# Patient Record
Sex: MALE | Race: WHITE | NOT HISPANIC OR LATINO | Employment: OTHER | URBAN - METROPOLITAN AREA
[De-identification: names, ages, dates, MRNs, and addresses within clinical notes are randomized per-mention and may not be internally consistent; named-entity substitution may affect disease eponyms.]

---

## 2017-01-11 ENCOUNTER — ANTICOAGULATION THERAPY VISIT (OUTPATIENT)
Dept: ANTICOAGULATION | Facility: CLINIC | Age: 82
End: 2017-01-11
Payer: MEDICARE

## 2017-01-11 DIAGNOSIS — I50.9 CONGESTIVE HEART FAILURE, UNSPECIFIED CONGESTIVE HEART FAILURE CHRONICITY, UNSPECIFIED CONGESTIVE HEART FAILURE TYPE: ICD-10-CM

## 2017-01-11 DIAGNOSIS — Z79.01 LONG-TERM (CURRENT) USE OF ANTICOAGULANTS: Primary | ICD-10-CM

## 2017-01-11 LAB — INR POINT OF CARE: 2.3 (ref 0.86–1.14)

## 2017-01-11 PROCEDURE — 99207 ZZC NO CHARGE NURSE ONLY: CPT

## 2017-01-11 PROCEDURE — 85610 PROTHROMBIN TIME: CPT | Mod: QW

## 2017-01-11 PROCEDURE — 36416 COLLJ CAPILLARY BLOOD SPEC: CPT

## 2017-01-11 NOTE — PROGRESS NOTES
ANTICOAGULATION FOLLOW-UP CLINIC VISIT    Patient Name:  Miguel Patten  Date:  1/11/2017  Contact Type:  Face to Face    SUBJECTIVE:     Patient Findings     Positives No Problem Findings           OBJECTIVE    INR PROTIME   Date Value Ref Range Status   01/11/2017 2.3* 0.86 - 1.14 Final       ASSESSMENT / PLAN  INR assessment THER    Recheck INR In: 8 WEEKS    INR Location Clinic      Anticoagulation Summary as of 1/11/2017     INR goal 2.0-3.0   Selected INR 2.3 (1/11/2017)   Maintenance plan 2.5 mg (2.5 mg x 1) on Mon, Wed, Fri; 3.75 mg (2.5 mg x 1.5) all other days   Full instructions 2.5 mg on Mon, Wed, Fri; 3.75 mg all other days   Weekly total 22.5 mg   No change documented Ashly Melo RN   Plan last modified Edward Lo RN (6/1/2016)   Next INR check 3/7/2017   Target end date     Indications   CHF (congestive heart failure) (H) [I50.9]  Long-term (current) use of anticoagulants [Z79.01] [Z79.01]         Anticoagulation Episode Summary     INR check location     Preferred lab     Send INR reminders to [facility]    Comments 2.5 mg tablets      Anticoagulation Care Providers     Provider Role Specialty Phone number    Rober العراقي MD [facility] [phone]            See the Encounter Report to view Anticoagulation Flowsheet and Dosing Calendar (Go to Encounters tab in chart review, and find the Anticoagulation Therapy Visit)    Dosage adjustment made based on physician directed care plan.    Ashly Melo, KARI

## 2017-01-24 DIAGNOSIS — I48.91 ATRIAL FIBRILLATION (H): Primary | ICD-10-CM

## 2017-01-24 NOTE — TELEPHONE ENCOUNTER
Hansa    Last Written Prescription Date: 04/11/2016  Last Fill Qty: 90, # refills: 3  Last Office Visit with G, P or [facility] prescribing provider: 09/27/2016       Date and Result of Last PT/INR:   INR      2.3   1/11/2017  INR      2.2   11/16/2016  INR     1.71   7/22/2016  INR     2.25   3/23/2015  INR      1.8   7/11/2014  INR      1.7   1/31/2014       Prema Pizano CMA

## 2017-01-25 RX ORDER — WARFARIN SODIUM 2.5 MG/1
TABLET ORAL
Qty: 90 TABLET | Refills: 3 | Status: ON HOLD | OUTPATIENT
Start: 2017-01-25 | End: 2017-04-21

## 2017-01-25 NOTE — TELEPHONE ENCOUNTER
Prescription approved per [facility] Refill Protocol.    Kerwin Faustin RN, BSN

## 2017-03-08 ENCOUNTER — ANTICOAGULATION THERAPY VISIT (OUTPATIENT)
Dept: ANTICOAGULATION | Facility: CLINIC | Age: 82
End: 2017-03-08
Payer: MEDICARE

## 2017-03-08 DIAGNOSIS — Z79.01 LONG-TERM (CURRENT) USE OF ANTICOAGULANTS: ICD-10-CM

## 2017-03-08 DIAGNOSIS — Z85.46 PERSONAL HISTORY OF MALIGNANT NEOPLASM OF PROSTATE: Primary | ICD-10-CM

## 2017-03-08 DIAGNOSIS — I50.9 CONGESTIVE HEART FAILURE, UNSPECIFIED CONGESTIVE HEART FAILURE CHRONICITY, UNSPECIFIED CONGESTIVE HEART FAILURE TYPE: ICD-10-CM

## 2017-03-08 LAB
ALBUMIN UR-MCNC: 100 MG/DL
APPEARANCE UR: CLEAR
BACTERIA #/AREA URNS HPF: ABNORMAL /HPF
BILIRUB UR QL STRIP: ABNORMAL
COLOR UR AUTO: ABNORMAL
GLUCOSE UR STRIP-MCNC: NEGATIVE MG/DL
HGB UR QL STRIP: ABNORMAL
INR POINT OF CARE: 3.6 (ref 0.86–1.14)
KETONES UR STRIP-MCNC: ABNORMAL MG/DL
LEUKOCYTE ESTERASE UR QL STRIP: NEGATIVE
MUCOUS THREADS #/AREA URNS LPF: PRESENT /LPF
NITRATE UR QL: NEGATIVE
NON-SQ EPI CELLS #/AREA URNS LPF: ABNORMAL /LPF
PH UR STRIP: 6 PH (ref 5–7)
PSA SERPL-MCNC: 1.37 UG/L (ref 0–4)
RBC #/AREA URNS AUTO: ABNORMAL /HPF (ref 0–2)
SP GR UR STRIP: 1.02 (ref 1–1.03)
URN SPEC COLLECT METH UR: ABNORMAL
UROBILINOGEN UR STRIP-ACNC: 1 EU/DL (ref 0.2–1)
WBC #/AREA URNS AUTO: ABNORMAL /HPF (ref 0–2)

## 2017-03-08 PROCEDURE — 84153 ASSAY OF PSA TOTAL: CPT | Performed by: UROLOGY

## 2017-03-08 PROCEDURE — 99207 ZZC NO CHARGE NURSE ONLY: CPT

## 2017-03-08 PROCEDURE — 81001 URINALYSIS AUTO W/SCOPE: CPT | Performed by: UROLOGY

## 2017-03-08 PROCEDURE — 85610 PROTHROMBIN TIME: CPT | Mod: QW

## 2017-03-08 PROCEDURE — 36416 COLLJ CAPILLARY BLOOD SPEC: CPT

## 2017-03-08 NOTE — PROGRESS NOTES
ANTICOAGULATION FOLLOW-UP CLINIC VISIT    Patient Name:  Miguel Patten  Date:  3/8/2017  Contact Type:  Face to Face    SUBJECTIVE:     Patient Findings     Positives Change in medications (on prednisone for the next 3 days)           OBJECTIVE    INR Protime   Date Value Ref Range Status   03/08/2017 3.6 (A) 0.86 - 1.14 Final       ASSESSMENT / PLAN  INR assessment SUPRA    Recheck INR In: 7 WEEKS    INR Location Clinic      Anticoagulation Summary as of 3/8/2017     INR goal 2.0-3.0   Today's INR 3.6!   Maintenance plan 2.5 mg (2.5 mg x 1) on Mon, Wed, Fri; 3.75 mg (2.5 mg x 1.5) all other days   Full instructions 3/9: Hold; Otherwise 2.5 mg on Mon, Wed, Fri; 3.75 mg all other days   Weekly total 22.5 mg   Plan last modified Edward Lo RN (6/1/2016)   Next INR check 4/26/2017   Target end date     Indications   CHF (congestive heart failure) (H) [I50.9]  Long-term (current) use of anticoagulants [Z79.01] [Z79.01]         Anticoagulation Episode Summary     INR check location     Preferred lab     Send INR reminders to [facility]    Comments 2.5 mg tablets      Anticoagulation Care Providers     Provider Role Specialty Phone number    Rober العراقي MD [facility] [phone]            See the Encounter Report to view Anticoagulation Flowsheet and Dosing Calendar (Go to Encounters tab in chart review, and find the Anticoagulation Therapy Visit)    Dosage adjustment made based on physician directed care plan.    Hold tomorrow (already took today's dose) and then resume     Ashly Melo RN

## 2017-03-08 NOTE — MR AVS SNAPSHOT
Miguel ANAND Patten   3/8/2017 9:00 AM   Anticoagulation Therapy Visit    Description:  84 year old male   Provider:  TARIQ ANTI COAG   Department:  Ph Anticoag           INR as of 3/8/2017     Today's INR 3.6!      Anticoagulation Summary as of 3/8/2017     INR goal 2.0-3.0   Today's INR 3.6!   Full instructions 3/9: Hold; Otherwise 2.5 mg on Mon, Wed, Fri; 3.75 mg all other days   Next INR check 4/26/2017    Indications   CHF (congestive heart failure) (H) [I50.9]  Long-term (current) use of anticoagulants [Z79.01] [Z79.01]         Your next Anticoagulation Clinic appointment(s)     Apr 26, 2017  9:00 AM CDT   Anticoagulation [facility] with TARIQ ANTI JOY   [facility]    [address] [place] [phone]   [phone]              Contact Numbers     Clinic Number:         March 2017 Details    Sun Mon Tue Wed Thu Fri Sat        1               2               3               4                 5               6               7               8      2.5 mg   See details      9      Hold         10      2.5 mg         11      3.75 mg           12      3.75 mg         13      2.5 mg         14      3.75 mg         15      2.5 mg         16      3.75 mg         17      2.5 mg         18      3.75 mg           19      3.75 mg         20      2.5 mg         21      3.75 mg         22      2.5 mg         23      3.75 mg         24      2.5 mg         25      3.75 mg           26      3.75 mg         27      2.5 mg         28      3.75 mg         29      2.5 mg         30      3.75 mg         31      2.5 mg           Date Details   03/08 This INR check               How to take your warfarin dose     To take:  2.5 mg Take 1 of the 2.5 mg tablets.    To take:  3.75 mg Take 1.5 of the 2.5 mg tablets.    Hold Do not take your warfarin dose. See the Details table to the right for additional instructions.                April 2017 Details    Sun Mon Tue Wed Thu Fri Sat            1      3.75 mg           2      3.75 mg         3      2.5 mg         4      3.75 mg         5      2.5 mg         6      3.75 mg         7      2.5 mg         8      3.75 mg           9      3.75 mg         10      2.5 mg         11      3.75 mg         12      2.5 mg         13      3.75 mg         14      2.5 mg         15      3.75 mg           16      3.75 mg         17      2.5 mg         18      3.75 mg         19      2.5 mg         20      3.75 mg         21      2.5 mg         22      3.75 mg           23      3.75 mg         24      2.5 mg         25      3.75 mg         26            27               28               29                 30                      Date Details   No additional details    Date of next INR:  4/26/2017         How to take your warfarin dose     To take:  2.5 mg Take 1 of the 2.5 mg tablets.    To take:  3.75 mg Take 1.5 of the 2.5 mg tablets.

## 2017-03-15 ENCOUNTER — ALLIED HEALTH/NURSE VISIT (OUTPATIENT)
Dept: CARDIOLOGY | Facility: CLINIC | Age: 82
End: 2017-03-15
Payer: MEDICARE

## 2017-03-15 DIAGNOSIS — Z95.0 CARDIAC PACEMAKER IN SITU: Primary | ICD-10-CM

## 2017-03-15 PROCEDURE — 93296 REM INTERROG EVL PM/IDS: CPT | Performed by: INTERNAL MEDICINE

## 2017-03-15 PROCEDURE — 93294 REM INTERROG EVL PM/LDLS PM: CPT | Performed by: INTERNAL MEDICINE

## 2017-03-15 NOTE — PROGRESS NOTES
[employer] CRT-P Remote PPM Device Check  AP: 0 % BIVP: 99 %  Mode: VVIR        Presenting Rhythm: BIVP  Heart Rate: Adequate rates per histogram  Sensing: Stable    Pacing Threshold: Stable    Impedance: Stable  Battery Status: 9 years  Atrial Arrhythmia: Chronic AFIB, taking Warfarin  Ventricular Arrhythmia: 7 ventricular high rates. 2 EGMs available show regular VS events for probable NSVT lasting 5-13 beats, rates 145-180bpm. EF 40-45% (2015). Reviewed findings with KARI Reis.      Care Plan: F/u annual threshold and OV in 3 months. Gave patient results over the phone. Dedra,CVT

## 2017-03-15 NOTE — MR AVS SNAPSHOT
After Visit Summary   3/15/2017    Miguel Patten    MRN: [id number]           Patient Information     Date Of Birth          12/16/1932        Visit Information        Provider Department      3/15/2017 3:45 PM BENNETT TECH1 [facility] [place]        Today's Diagnoses     Cardiac pacemaker in situ    -  1       Follow-ups after your visit        Your next 10 appointments already scheduled     Mar 15, 2017  3:45 PM CDT   Remote PPM Check with BENNETT TECH1   [facility] [place] ([facility])    [address] [place] [id number] [phone]           This appointment is for a remote check of your pacemaker.  This is not an appointment at the office.            Mar 30, 2017  9:30 AM CDT   SHORT with Rober العراقي MD   [facility]    [address] [facility] [id number] [phone]            Apr 26, 2017  9:00 AM CDT   Anticoagulation Visit with PH ANTI COAG   [facility] ([facility])    [address] [place] [id number] [phone]            May 31, 2017  9:00 AM CDT   Return Visit with CARDIO DEVICE NURSE   [facility] ([facility])    [address] [place] [phone]            May 31, 2017  9:30 AM CDT   Return Visit with Cristina Caputo MD   [facility] ([address] [phone]              Who to contact     If you have questions or need follow up information about today's clinic visit or your schedule please contact [facility] [place] directly at [phone].  Normal or non-critical lab and imaging results will be communicated to you by MyChart, letter or phone within 4 business days after the clinic has received the results. If you do not hear from us  "within 7 days, please contact the clinic through [employer] or phone. If you have a critical or abnormal lab result, we will notify you by phone as soon as possible.  Submit refill requests through [employer] or call your pharmacy and they will forward the refill request to us. Please allow 3 business days for your refill to be completed.          Additional Information About Your Visit        [employer] Information     [employer] lets you send messages to your doctor, view your test results, renew your prescriptions, schedule appointments and more. To sign up, go to [email or web address] . Click on \"Log in\" on the left side of the screen, which will take you to the Welcome page. Then click on \"Sign up Now\" on the right side of the page.     You will be asked to enter the access code listed below, as well as some personal information. Please follow the directions to create your username and password.     Your access code is: [email or web address]  Expires: 2017 10:57 AM     Your access code will  in 90 days. If you need help or a new code, please call your [place] clinic or [phone].        Care EveryWhere ID     This is your Care EveryWhere ID. This could be used by other organizations to access your [place] medical records  [id number]         Blood Pressure from Last 3 Encounters:   10/20/16 139/81   10/06/16 141/85   16 128/60    Weight from Last 3 Encounters:   10/20/16 85.5 kg (188 lb 6.4 oz)   16 85.5 kg (188 lb 6.4 oz)   16 84.4 kg (186 lb)              We Performed the Following     INTERROGATION DEVICE EVAL REMOTE, PACER/ICD (83558)     PM DEVICE INTERROGATE REMOTE (35481)        Primary Care Provider    None       No address on file        Thank you!     Thank you for choosing [facility] [place]  for your care. Our goal is always to provide you with excellent care. Hearing back from our patients is one way we can continue to improve our services. Please " take a few minutes to complete the written survey that you may receive in the mail after your visit with us. Thank you!             Your Updated Medication List - Protect others around you: Learn how to safely use, store and throw away your medicines at www.disposemymeds.org.          This list is accurate as of: 3/15/17 10:57 AM.  Always use your most recent med list.                   Brand Name Dispense Instructions for use    albuterol 108 (90 BASE) MCG/ACT Inhaler    PROAIR HFA/PROVENTIL HFA/VENTOLIN HFA    1 Inhaler    Inhale 2 puffs into the lungs every 6 hours as needed for shortness of breath / dyspnea or wheezing       fluticasone 44 MCG/ACT Inhaler    FLOVENT HFA    2 Inhaler    INHALE ONE PUFF INTO THE LUNGS TWO TIMES A DAY       ipratropium - albuterol 0.5 mg/2.5 mg/3 mL 0.5-2.5 (3) MG/3ML neb solution    DUONEB    60 vial    Take 3 mLs by nebulization every 6 hours as needed for shortness of breath / dyspnea.       JANTOVEN 2.5 MG tablet   Generic drug:  warfarin     90 tablet    TAKE ONE TABLET BY MOUTH DAILY  ON MONDAY, WEDNESDAY, AND FRIDAY AND TAKE ONE AND ONE-HALF TABLETS  DAILY BY MOUTH THE REST OF THE WEEK       losartan 50 MG tablet    COZAAR    180 tablet    Take 1 tablet (50 mg) by mouth 2 times daily       LUPRON DEPOT 22.5 MG kit   Generic drug:  leuprolide     1 each    Inject 22.5 MG, IM q 4 months per Dr. Zeyad Guevara, pt's Urologist in [place].       predniSONE 20 MG tablet    DELTASONE    10 tablet    TAKE ONE TABLET BY MOUTH EVERY DAY AS NEEDED       simvastatin 40 MG tablet    ZOCOR    90 tablet    TAKE ONE TABLET BY MOUTH AT BEDTIME

## 2017-04-06 ENCOUNTER — TELEPHONE (OUTPATIENT)
Dept: FAMILY MEDICINE | Facility: CLINIC | Age: 82
End: 2017-04-06

## 2017-04-06 ENCOUNTER — ALLIED HEALTH/NURSE VISIT (OUTPATIENT)
Dept: FAMILY MEDICINE | Facility: CLINIC | Age: 82
End: 2017-04-06
Payer: MEDICARE

## 2017-04-06 DIAGNOSIS — C61 MALIGNANT NEOPLASM OF PROSTATE (H): ICD-10-CM

## 2017-04-06 PROCEDURE — 96372 THER/PROPH/DIAG INJ SC/IM: CPT

## 2017-04-06 NOTE — MR AVS SNAPSHOT
After Visit Summary   4/6/2017    Miguel Patten    MRN: [id number]           Patient Information     Date Of Birth          12/16/1932        Visit Information        Provider Department      4/6/2017 4:30 PM CHERISE ARGUELLES TEAM [facility]        Today's Diagnoses     MALIGN NEOPL PROSTATE           Follow-ups after your visit        Your next 10 appointments already scheduled     Apr 26, 2017  9:00 AM CDT   [facility]agulation Visit with PH ANTI COAG   [facility]    [address] [place] [phone]   [phone]            May 31, 2017  9:00 AM CDT   Return Visit with CARDIO DEVICE NURSE   [facility]    [address] [place] [phone]            May 31, 2017  9:30 AM CDT   Return Visit with Cristina Caputo MD   [facility]    [address] [place] [address]   [phone]            Aug 10, 2017 10:00 AM CDT   Nurse Only with CHERISE ARGUELLES TEAM JOHANNY [facility] ([facility])    [address] [place] [phone]              Who to contact     If you have questions or need follow up information about today's clinic visit or your schedule please contact [facility] directly at [phone].  Normal or non-critical lab and imaging results will be communicated to you by MyChart, letter or phone within 4 business days after the clinic has received the results. If you do not hear from us within 7 days, please contact the clinic through MyChart or phone. If you have a critical or abnormal lab result, we will notify you by phone as soon as possible.  Submit refill requests through [employer] or call your pharmacy and they will forward the refill request to us. Please allow 3 business days for your refill to be completed.          Additional  "Information About Your Visit        MyChart Information     [employer] lets you send messages to your doctor, view your test results, renew your prescriptions, schedule appointments and more. To sign up, go to [email or web address] . Click on \"Log in\" on the left side of the screen, which will take you to the Welcome page. Then click on \"Sign up Now\" on the right side of the page.     You will be asked to enter the access code listed below, as well as some personal information. Please follow the directions to create your username and password.     Your access code is: [email or web address]  Expires: 2017 10:57 AM     Your access code will  in 90 days. If you need help or a new code, please call your [place] clinic or [phone].        Care EveryWhere ID     This is your Care EveryWhere ID. This could be used by other organizations to access your [place] medical records  [id number]         Blood Pressure from Last 3 Encounters:   10/20/16 139/81   10/06/16 141/85   16 128/60    Weight from Last 3 Encounters:   10/20/16 188 lb 6.4 oz (85.5 kg)   16 188 lb 6.4 oz (85.5 kg)   16 186 lb (84.4 kg)              We Performed the Following     INJECTION INTRAMUSCULAR OR SUB-Q     LEUPROLIDE ACETATE 7.5MG          Today's Medication Changes          These changes are accurate as of: 17  4:38 PM.  If you have any questions, ask your nurse or doctor.               These medicines have changed or have updated prescriptions.        Dose/Directions    LUPRON DEPOT 22.5 MG kit   This may have changed:  additional instructions   Used for:  Malignant neoplasm of prostate (H)   Generic drug:  leuprolide        Inject 22.5 MG, IM q 4 months per Dr. Zeyad Guevara, pt's Urologist in [place]. 2017   Quantity:  1 each   Refills:  3                Primary Care Provider    None       No address on file        Thank you!     Thank you for choosing [facility]  for your care. Our goal is " always to provide you with excellent care. Hearing back from our patients is one way we can continue to improve our services. Please take a few minutes to complete the written survey that you may receive in the mail after your visit with us. Thank you!             Your Updated Medication List - Protect others around you: Learn how to safely use, store and throw away your medicines at www.disposemymeds.org.          This list is accurate as of: 4/6/17  4:38 PM.  Always use your most recent med list.                   Brand Name Dispense Instructions for use    albuterol 108 (90 BASE) MCG/ACT Inhaler    PROAIR HFA/PROVENTIL HFA/VENTOLIN HFA    1 Inhaler    Inhale 2 puffs into the lungs every 6 hours as needed for shortness of breath / dyspnea or wheezing       fluticasone 44 MCG/ACT Inhaler    FLOVENT HFA    2 Inhaler    INHALE ONE PUFF INTO THE LUNGS TWO TIMES A DAY       ipratropium - albuterol 0.5 mg/2.5 mg/3 mL 0.5-2.5 (3) MG/3ML neb solution    DUONEB    60 vial    Take 3 mLs by nebulization every 6 hours as needed for shortness of breath / dyspnea.       JANTOVEN 2.5 MG tablet   Generic drug:  warfarin     90 tablet    TAKE ONE TABLET BY MOUTH DAILY  ON MONDAY, WEDNESDAY, AND FRIDAY AND TAKE ONE AND ONE-HALF TABLETS  DAILY BY MOUTH THE REST OF THE WEEK       losartan 50 MG tablet    COZAAR    180 tablet    Take 1 tablet (50 mg) by mouth 2 times daily       LUPRON DEPOT 22.5 MG kit   Generic drug:  leuprolide     1 each    Inject 22.5 MG, IM q 4 months per Dr. Zeyad Guevara, pt's Urologist in [place]. 4/6/2017       predniSONE 20 MG tablet    DELTASONE    10 tablet    TAKE ONE TABLET BY MOUTH EVERY DAY AS NEEDED       simvastatin 40 MG tablet    ZOCOR    90 tablet    TAKE ONE TABLET BY MOUTH AT BEDTIME

## 2017-04-06 NOTE — NURSING NOTE
Prior to injection verified patient identity using patient's name and date of birth.   Patient instructed to remain in clinic for 20 minutes afterwards, and to report any adverse reaction to me immediately.  Fabiana Bird MA

## 2017-04-10 ENCOUNTER — APPOINTMENT (OUTPATIENT)
Dept: GENERAL RADIOLOGY | Facility: CLINIC | Age: 82
End: 2017-04-10
Attending: FAMILY MEDICINE
Payer: MEDICARE

## 2017-04-10 ENCOUNTER — HOSPITAL ENCOUNTER (EMERGENCY)
Facility: CLINIC | Age: 82
Discharge: HOME | End: 2017-04-10
Attending: FAMILY MEDICINE | Admitting: FAMILY MEDICINE
Payer: MEDICARE

## 2017-04-10 VITALS
TEMPERATURE: 98.6 F | RESPIRATION RATE: 16 BRPM | HEART RATE: 72 BPM | SYSTOLIC BLOOD PRESSURE: 138 MMHG | DIASTOLIC BLOOD PRESSURE: 80 MMHG | OXYGEN SATURATION: 98 %

## 2017-04-10 DIAGNOSIS — C61 MALIGNANT NEOPLASM OF PROSTATE (H): ICD-10-CM

## 2017-04-10 DIAGNOSIS — M54.50 ACUTE RIGHT-SIDED LOW BACK PAIN WITHOUT SCIATICA: ICD-10-CM

## 2017-04-10 PROCEDURE — 99283 EMERGENCY DEPT VISIT LOW MDM: CPT | Mod: Z6 | Performed by: FAMILY MEDICINE

## 2017-04-10 PROCEDURE — 99283 EMERGENCY DEPT VISIT LOW MDM: CPT | Performed by: FAMILY MEDICINE

## 2017-04-10 PROCEDURE — 72170 X-RAY EXAM OF PELVIS: CPT | Mod: TC

## 2017-04-10 ASSESSMENT — ENCOUNTER SYMPTOMS
BACK PAIN: 1
FEVER: 0
APPETITE CHANGE: 0
DIZZINESS: 0
CHILLS: 0
WEAKNESS: 0
SHORTNESS OF BREATH: 0
LIGHT-HEADEDNESS: 0
ACTIVITY CHANGE: 1

## 2017-04-10 NOTE — ED AVS SNAPSHOT
[facility] Emergency Department    [address] [place] [address]    Phone:  [phone]    Fax:  [phone]                                       Miguel Patten   MRN: [id number]    Department:  [facility] Emergency Department   Date of Visit:  4/10/2017           After Visit Summary Signature Page     I have received my discharge instructions, and my questions have been answered. I have discussed any challenges I see with this plan with the nurse or doctor.    ..........................................................................................................................................  Patient/Patient Representative Signature      ..........................................................................................................................................  Patient Representative Print Name and Relationship to Patient    ..................................................               ................................................  Date                                            Time    ..........................................................................................................................................  Reviewed by Signature/Title    ...................................................              ..............................................  Date                                                            Time

## 2017-04-10 NOTE — ED PROVIDER NOTES
History     Chief Complaint   Patient presents with     Hip Pain     RIght hip pain started two days ago. Pt has had hip replacement 10 years ago.      The history is provided by the patient.     Miguel Patten is a 84 year old male who presents to the ED with bilateral hip pain. Patient woke up 2 days ago with bilateral hip pain but more prominent to right hip. Patient has bilateral hip replacements 10+ years ago. He denies an injury, fall or trauma. The pain is worsened with movement. When he gets up from a sitting position he develops pain to right lower side of back as well. Patient mentions he dances 2-3x a month but has never had problems with his hips before. He gets Lupron injections to buttocks due to a history of prostate cancer.     Patient Active Problem List   Diagnosis     Malignant neoplasm of prostate (H)     Health Care Home     Advanced directives, counseling/discussion     Gout     History of total hip arthroplasty - bilateral     Osteoarthritis of hip - right     Atrial flutter (H)     Aneurysm of ascending aorta (H)     CHF (congestive heart failure) (H)     Hypertension goal BP (blood pressure) < 140/90     Aortic regurgitation     S/P AVR (aortic valve replacement)     S/P ascending aortic replacement     Atrial fibrillation (H)     Long-term (current) use of anticoagulants [Z79.01]     Mild intermittent asthma without complication     Past Medical History:   Diagnosis Date     Arthritis      Ascending aortic aneurysm (H)     repaired with Hemashield graft 7/2014     Asthma      Atrial fibrillation (H)     chronic     Complete AV block (H)     sp CRTP implant     Congestive heart failure (H)      Coronary artery disease     mild-moderate stenosis by angiography     H/O aortic valve replacement     bioprosthetic, 7/2014     Hypertension      Malignant neoplasm of prostate (H)     Prostate cancer       Past Surgical History:   Procedure Laterality Date     ARTHROPLASTY HIP  1/21/2013     Procedure: ARTHROPLASTY HIP;  right total hip arthroplasty;  Surgeon: Rober Alves MD;  Location: [facility] OR     C NONSPECIFIC PROCEDURE      Hernia repair     C NONSPECIFIC PROCEDURE      Prostatectomy/cancer     C TOTAL HIP ARTHROPLASTY  1/21/13    Right     GENITOURINARY SURGERY  2001    Prostatectomy       ORTHOPEDIC SURGERY      Left SONALI     PHACOEMULSIFICATION WITH STANDARD INTRAOCULAR LENS IMPLANT Right 10/6/2016    Procedure: PHACOEMULSIFICATION WITH STANDARD INTRAOCULAR LENS IMPLANT;  Surgeon: Elder Rueda MD;  Location: [facility] OR     PHACOEMULSIFICATION WITH STANDARD INTRAOCULAR LENS IMPLANT Left 10/20/2016    Procedure: PHACOEMULSIFICATION WITH STANDARD INTRAOCULAR LENS IMPLANT;  Surgeon: Elder Rueda MD;  Location: [facility] OR     REPAIR ANEURYSM ASCENDING AORTA  7/17/2014    2. Aortic aneurysm repair with 32 mm Hemashield graft.      REPLACE VALVE AORTIC  7/17/2014    1. Aortic valve replacement with 23 mm [employer] Trifecta tissue valve.      s/p aneurysm repair by Dr. Friedman       s/p avr         Family History   Problem Relation Age of Onset     Asthma Daughter      Asthma Father      Asthma Paternal Grandmother        Social History   Substance Use Topics     Smoking status: Never Smoker     Smokeless tobacco: Never Used     Alcohol use 0.0 oz/week     0 Standard drinks or equivalent per week      Comment: rarely, once a week or less        Immunization History   Administered Date(s) Administered     Influenza (High Dose) 3 valent vaccine 11/05/2010, 10/14/2011, 10/29/2012, 10/04/2013, 10/10/2014, 11/10/2015, 11/16/2016     Influenza (IIV3) 10/30/2003, 10/20/2004, 11/03/2005, 11/07/2006, 11/08/2007, 11/25/2008, 10/02/2009     Pneumococcal 23 valent 01/08/2010     TD (ADULT, 7+) 01/08/2010          Allergies   Allergen Reactions     No Known Drug Allergies        Current Outpatient Prescriptions   Medication Sig Dispense Refill     leuprolide (LUPRON DEPOT) 22.5 MG kit Inject 22.5 MG, IM q  4 months per Dr. Zeyad Guevara, pt's Urologist in [place]. 4/6/2017 1 each 3     predniSONE (DELTASONE) 20 MG tablet TAKE ONE TABLET BY MOUTH EVERY DAY AS NEEDED 10 tablet 3     JANTOVEN 2.5 MG tablet TAKE ONE TABLET BY MOUTH DAILY  ON MONDAY, WEDNESDAY, AND FRIDAY AND TAKE ONE AND ONE-HALF TABLETS  DAILY BY MOUTH THE REST OF THE WEEK 90 tablet 3     albuterol (PROAIR HFA, PROVENTIL HFA, VENTOLIN HFA) 108 (90 BASE) MCG/ACT inhaler Inhale 2 puffs into the lungs every 6 hours as needed for shortness of breath / dyspnea or wheezing 1 Inhaler 6     losartan (COZAAR) 50 MG tablet Take 1 tablet (50 mg) by mouth 2 times daily 180 tablet 3     simvastatin (ZOCOR) 40 MG tablet TAKE ONE TABLET BY MOUTH AT BEDTIME 90 tablet 3     fluticasone (FLOVENT HFA) 44 MCG/ACT inhaler INHALE ONE PUFF INTO THE LUNGS TWO TIMES A DAY 2 Inhaler 2     ipratropium - albuterol 0.5 mg/2.5 mg/3 mL (DUONEB) 0.5-2.5 (3) MG/3ML nebulization Take 3 mLs by nebulization every 6 hours as needed for shortness of breath / dyspnea. 60 vial 1       I have reviewed the Medications, Allergies, Past Medical and Surgical History, and Social History in the Epic system.    Review of Systems   Constitutional: Positive for activity change. Negative for appetite change, chills and fever.   Respiratory: Negative for shortness of breath.    Musculoskeletal: Positive for back pain (right lower side).        Positive for bilateral hip pain but more in right hip. He has had previous right and left SONALI.   Neurological: Negative for dizziness, weakness and light-headedness.   All other systems reviewed and are negative.      Physical Exam      Physical Exam   Constitutional: He is oriented to person, place, and time. He appears well-developed and well-nourished. No distress.   HENT:   Head: Normocephalic and atraumatic.   Eyes: Conjunctivae and EOM are normal.   Pulmonary/Chest: No respiratory distress.   Musculoskeletal: He exhibits no edema or deformity.   He has  no pain over the hip on the right side. He has right sided low back pain with no radiation. The most tender area is around the right SI joint.    Neurological: He is alert and oriented to person, place, and time.   Skin: Skin is warm and dry. No rash noted. No erythema.       ED Course     ED Course     Procedures    Results for orders placed or performed during the hospital encounter of 04/10/17 (from the past 24 hour(s))   Pelvis XR, 1-2 views    Narrative    XR PELVIS 1/2 VW 4/10/2017 3:05 PM    COMPARISON: 3/4/2013 and CT dated 7/22/2016.    HISTORY: Pain over the right iliac/sacral area, history of prostate  cancer.      Impression    IMPRESSION: Bilateral hip arthroplasties are again seen, hardware in  unchanged position. No displaced fractures are seen. No definite lytic  or sclerotic lesions are seen. However, stool and bowel gas somewhat  obscure the pelvis.    IANTRAM COLON           Assessments & Plan (with Medical Decision Making)  Miguel is an 84-year-old male here with what he calls right hip pain.  He's had this pain now for about 6 or 7 days.  He had a right total hip about 10 years ago.  Since about Tuesday of last week he has had pain getting out of a chair.  When he actually points to the area, however, it's over his right SI joint.  Exam shows no skin rash and no tingling or abnormal sensation of the skin.  There is an area of palpable tenderness just above the right SI joint, superior to the joint.  X-ray shows no lytic bone lesions, and we did this because he has a history of prostate cancer.  The patient was very relieved to hear this.  I recommend physical therapy and is very happy with that idea.  He's been to physical therapy in the past and it has worked well for him.  He was discharged from the ED.       I have reviewed the nursing notes.    I have reviewed the findings, diagnosis, plan and need for follow up with the patient.    New Prescriptions    No medications on file       Final  diagnoses:   Acute right-sided low back pain without sciatica       This document serves as a record of services personally performed by Rober Mack MD. It was created on their behalf by Judie Moody, a trained medical scribe. The creation of this record is based on the provider's personal observations and the statements of the patient. This document has been checked and approved by the attending provider.   Note: Chart documentation done in part with Dragon Voice Recognition software. Although reviewed after completion, some word and grammatical errors may remain.        4/10/2017   [facility] EMERGENCY DEPARTMENT     Rober Mack MD  04/10/17 1522

## 2017-04-10 NOTE — ED NOTES
"Pt presents with right hip pain. Started two days ago. Pt has had total hip replacement 10 years ago. Pt has no pain while sitting but while moving pt states \"hurts like hell.\" Pt using heat.   "

## 2017-04-10 NOTE — DISCHARGE INSTRUCTIONS
Back Care Tips    Caring for your back  These are things you can do to prevent a recurrence of acute back pain and to reduce symptoms from chronic back pain:    Maintain a healthy weight. If you are overweight, losing weight will help most types of back pain.    Exercise is an important part of recovery from most types of back pain. The back is supported by the muscles behind and in front of the spine. This means both the back muscles and the abdominal muscles must be strengthened to provide better support for your spine.     Swimming and brisk walking are good overall exercises to improve your fitness level.    Practice safe lifting methods (below).    Practice good posture when sitting, standing and walking. Avoid prolonged sitting. This puts more stress on the lower back than standing or walking.    Wear quality shoes with sufficient arch support. Foot and ankle alignment can affect back symptoms. Women should avoid high heels.    Therapeutic massage can help  relax the back muscles without stretching them.    During the first 24 to 72 hours after an acute injury or flare-up of chronic back pain, apply an ice pack to the painful area for 20 minutes and then remove it for 20 minutes over a period of 60 to 90 minutes or several times a day. As a safety precaution, do not use a heating pad at bedtime. Sleeping on a heating pad can lead to skin burns or tissue damage.    Ice and heat therapies can be alternated.  Medications  Talk to your doctor before using medications, especially if you have other medical problems or are taking other medicines.    You may use acetaminophen or ibuprofen to control pain, unless other pain medicine was prescribed. If you have chronic conditions like diabetes, liver or kidney disease, stomach ulcers or gastrointestinal bleeding, or are taking blood thinners, talk with your doctor before taking any meidcations.    Be careful if you are given prescription pain medicines, narcotics, or  medication for muscle spasm. They can cause drowsiness, affect your coordination, reflexes and judgment. Do not drive or operate heavy machinery.  Lumbar stretch  Here is a simple stretching exercise that will help relax muscle spasm and keep your back more limber. If exercise makes your back pain worse, don t do it.    Lie on your back with your knees bent and both feet on the ground.    Slowly raise your left knee to your chest as you flatten your lower back against the floor. Hold for 5 seconds.    Relax and repeat the exercise with your right knee.    Do 10 of these exercises for each leg.  Safe lifting method    Don t bend over at the waist to lift an object off the floor.  Instead, bend your knees and hips in a squat.     Keep your back and head upright    Hold the object close to your body, directly in front of you.    Straighten your legs to lift the object.     Lower the object to the floor in the reverse fashion.    If you must slide something across the floor, push it.  Posture tips  Sitting  Sit in chairs with straight backs or low-back support. rKeep your k nees lower than your hip, with your feet flat on the floor.  When driving, sit up straight. Adjust the seat forward so you are not leaning toward the steering wheel.  A small pillow or rolled towel behind your lower back may help if you are driving long distances.   Standing  When standing for long periods, shift most of your weight to one leg at a time. Alternate legs every few minutes.   Sleeping  The best way to sleep is on your side with your knees bent. Put a low pillow under your head to support your neck in a neutral spine position. Avoid thick pillows that bend your neck to one side. Put a pillow between your legs to further relax your lower back. If you sleep on your back, put pillows under your knees to support your legs in a slightly flexed position. Use a firm mattress. If your mattress sags, replace it, or use a 1/2-inch plywood board  under the mattress to add support.  Follow-up care  Follow up with your health care provider or as directed by our staff.  If X-rays, a CT scan or an MRI scan were taken, they will be reviewed by a radiologist. You will be notified of any new findings that may affect your care.  Call 911  Seek emergency medical care if any of the following occur:    Trouble breathing    Confusion    Very drowsy or trouble breathing    Fainting or loss of consciousness    Rapid or very slow heart rate    Loss of  bwel or bladder control  When to seek medical care  Call your health care provider if any of the following occur:    Pain becomes worse or spreads to your arms or legs    Weakness or numbness in one or both arms or legs    Numbness in the groin area    Thank you for choosing our Emergency Department for your care.     Sincerely,    Dr Oscar Mack M.D.

## 2017-04-10 NOTE — ED AVS SNAPSHOT
[facility]    [address] [place] [address]    Phone:  [phone]    Fax:  [phone]                                       Miguel Patten   MRN: [id number]    Department:  [facility]   Date of Visit:  4/10/2017           Patient Information     Date Of Birth          12/16/1932        Your diagnoses for this visit were:     Acute right-sided low back pain without sciatica        You were seen by Rober Mack MD.      Follow-up Information     Follow up with [facility].    Specialty:  EMERGENCY MEDICINE    Why:  If symptoms worsen    Contact information:    [address] [facility]   [place] [id number] [phone]    Additional information:    From [address]: Exit at [employer] on [place] side of [place]. Turn right on [address]. Turn left at stoplight on [address]. [facility] will be in view two blocks ahead        Discharge Instructions         Back Care Tips    Caring for your back  These are things you can do to prevent a recurrence of acute back pain and to reduce symptoms from chronic back pain:    Maintain a healthy weight. If you are overweight, losing weight will help most types of back pain.    Exercise is an important part of recovery from most types of back pain. The back is supported by the muscles behind and in front of the spine. This means both the back muscles and the abdominal muscles must be strengthened to provide better support for your spine.     Swimming and brisk walking are good overall exercises to improve your fitness level.    Practice safe lifting methods (below).    Practice good posture when sitting, standing and walking. Avoid prolonged sitting. This puts more stress on the lower back than standing or walking.    Wear quality shoes with sufficient arch support. Foot and ankle alignment can affect back symptoms. Women should avoid high  heels.    Therapeutic massage can help  relax the back muscles without stretching them.    During the first 24 to 72 hours after an acute injury or flare-up of chronic back pain, apply an ice pack to the painful area for 20 minutes and then remove it for 20 minutes over a period of 60 to 90 minutes or several times a day. As a safety precaution, do not use a heating pad at bedtime. Sleeping on a heating pad can lead to skin burns or tissue damage.    Ice and heat therapies can be alternated.  Medications  Talk to your doctor before using medications, especially if you have other medical problems or are taking other medicines.    You may use acetaminophen or ibuprofen to control pain, unless other pain medicine was prescribed. If you have chronic conditions like diabetes, liver or kidney disease, stomach ulcers or gastrointestinal bleeding, or are taking blood thinners, talk with your doctor before taking any meidcations.    Be careful if you are given prescription pain medicines, narcotics, or medication for muscle spasm. They can cause drowsiness, affect your coordination, reflexes and judgment. Do not drive or operate heavy machinery.  Lumbar stretch  Here is a simple stretching exercise that will help relax muscle spasm and keep your back more limber. If exercise makes your back pain worse, don t do it.    Lie on your back with your knees bent and both feet on the ground.    Slowly raise your left knee to your chest as you flatten your lower back against the floor. Hold for 5 seconds.    Relax and repeat the exercise with your right knee.    Do 10 of these exercises for each leg.  Safe lifting method    Don t bend over at the waist to lift an object off the floor.  Instead, bend your knees and hips in a squat.     Keep your back and head upright    Hold the object close to your body, directly in front of you.    Straighten your legs to lift the object.     Lower the object to the floor in the reverse  fashion.    If you must slide something across the floor, push it.  Posture tips  Sitting  Sit in chairs with straight backs or low-back support. rKeep your k nees lower than your hip, with your feet flat on the floor.  When driving, sit up straight. Adjust the seat forward so you are not leaning toward the steering wheel.  A small pillow or rolled towel behind your lower back may help if you are driving long distances.   Standing  When standing for long periods, shift most of your weight to one leg at a time. Alternate legs every few minutes.   Sleeping  The best way to sleep is on your side with your knees bent. Put a low pillow under your head to support your neck in a neutral spine position. Avoid thick pillows that bend your neck to one side. Put a pillow between your legs to further relax your lower back. If you sleep on your back, put pillows under your knees to support your legs in a slightly flexed position. Use a firm mattress. If your mattress sags, replace it, or use a 1/2-inch plywood board under the mattress to add support.  Follow-up care  Follow up with your health care provider or as directed by our staff.  If X-rays, a CT scan or an MRI scan were taken, they will be reviewed by a radiologist. You will be notified of any new findings that may affect your care.  Call 911  Seek emergency medical care if any of the following occur:    Trouble breathing    Confusion    Very drowsy or trouble breathing    Fainting or loss of consciousness    Rapid or very slow heart rate    Loss of  bwel or bladder control  When to seek medical care  Call your health care provider if any of the following occur:    Pain becomes worse or spreads to your arms or legs    Weakness or numbness in one or both arms or legs    Numbness in the groin area    Thank you for choosing our Emergency Department for your care.     Sincerely,    Dr Oscar Mack M.D.          Future Appointments        Provider Department Dept Phone  Center    4/26/2017 9:00 AM [place] [facility] [phone] [facility]    5/31/2017 9:00 AM CARDIO DEVICE NURSE [facility] [phone] [facility]    5/31/2017 9:30 AM Cristina Caputo MD [facility] [phone] [facility]    8/10/2017 10:00 AM NL FLOAT TEAM D [facility] [phone] [facility]      24 Hour Appointment Hotline       To make an appointment at any [facility], call [phone] ([phone]). If you don't have a family doctor or clinic, we will help you find one. [facility] are conveniently located to serve the needs of you and your family.          ED Discharge Orders     PHYSICAL THERAPY REFERRAL       *This therapy referral will be filtered to a centralized scheduling office at [facility] and the patient will receive a call to schedule an appointment at a [facility] location most convenient for them. *     [facility] provides Physical Therapy evaluation and treatment and many specialty services across the [facility] system.  If requesting a specialty program, please choose from the list below.    If you have not heard from the scheduling office within 2 business days, please call [phone] for all locations, with the exception of [place], please call [phone].  Treatment: Evaluation & Treatment  Special Instructions/Modalities:   Special Programs: None    Please be aware that coverage of these services is subject to the terms and limitations of your health insurance plan.  Call member services at your health plan with any benefit or coverage questions.      **Note to Provider:  If you are referring outside of [facility] for the therapy appointment, please list the name of the location in the  special instructions  above, print the referral and give to the patient to schedule the appointment.                     Review of your medicines      Our records  show that you are taking the medicines listed below. If these are incorrect, please call your family doctor or clinic.        Dose / Directions Last dose taken    albuterol 108 (90 BASE) MCG/ACT Inhaler   Commonly known as:  PROAIR HFA/PROVENTIL HFA/VENTOLIN HFA   Dose:  2 puff   Quantity:  1 Inhaler        Inhale 2 puffs into the lungs every 6 hours as needed for shortness of breath / dyspnea or wheezing   Refills:  6        fluticasone 44 MCG/ACT Inhaler   Commonly known as:  FLOVENT HFA   Quantity:  2 Inhaler        INHALE ONE PUFF INTO THE LUNGS TWO TIMES A DAY   Refills:  2        ipratropium - albuterol 0.5 mg/2.5 mg/3 mL 0.5-2.5 (3) MG/3ML neb solution   Commonly known as:  DUONEB   Dose:  1 ampule   Quantity:  60 vial        Take 3 mLs by nebulization every 6 hours as needed for shortness of breath / dyspnea.   Refills:  1        JANTOVEN 2.5 MG tablet   Quantity:  90 tablet   Generic drug:  warfarin        TAKE ONE TABLET BY MOUTH DAILY  ON MONDAY, WEDNESDAY, AND FRIDAY AND TAKE ONE AND ONE-HALF TABLETS  DAILY BY MOUTH THE REST OF THE WEEK   Refills:  3        losartan 50 MG tablet   Commonly known as:  COZAAR   Dose:  50 mg   Quantity:  180 tablet        Take 1 tablet (50 mg) by mouth 2 times daily   Refills:  3        LUPRON DEPOT 22.5 MG kit   Quantity:  1 each   Generic drug:  leuprolide        Inject 22.5 MG, IM q 4 months per Dr. Zeyad Guevara, pt's Urologist in [place]. 4/6/2017   Refills:  3        predniSONE 20 MG tablet   Commonly known as:  DELTASONE   Quantity:  10 tablet        TAKE ONE TABLET BY MOUTH EVERY DAY AS NEEDED   Refills:  3        simvastatin 40 MG tablet   Commonly known as:  ZOCOR   Quantity:  90 tablet        TAKE ONE TABLET BY MOUTH AT BEDTIME   Refills:  3                Procedures and tests performed during your visit     Pelvis XR, 1-2 views      Orders Needing Specimen Collection     None      Pending Results     No orders found from 4/8/2017 to 4/11/2017.           "  Pending Culture Results     No orders found from 2017 to 2017.            Thank you for choosing [facility]       Thank you for choosing [facility] for your care. Our goal is always to provide you with excellent care. Hearing back from our patients is one way we can continue to improve our services. Please take a few minutes to complete the written survey that you may receive in the mail after you visit with us. Thank you!        [employer] Information     [employer] lets you send messages to your doctor, view your test results, renew your prescriptions, schedule appointments and more. To sign up, go to [email or web address] . Click on \"Log in\" on the left side of the screen, which will take you to the Welcome page. Then click on \"Sign up Now\" on the right side of the page.     You will be asked to enter the access code listed below, as well as some personal information. Please follow the directions to create your username and password.     Your access code is: [email or web address]  Expires: 2017 10:57 AM     Your access code will  in 90 days. If you need help or a new code, please call your [facility] clinic or [phone].        Care EveryWhere ID     This is your Care EveryWhere ID. This could be used by other organizations to access your [facility] medical records  [id number]        After Visit Summary       This is your record. Keep this with you and show to your community pharmacist(s) and doctor(s) at your next visit.                  "

## 2017-04-19 ENCOUNTER — OFFICE VISIT (OUTPATIENT)
Dept: FAMILY MEDICINE | Facility: OTHER | Age: 82
End: 2017-04-19
Payer: MEDICARE

## 2017-04-19 ENCOUNTER — HOSPITAL ENCOUNTER (OUTPATIENT)
Dept: CT IMAGING | Facility: CLINIC | Age: 82
Discharge: HOME | DRG: 372 | End: 2017-04-19
Attending: INTERNAL MEDICINE | Admitting: INTERNAL MEDICINE
Payer: MEDICARE

## 2017-04-19 ENCOUNTER — HOSPITAL ENCOUNTER (INPATIENT)
Facility: CLINIC | Age: 82
LOS: 2 days | Discharge: HOME | DRG: 372 | End: 2017-04-21
Attending: EMERGENCY MEDICINE | Admitting: FAMILY MEDICINE
Payer: MEDICARE

## 2017-04-19 VITALS
HEIGHT: 72 IN | BODY MASS INDEX: 23.84 KG/M2 | DIASTOLIC BLOOD PRESSURE: 62 MMHG | HEART RATE: 76 BPM | WEIGHT: 176 LBS | SYSTOLIC BLOOD PRESSURE: 108 MMHG | OXYGEN SATURATION: 97 % | TEMPERATURE: 98.2 F

## 2017-04-19 DIAGNOSIS — Z79.01 LONG-TERM (CURRENT) USE OF ANTICOAGULANTS: ICD-10-CM

## 2017-04-19 DIAGNOSIS — I48.20 CHRONIC ATRIAL FIBRILLATION (H): ICD-10-CM

## 2017-04-19 DIAGNOSIS — I10 HYPERTENSION GOAL BP (BLOOD PRESSURE) < 140/90: ICD-10-CM

## 2017-04-19 DIAGNOSIS — I50.9 CONGESTIVE HEART FAILURE, UNSPECIFIED CONGESTIVE HEART FAILURE CHRONICITY, UNSPECIFIED CONGESTIVE HEART FAILURE TYPE: ICD-10-CM

## 2017-04-19 DIAGNOSIS — I48.20 CHRONIC ATRIAL FIBRILLATION (H): Primary | ICD-10-CM

## 2017-04-19 DIAGNOSIS — K35.32 RUPTURED APPENDICITIS: ICD-10-CM

## 2017-04-19 DIAGNOSIS — R10.31 RLQ ABDOMINAL PAIN: Primary | ICD-10-CM

## 2017-04-19 DIAGNOSIS — R10.31 RLQ ABDOMINAL PAIN: ICD-10-CM

## 2017-04-19 DIAGNOSIS — E78.5 HYPERLIPIDEMIA LDL GOAL <100: ICD-10-CM

## 2017-04-19 DIAGNOSIS — I48.91 ATRIAL FIBRILLATION (H): ICD-10-CM

## 2017-04-19 PROBLEM — K37 APPENDICITIS: Status: ACTIVE | Noted: 2017-04-19

## 2017-04-19 LAB
ALBUMIN SERPL-MCNC: 3.5 G/DL (ref 3.4–5)
ALP SERPL-CCNC: 56 U/L (ref 40–150)
ALT SERPL W P-5'-P-CCNC: 15 U/L (ref 0–70)
ANION GAP SERPL CALCULATED.3IONS-SCNC: 8 MMOL/L (ref 3–14)
ANION GAP SERPL CALCULATED.3IONS-SCNC: 9 MMOL/L (ref 3–14)
APTT PPP: 66 SEC (ref 22–37)
AST SERPL W P-5'-P-CCNC: 14 U/L (ref 0–45)
BASOPHILS # BLD AUTO: 0.1 10E9/L (ref 0–0.2)
BASOPHILS # BLD AUTO: 0.1 10E9/L (ref 0–0.2)
BASOPHILS NFR BLD AUTO: 0.5 %
BASOPHILS NFR BLD AUTO: 0.8 %
BILIRUB SERPL-MCNC: 1.4 MG/DL (ref 0.2–1.3)
BUN SERPL-MCNC: 15 MG/DL (ref 7–30)
BUN SERPL-MCNC: 16 MG/DL (ref 7–30)
CALCIUM SERPL-MCNC: 8.5 MG/DL (ref 8.5–10.1)
CALCIUM SERPL-MCNC: 8.9 MG/DL (ref 8.5–10.1)
CHLORIDE SERPL-SCNC: 102 MMOL/L (ref 94–109)
CHLORIDE SERPL-SCNC: 106 MMOL/L (ref 94–109)
CHOLEST SERPL-MCNC: 127 MG/DL
CO2 SERPL-SCNC: 25 MMOL/L (ref 20–32)
CO2 SERPL-SCNC: 28 MMOL/L (ref 20–32)
CREAT SERPL-MCNC: 0.75 MG/DL (ref 0.66–1.25)
CREAT SERPL-MCNC: 0.88 MG/DL (ref 0.66–1.25)
CRP SERPL-MCNC: 102 MG/L (ref 0–8)
DIFFERENTIAL METHOD BLD: ABNORMAL
DIFFERENTIAL METHOD BLD: NORMAL
EOSINOPHIL # BLD AUTO: 0.1 10E9/L (ref 0–0.7)
EOSINOPHIL # BLD AUTO: 0.1 10E9/L (ref 0–0.7)
EOSINOPHIL NFR BLD AUTO: 0.9 %
EOSINOPHIL NFR BLD AUTO: 1.1 %
ERYTHROCYTE [DISTWIDTH] IN BLOOD BY AUTOMATED COUNT: 15 % (ref 10–15)
ERYTHROCYTE [DISTWIDTH] IN BLOOD BY AUTOMATED COUNT: 15.2 % (ref 10–15)
ERYTHROCYTE [SEDIMENTATION RATE] IN BLOOD BY WESTERGREN METHOD: 23 MM/H (ref 0–20)
GFR SERPL CREATININE-BSD FRML MDRD: 82 ML/MIN/1.7M2
GFR SERPL CREATININE-BSD FRML MDRD: ABNORMAL ML/MIN/1.7M2
GLUCOSE SERPL-MCNC: 80 MG/DL (ref 70–99)
GLUCOSE SERPL-MCNC: 95 MG/DL (ref 70–99)
HCT VFR BLD AUTO: 40.3 % (ref 40–53)
HCT VFR BLD AUTO: 41.8 % (ref 40–53)
HDLC SERPL-MCNC: 70 MG/DL
HGB BLD-MCNC: 13.4 G/DL (ref 13.3–17.7)
HGB BLD-MCNC: 13.8 G/DL (ref 13.3–17.7)
IMM GRANULOCYTES # BLD: 0 10E9/L (ref 0–0.4)
IMM GRANULOCYTES NFR BLD: 0.2 %
INR PPP: 2.99 (ref 0.86–1.14)
LDLC SERPL CALC-MCNC: 45 MG/DL
LYMPHOCYTES # BLD AUTO: 1.5 10E9/L (ref 0.8–5.3)
LYMPHOCYTES # BLD AUTO: 1.5 10E9/L (ref 0.8–5.3)
LYMPHOCYTES NFR BLD AUTO: 14.3 %
LYMPHOCYTES NFR BLD AUTO: 15.2 %
MCH RBC QN AUTO: 29.8 PG (ref 26.5–33)
MCH RBC QN AUTO: 30.3 PG (ref 26.5–33)
MCHC RBC AUTO-ENTMCNC: 33 G/DL (ref 31.5–36.5)
MCHC RBC AUTO-ENTMCNC: 33.3 G/DL (ref 31.5–36.5)
MCV RBC AUTO: 90 FL (ref 78–100)
MCV RBC AUTO: 92 FL (ref 78–100)
MONOCYTES # BLD AUTO: 1 10E9/L (ref 0–1.3)
MONOCYTES # BLD AUTO: 1.2 10E9/L (ref 0–1.3)
MONOCYTES NFR BLD AUTO: 11.4 %
MONOCYTES NFR BLD AUTO: 9.9 %
NEUTROPHILS # BLD AUTO: 7.2 10E9/L (ref 1.6–8.3)
NEUTROPHILS # BLD AUTO: 7.4 10E9/L (ref 1.6–8.3)
NEUTROPHILS NFR BLD AUTO: 72.4 %
NEUTROPHILS NFR BLD AUTO: 73.3 %
NONHDLC SERPL-MCNC: 57 MG/DL
PLATELET # BLD AUTO: 193 10E9/L (ref 150–450)
PLATELET # BLD AUTO: 203 10E9/L (ref 150–450)
POTASSIUM SERPL-SCNC: 4.3 MMOL/L (ref 3.4–5.3)
POTASSIUM SERPL-SCNC: 4.5 MMOL/L (ref 3.4–5.3)
PROT SERPL-MCNC: 7 G/DL (ref 6.8–8.8)
RBC # BLD AUTO: 4.5 10E12/L (ref 4.4–5.9)
RBC # BLD AUTO: 4.55 10E12/L (ref 4.4–5.9)
SODIUM SERPL-SCNC: 139 MMOL/L (ref 133–144)
SODIUM SERPL-SCNC: 139 MMOL/L (ref 133–144)
TRIGL SERPL-MCNC: 60 MG/DL
WBC # BLD AUTO: 10.2 10E9/L (ref 4–11)
WBC # BLD AUTO: 9.8 10E9/L (ref 4–11)

## 2017-04-19 PROCEDURE — 85730 THROMBOPLASTIN TIME PARTIAL: CPT | Performed by: EMERGENCY MEDICINE

## 2017-04-19 PROCEDURE — 36415 COLL VENOUS BLD VENIPUNCTURE: CPT | Performed by: INTERNAL MEDICINE

## 2017-04-19 PROCEDURE — 99207 ZZC CDG-UP CODE -MED NECESSITY: CPT | Performed by: PHYSICIAN ASSISTANT

## 2017-04-19 PROCEDURE — 80048 BASIC METABOLIC PNL TOTAL CA: CPT | Performed by: INTERNAL MEDICINE

## 2017-04-19 PROCEDURE — 86140 C-REACTIVE PROTEIN: CPT | Performed by: EMERGENCY MEDICINE

## 2017-04-19 PROCEDURE — 99285 EMERGENCY DEPT VISIT HI MDM: CPT | Mod: 25

## 2017-04-19 PROCEDURE — 25800025 ZZH RX 258: Performed by: PHYSICIAN ASSISTANT

## 2017-04-19 PROCEDURE — 85025 COMPLETE CBC W/AUTO DIFF WBC: CPT | Performed by: EMERGENCY MEDICINE

## 2017-04-19 PROCEDURE — 85610 PROTHROMBIN TIME: CPT | Performed by: EMERGENCY MEDICINE

## 2017-04-19 PROCEDURE — 93010 ELECTROCARDIOGRAM REPORT: CPT | Mod: Z6 | Performed by: EMERGENCY MEDICINE

## 2017-04-19 PROCEDURE — 25000128 H RX IP 250 OP 636: Performed by: PHYSICIAN ASSISTANT

## 2017-04-19 PROCEDURE — 80061 LIPID PANEL: CPT | Performed by: INTERNAL MEDICINE

## 2017-04-19 PROCEDURE — 25000132 ZZH RX MED GY IP 250 OP 250 PS 637: Mod: GY | Performed by: PHYSICIAN ASSISTANT

## 2017-04-19 PROCEDURE — 85652 RBC SED RATE AUTOMATED: CPT | Performed by: EMERGENCY MEDICINE

## 2017-04-19 PROCEDURE — A9270 NON-COVERED ITEM OR SERVICE: HCPCS | Mod: GY | Performed by: PHYSICIAN ASSISTANT

## 2017-04-19 PROCEDURE — 99223 1ST HOSP IP/OBS HIGH 75: CPT | Mod: AI | Performed by: PHYSICIAN ASSISTANT

## 2017-04-19 PROCEDURE — 25000125 ZZHC RX 250: Performed by: PHYSICIAN ASSISTANT

## 2017-04-19 PROCEDURE — 25000128 H RX IP 250 OP 636: Performed by: EMERGENCY MEDICINE

## 2017-04-19 PROCEDURE — 99285 EMERGENCY DEPT VISIT HI MDM: CPT | Mod: 25 | Performed by: EMERGENCY MEDICINE

## 2017-04-19 PROCEDURE — 93005 ELECTROCARDIOGRAM TRACING: CPT

## 2017-04-19 PROCEDURE — 85025 COMPLETE CBC W/AUTO DIFF WBC: CPT | Performed by: INTERNAL MEDICINE

## 2017-04-19 PROCEDURE — 99214 OFFICE O/P EST MOD 30 MIN: CPT | Performed by: INTERNAL MEDICINE

## 2017-04-19 PROCEDURE — 96375 TX/PRO/DX INJ NEW DRUG ADDON: CPT

## 2017-04-19 PROCEDURE — 80053 COMPREHEN METABOLIC PANEL: CPT | Performed by: EMERGENCY MEDICINE

## 2017-04-19 PROCEDURE — 12000000 ZZH R&B MED SURG/OB

## 2017-04-19 PROCEDURE — 87081 CULTURE SCREEN ONLY: CPT | Performed by: FAMILY MEDICINE

## 2017-04-19 PROCEDURE — 96365 THER/PROPH/DIAG IV INF INIT: CPT

## 2017-04-19 RX ORDER — NALOXONE HYDROCHLORIDE 0.4 MG/ML
.1-.4 INJECTION, SOLUTION INTRAMUSCULAR; INTRAVENOUS; SUBCUTANEOUS
Status: DISCONTINUED | OUTPATIENT
Start: 2017-04-19 | End: 2017-04-21 | Stop reason: HOSPADM

## 2017-04-19 RX ORDER — DEXTROSE MONOHYDRATE, SODIUM CHLORIDE, AND POTASSIUM CHLORIDE 50; 1.49; 4.5 G/1000ML; G/1000ML; G/1000ML
INJECTION, SOLUTION INTRAVENOUS CONTINUOUS
Status: DISCONTINUED | OUTPATIENT
Start: 2017-04-19 | End: 2017-04-21

## 2017-04-19 RX ORDER — LEVOFLOXACIN 5 MG/ML
500 INJECTION, SOLUTION INTRAVENOUS EVERY 24 HOURS
Status: DISCONTINUED | OUTPATIENT
Start: 2017-04-19 | End: 2017-04-20

## 2017-04-19 RX ORDER — HYDROMORPHONE HYDROCHLORIDE 1 MG/ML
.3-.5 INJECTION, SOLUTION INTRAMUSCULAR; INTRAVENOUS; SUBCUTANEOUS
Status: DISCONTINUED | OUTPATIENT
Start: 2017-04-19 | End: 2017-04-21 | Stop reason: HOSPADM

## 2017-04-19 RX ORDER — HYDROMORPHONE HYDROCHLORIDE 1 MG/ML
0.5 INJECTION, SOLUTION INTRAMUSCULAR; INTRAVENOUS; SUBCUTANEOUS
Status: DISCONTINUED | OUTPATIENT
Start: 2017-04-19 | End: 2017-04-19

## 2017-04-19 RX ORDER — SODIUM CHLORIDE 9 MG/ML
INJECTION, SOLUTION INTRAVENOUS CONTINUOUS
Status: DISCONTINUED | OUTPATIENT
Start: 2017-04-19 | End: 2017-04-19

## 2017-04-19 RX ORDER — ONDANSETRON 2 MG/ML
4 INJECTION INTRAMUSCULAR; INTRAVENOUS EVERY 6 HOURS PRN
Status: DISCONTINUED | OUTPATIENT
Start: 2017-04-19 | End: 2017-04-21 | Stop reason: HOSPADM

## 2017-04-19 RX ORDER — LIDOCAINE 40 MG/G
CREAM TOPICAL
Status: DISCONTINUED | OUTPATIENT
Start: 2017-04-19 | End: 2017-04-19

## 2017-04-19 RX ORDER — ALBUTEROL SULFATE 90 UG/1
2 AEROSOL, METERED RESPIRATORY (INHALATION) EVERY 6 HOURS PRN
Status: DISCONTINUED | OUTPATIENT
Start: 2017-04-19 | End: 2017-04-21 | Stop reason: HOSPADM

## 2017-04-19 RX ORDER — ACETAMINOPHEN 650 MG/1
650 SUPPOSITORY RECTAL EVERY 4 HOURS PRN
Status: DISCONTINUED | OUTPATIENT
Start: 2017-04-19 | End: 2017-04-21 | Stop reason: HOSPADM

## 2017-04-19 RX ADMIN — LEVOFLOXACIN 500 MG: 5 INJECTION, SOLUTION INTRAVENOUS at 17:29

## 2017-04-19 RX ADMIN — POTASSIUM CHLORIDE, DEXTROSE MONOHYDRATE AND SODIUM CHLORIDE: 150; 5; 450 INJECTION, SOLUTION INTRAVENOUS at 16:53

## 2017-04-19 RX ADMIN — HYDROMORPHONE HYDROCHLORIDE 0.5 MG: 1 INJECTION, SOLUTION INTRAMUSCULAR; INTRAVENOUS; SUBCUTANEOUS at 22:40

## 2017-04-19 RX ADMIN — TAZOBACTAM SODIUM AND PIPERACILLIN SODIUM 3.38 G: 375; 3 INJECTION, SOLUTION INTRAVENOUS at 14:00

## 2017-04-19 RX ADMIN — FLUTICASONE PROPIONATE 1 PUFF: 50 POWDER, METERED RESPIRATORY (INHALATION) at 19:54

## 2017-04-19 RX ADMIN — HYDROMORPHONE HYDROCHLORIDE 0.5 MG: 1 INJECTION, SOLUTION INTRAMUSCULAR; INTRAVENOUS; SUBCUTANEOUS at 14:12

## 2017-04-19 RX ADMIN — METRONIDAZOLE 500 MG: 500 INJECTION, SOLUTION INTRAVENOUS at 18:55

## 2017-04-19 RX ADMIN — SODIUM CHLORIDE: 9 INJECTION, SOLUTION INTRAVENOUS at 14:00

## 2017-04-19 ASSESSMENT — PATIENT HEALTH QUESTIONNAIRE - PHQ9: 5. POOR APPETITE OR OVEREATING: NOT AT ALL

## 2017-04-19 ASSESSMENT — ACTIVITIES OF DAILY LIVING (ADL)
TOILETING: 0-->INDEPENDENT
RETIRED_COMMUNICATION: 0-->UNDERSTANDS/COMMUNICATES WITHOUT DIFFICULTY
AMBULATION: 1-->ASSISTIVE EQUIPMENT
SWALLOWING: 0-->SWALLOWS FOODS/LIQUIDS WITHOUT DIFFICULTY
COGNITION: 0 - NO COGNITION ISSUES REPORTED
BATHING: 0-->INDEPENDENT
TRANSFERRING: 0-->INDEPENDENT
DRESS: 0-->INDEPENDENT
RETIRED_EATING: 0-->INDEPENDENT
FALL_HISTORY_WITHIN_LAST_SIX_MONTHS: NO

## 2017-04-19 ASSESSMENT — ANXIETY QUESTIONNAIRES
7. FEELING AFRAID AS IF SOMETHING AWFUL MIGHT HAPPEN: NOT AT ALL
6. BECOMING EASILY ANNOYED OR IRRITABLE: NOT AT ALL
2. NOT BEING ABLE TO STOP OR CONTROL WORRYING: NOT AT ALL
1. FEELING NERVOUS, ANXIOUS, OR ON EDGE: NOT AT ALL
3. WORRYING TOO MUCH ABOUT DIFFERENT THINGS: NOT AT ALL
GAD7 TOTAL SCORE: 0
IF YOU CHECKED OFF ANY PROBLEMS ON THIS QUESTIONNAIRE, HOW DIFFICULT HAVE THESE PROBLEMS MADE IT FOR YOU TO DO YOUR WORK, TAKE CARE OF THINGS AT HOME, OR GET ALONG WITH OTHER PEOPLE: NOT DIFFICULT AT ALL
5. BEING SO RESTLESS THAT IT IS HARD TO SIT STILL: NOT AT ALL

## 2017-04-19 ASSESSMENT — ENCOUNTER SYMPTOMS: ABDOMINAL PAIN: 1

## 2017-04-19 ASSESSMENT — PAIN SCALES - GENERAL: PAINLEVEL: NO PAIN (0)

## 2017-04-19 NOTE — IP AVS SNAPSHOT
MRN:[id number]                      After Visit Summary   4/19/2017    Miguel Patten    MRN: [id number]           Thank you!     Thank you for choosing [facility] for your care. Our goal is always to provide you with excellent care. Hearing back from our patients is one way we can continue to improve our services. Please take a few minutes to complete the written survey that you may receive in the mail after you visit with us. Thank you!        Patient Information     Date Of Birth          12/16/1932        Designated Caregiver       Most Recent Value    Caregiver    Will someone help with your care after discharge? yes    Name of designated caregiver Jmi Vang    Phone number of caregiver [phone]    Caregiver address [address] [place] [address]      About your hospital stay     You were admitted on:  April 19, 2017 You last received care in the:  [address] [facility]    You were discharged on:  April 21, 2017        Reason for your hospital stay       Ruptured appendicitis which was causing right lower abdominal pain.  Thankfully you have responded very well to the antibiotics given to you during your hospital stay and are able to discharge home with ongoing by mouth antibiotics and close clinical follow up.  Please take the antibiotics as prescribed and make sure to follow up with Coumadin clinic, Dr. Gonzales, and Dr. Ann as scheduled.  Enjoy going home!                  Who to Call     For medical emergencies, please call 911.  For non-urgent questions about your medical care, please call your primary care provider or clinic, None          Attending Provider     Provider Specialty    Brandin Hale MD Emergency Medicine    Shirley Quintanilla MD Family Practice       Primary Care Provider    None       No address on file        After Care Instructions     Activity       Your activity upon discharge: activity as tolerated             Diet       Follow this diet upon discharge: Advance to a regular diet as tolerated                  Follow-up Appointments     Follow-up and recommended labs and tests        Follow up with Coumadin Clinic on Monday April 21 as scheduled  Follow up with Dr. Gonzales within 7-10 days for hospital follow- up.    Follow up with Dr. Ann in 6 weeks as scheduled.                  Your next 10 appointments already scheduled     Apr 24, 2017  3:00 PM CDT   Anticoagulation Visit with PH ANTI COAG   [facility]    [address] [place] [id number]   [phone]            May 02, 2017  2:00 PM CDT   Office Visit with Vladimir Gonzales DO   [facility] ([facility]    [address] [facility] [id number] [phone]           Bring a current list of meds and any records pertaining to this visit.  For Physicals, please bring immunization records and any forms needing to be filled out.  Please arrive 10 minutes early to complete paperwork.            May 31, 2017  9:00 AM CDT   Return Visit with CARDIO DEVICE NURSE   [facility]    [address] [place] [id number]   [phone]            May 31, 2017  9:30 AM CDT   Return Visit with Cristina Caputo MD   [facility] ([address] [address]   [phone]            Jun 02, 2017 12:30 PM CDT   CT ABDOMEN PELVIS W CONTRAST with PHCT1   [facility] CT Scan ([facility])    [address] [place] [id number]   [phone]           Please bring any scans or X-rays taken at other hospitals, if similar tests were done. Also bring a list of your medicines, including vitamins, minerals and over-the-counter drugs. It is safest to leave personal items at home.  Be sure to tell your doctor:   If you have any allergies.   If there s any chance  you are pregnant.   If you are breastfeeding.   If you have any special needs.  You may have contrast for this exam. To prepare:   Do not eat or drink for 2 hours before your exam. If you need to take medicine, you may take it with small sips of water. (We may ask you to take liquid medicine as well.)   The day before your exam, drink extra fluids at least six 8-ounce glasses (unless your doctor tells you to restrict your fluids).  Patients over 70 or patients with diabetes or kidney problems:   If you haven t had a blood test (creatinine test) within the last 30 days, go to your clinic or Diagnostic Imaging Department for this test.  If you have diabetes:   If your kidney function is normal, continue taking your metformin (Avandamet, Glucophage, Glucovance, Metaglip) on the day of your exam.   If your kidney function is abnormal, wait 48 hours before restarting this medicine.  You will have oral contrast for this exam:   You will drink the contrast at home. Get this from your clinic or Diagnostic Imaging Department. Please follow the directions given.  Please wear loose clothing, such as a sweat suit or jogging clothes. Avoid snaps, zippers and other metal. We may ask you to undress and put on a hospital gown.  If you have any questions, please call the Imaging Department where you will have your exam.            Jun 02, 2017  2:00 PM CDT   Return Visit with Petar Ann MD   [address] [address]   [phone]            Aug 10, 2017 10:00 AM CDT   Nurse Only with CHERISE ARGUELLES TEAM D [address] [address]   [phone]              Future tests that were ordered for you     CT Abdomen Pelvis w Contrast       Administration of IV contrast (contrast agent, dose, and amount) will be tailored to this examination per the appropriate written protocol  "listed in the Protocol E-Book, or by the supervising imaging provider.                  Warfarin Instruction     You have started taking a medicine called warfarin. This is a blood-thinning medicine (anticoagulant). It helps prevent and treat blood clots.      Before leaving the hospital, make sure you know how much to take and how long to take it.      You will need regular blood tests to make sure your blood is clotting safely. It is very important to see your doctor for regular blood tests.    Talk to your doctor before taking any new medicine (this includes over-the-counter drugs and herbal products). Many medicines can interact with warfarin. This may cause more bleeding or too much clotting.     Eating a lot of vitamin K--found in green, leafy vegetables--can change the way warfarin works in your body. Do NOT avoid these foods. Instead, try to eat the same amount each day.     Bleeding is the most common side-effect of warfarin. You may notice bleeding gums, a bloody nose, bruises and bleeding longer when you cut yourself. See a doctor at once if:   o You cough up blood  o You find blood in your stool (poop)  o You have a deep cut, or a cut that bleeds longer than 10 minutes   o You have a bad cut, hard fall, accident or hit your head (go to urgent care or the emergency room).    For women who can get pregnant: This medicine can harm an unborn baby. Be very careful not to get pregnant while taking this medicine. If you think you might be pregnant, call your doctor right away.    For more information, read \"Guide to Warfarin Therapy,  the booklet you received in the hospital.        Pending Results     No orders found from 4/17/2017 to 4/20/2017.            Statement of Approval     Ordered          04/21/17 1541  I have reviewed and agree with all the recommendations and orders detailed in this document.  EFFECTIVE NOW     Approved and electronically signed by:  Shirley Quintanilla MD           " "  Admission Information     Date & Time Provider Department Dept. Phone    4/19/2017 Shirley Quintanilla MD [address] [facility] [phone]      Your Vitals Were     Blood Pressure Pulse Temperature Respirations Height Weight    131/66 62 95.8  F (35.4  C) (Oral) 18 1.854 m (6' 1\") 81.9 kg (180 lb 8 oz)    Pulse Oximetry BMI (Body Mass Index)                96% 23.81 kg/m2          Care EveryWhere ID     This is your Care EveryWhere ID. This could be used by other organizations to access your [facility] medical records  [id number]           Review of your medicines      START taking        Dose / Directions    acetaminophen 325 MG tablet   Commonly known as:  TYLENOL        Dose:  650 mg   Take 2 tablets (650 mg) by mouth every 4 hours as needed for mild pain or fever   Quantity:  100 tablet   Refills:  0       levofloxacin 500 MG tablet   Commonly known as:  LEVAQUIN   Indication:  Infection Within the Abdomen   Used for:  Ruptured appendicitis        Dose:  500 mg   Take 1 tablet (500 mg) by mouth daily for 12 days   Quantity:  12 tablet   Refills:  0       metroNIDAZOLE 50 mg/mL Susp   Commonly known as:  FLAGYL   Indication:  Infection Within the Abdomen   Used for:  Ruptured appendicitis        Dose:  500 mg   Take 10 mLs (500 mg) by mouth 3 times daily for 12 days   Quantity:  360 mL   Refills:  0       senna-docusate 8.6-50 MG per tablet   Commonly known as:  SENOKOT-S;PERICOLACE   Used for:  Ruptured appendicitis        Dose:  1 tablet   Take 1 tablet by mouth At Bedtime   Quantity:  30 tablet   Refills:  0         CONTINUE these medicines which may have CHANGED, or have new prescriptions. If we are uncertain of the size of tablets/capsules you have at home, strength may be listed as something that might have changed.        Dose / Directions    JANTOVEN 2.5 MG tablet   This may have changed:  See the new instructions.   Used for:  Chronic atrial fibrillation (H), Long-term " (current) use of anticoagulants   Generic drug:  warfarin        HOLD THIS MEDICATION on 4/21, 4/22, and 4/23.  Please follow up with coumadin clinic on Monday 4/24 as scheduled and then take as scheduled by coumadin clinic.   Quantity:  90 tablet   Refills:  3         CONTINUE these medicines which have NOT CHANGED        Dose / Directions    albuterol 108 (90 BASE) MCG/ACT Inhaler   Commonly known as:  PROAIR HFA/PROVENTIL HFA/VENTOLIN HFA   Used for:  Mild intermittent asthma, uncomplicated        Dose:  2 puff   Inhale 2 puffs into the lungs every 6 hours as needed for shortness of breath / dyspnea or wheezing   Quantity:  1 Inhaler   Refills:  6       bicalutamide 50 MG tablet   Commonly known as:  CASODEX        Dose:  50 mg   Take 1 tablet (50 mg) by mouth daily   Refills:  0       fluticasone 44 MCG/ACT Inhaler   Commonly known as:  FLOVENT HFA   Used for:  Mild intermittent asthma without complication        INHALE ONE PUFF INTO THE LUNGS TWO TIMES A DAY   Quantity:  2 Inhaler   Refills:  2       ipratropium - albuterol 0.5 mg/2.5 mg/3 mL 0.5-2.5 (3) MG/3ML neb solution   Commonly known as:  DUONEB   Used for:  Moderate persistent asthma        Dose:  1 ampule   Take 3 mLs by nebulization every 6 hours as needed for shortness of breath / dyspnea.   Quantity:  60 vial   Refills:  1       losartan 50 MG tablet   Commonly known as:  COZAAR   Used for:  S/P AVR (aortic valve replacement)        Dose:  50 mg   Take 1 tablet (50 mg) by mouth 2 times daily   Quantity:  180 tablet   Refills:  3       LUPRON DEPOT (3-MONTH) 22.5 MG kit   Used for:  Malignant neoplasm of prostate (H)   Generic drug:  leuprolide        Inject 22.5 MG, IM q 4 months per Dr. Zeyad Guevara, pt's Urologist in [place]. 4/6/2017   Quantity:  1 each   Refills:  3       simvastatin 40 MG tablet   Commonly known as:  ZOCOR   Used for:  S/P AVR (aortic valve replacement)        TAKE ONE TABLET BY MOUTH AT BEDTIME   Quantity:  90 tablet    Refills:  3         STOP taking     predniSONE 20 MG tablet   Commonly known as:  DELTASONE                Where to get your medicines      These medications were sent to [facility] [place], [place] - [address]  [address], [facility] [address]     Phone:  [phone]     levofloxacin 500 MG tablet    metroNIDAZOLE 50 mg/mL Susp    senna-docusate 8.6-50 MG per tablet                Protect others around you: Learn how to safely use, store and throw away your medicines at www.disposemymeds.org.             Medication List: This is a list of all your medications and when to take them. Check marks below indicate your daily home schedule. Keep this list as a reference.      Medications           Morning Afternoon Evening Bedtime As Needed    acetaminophen 325 MG tablet   Commonly known as:  TYLENOL   Take 2 tablets (650 mg) by mouth every 4 hours as needed for mild pain or fever   Last time this was given:  650 mg on 4/20/2017  6:59 PM                                   albuterol 108 (90 BASE) MCG/ACT Inhaler   Commonly known as:  PROAIR HFA/PROVENTIL HFA/VENTOLIN HFA   Inhale 2 puffs into the lungs every 6 hours as needed for shortness of breath / dyspnea or wheezing                                   bicalutamide 50 MG tablet   Commonly known as:  CASODEX   Take 1 tablet (50 mg) by mouth daily                                   fluticasone 44 MCG/ACT Inhaler   Commonly known as:  FLOVENT HFA   INHALE ONE PUFF INTO THE LUNGS TWO TIMES A DAY                                      ipratropium - albuterol 0.5 mg/2.5 mg/3 mL 0.5-2.5 (3) MG/3ML neb solution   Commonly known as:  DUONEB   Take 3 mLs by nebulization every 6 hours as needed for shortness of breath / dyspnea.                                   JANTOVEN 2.5 MG tablet   HOLD THIS MEDICATION on 4/21, 4/22, and 4/23.  Please follow up with coumadin clinic on Monday 4/24 as scheduled and then take as scheduled by coumadin clinic.   Generic  drug:  warfarin                                levofloxacin 500 MG tablet   Commonly known as:  LEVAQUIN   Take 1 tablet (500 mg) by mouth daily for 12 days   Last time this was given:  500 mg on 4/21/2017  4:06 PM                                   losartan 50 MG tablet   Commonly known as:  COZAAR   Take 1 tablet (50 mg) by mouth 2 times daily   Last time this was given:  50 mg on 4/21/2017  8:45 AM                                      LUPRON DEPOT (3-MONTH) 22.5 MG kit   Inject 22.5 MG, IM q 4 months per Dr. Zeyad Guevara, pt's Urologist in [place]. 4/6/2017   Generic drug:  leuprolide                                metroNIDAZOLE 50 mg/mL Susp   Commonly known as:  FLAGYL   Take 10 mLs (500 mg) by mouth 3 times daily for 12 days   Last time this was given:  500 mg on 4/21/2017  3:00 PM                                         senna-docusate 8.6-50 MG per tablet   Commonly known as:  SENOKOT-S;PERICOLACE   Take 1 tablet by mouth At Bedtime   Last time this was given:  1 tablet on 4/20/2017  8:31 PM                                   simvastatin 40 MG tablet   Commonly known as:  ZOCOR   TAKE ONE TABLET BY MOUTH AT BEDTIME                                             More Information        What Is Appendicitis?    Your side may hurt so much that you called your doctor. Or maybe you went straight to the hospital emergency room. If the symptoms came on quickly, you may have appendicitis. This is an infection of the appendix. Surgery can remove the infection and relieve your symptoms. Read on to learn more.     A normal appendix    Your Appendix  The appendix is a hollow structure about the size of your little finger. It opens off the colon (large intestine). The purpose of the appendix is unclear. But if it becomes blocked, it may become infected.     An inflamed appendix    Pain and Other Symptoms  Symptoms tend to appear quickly, often over a day or two. Symptoms can include:    Pain that starts in the center of  your belly and moves to your lower right side    Increased pain and pressure on your side when you walk    Vomiting, nausea, or decreased appetite    Fever or fatigue    Either diarrhea or constipation  How Surgery Helps  Medication can t cure appendicitis. Surgery is needed to remove an infected appendix (an appendectomy). This is a very common procedure. Removing the appendix should not affect your long-term health. It s best to remove the appendix before it bursts. If an infected or burst appendix is not removed, it can cause severe health problems.     3807-7233 [employer]. [address] [place] [address], [place] [address]. All rights reserved. This information is not intended as a substitute for professional medical care. Always follow your healthcare professional's instructions.

## 2017-04-19 NOTE — ED NOTES
"Sent to ED from CT by Dr. Gonzales for \"rupture\". Patient dx with ruptured appendix per CT. Last p.o. Was cereal at 0730 this morning. Patient did not receive an oral prep for CT. Katherin Shepherd RN  "

## 2017-04-19 NOTE — ED NOTES
"Diagnosis: Ruptured Appendicitis   The pt seen in the clinic this AM with this complaint and was then ordered to have a CT scan of abdomen; \"The patient is a pleasant 84-year-old gentleman who presents today with right lower quadrant abdominal pain. He was recently in the emergency department at that that time was complaining of sacroiliac discomfort. The pain is easily reproducible with palpation and is right over Fields's point\"   Medical history: Aneurysm of ascending aorta, Bilateral hip arthroplasty, Atrial valve replacement, gout, CHF, Atrial fib/flutter, has a pacemaker, Mild intermittent asthma, aortic regurgitation, long term anticoagulant usage.   EKG/Tele: Electronic ventricular pacemaker Pacemaker ECG  Vitals: Stable  Abnormal Labs: Results for JAMI HUDSON (MRN [id number]) as of 4/19/2017 14:27   Ref. Range 4/19/2017 13:40   Sodium Latest Ref Range: 133 - 144 mmol/L 139   Potassium Latest Ref Range: 3.4 - 5.3 mmol/L 4.5   Chloride Latest Ref Range: 94 - 109 mmol/L 106   Carbon Dioxide Latest Ref Range: 20 - 32 mmol/L 25   Urea Nitrogen Latest Ref Range: 7 - 30 mg/dL 16   Creatinine Latest Ref Range: 0.66 - 1.25 mg/dL 0.75   GFR Estimate Latest Ref Range: >60 mL/min/1.7m2 >90...   GFR Estimate If Black Latest Ref Range: >60 mL/min/1.7m2 >90...   Calcium Latest Ref Range: 8.5 - 10.1 mg/dL 8.5   Anion Gap Latest Ref Range: 3 - 14 mmol/L 8   Albumin Latest Ref Range: 3.4 - 5.0 g/dL 3.5   Protein Total Latest Ref Range: 6.8 - 8.8 g/dL 7.0   Bilirubin Total Latest Ref Range: 0.2 - 1.3 mg/dL 1.4 (H)   Alkaline Phosphatase Latest Ref Range: 40 - 150 U/L 56   ALT Latest Ref Range: 0 - 70 U/L 15   AST Latest Ref Range: 0 - 45 U/L 14   CRP Inflammation Latest Ref Range: 0.0 - 8.0 mg/L 102.0 (H)   Glucose Latest Ref Range: 70 - 99 mg/dL 95   WBC Latest Ref Range: 4.0 - 11.0 10e9/L 9.8   Hemoglobin Latest Ref Range: 13.3 - 17.7 g/dL 13.4   Hematocrit Latest Ref Range: 40.0 - 53.0 % 40.3   Platelet Count Latest " Ref Range: 150 - 450 10e9/L 193   RBC Count Latest Ref Range: 4.4 - 5.9 10e12/L 4.50   MCV Latest Ref Range: 78 - 100 fl 90   MCH Latest Ref Range: 26.5 - 33.0 pg 29.8   MCHC Latest Ref Range: 31.5 - 36.5 g/dL 33.3   RDW Latest Ref Range: 10.0 - 15.0 % 15.2 (H)   Diff Method Unknown Pending     Imaging: CT of abdomen and Pelvis IMPRESSION:  1. Findings are most consistent with acute appendicitis with  perforation, but without periappendiceal abscess.  2. Multiple hepatic cysts. Some appear slightly increased in size.  3. Atherosclerosis of the aorta and of the coronary arteries.  4. Severe pectus excavatum deformity of the chest with indentation at  the anterior aspect of the heart.  Meds Given: Zosyn and Dilaudid 0.5.IV  ED course: IV, Labs, EKG  Pain: Controlled to rt lower quad  Skin Integrity:Intact  Belongings list done.  Special needs: Has a female S.O. That he lives with. Pt is [place], he has bilateral hearing aides. He is a fall risk with IV in place. He uses a cane.   Orders completed  Report given to: Sujatha Heard RN

## 2017-04-19 NOTE — PHARMACY-ANTICOAGULATION SERVICE
Clinical Pharmacy - Warfarin Dosing Consult     Pharmacy has been consulted to manage this patient s warfarin therapy.  Indication: Atrial Fibrillation  Therapy Goal: INR 2-3  Warfarin Prior to Admission: Yes  Warfarin PTA Regimen: 2.5 mg on Monday, Wednesday and Friday, 3.75 mg all other days of the week.  Recent documented change in oral intake/nutrition: Unknown    INR   Date Value Ref Range Status   04/19/2017 2.99 (H) 0.86 - 1.14 Final     INR Protime   Date Value Ref Range Status   03/08/2017 3.6 (A) 0.86 - 1.14 Final       Recommend holding warfarin dose today per MD order.  Pharmacy will monitor Miguel Patten daily and order warfarin doses to achieve specified goal.      Please contact pharmacy as soon as possible if the warfarin needs to be held for a procedure or if the warfarin goals change.

## 2017-04-19 NOTE — NURSING NOTE
"Chief Complaint   Patient presents with     Abdominal Pain     right lower quadrant pain       Initial /62 (BP Location: Right arm, Patient Position: Chair, Cuff Size: Adult Regular)  Pulse 76  Temp 98.2  F (36.8  C) (Temporal)  Ht 5' 11.5\" (1.816 m)  Wt 176 lb (79.8 kg)  SpO2 97%  BMI 24.2 kg/m2 Estimated body mass index is 24.2 kg/(m^2) as calculated from the following:    Height as of this encounter: 5' 11.5\" (1.816 m).    Weight as of this encounter: 176 lb (79.8 kg).  Medication Reconciliation: complete   Dominique WALLER      "

## 2017-04-19 NOTE — IP AVS SNAPSHOT
[address] [facility]    [address] [facility] [address] [place] [address]    Phone:  [phone]                                       After Visit Summary   4/19/2017    Miguel Patten    MRN: [id number]           After Visit Summary Signature Page     I have received my discharge instructions, and my questions have been answered. I have discussed any challenges I see with this plan with the nurse or doctor.    ..........................................................................................................................................  Patient/Patient Representative Signature      ..........................................................................................................................................  Patient Representative Print Name and Relationship to Patient    ..................................................               ................................................  Date                                            Time    ..........................................................................................................................................  Reviewed by Signature/Title    ...................................................              ..............................................  Date                                                            Time

## 2017-04-19 NOTE — ED PROVIDER NOTES
History     Chief Complaint   Patient presents with     Abdominal Pain     The history is provided by the patient and medical records.     Miguel Patten is a 84 year old male who presents from the CT scanner for evaluation and treatment of a ruptured appendicitis. Patient was seen in the clinic earlier today by Dr. Ashkan Gonzales who evelin a CBC and ordered the CT of the abdomen and pelvis. Patient has a leukocyte count of 10.2. A CT of the abdomen and pelvis was performed showing small gas bubbles outside of the appendix consistent with the ruptured acute appendicitis. Patient was sent to the ER to facilitate surgical intervention.    Patient states he only experiences pain when touching his abdomen. He denies fever. Patient last ate breakfast at 0730. Patient has a pacemaker and is on blood thinner. He is unsure when his last INR was.    I have reviewed the Medications, Allergies, Past Medical and Surgical History, and Social History in the Epic system.    Patient Active Problem List   Diagnosis     Malignant neoplasm of prostate (H)     Health Care Home     Advanced directives, counseling/discussion     Gout     History of total hip arthroplasty - bilateral     Osteoarthritis of hip - right     Atrial flutter (H)     Aneurysm of ascending aorta (H)     CHF (congestive heart failure) (H)     Hypertension goal BP (blood pressure) < 140/90     Aortic regurgitation     S/P AVR (aortic valve replacement)     S/P ascending aortic replacement     Atrial fibrillation (H)     Long-term (current) use of anticoagulants [Z79.01]     Mild intermittent asthma without complication     Past Medical History:   Diagnosis Date     Arthritis      Ascending aortic aneurysm (H)     repaired with Hemashield graft 7/2014     Asthma      Atrial fibrillation (H)     chronic     Complete AV block (H)     sp CRTP implant     Congestive heart failure (H)      Coronary artery disease     mild-moderate stenosis by angiography     H/O aortic  valve replacement     bioprosthetic, 7/2014     Hypertension      Malignant neoplasm of prostate (H)     Prostate cancer       Past Surgical History:   Procedure Laterality Date     ARTHROPLASTY HIP  1/21/2013    Procedure: ARTHROPLASTY HIP;  right total hip arthroplasty;  Surgeon: Rober Alves MD;  Location: [facility] OR     C NONSPECIFIC PROCEDURE      Hernia repair     C NONSPECIFIC PROCEDURE      Prostatectomy/cancer     C TOTAL HIP ARTHROPLASTY  1/21/13    Right     GENITOURINARY SURGERY  2001    Prostatectomy       ORTHOPEDIC SURGERY      Left SONALI     PHACOEMULSIFICATION WITH STANDARD INTRAOCULAR LENS IMPLANT Right 10/6/2016    Procedure: PHACOEMULSIFICATION WITH STANDARD INTRAOCULAR LENS IMPLANT;  Surgeon: Elder Rueda MD;  Location: [facility]     PHACOEMULSIFICATION WITH STANDARD INTRAOCULAR LENS IMPLANT Left 10/20/2016    Procedure: PHACOEMULSIFICATION WITH STANDARD INTRAOCULAR LENS IMPLANT;  Surgeon: Elder Rueda MD;  Location: [facility]     REPAIR ANEURYSM ASCENDING AORTA  7/17/2014    2. Aortic aneurysm repair with 32 mm Hemashield graft.      REPLACE VALVE AORTIC  7/17/2014    1. Aortic valve replacement with 23 mm [employer] Trifecta tissue valve.      s/p aneurysm repair by Dr. Friedman       s/p avr         Family History   Problem Relation Age of Onset     Asthma Daughter      Asthma Father      Asthma Paternal Grandmother        Social History   Substance Use Topics     Smoking status: Never Smoker     Smokeless tobacco: Never Used     Alcohol use 0.0 oz/week     0 Standard drinks or equivalent per week      Comment: rarely, once a week or less        Immunization History   Administered Date(s) Administered     Influenza (High Dose) 3 valent vaccine 11/05/2010, 10/14/2011, 10/29/2012, 10/04/2013, 10/10/2014, 11/10/2015, 11/16/2016     Influenza (IIV3) 10/30/2003, 10/20/2004, 11/03/2005, 11/07/2006, 11/08/2007, 11/25/2008, 10/02/2009     Pneumococcal 23 valent 01/08/2010     TD (ADULT, 7+)  01/08/2010          Allergies   Allergen Reactions     No Known Drug Allergies        Current Outpatient Prescriptions   Medication Sig Dispense Refill     leuprolide (LUPRON DEPOT) 22.5 MG kit Inject 22.5 MG, IM q 4 months per Dr. Zeyad Guevara, pt's Urologist in [place]. 4/6/2017 1 each 3     JANTOVEN 2.5 MG tablet TAKE ONE TABLET BY MOUTH DAILY  ON MONDAY, WEDNESDAY, AND FRIDAY AND TAKE ONE AND ONE-HALF TABLETS  DAILY BY MOUTH THE REST OF THE WEEK 90 tablet 3     albuterol (PROAIR HFA, PROVENTIL HFA, VENTOLIN HFA) 108 (90 BASE) MCG/ACT inhaler Inhale 2 puffs into the lungs every 6 hours as needed for shortness of breath / dyspnea or wheezing 1 Inhaler 6     losartan (COZAAR) 50 MG tablet Take 1 tablet (50 mg) by mouth 2 times daily 180 tablet 3     simvastatin (ZOCOR) 40 MG tablet TAKE ONE TABLET BY MOUTH AT BEDTIME 90 tablet 3     fluticasone (FLOVENT HFA) 44 MCG/ACT inhaler INHALE ONE PUFF INTO THE LUNGS TWO TIMES A DAY 2 Inhaler 2     predniSONE (DELTASONE) 20 MG tablet TAKE ONE TABLET BY MOUTH EVERY DAY AS NEEDED 10 tablet 3     ipratropium - albuterol 0.5 mg/2.5 mg/3 mL (DUONEB) 0.5-2.5 (3) MG/3ML nebulization Take 3 mLs by nebulization every 6 hours as needed for shortness of breath / dyspnea. 60 vial 1     Review of Systems   Gastrointestinal: Positive for abdominal pain.   All other systems reviewed and are negative.      Physical Exam      Physical Exam   Constitutional: He is oriented to person, place, and time. He appears well-developed and well-nourished. No distress.   HENT:   Head: Normocephalic and atraumatic.   Eyes: Conjunctivae and EOM are normal.   Neck: Normal range of motion. Neck supple.   Cardiovascular: Normal rate, regular rhythm and normal heart sounds.    Pulmonary/Chest: Effort normal.   Crackles bilateral bases.   Abdominal: Soft. Bowel sounds are decreased. There is tenderness. There is rebound and guarding (RLQ).   Musculoskeletal: Normal range of motion.   Neurological: He is  alert and oriented to person, place, and time.   Skin: Skin is warm and dry.   Psychiatric: He has a normal mood and affect. His behavior is normal.   Nursing note and vitals reviewed.      ED Course     ED Course     Procedures         [id number]  this EKG was interpreted by Brandin Hale M.D.  Electronically paced rhythm with a rate of 60 bpm.  It appears the underlying rhythm is atrial fibrillation.  No other analysis is unobtainable.    Results for orders placed or performed during the hospital encounter of 17 (from the past 24 hour(s))   CBC with platelets differential   Result Value Ref Range    WBC 9.8 4.0 - 11.0 10e9/L    RBC Count 4.50 4.4 - 5.9 10e12/L    Hemoglobin 13.4 13.3 - 17.7 g/dL    Hematocrit 40.3 40.0 - 53.0 %    MCV 90 78 - 100 fl    MCH 29.8 26.5 - 33.0 pg    MCHC 33.3 31.5 - 36.5 g/dL    RDW 15.2 (H) 10.0 - 15.0 %    Platelet Count 193 150 - 450 10e9/L    Diff Method Pending    INR   Result Value Ref Range    INR 2.99 (H) 0.86 - 1.14   Partial thromboplastin time   Result Value Ref Range    PTT 66 (H) 22 - 37 sec   Comprehensive metabolic panel   Result Value Ref Range    Sodium 139 133 - 144 mmol/L    Potassium 4.5 3.4 - 5.3 mmol/L    Chloride 106 94 - 109 mmol/L    Carbon Dioxide 25 20 - 32 mmol/L    Anion Gap 8 3 - 14 mmol/L    Glucose 95 70 - 99 mg/dL    Urea Nitrogen 16 7 - 30 mg/dL    Creatinine 0.75 0.66 - 1.25 mg/dL    GFR Estimate >90  Non  GFR Calc   >60 mL/min/1.7m2    GFR Estimate If Black >90   GFR Calc   >60 mL/min/1.7m2    Calcium 8.5 8.5 - 10.1 mg/dL    Bilirubin Total 1.4 (H) 0.2 - 1.3 mg/dL    Albumin 3.5 3.4 - 5.0 g/dL    Protein Total 7.0 6.8 - 8.8 g/dL    Alkaline Phosphatase 56 40 - 150 U/L    ALT 15 0 - 70 U/L    AST 14 0 - 45 U/L   CRP inflammation   Result Value Ref Range    CRP Inflammation 102.0 (H) 0.0 - 8.0 mg/L   Erythrocyte sedimentation rate auto   Result Value Ref Range    Sed Rate 23 (H) 0 - 20 mm/h      Medications   lidocaine 1 % 1 mL (not administered)   lidocaine (LMX4) kit (not administered)   sodium chloride (PF) 0.9% PF flush 3 mL (not administered)   sodium chloride (PF) 0.9% PF flush 3 mL (not administered)   0.9% sodium chloride infusion ( Intravenous New Bag 4/19/17 1400)   piperacillin-tazobactam (ZOSYN) infusion 3.375 g (3.375 g Intravenous New Bag 4/19/17 1400)   HYDROmorphone (PF) (DILAUDID) injection 0.5 mg (0.5 mg Intravenous Given 4/19/17 1412)     1305: I discussed the case with Dr. Acosta who recommended we start the patient on Zosyn and either he or Dr. NICHO Ann will take the patient to the operating room for further care.       Assessments & Plan (with Medical Decision Making)  Miguel Patten is an 84-year-old male who presents to the ED from radiology for further care after being found to have an acute ruptured appendicitis.  Patient reports that he had sacral pain last week that worsened to the point today where he was having right lower quadrant pain.  He was seen in the clinic by Dr. Gonzales who obtained a CBC and the CT abdomen and pelvis.  The patient does have a leukocyte count at 10.2 and the CT abdomen and pelvis demonstrates air outside the appendix consistent with an acute ruptured appendicitis.  I discussed the case with Dr. Acosta who will not operate on the patient due to being in [place], however, he talked with Dr. NICHO Ann who assumes care of the patient and recommends admission to the hospitalist service for IV antibiotics, establishment of a PICC line, and medical management of this infection.  Given the patient's multiple comorbidities and degree of inflammation in the pelvis right now, she felt he was not a good candidate for surgical approach but rather medical management. Patient was started on Zosyn 3.375 g IV.    I discussed this case with the hospitalist, Dr. Quintanilla. The patient will need to be admitted for IV antibiotics and non-operative  intervention.       I have reviewed the nursing notes.    I have reviewed the findings, diagnosis, plan and need for follow up with the patient.    New Prescriptions    No medications on file       Final diagnoses:   Ruptured appendicitis     This document serves as a record of services personally performed by Brandin Hale MD. It was created on their behalf by Sharyn Thomas, a trained medical scribe. The creation of this record is based on the provider's personal observations and the statements of the patient. This document has been checked and approved by the attending provider.     Note: Chart documentation done in part with Dragon Voice Recognition software. Although reviewed after completion, some word and grammatical errors may remain.    4/19/2017   [facility] EMERGENCY DEPARTMENT     Brandin Hale MD  04/19/17 7108       Brandin Hale MD  04/19/17 [id number]

## 2017-04-19 NOTE — PROGRESS NOTES
SUBJECTIVE:                                                    Miguel Patten is a 84 year old male who presents to clinic today for the following health issues:    ED/UC Followup:    Facility:  [facility]  Date of visit: 4/10/17  Reason for visit: low back pain  Current Status: pain is in Right Lower Quadrant and hurts to touch     CHIEF COMPLAINT:    The patient is a pleasant 84-year-old gentleman who presents today with right lower quadrant abdominal pain. He was recently in the emergency department at that that time was complaining of sacroiliac discomfort. The pain is easily reproducible with palpation and is right over Fields's point. The anterior abdomen is tender and I cannot distinguish between visceral or musculoskeletal source at this time. The patient is thin but acutely tender. He does have some discomfort with percussion. He also has some discomfort with elevation of the legs from a supine position. He's had no fever or chills that he has been aware of. Otherwise, he's had no gastrointestinal symptoms. Denies any nausea or vomiting.                         PAST, FAMILY,SOCIAL HISTORY:     Medical  History:   has a past medical history of Arthritis; Ascending aortic aneurysm (H); Asthma; Atrial fibrillation (H); Complete AV block (H); Congestive heart failure (H); Coronary artery disease; H/O aortic valve replacement; Hypertension; and Malignant neoplasm of prostate (H).     Surgical History:   has a past surgical history that includes NONSPECIFIC PROCEDURE; NONSPECIFIC PROCEDURE; orthopedic surgery; Abdomen surgery (2001); TOTAL HIP ARTHROPLASTY (1/21/13); Arthroplasty hip (1/21/2013); s/p avr; s/p aneurysm repair by Dr. Friedman; Replace valve aortic (7/17/2014); Repair aneurysm ascending aorta (7/17/2014); Phacoemulsification with standard intraocular lens implant (Right, 10/6/2016); and Phacoemulsification with standard intraocular lens implant (Left, 10/20/2016).     Social History:    reports that he has never smoked. He has never used smokeless tobacco. He reports that he drinks alcohol. He reports that he does not use illicit drugs.     Family History:  family history includes Asthma in his daughter, father, and paternal grandmother.            MEDICATIONS  Current Outpatient Prescriptions   Medication Sig Dispense Refill     leuprolide (LUPRON DEPOT) 22.5 MG kit Inject 22.5 MG, IM q 4 months per Dr. Zeyad Guevara, pt's Urologist in [place]. 4/6/2017 1 each 3     predniSONE (DELTASONE) 20 MG tablet TAKE ONE TABLET BY MOUTH EVERY DAY AS NEEDED 10 tablet 3     JANTOVEN 2.5 MG tablet TAKE ONE TABLET BY MOUTH DAILY  ON MONDAY, WEDNESDAY, AND FRIDAY AND TAKE ONE AND ONE-HALF TABLETS  DAILY BY MOUTH THE REST OF THE WEEK 90 tablet 3     albuterol (PROAIR HFA, PROVENTIL HFA, VENTOLIN HFA) 108 (90 BASE) MCG/ACT inhaler Inhale 2 puffs into the lungs every 6 hours as needed for shortness of breath / dyspnea or wheezing 1 Inhaler 6     losartan (COZAAR) 50 MG tablet Take 1 tablet (50 mg) by mouth 2 times daily 180 tablet 3     simvastatin (ZOCOR) 40 MG tablet TAKE ONE TABLET BY MOUTH AT BEDTIME 90 tablet 3     fluticasone (FLOVENT HFA) 44 MCG/ACT inhaler INHALE ONE PUFF INTO THE LUNGS TWO TIMES A DAY 2 Inhaler 2     ipratropium - albuterol 0.5 mg/2.5 mg/3 mL (DUONEB) 0.5-2.5 (3) MG/3ML nebulization Take 3 mLs by nebulization every 6 hours as needed for shortness of breath / dyspnea. 60 vial 1         --------------------------------------------------------------------------------------------------------------------                          REVIEW OF SYSTEMS:         LUNGS: Pt denies: cough,excess sputum, hemoptysis, or shortness of breath.   HEART: Pt denies: chest pain, arrythmia, syncope, tachy or bradyarrhythmia or excess edema.   GI: Pt denies: nausea, vomitting, diarrhea, constipation, melena, or hematochezia.   NEURO: Pt denies: seizures, strokes, diplopia, weakness, paraesthesias, or  "paralysis.   SKIN: Pt denies: itching, rashes, discoloration, or specific lesions of concern. Denies recent hair loss.                          EXAMINATION:       /62 (BP Location: Right arm, Patient Position: Chair, Cuff Size: Adult Regular)  Pulse 76  Temp 98.2  F (36.8  C) (Temporal)  Ht 5' 11.5\" (1.816 m)  Wt 176 lb (79.8 kg)  SpO2 97%  BMI 24.2 kg/m2   Constitutional: The patient appears to be in moderate acute distress. The patient appears to be adequately hydrated. No acute respiratory or hemodynamic distress is noted at this time.   LUNGS: clear bilaterally, airflow is brisk, no intercostal retraction or stridor is noted. No coughing is noted during visit.   HEART:  regular without rubs, clicks, gallops, or murmurs. PMI is nondisplaced. Upstrokes are brisk. S1,S2 are heard. Pacemaker is in place.   GI: Abdomen is soft, but significant tenderness is noted in quadrant. Some guarding is present with any palpation. Rebound is questionable Bowel sounds are appropriate. No renal bruits are heard.                           DECISION MAKIN. RLQ abdominal pain  Will need to obtain a CAT scan to better distinguish appendiceal pain from muscle wall pain. Frequently, the elderly do not perceive the same symptoms with abdominal pain as younger people do.  - CBC with platelets and differential  - CT Abdomen Pelvis w/o Contrast; Future    2. Congestive heart failure, unspecified congestive heart failure chronicity, unspecified congestive heart failure type (H)  Currently controlled  - HEART FAILURE ACTION PLAN    3. Chronic atrial fibrillation (H)  On anticoagulation with pacemaker    4. Long-term (current) use of anticoagulants [Z79.01]  Followed closely    5. Hypertension goal BP (blood pressure) < 140/90  Currently controlled  - Basic metabolic panel  (Ca, Cl, CO2, Creat, Gluc, K, Na, BUN)    6. Hyperlipidemia LDL goal <100  On statin therapy  - Lipid Profile with reflex to direct LDL                "                FOLLOW UP    I have asked the patient to call me when he is done with a CAT scan for the results. If positive, he will obviously be admitted. If negative for appendicitis, would recommend that he takes his 20 mg of prednisone daily for 5 days and uses moist heat to the anterior abdominal wall.    He will follow-up with me in person in 1 week either way.            I have carefully explained the diagnosis and treatment options with the patient. The patient has displayed an understanding of the above, and all subsequent questions were answered.             DO THAIS Correa    Portions of this note were produced using [employer]  Although every attempt at real-time proof reading has been made, occasional grammar/syntax errors may have been missed.

## 2017-04-19 NOTE — CONSULTS
SURGICAL CONSULTATION      REASON FOR CONSULTATION:  Perforated appendicitis.      HISTORY OF PRESENT ILLNESS:  Mr. Miguel Patten is an 85-year-old who states that he has been having intermittent right lower quadrant pain for perhaps up to a year.  It got worse a week ago and he went to the emergency room.  In the emergency room, they obtained an x-ray and evaluated for sacroiliac spine pathology and none was found so he was sent home.  Because of increasing pain and challenges with walking he pursued a PCP as his is retired and tried to get seen as quickly as possible.  The patient kind of walks back his pain and says that it was enough to have him pull out his walker, but it was not disabling enough to do his activities of daily living.  In the ER they were more concerned with his prior prostate problems and spine problems.  When he saw the PCP today the PCP sent him for CT scan where they found a perforated appendicitis and we were called to see him.  The patient does state that he has not had any fevers or chills.  He has been eating fine and he ate cereal this morning.  He has been passing gas but had been severely constipated for a week and has been taking laxatives for a week and he read on the bottle that he should tell a doctor about that.  He has had about 8-pound weight loss; he says based on him trying to lose weight.  He has not noticed any blood in his stool.      PAST MEDICAL HISTORY:  I reviewed his past medical history to include arthritis, ascending aortic aneurysm, asthma, atrial fibrillation for which he is on Coumadin, complete AV block, CHF, CAD, history of aortic valve replacement, hypertension and malignant neoplasm of the prostate.      PAST SURGICAL HISTORY:  I have reviewed this he has had hip arthroplasty, prostatectomy and repair of an aneurysm of the ascending aorta and aortic valve replacement.      SOCIAL HISTORY:  He lives with his significant other; they have been together for 5  years who are unmarried.  He states that he does not actively smoke and never did smoke.      FAMILY HISTORY:  Significant for daughter, father, maternal grandmother with asthma.      ALLERGIES:  No known drug allergies.       MEDICATIONS:   I have reviewed his medications.  Of note, he is on prednisone and he is on Coumadin. Last INR of record was 3.6 in March.  The one from today was still pending at the time of my review of his chart.      PHYSICAL EXAMINATION:   GENERAL:  He is a delightful gentleman who looks much younger than his stated age and looks vibrant; in fact, he does not look ill at all.   RESPIRATORY:  Lungs had normal breathing.  He does appear tight, maybe in relationship to his asthma and has what sounds like could be a productive cough.   ABDOMEN:  Soft.  It is not distended.  He has tenderness with focal peritoneal signs in the right lower quadrant, but it does not cause him much pain.        The remainder in general, he looks well.  He does not have any jaundice.  No scleral icterus and he has no pitting edema of the lower extremities.      LABORATORY DATA AND DIAGNOSTIC STUDIES:  I have reviewed his CBC.  It is altogether pending.  He had a CRP.  He has a slight elevation in his bilirubin of 1.4.  The remainder is normal.  He had a CBC in the ER which shows a normal white blood cell count and no left shift.  His INR from today is 2.99, consistent with the ingestion of Coumadin.  I reviewed his CT scan, he has a pelvic inflammation.  No drainable abscess is noted on the scan, but a lot of pelvic and abdominal inflammation.  He did not have any contrast, so I cannot tell if there is an associated mass with this, just extensive inflammation.      ASSESSMENT AND PLAN:  The patient with perforated acute appendicitis.  I believe this has been going on for perhaps a week.  I am certain that the prednisone that he is on has helped to mask some of the inflammatory response to this process.  He is  mostly nontender now, but we should take it seriously given that he is on prednisone and start him on IV antibiotics.  I recommend levofloxacin and Flagyl and we will dismiss him on that.  We will order a PICC line and we have discussed with the staff to secure a PICC line for discharge.  The patient would like to be discharged as expeditiously as possible.  I do not feel strongly about reversing his anticoagulation as I will not and did not offer surgery given the extensive inflammation in his pelvis.  We will treat with antibiotic and given his numerous comorbidities would hope that in the future he does not need an appendectomy; as many patients can be managed without interval appendectomy.  In his case, I hope that is so to reduce his risk of surgery.       I would keep him n.p.o. until I visit with him tomorrow.  Hopefully, by midday we can start him on clears and advance him and I believe that he could likely be discharged by Friday.  Agree with the Flovent and albuterol for his asthma placement.  I explained to the patient what a PICC line is and that if insurance covers it he will have the opportunity to go home with the PICC line and have a 2-week course of antibiotics followed by a CT scan of the abdomen and pelvis in about 6-8 weeks where we could decide whether or not interval elective appendectomy is required.       I appreciate the consult and appreciate seeing the patient.  This followup CT should be done with contrast so we can ensure that there is no underlying pathology associated with this and that this is garden variety appendicitis.  I  appreciate the consult and the opportunity to care for this patient.         MANUEL LANE MD             D: 2017 16:33   T: 2017 17:05   MT: [id number]      Name:     JAMI HUDSON   MRN:      [id number]        Account:       [id number]   :      1932           Consult Date:  2017      Document: [id number]

## 2017-04-19 NOTE — H&P
[facility] History and Physical  4/19/2017    Miguel Patten MRN# [id number]   Age: 84 year old YOB: 1932     Date of Admission:  4/19/2017    Home clinic: [facility]  Primary care provider: None          Assessment and Plan:   Assessment:   Active Problems:    Appendicitis with perforation    Assessment: Per CT, stable with normal white count.    Plan: Will admit inpatient for medical management, surgical consultation. IV Levaquin, Flagyl , NPO until surgical consult  Hypertension goal BP (blood pressure) < 140/90    Assessment: -117/64-65    Plan: Hold Cozaar.    Atrial fibrillation (H)    Assessment: Has pacemaker with controlled rate., INR 2.99    Plan: Will hold Warfarin for now pending surgical consultation.     Long-term (current) use of anticoagulants [Z79.01]    Assessment: Current INR 2.99    Plan: Holding warfarin until after surgical consultation.     Mild intermittent asthma without complication    Assessment: No wheezing on exam, denies any asthma symptoms for many months.     Plan: Continue his Flovent MDI, Albuterol MDI prn    Advanced directives, counseling/discussion    Assessment: DNR/DNI    Plan: Honor his wishes.     CHF (congestive heart failure) (H)    Assessment: No current symptoms of heart failure    Plan: Monitor  Aortic valve replacement  Assessment: replaced 7/2014, INR 2.99  Plan:Hold this evenings dose of warfarin            Admit Core Measures:  Acute MI, CHF, or stroke core measures do not apply for this admission        Chief Complaint:   History is obtained from the patient     Abdominal pain         History of Present Illness:   This patient is a 84 year old  male with a significant past medical history of asthma, congestive heart failure and hypertension who presented to the clinic today with right lower quadrant pain. He was sent for a CT scan of the abdomen which showed acute appendicitis with perforation. He had a normal CBC and BMP.  He was sent to the ER for further management. He states that pain was more severe about a week ago, but improved. It is generally only painful when you press on it, or when he walks. He denies any fever, chills, chest pressure/pain, SOB, changes to bowel movements. Other than the abdominal pain he feels fine. He is admitted inpatient for IV antibiotic therapy, pain control, and surgical consultation. He is at risk for developing sepsis, abscess formation without aggressive management Surgery was consulted from the ER and medical management was recommended at this time.          Past Medical History:     Past Medical History:   Diagnosis Date     Arthritis      Ascending aortic aneurysm (H)     repaired with Hemashield graft 7/2014     Asthma      Atrial fibrillation (H)     chronic     Complete AV block (H)     sp CRTP implant     Congestive heart failure (H)      Coronary artery disease     mild-moderate stenosis by angiography     H/O aortic valve replacement     bioprosthetic, 7/2014     Hypertension      Malignant neoplasm of prostate (H)     Prostate cancer             Past Surgical History:      Past Surgical History:   Procedure Laterality Date     ARTHROPLASTY HIP  1/21/2013    Procedure: ARTHROPLASTY HIP;  right total hip arthroplasty;  Surgeon: Rober Alves MD;  Location: [facility] OR     C NONSPECIFIC PROCEDURE      Hernia repair     C NONSPECIFIC PROCEDURE      Prostatectomy/cancer     C TOTAL HIP ARTHROPLASTY  1/21/13    Right     GENITOURINARY SURGERY  2001    Prostatectomy       ORTHOPEDIC SURGERY      Left SONALI     PHACOEMULSIFICATION WITH STANDARD INTRAOCULAR LENS IMPLANT Right 10/6/2016    Procedure: PHACOEMULSIFICATION WITH STANDARD INTRAOCULAR LENS IMPLANT;  Surgeon: Elder Rueda MD;  Location: [facility] OR     PHACOEMULSIFICATION WITH STANDARD INTRAOCULAR LENS IMPLANT Left 10/20/2016    Procedure: PHACOEMULSIFICATION WITH STANDARD INTRAOCULAR LENS IMPLANT;  Surgeon: Elder Rueda,  MD;  Location: [facility] OR     REPAIR ANEURYSM ASCENDING AORTA  7/17/2014    2. Aortic aneurysm repair with 32 mm Hemashield graft.      REPLACE VALVE AORTIC  7/17/2014    1. Aortic valve replacement with 23 mm [employer] Trifecta tissue valve.      s/p aneurysm repair by Dr. Friedman       s/p avr               Social History:     Social History   Substance Use Topics     Smoking status: Never Smoker     Smokeless tobacco: Never Used     Alcohol use 0.0 oz/week     0 Standard drinks or equivalent per week      Comment: rarely, once a week or less             Family History:     Family History   Problem Relation Age of Onset     Asthma Daughter      Asthma Father      Asthma Paternal Grandmother              Immunizations:     Immunization History   Administered Date(s) Administered     Influenza (High Dose) 3 valent vaccine 11/05/2010, 10/14/2011, 10/29/2012, 10/04/2013, 10/10/2014, 11/10/2015, 11/16/2016     Influenza (IIV3) 10/30/2003, 10/20/2004, 11/03/2005, 11/07/2006, 11/08/2007, 11/25/2008, 10/02/2009     Pneumococcal 23 valent 01/08/2010     TD (ADULT, 7+) 01/08/2010             Allergies:     Allergies   Allergen Reactions     No Known Drug Allergies              Medications:     (Not in a hospital admission)  Current Facility-Administered Medications   Medication     lidocaine 1 % 1 mL     lidocaine (LMX4) kit     sodium chloride (PF) 0.9% PF flush 3 mL     sodium chloride (PF) 0.9% PF flush 3 mL     0.9% sodium chloride infusion     HYDROmorphone (PF) (DILAUDID) injection 0.5 mg     Current Outpatient Prescriptions   Medication     leuprolide (LUPRON DEPOT) 22.5 MG kit     JANTOVEN 2.5 MG tablet     albuterol (PROAIR HFA, PROVENTIL HFA, VENTOLIN HFA) 108 (90 BASE) MCG/ACT inhaler     losartan (COZAAR) 50 MG tablet     simvastatin (ZOCOR) 40 MG tablet     fluticasone (FLOVENT HFA) 44 MCG/ACT inhaler     predniSONE (DELTASONE) 20 MG tablet     ipratropium - albuterol 0.5 mg/2.5 mg/3 mL (DUONEB) 0.5-2.5 (3) MG/3ML  nebulization              Review of Systems:   C: NEGATIVE for fever, chills, change in weight  I: NEGATIVE for worrisome rashes, moles or lesions  E: NEGATIVE for vision changes or irritation  E/M: NEGATIVE for ear, mouth and throat problems  R: NEGATIVE for significant cough or SOB  CV: NEGATIVE for chest pain, palpitations or peripheral edema  GI: POSITIVE for abdominal pain RLQ  : NEGATIVE for frequency, dysuria, or hematuria  MUSCULOSKELETAL:POSITIVE  for back pain, right hip pain.   N: NEGATIVE for weakness, dizziness or paresthesias  E: NEGATIVE for temperature intolerance, skin/hair changes  H: NEGATIVE for bleeding problems  P: NEGATIVE for changes in mood or affect          Physical Exam:   Vitals were reviewed  Temp:  [97.8  F (36.6  C)-98.2  F (36.8  C)] 97.8  F (36.6  C)  Pulse:  [60-76] 60  Resp:  [12-16] 12  BP: (100-122)/(62-65) 117/65  SpO2:  [96 %-98 %] 96 %  No intake or output data in the 24 hours ending 04/19/17 1541    Constitutional:   awake, alert, cooperative, no apparent distress, and appears stated age     Eyes:   Lids and lashes normal, pupils equal, round and reactive to light, extra ocular muscles intact, sclera clear, conjunctiva normal     ENT:   normocepalic, without obvious abnormality, atramatic, external ears without lesions, tympanic membranes intact bilaterally, upper dentures and lower dentures, oral pharynx with moist mucus membranes     Neck:   supple, symmetrical, trachea midline     Lungs:   no increased work of breathing, good air exchange,  and a few basilar crackles     Cardiovascular:   regular rate and rhythm     Abdomen:   normal bowel sounds, soft, tenderness noted in the right upper quadrant Fields's sign is present and in the right lower quadrant, involuntary guarding present      Musculoskeletal:   no lower extremity pitting edema present               Data:     Results for orders placed or performed during the hospital encounter of 04/19/17   CBC with platelets  differential   Result Value Ref Range    WBC 9.8 4.0 - 11.0 10e9/L    RBC Count 4.50 4.4 - 5.9 10e12/L    Hemoglobin 13.4 13.3 - 17.7 g/dL    Hematocrit 40.3 40.0 - 53.0 %    MCV 90 78 - 100 fl    MCH 29.8 26.5 - 33.0 pg    MCHC 33.3 31.5 - 36.5 g/dL    RDW 15.2 (H) 10.0 - 15.0 %    Platelet Count 193 150 - 450 10e9/L    Diff Method Automated Method     % Neutrophils 73.3 %    % Lymphocytes 15.2 %    % Monocytes 9.9 %    % Eosinophils 0.9 %    % Basophils 0.5 %    % Immature Granulocytes 0.2 %    Absolute Neutrophil 7.2 1.6 - 8.3 10e9/L    Absolute Lymphocytes 1.5 0.8 - 5.3 10e9/L    Absolute Monocytes 1.0 0.0 - 1.3 10e9/L    Absolute Eosinophils 0.1 0.0 - 0.7 10e9/L    Absolute Basophils 0.1 0.0 - 0.2 10e9/L    Abs Immature Granulocytes 0.0 0 - 0.4 10e9/L   INR   Result Value Ref Range    INR 2.99 (H) 0.86 - 1.14   Partial thromboplastin time   Result Value Ref Range    PTT 66 (H) 22 - 37 sec   Comprehensive metabolic panel   Result Value Ref Range    Sodium 139 133 - 144 mmol/L    Potassium 4.5 3.4 - 5.3 mmol/L    Chloride 106 94 - 109 mmol/L    Carbon Dioxide 25 20 - 32 mmol/L    Anion Gap 8 3 - 14 mmol/L    Glucose 95 70 - 99 mg/dL    Urea Nitrogen 16 7 - 30 mg/dL    Creatinine 0.75 0.66 - 1.25 mg/dL    GFR Estimate >90  Non  GFR Calc   >60 mL/min/1.7m2    GFR Estimate If Black >90   GFR Calc   >60 mL/min/1.7m2    Calcium 8.5 8.5 - 10.1 mg/dL    Bilirubin Total 1.4 (H) 0.2 - 1.3 mg/dL    Albumin 3.5 3.4 - 5.0 g/dL    Protein Total 7.0 6.8 - 8.8 g/dL    Alkaline Phosphatase 56 40 - 150 U/L    ALT 15 0 - 70 U/L    AST 14 0 - 45 U/L   CRP inflammation   Result Value Ref Range    CRP Inflammation 102.0 (H) 0.0 - 8.0 mg/L   Erythrocyte sedimentation rate auto   Result Value Ref Range    Sed Rate 23 (H) 0 - 20 mm/h      All cardiac studies reviewed by me.   All imaging studies reviewed by me.      Attestation:  I have reviewed today's vital signs, notes, medications, labs and imaging.      Claude Hdez PA-C

## 2017-04-19 NOTE — PROGRESS NOTES
Please contact the patient and notify him of the following:  The cholesterol is excellent with an LDL of 45.  Chemistry panel is normal with excellent kidney function.  Thank you.  DO THAIS Correa

## 2017-04-19 NOTE — LETTER
My Heart Failure Action Plan   Name: Miguel Patten    YOB: 1932   Date: 4/19/2017    My doctor: None     [place] [address] [facility] [address] [phone]  My Diagnosis:    My Ejection Fraction:     My Exercise Goal: 30 minutes daily  .     My Weight Goal:   Wt Readings from Last 2 Encounters:   04/19/17 176 lb (79.8 kg)   10/20/16 188 lb 6.4 oz (85.5 kg)     Weigh yourself daily using the same scale. If you gain more than 2 pounds in 24 hours or 5 pounds in a week     My Diet Goal:   Emergency Room Visits:    Our goal is to improve your quality of life and help you avoid a visit to the emergency room or hospital.  If we work together, we can achieve this goal. But, if you feel you need to call 911 or go to the emergency room, please do so.  If you go to the emergency room, please bring your list of medicines and your daily weight chart with you.       GREEN ZONE     Doing well today    Weight gained is no more than 2 pounds a day or 5 pounds a week.    No swelling in feet, ankles, legs or stomach.    No more swelling than usual.    No more trouble breathing than usual.    No change in my sleep.    No other problems. Actions:    I am doing fine.  I will take my medicine, follow my diet, see my doctor, exercise, and watch for symptoms.           YELLOW ZONE         Having a bad day or flare up    Weight gain of more than 2 pounds in one day or 5 pounds in one week.    New swelling in ankle, leg, knee or thigh.    Bloating in belly, pants feel tighter.    Swelling in hands or face.    Coughing or trouble breathing while walking or talking.    Harder to breathe last night.    Have trouble sleeping, wake up short of breath.    Much more tired than usual.    Not eating.    Pain in my chest or bad leg cramps.    Feel weak or dizzy. Actions:    I need to take action and call my doctor or nurse today.                 RED ZONE         Need medical care now    Weight gain of  5 pounds overnight.    Chest pain or pressure that does not go away.    Feel less alert.    Wheezing or have trouble breathing when at rest.    Cannot sleep lying down.    Cannot take my water pill.    Pass out or faint. Actions:    I need to call my doctor or nurse now!    Call 911 if I have chest pain or cannot breathe.        Electronically signed by: Dominique Muller, April 19, 2017

## 2017-04-19 NOTE — MR AVS SNAPSHOT
After Visit Summary   4/19/2017    Miguel Patten    MRN: [id number]           Patient Information     Date Of Birth          12/16/1932        Visit Information        Provider Department      4/19/2017 10:20 AM Vladimir Gonzales DO [facility]        Today's Diagnoses     RLQ abdominal pain    -  1    Congestive heart failure, unspecified congestive heart failure chronicity, unspecified congestive heart failure type (H)        Chronic atrial fibrillation (H)        Long-term (current) use of anticoagulants [Z79.01]        Hypertension goal BP (blood pressure) < 140/90        Hyperlipidemia LDL goal <100           Follow-ups after your visit        Your next 10 appointments already scheduled     Apr 21, 2017  8:45 AM CDT   Evaluation with Tanya Lott, PT   [facility] ([address] [address]   [phone]            Apr 26, 2017  9:00 AM CDT   [facility]   [facility]    [address] [place] [id number]   [phone]            May 31, 2017  9:00 AM CDT   Return Visit with CARDIO DEVICE NURSE   [facility]    [address] [place] [address]   [phone]            May 31, 2017  9:30 AM CDT   [facility] Visit with Cristina Caputo MD   [facility]    [address] [place] [id number]   [phone]            Aug 10, 2017 10:00 AM CDT   Nurse Only with NL MORENAAT TEAM D [facility]C   [facility]    [address] [place] [address]   [phone]              Future tests that were ordered for you today     Open Future Orders        Priority Expected Expires Ordered    CT Abdomen Pelvis w/o Contrast Routine  4/19/2018 4/19/2017            Who to contact     If you have questions  "or need follow up information about today's clinic visit or your schedule please contact [facility] directly at [phone].  Normal or non-critical lab and imaging results will be communicated to you by [employer] Liens[employer], letter or phone within 4 business days after the clinic has received the results. If you do not hear from us within 7 days, please contact the clinic through [employer] or phone. If you have a critical or abnormal lab result, we will notify you by phone as soon as possible.  Submit refill requests through RemitDATA or call your pharmacy and they will forward the refill request to us. Please allow 3 business days for your refill to be completed.          Additional Information About Your Visit        Power Liens[employer] Information     RemitDATA lets you send messages to your doctor, view your test results, renew your prescriptions, schedule appointments and more. To sign up, go to [email or web address] . Click on \"Log in\" on the left side of the screen, which will take you to the Welcome page. Then click on \"Sign up Now\" on the right side of the page.     You will be asked to enter the access code listed below, as well as some personal information. Please follow the directions to create your username and password.     Your access code is: [email or web address]  Expires: 2017 10:57 AM     Your access code will  in 90 days. If you need help or a new code, please call your [facility] clinic or [phone].        Care EveryWhere ID     This is your Care EveryWhere ID. This could be used by other organizations to access your [facility] medical records  [id number]        Your Vitals Were     Pulse Temperature Height Pulse Oximetry BMI (Body Mass Index)       76 98.2  F (36.8  C) (Temporal) 5' 11.5\" (1.816 m) 97% 24.2 kg/m2        Blood Pressure from Last 3 Encounters:   17 100/64   17 108/62   04/10/17 138/80    Weight from Last 3 Encounters:   17 176 lb (79.8 kg)   17 176 lb (79.8 kg) "   10/20/16 188 lb 6.4 oz (85.5 kg)              We Performed the Following     Basic metabolic panel  (Ca, Cl, CO2, Creat, Gluc, K, Na, BUN)     CBC with platelets and differential     HEART FAILURE ACTION PLAN     Lipid Profile with reflex to direct LDL        Primary Care Provider    None       No address on file        Thank you!     Thank you for choosing [facility]  for your care. Our goal is always to provide you with excellent care. Hearing back from our patients is one way we can continue to improve our services. Please take a few minutes to complete the written survey that you may receive in the mail after your visit with us. Thank you!             Your Updated Medication List - Protect others around you: Learn how to safely use, store and throw away your medicines at www.disposemymeds.org.          This list is accurate as of: 4/19/17  1:06 PM.  Always use your most recent med list.                   Brand Name Dispense Instructions for use    albuterol 108 (90 BASE) MCG/ACT Inhaler    PROAIR HFA/PROVENTIL HFA/VENTOLIN HFA    1 Inhaler    Inhale 2 puffs into the lungs every 6 hours as needed for shortness of breath / dyspnea or wheezing       fluticasone 44 MCG/ACT Inhaler    FLOVENT HFA    2 Inhaler    INHALE ONE PUFF INTO THE LUNGS TWO TIMES A DAY       ipratropium - albuterol 0.5 mg/2.5 mg/3 mL 0.5-2.5 (3) MG/3ML neb solution    DUONEB    60 vial    Take 3 mLs by nebulization every 6 hours as needed for shortness of breath / dyspnea.       JANTOVEN 2.5 MG tablet   Generic drug:  warfarin     90 tablet    TAKE ONE TABLET BY MOUTH DAILY  ON MONDAY, WEDNESDAY, AND FRIDAY AND TAKE ONE AND ONE-HALF TABLETS  DAILY BY MOUTH THE REST OF THE WEEK       losartan 50 MG tablet    COZAAR    180 tablet    Take 1 tablet (50 mg) by mouth 2 times daily       LUPRON DEPOT (3-MONTH) 22.5 MG kit   Generic drug:  leuprolide     1 each    Inject 22.5 MG, IM q 4 months per Dr. Zeyad Guevara, pt's Urologist in  [place]. 4/6/2017       predniSONE 20 MG tablet    DELTASONE    10 tablet    TAKE ONE TABLET BY MOUTH EVERY DAY AS NEEDED       simvastatin 40 MG tablet    ZOCOR    90 tablet    TAKE ONE TABLET BY MOUTH AT BEDTIME

## 2017-04-20 PROBLEM — K35.32 APPENDICITIS WITH PERFORATION: Status: RESOLVED | Noted: 2017-04-19 | Resolved: 2017-04-20

## 2017-04-20 LAB
ANION GAP SERPL CALCULATED.3IONS-SCNC: 7 MMOL/L (ref 3–14)
BACTERIA SPEC CULT: NORMAL
BUN SERPL-MCNC: 13 MG/DL (ref 7–30)
CALCIUM SERPL-MCNC: 8.2 MG/DL (ref 8.5–10.1)
CHLORIDE SERPL-SCNC: 106 MMOL/L (ref 94–109)
CO2 SERPL-SCNC: 27 MMOL/L (ref 20–32)
CREAT SERPL-MCNC: 0.73 MG/DL (ref 0.66–1.25)
ERYTHROCYTE [DISTWIDTH] IN BLOOD BY AUTOMATED COUNT: 15.3 % (ref 10–15)
GFR SERPL CREATININE-BSD FRML MDRD: ABNORMAL ML/MIN/1.7M2
GLUCOSE SERPL-MCNC: 90 MG/DL (ref 70–99)
HCT VFR BLD AUTO: 39.5 % (ref 40–53)
HGB BLD-MCNC: 13.2 G/DL (ref 13.3–17.7)
INR PPP: 3.34 (ref 0.86–1.14)
MCH RBC QN AUTO: 30 PG (ref 26.5–33)
MCHC RBC AUTO-ENTMCNC: 33.4 G/DL (ref 31.5–36.5)
MCV RBC AUTO: 90 FL (ref 78–100)
MICRO REPORT STATUS: NORMAL
PLATELET # BLD AUTO: 179 10E9/L (ref 150–450)
POTASSIUM SERPL-SCNC: 4.2 MMOL/L (ref 3.4–5.3)
RBC # BLD AUTO: 4.4 10E12/L (ref 4.4–5.9)
SODIUM SERPL-SCNC: 140 MMOL/L (ref 133–144)
SPECIMEN SOURCE: NORMAL
WBC # BLD AUTO: 8.5 10E9/L (ref 4–11)

## 2017-04-20 PROCEDURE — 25000125 ZZHC RX 250: Performed by: PHYSICIAN ASSISTANT

## 2017-04-20 PROCEDURE — A9270 NON-COVERED ITEM OR SERVICE: HCPCS | Mod: GY | Performed by: FAMILY MEDICINE

## 2017-04-20 PROCEDURE — 36415 COLL VENOUS BLD VENIPUNCTURE: CPT | Performed by: PHYSICIAN ASSISTANT

## 2017-04-20 PROCEDURE — 25800025 ZZH RX 258: Performed by: SURGERY

## 2017-04-20 PROCEDURE — A9270 NON-COVERED ITEM OR SERVICE: HCPCS | Mod: GY | Performed by: INTERNAL MEDICINE

## 2017-04-20 PROCEDURE — 25800025 ZZH RX 258: Performed by: PHYSICIAN ASSISTANT

## 2017-04-20 PROCEDURE — 25000132 ZZH RX MED GY IP 250 OP 250 PS 637: Mod: GY | Performed by: INTERNAL MEDICINE

## 2017-04-20 PROCEDURE — 25000132 ZZH RX MED GY IP 250 OP 250 PS 637: Mod: GY | Performed by: FAMILY MEDICINE

## 2017-04-20 PROCEDURE — 85610 PROTHROMBIN TIME: CPT | Performed by: PHYSICIAN ASSISTANT

## 2017-04-20 PROCEDURE — 12000000 ZZH R&B MED SURG/OB

## 2017-04-20 PROCEDURE — 25000132 ZZH RX MED GY IP 250 OP 250 PS 637: Mod: GY | Performed by: SURGERY

## 2017-04-20 PROCEDURE — A9270 NON-COVERED ITEM OR SERVICE: HCPCS | Mod: GY | Performed by: SURGERY

## 2017-04-20 PROCEDURE — 85027 COMPLETE CBC AUTOMATED: CPT | Performed by: PHYSICIAN ASSISTANT

## 2017-04-20 PROCEDURE — 99232 SBSQ HOSP IP/OBS MODERATE 35: CPT | Performed by: FAMILY MEDICINE

## 2017-04-20 PROCEDURE — 80048 BASIC METABOLIC PNL TOTAL CA: CPT | Performed by: PHYSICIAN ASSISTANT

## 2017-04-20 RX ORDER — LEVOFLOXACIN 5 MG/ML
500 INJECTION, SOLUTION INTRAVENOUS EVERY 24 HOURS
Status: DISCONTINUED | OUTPATIENT
Start: 2017-04-20 | End: 2017-04-20

## 2017-04-20 RX ORDER — ACETAMINOPHEN 325 MG/1
650 TABLET ORAL EVERY 4 HOURS PRN
Status: DISCONTINUED | OUTPATIENT
Start: 2017-04-20 | End: 2017-04-21 | Stop reason: HOSPADM

## 2017-04-20 RX ORDER — LEVOFLOXACIN 500 MG/1
500 TABLET, FILM COATED ORAL EVERY 24 HOURS
Status: DISCONTINUED | OUTPATIENT
Start: 2017-04-20 | End: 2017-04-21 | Stop reason: HOSPADM

## 2017-04-20 RX ORDER — AMOXICILLIN 250 MG
1 CAPSULE ORAL AT BEDTIME
Status: DISCONTINUED | OUTPATIENT
Start: 2017-04-20 | End: 2017-04-21 | Stop reason: HOSPADM

## 2017-04-20 RX ORDER — BISACODYL 10 MG
10 SUPPOSITORY, RECTAL RECTAL DAILY PRN
Status: DISCONTINUED | OUTPATIENT
Start: 2017-04-20 | End: 2017-04-21 | Stop reason: HOSPADM

## 2017-04-20 RX ADMIN — LEVOFLOXACIN 500 MG: 500 TABLET, FILM COATED ORAL at 17:11

## 2017-04-20 RX ADMIN — METRONIDAZOLE 500 MG: 500 INJECTION, SOLUTION INTRAVENOUS at 13:26

## 2017-04-20 RX ADMIN — POTASSIUM CHLORIDE, DEXTROSE MONOHYDRATE AND SODIUM CHLORIDE: 150; 5; 450 INJECTION, SOLUTION INTRAVENOUS at 03:50

## 2017-04-20 RX ADMIN — Medication 500 MG: at 20:30

## 2017-04-20 RX ADMIN — ACETAMINOPHEN 650 MG: 325 TABLET ORAL at 18:59

## 2017-04-20 RX ADMIN — SENNOSIDES AND DOCUSATE SODIUM 1 TABLET: 8.6; 5 TABLET ORAL at 20:31

## 2017-04-20 RX ADMIN — POTASSIUM CHLORIDE, DEXTROSE MONOHYDRATE AND SODIUM CHLORIDE: 150; 5; 450 INJECTION, SOLUTION INTRAVENOUS at 17:35

## 2017-04-20 RX ADMIN — FLUTICASONE PROPIONATE 1 PUFF: 50 POWDER, METERED RESPIRATORY (INHALATION) at 20:31

## 2017-04-20 RX ADMIN — METRONIDAZOLE 500 MG: 500 INJECTION, SOLUTION INTRAVENOUS at 03:43

## 2017-04-20 ASSESSMENT — ASTHMA QUESTIONNAIRES: ACT_TOTALSCORE: 25

## 2017-04-20 ASSESSMENT — PATIENT HEALTH QUESTIONNAIRE - PHQ9: SUM OF ALL RESPONSES TO PHQ QUESTIONS 1-9: 0

## 2017-04-20 ASSESSMENT — ANXIETY QUESTIONNAIRES: GAD7 TOTAL SCORE: 0

## 2017-04-20 NOTE — CONSULTS
Care Transition Initial Assessment -   Reason For Consult: discharge planning  Met with: Patient    Active Problems:    Advanced directives, counseling/discussion    CHF (congestive heart failure) (H)    Hypertension goal BP (blood pressure) < 140/90    Atrial fibrillation (H)    Long-term (current) use of anticoagulants [Z79.01]    Mild intermittent asthma without complication    Appendicitis with perforation    Appendicitis with perforation         DATA  Lives With: significant other  Living Arrangements: house  Description of Support System: Supportive, Involved  Who is your support system?: Significant Other, Children     Identified issues/concerns regarding health management: May need IV antibiotics after discharge. Discussed care options and insurance coverage. Patient does not have coverage for home infusion IV antibiotics.                 Transportation Available: car, family or friend will provide      ASSESSMENT  Cognitive Status:  awake, alert and oriented  Concerns to be addressed: IV antibiotics after d/c versus oral antibiotics .       PLAN  Financial costs for the patient includes: Discussed no coverage for home IV ABX from Medicare .  Patient given options and choices for discharge: YES .  Discussed possible option of outpatient infusion services or paying out of pocket for home IV services.   Patient/family is agreeable to the plan?  N/A- final d/c plan not determined yet.   Patient anticipates discharging to:  Home .

## 2017-04-20 NOTE — PROGRESS NOTES
S-(situation): Patient arrives to room 269 via cart from ED and transferred self to bed.    B-(background): Ruptured appendix    A-(assessment): States he really does not have pain except with palpation to RLQ. IV fluids infusing. Afebrile and significant other at side. Had BM yesterday. Skin intact. Large moles on back. Uses cane to ambulate. Alert and oriented x4. Paced. Fine crackles in lung bases.     R-(recommendations): Orders reviewed with pt and significant other. Will monitor patient per MD orders.    Inpatient nursing criteria listed below were met:    Health care directives status obtained and documented: Yes, does not have written directives;   Core Measures assessed (SSI): Yes  SCD's Documented: No; is on coumadin  Vaccine assessment done and vaccines ordered if appropriate: Yes  Skin issues/needs documented:NA  Isolation needs addressed, if appropriate: NA  Fall Prevention: Care plan updated, Education given and documented Yes  MRSA swab completed for patient 55 years and older (exclude SONALI and TKA): Yes  My Chart patient sign up addressed and documented: Yes  Care Plan initiated: Yes  Education Assessment documented:Yes  Education Documented (Pre-existing chronic infection such as, MRSA/VRE need education on admission): Yes  New medication patient education completed and documented (Possible Side Effects of Common Medications handout): Yes  Home medications if not able to send immediately home with family stored here: NA   Reminder note placed in discharge instructions: NA  Discharge planning review completed (admission navigator) Yes

## 2017-04-20 NOTE — PHARMACY-ANTICOAGULATION SERVICE
Clinical Pharmacy - Warfarin Dosing Consult     Pharmacy has been consulted to manage this patient s warfarin therapy.  Indication: Atrial Fibrillation  Therapy Goal: INR 2-3  Warfarin Prior to Admission: Yes  Warfarin PTA Regimen: 2.5 mg on Monday, Wednesday and Friday, 3.75 mg all other days of the week.  Recent documented change in oral intake/nutrition: Unknown  Medication Interactions: acetaminophen, levofloxacin, metronidazole    INR   Date Value Ref Range Status   04/20/2017 3.34 (H) 0.86 - 1.14 Final   04/19/2017 2.99 (H) 0.86 - 1.14 Final       Recommend HOLDING warfarin today, due to INR increasing despite holding warfarin yesterday. Patient also on levofloxacin and metronidazole.  Pharmacy will monitor Miguel Patten daily and order warfarin doses to achieve specified goal.      Please contact pharmacy as soon as possible if the warfarin needs to be held for a procedure or if the warfarin goals change.

## 2017-04-20 NOTE — PROGRESS NOTES
Subjective:   Doing well.  Continues to improve.  Pain is well-controlled.  No fevers.     Yes to Passing Gas   No to having a bowel movement    Objective:  Vitals:    04/20/17 0050 04/20/17 0325 04/20/17 0705 04/20/17 1518   BP: 114/67 123/67 148/66 122/51   BP Location: Left arm Left arm  Right arm   Pulse: 60 60 60 59   Resp: 18 20 20 18   Temp: 97.2  F (36.2  C) 96.1  F (35.6  C) 96  F (35.6  C) 96.9  F (36.1  C)   TempSrc: Oral Oral Oral Oral   SpO2: 95% 97% 95% 95%   Weight:       Height:             Intake/Output Summary (Last 24 hours) at 04/20/17 1524  Last data filed at 04/19/17 2200   Gross per 24 hour   Intake              796 ml   Output                0 ml   Net              796 ml        No current outpatient prescriptions on file.       Data  BMP  Recent Labs  Lab 04/20/17  0530 04/19/17  1340 04/19/17  1100    139 139   POTASSIUM 4.2 4.5 4.3   CHLORIDE 106 106 102   LIDIA 8.2* 8.5 8.9   CO2 27 25 28   BUN 13 16 15   CR 0.73 0.75 0.88   GLC 90 95 80     CBC  Recent Labs  Lab 04/20/17  0530 04/19/17  1340 04/19/17  1100   WBC 8.5 9.8 10.2   RBC 4.40 4.50 4.55   HGB 13.2* 13.4 13.8   HCT 39.5* 40.3 41.8   MCV 90 90 92   MCH 30.0 29.8 30.3   MCHC 33.4 33.3 33.0   RDW 15.3* 15.2* 15.0    193 203     INR  Recent Labs  Lab 04/20/17  0530 04/19/17  1340   INR 3.34* 2.99*        EXAM  AOX4 NAD  CTAB  RRR  S&NTND +BS       ASSESSMENT/PLAN:       Doing well.  Will start on clears. While he is hospital will start on oral Levofloxacin and Flagyl as bioavailability is same/similiar. WIll start on a bowel regiment. Anticipate discharge on Friday.    Diet: Clear liquids.  Activity:Activity as tolerated  Patient may move about with assist as indicated or with supervision  Fluids: Decrease to 80ml per hour

## 2017-04-20 NOTE — PROGRESS NOTES
[facility] Progress Note          Assessment and Plan:   Assessment:   Active Problems:    Appendicitis with perforation    Assessment: Patient continues to be vitally stable without any concern for developing sepsis has minimal pain. General surgery has seen and has is planning to transition to oral antibiotics as well as initiation of clear liquid diet    Plan:  Ongoing management of perforated appendicitis as per surgery - patient has been transitioned to oral Levaquin and Flagyl, first few steps of clear liquids have gone well without complication.      Hypertension goal BP (blood pressure) < 140/90    Assessment: Blood pressures have overall been stable and are trending upward him a currently in the 120 range    Plan:  We'll continue to monitor, and consider resuming patient's home losartan dosing tomorrow morning if he is tolerating clear liquid diet well and blood pressures continue to increase.      Atrial fibrillation (H)    Assessment:  Rate controlled status post pacemaker    Plan:  Continue home regimen, Dawit V to assist with Coumadin management, which can be restarted this evening.      S/p AVR (aortic valve replacement)     Assessment:  Stable, with patient anticoagulated and INR this morning 3.34    Plan:  Ongoing management of Coumadin Per pharmacy. Continue to monitor.      Long-term (current) use of anticoagulants [Z79.01]    Assessment:  On Coumadin, dose last night was held secondary to concern for possible surgical intervention versus PICC line placement    Plan:  We'll resume Coumadin, pharmacy to assist in management.      CHF (congestive heart failure) (H)    Assessment:  Chronic and stable without concern for acute exacerbation    Plan: Continue to monitor, hold losartan at this time as above      Mild intermittent asthma without complication    Assessment:  Chronic and stable without concern for acute exacerbation    Plan:  Continue home regimen without change      Advanced  "directives, counseling/discussion    Assessment:  DNR/DNI    Plan:  Will honor.        VTE:  Coumadin  Code Status:  DNR/DNI        Interval History:   Continues to improve.  Vital signs generally better with blood pressures trending upward and mainly in the 120 range, however did have one in the 140 range systolic. Patient continues to have minimal right lower quadrant abdominal pain and no signs or symptoms concerning for developing sepsis.  This tolerated the first few sips of clear liquids without difficulty. Voiding well.  Tolerating medications without significant side effects.  No new concerns today.            Significant Problems:     Past Medical History:   Diagnosis Date     Arthritis      Ascending aortic aneurysm (H)     repaired with Hemashield graft 7/2014     Asthma      Atrial fibrillation (H)     chronic     Complete AV block (H)     sp CRTP implant     Congestive heart failure (H)      Coronary artery disease     mild-moderate stenosis by angiography     H/O aortic valve replacement     bioprosthetic, 7/2014     Hypertension      Malignant neoplasm of prostate (H)     Prostate cancer            Physical Exam:   Blood pressure 122/51, pulse 59, temperature 96.9  F (36.1  C), temperature source Oral, resp. rate 18, height 1.854 m (6' 1\"), weight 81.9 kg (180 lb 8 oz), SpO2 95 %.  Constitutional:   awake, alert, cooperative, no apparent distress, and appears stated age     Lungs:   No increased work of breathing, good air exchange, clear to auscultation bilaterally, no crackles or wheezing     Cardiovascular:    paced rhythm with heart rate in the 60s      Abdomen:    bowel sounds are present and very active, abdomen is soft. Ongoing mild tenderness to the right lower quadrant, however per patient is less noticeable than yesterday and no guarding or rebound tenderness present.      Musculoskeletal:   no lower extremity pitting edema present     Neurologic:   Awake, alert, oriented to name, place and " time.      Skin:   normal skin color, texture, turgor             Data:   All laboratory data reviewed    Attestation:  I have reviewed today's vital signs, notes, medications, labs and imaging.     Electronically Signed:  Shirley Quintanilla MD    Note: Chart documentation done in part with Dragon Voice Recognition software. Although reviewed after completion, some word and grammatical errors may remain.

## 2017-04-20 NOTE — PLAN OF CARE
"Problem: Goal Outcome Summary  Goal: Goal Outcome Summary  Outcome: Improving  VSS on RA.  Afebrile.  Denies pain, at 0400 pt reported feeling \"some tenderness in that side/abdomen\" but continues to decline analgesia.  Using call light appropriately, ambulates to bathroom, with SBA, little unsteady.  Adequately voiding.  NPO.  Very pleasant.  IVFs infusing.  Continuing to place warm packs on R lower arm infiltrate, still remains swollen and tender.  LS clear with occasional cough.  HR irregular- Hx of Afib.  Pt reports adamant about going home today!          "

## 2017-04-20 NOTE — PLAN OF CARE
Problem: Pain, Acute (Adult)  Goal: Identify Related Risk Factors and Signs and Symptoms  Related risk factors and signs and symptoms are identified upon initiation of Human Response Clinical Practice Guideline (CPG)   Outcome: Improving  VSS, up with SBA, NPO, slight pain at times-no pain meds desired, A and O,  IV ABX continue.

## 2017-04-20 NOTE — PLAN OF CARE
Problem: Goal Outcome Summary  Goal: Goal Outcome Summary  Outcome: Therapy, progress toward functional goals as expected  Has denied pain at rest. Some tenderness with palpation to RLQ. Voiding. NPO. IV fluids and IV abx infusing. Fine crackles in lungs upon admission, but has coughed occasionally since then. Warm pack to right lower arm due to IV infiltrate; edema has decreased. Afebrile. Up with SBA. CRP was 102. Will continue to monitor lungs, labs, pain, temps, right arm's IV site.

## 2017-04-21 VITALS
DIASTOLIC BLOOD PRESSURE: 66 MMHG | BODY MASS INDEX: 23.92 KG/M2 | RESPIRATION RATE: 18 BRPM | WEIGHT: 180.5 LBS | TEMPERATURE: 95.8 F | SYSTOLIC BLOOD PRESSURE: 131 MMHG | HEART RATE: 62 BPM | OXYGEN SATURATION: 96 % | HEIGHT: 73 IN

## 2017-04-21 PROBLEM — I50.22 CHRONIC SYSTOLIC CONGESTIVE HEART FAILURE (H): Status: ACTIVE | Noted: 2017-04-21

## 2017-04-21 LAB
INR PPP: 4.72 (ref 0.86–1.14)
WBC # BLD AUTO: 6.3 10E9/L (ref 4–11)

## 2017-04-21 PROCEDURE — 36415 COLL VENOUS BLD VENIPUNCTURE: CPT | Performed by: PHYSICIAN ASSISTANT

## 2017-04-21 PROCEDURE — A9270 NON-COVERED ITEM OR SERVICE: HCPCS | Mod: GY | Performed by: SURGERY

## 2017-04-21 PROCEDURE — 25000132 ZZH RX MED GY IP 250 OP 250 PS 637: Mod: GY | Performed by: SURGERY

## 2017-04-21 PROCEDURE — A9270 NON-COVERED ITEM OR SERVICE: HCPCS | Mod: GY | Performed by: FAMILY MEDICINE

## 2017-04-21 PROCEDURE — 99239 HOSP IP/OBS DSCHRG MGMT >30: CPT | Performed by: FAMILY MEDICINE

## 2017-04-21 PROCEDURE — 25000132 ZZH RX MED GY IP 250 OP 250 PS 637: Mod: GY | Performed by: FAMILY MEDICINE

## 2017-04-21 PROCEDURE — 85610 PROTHROMBIN TIME: CPT | Performed by: PHYSICIAN ASSISTANT

## 2017-04-21 PROCEDURE — 25800025 ZZH RX 258: Performed by: SURGERY

## 2017-04-21 PROCEDURE — 85048 AUTOMATED LEUKOCYTE COUNT: CPT | Performed by: PHYSICIAN ASSISTANT

## 2017-04-21 RX ORDER — WARFARIN SODIUM 2.5 MG/1
2.5 TABLET ORAL
Status: DISCONTINUED | OUTPATIENT
Start: 2017-04-21 | End: 2017-04-21

## 2017-04-21 RX ORDER — AMOXICILLIN 250 MG
1 CAPSULE ORAL AT BEDTIME
Qty: 30 TABLET | Refills: 0 | Status: SHIPPED | OUTPATIENT
Start: 2017-04-21 | End: 2018-04-12

## 2017-04-21 RX ORDER — WARFARIN SODIUM 2.5 MG/1
TABLET ORAL
Qty: 90 TABLET | Refills: 3 | COMMUNITY
Start: 2017-04-21 | End: 2018-06-06 | Stop reason: DRUGHIGH

## 2017-04-21 RX ORDER — ACETAMINOPHEN 325 MG/1
650 TABLET ORAL EVERY 4 HOURS PRN
Qty: 100 TABLET | COMMUNITY
Start: 2017-04-21 | End: 2023-01-01

## 2017-04-21 RX ORDER — SIMVASTATIN 40 MG
40 TABLET ORAL AT BEDTIME
Status: DISCONTINUED | OUTPATIENT
Start: 2017-04-21 | End: 2017-04-21 | Stop reason: HOSPADM

## 2017-04-21 RX ORDER — LOSARTAN POTASSIUM 50 MG/1
50 TABLET ORAL 2 TIMES DAILY
Status: DISCONTINUED | OUTPATIENT
Start: 2017-04-21 | End: 2017-04-21 | Stop reason: HOSPADM

## 2017-04-21 RX ORDER — LEVOFLOXACIN 500 MG/1
500 TABLET, FILM COATED ORAL DAILY
Qty: 12 TABLET | Refills: 0 | Status: SHIPPED | OUTPATIENT
Start: 2017-04-21 | End: 2017-05-03

## 2017-04-21 RX ORDER — BICALUTAMIDE 50 MG/1
50 TABLET, FILM COATED ORAL DAILY
COMMUNITY
Start: 2017-04-21 | End: 2021-10-26

## 2017-04-21 RX ADMIN — Medication 500 MG: at 15:00

## 2017-04-21 RX ADMIN — POTASSIUM CHLORIDE, DEXTROSE MONOHYDRATE AND SODIUM CHLORIDE: 150; 5; 450 INJECTION, SOLUTION INTRAVENOUS at 03:47

## 2017-04-21 RX ADMIN — FLUTICASONE PROPIONATE 1 PUFF: 50 POWDER, METERED RESPIRATORY (INHALATION) at 08:46

## 2017-04-21 RX ADMIN — LOSARTAN POTASSIUM 50 MG: 50 TABLET, FILM COATED ORAL at 08:45

## 2017-04-21 RX ADMIN — LEVOFLOXACIN 500 MG: 500 TABLET, FILM COATED ORAL at 16:06

## 2017-04-21 RX ADMIN — Medication 500 MG: at 08:46

## 2017-04-21 NOTE — PLAN OF CARE
Problem: Goal Outcome Summary  Goal: Goal Outcome Summary  Outcome: Improving  Vss, afebrile. Pt denies pain unless has palpation to R lower abdomen. Declines any pain meds. Has been up ambulating well to bathroom. Bowel sounds are active, has had bowel movement. Will continue to monitor bowel status, plan of care. Pt hoping to go home today.

## 2017-04-21 NOTE — PROGRESS NOTES
Subjective:   Doing well.  Continues to improve.  Pain is well-controlled.  No fevers.      Yes to Passing Gas   Yes to having a bowel movement    Objective:  Vitals:    04/20/17 2028 04/20/17 2300 04/21/17 0340 04/21/17 0706   BP: 115/60 127/64 124/72 131/66   BP Location: Left arm      Pulse: 59 60 62 62   Resp: 16 18 16 18   Temp: 98  F (36.7  C) 97.1  F (36.2  C) 98.4  F (36.9  C) 95.8  F (35.4  C)   TempSrc: Oral Oral Oral Oral   SpO2: 96% 95% 96% 96%   Weight:       Height:             Intake/Output Summary (Last 24 hours) at 04/21/17 0825  Last data filed at 04/20/17 2300   Gross per 24 hour   Intake             2100 ml   Output                0 ml   Net             2100 ml        No current outpatient prescriptions on file.       Data  BMP  Recent Labs  Lab 04/20/17  0530 04/19/17  1340 04/19/17  1100    139 139   POTASSIUM 4.2 4.5 4.3   CHLORIDE 106 106 102   LIDIA 8.2* 8.5 8.9   CO2 27 25 28   BUN 13 16 15   CR 0.73 0.75 0.88   GLC 90 95 80     CBC  Recent Labs  Lab 04/21/17  0528 04/20/17  0530 04/19/17  1340 04/19/17  1100   WBC 6.3 8.5 9.8 10.2   RBC  --  4.40 4.50 4.55   HGB  --  13.2* 13.4 13.8   HCT  --  39.5* 40.3 41.8   MCV  --  90 90 92   MCH  --  30.0 29.8 30.3   MCHC  --  33.4 33.3 33.0   RDW  --  15.3* 15.2* 15.0   PLT  --  179 193 203     INR  Recent Labs  Lab 04/21/17  0528 04/20/17  0530 04/19/17  1340   INR 4.72* 3.34* 2.99*        EXAM  AOX4 NAD  CTAB  RRR  S&NTND +BS  No CCE  Wound CDI     ASSESSMENT/PLAN:       for   Doing well. Pain improved. Ok to discharge later this afternoon. Advanced diet to regular. Follow up with PCP in 1-2 weeks. Follow up with Dr. Ann with CT in 6 weeks. Discussed plan with patient.    Diet: Regular.  Activity:Activity as tolerated  Patient may move about with assist as indicated or with supervision  Fluids: Heplock

## 2017-04-21 NOTE — PROGRESS NOTES
S-(situation): Patient discharged to home via ambulatory with significant other.    B-(background): Ruptured Appendix    A-(assessment): Pain tolerable, pain only on palpation. Up independent in room. Tolerating regular diet. VSS.     R-(recommendations): Discharge instructions reviewed with patient and S.O. Listed belongings gathered and returned to patient.        Discharge Nursing Criteria:     Care Plan and Patient education resolved: Yes    New Medications- pt has been educated about purpose and side effects: Yes    Vaccines  Pneumonia Vaccine verified at discharge: Yes  Influenza status verified at discharge:  Yes      MISC  Prescriptions if needed, hard copies sent with patient  Yes  Home and hospital aquired medications returned to patient: Yes  Medication Bin checked and emptied on discharge Yes  Patient reports post-discharge pain management plan is effective: Yes

## 2017-04-21 NOTE — PLAN OF CARE
Problem: Goal Outcome Summary  Goal: Goal Outcome Summary  Outcome: Improving  Feeling better. Has denied abdominal pain except has tenderness with palpation. Took Tylenol for some knee pain with relief. WBC was 8.5 today. Voiding and had BM. Tolerated clear liquids and oral antibiotics well. Up with SBA. IV fluids continue. Will continue to monitor pain, labs, diet and activity tolerance. Plans to go home with significant other tomorrow.

## 2017-04-21 NOTE — DISCHARGE SUMMARY
[facility] Discharge Summary    Miguel Patten MRN# [id number]   Age: 84 year old YOB: 1932     Date of Admission:  4/19/2017  Date of Discharge::  4/21/2017  Admitting Physician:  Shirley Quintanilla MD  Discharge Physician:  Shirley Quintanilla MD    Home clinic: [facility]          Admission Diagnoses:   Ruptured appendicitis [K35.2]          Discharge Diagnosis:   Principal Problem:    Appendicitis with perforation    Assessment: Patient with a one-week history of intermittent right lower quadrant abdominal pain presented with perforated appendicitis seen on CT scan with significant inflammation and decision to perform medical management was made. Patient improved on Levaquin and Flagyl and was transitioned to oral antibiotics without difficulty, with advancement of his diet without recurrence of symptoms.    Plan:  Patient will be discharged home with ongoing Flagyl and Levaquin for a total 14 day antibiotic course. He will follow up with his primary care provider within one week to ensure ongoing symptomatic improvement and will follow-up for a repeat CT scan and evaluation by general surgery in 6 weeks with further discussion at that time if more intervention is needed.    Active Problems:    Hypertension goal BP (blood pressure) < 140/90    Assessment:  Blood pressures initially were slightly low and therefore antihypertensive agents were held. The patient improved, his blood pressures increased and home losartan was reinitiated without difficulty    Plan:  Discharged without changed to home regimen      S/P AVR (aortic valve replacement)    Assessment:  Stable with patient anticoagulated with Coumadin. INR progressively increasing during this hospitalization despite Coumadin being held, likely secondary to antibiotic administration    Plan:  Patient will continue to hold Coumadin at time of discharge and recheck INR in 3 days with appointment with Coumadin  clinic to discuss reinitiation of Coumadin at that time      Atrial fibrillation (H)    Assessment:  re-controlled status post pacemaker     Plan:  Hold Coumadin as above with close follow-up at time of discharge       Long-term (current) use of anticoagulants [Z79.01]    Assessment:  INR became supratherapeutic over 4 despite Coumadin being held, thought secondary to antibiotics     Plan:  Discharge with plan as above       Mild intermittent asthma without complication    Assessment:  Chronic and stable, without concern for acute exacerbation    Plan:  Discharge changed home regimen       Chronic systolic congestive heart failure (H)    Assessment:  Chronic and stable without signs concerning for acute exacerbation    Plan:  discharge home without changed home regimen      Advanced directives, counseling/discussion    Assessment:  DNR/DNI    Plan:  continue to honor          Procedures:   No procedures performed during this admission          Medications Prior to Admission:     Prescriptions Prior to Admission   Medication Sig Dispense Refill Last Dose     leuprolide (LUPRON DEPOT) 22.5 MG kit Inject 22.5 MG, IM q 4 months per Dr. Zeyad Guevara, pt's Urologist in [place]. 4/6/2017 1 each 3 Past Month at Unknown time     albuterol (PROAIR HFA, PROVENTIL HFA, VENTOLIN HFA) 108 (90 BASE) MCG/ACT inhaler Inhale 2 puffs into the lungs every 6 hours as needed for shortness of breath / dyspnea or wheezing 1 Inhaler 6 Past Month at Unknown time     losartan (COZAAR) 50 MG tablet Take 1 tablet (50 mg) by mouth 2 times daily 180 tablet 3 4/19/2017 at 0700     simvastatin (ZOCOR) 40 MG tablet TAKE ONE TABLET BY MOUTH AT BEDTIME 90 tablet 3 Past Week at 2100     fluticasone (FLOVENT HFA) 44 MCG/ACT inhaler INHALE ONE PUFF INTO THE LUNGS TWO TIMES A DAY 2 Inhaler 2 4/19/2017 at 0700     [DISCONTINUED] predniSONE (DELTASONE) 20 MG tablet TAKE ONE TABLET BY MOUTH EVERY DAY AS NEEDED 10 tablet 3 More than a month at Unknown  time     ipratropium - albuterol 0.5 mg/2.5 mg/3 mL (DUONEB) 0.5-2.5 (3) MG/3ML nebulization Take 3 mLs by nebulization every 6 hours as needed for shortness of breath / dyspnea. 60 vial 1 More than a month at Unknown time             Discharge Medications:     Current Discharge Medication List      START taking these medications    Details   acetaminophen (TYLENOL) 325 MG tablet Take 2 tablets (650 mg) by mouth every 4 hours as needed for mild pain or fever  Qty: 100 tablet      levofloxacin (LEVAQUIN) 500 MG tablet Take 1 tablet (500 mg) by mouth daily for 12 days  Qty: 12 tablet, Refills: 0    Associated Diagnoses: Ruptured appendicitis      senna-docusate (SENOKOT-S;PERICOLACE) 8.6-50 MG per tablet Take 1 tablet by mouth At Bedtime  Qty: 30 tablet, Refills: 0    Associated Diagnoses: Ruptured appendicitis      metroNIDAZOLE (FLAGYL) 50 mg/mL SUSP Take 10 mLs (500 mg) by mouth 3 times daily for 12 days  Qty: 360 mL, Refills: 0    Associated Diagnoses: Ruptured appendicitis         CONTINUE these medications which have CHANGED    Details   warfarin (JANTOVEN) 2.5 MG tablet HOLD THIS MEDICATION on 4/21, 4/22, and 4/23.  Please follow up with coumadin clinic on Monday 4/24 as scheduled and then take as scheduled by coumadin clinic.  Qty: 90 tablet, Refills: 3    Associated Diagnoses: Chronic atrial fibrillation (H); Long-term (current) use of anticoagulants         CONTINUE these medications which have NOT CHANGED    Details   bicalutamide (CASODEX) 50 MG tablet Take 1 tablet (50 mg) by mouth daily      leuprolide (LUPRON DEPOT) 22.5 MG kit Inject 22.5 MG, IM q 4 months per Dr. Zeyad Guevara, pt's Urologist in [place]. 4/6/2017  Qty: 1 each, Refills: 3    Associated Diagnoses: Malignant neoplasm of prostate (H)      albuterol (PROAIR HFA, PROVENTIL HFA, VENTOLIN HFA) 108 (90 BASE) MCG/ACT inhaler Inhale 2 puffs into the lungs every 6 hours as needed for shortness of breath / dyspnea or wheezing  Qty: 1  Inhaler, Refills: 6    Associated Diagnoses: Mild intermittent asthma, uncomplicated      losartan (COZAAR) 50 MG tablet Take 1 tablet (50 mg) by mouth 2 times daily  Qty: 180 tablet, Refills: 3    Associated Diagnoses: S/P AVR (aortic valve replacement)      simvastatin (ZOCOR) 40 MG tablet TAKE ONE TABLET BY MOUTH AT BEDTIME  Qty: 90 tablet, Refills: 3    Associated Diagnoses: S/P AVR (aortic valve replacement)      fluticasone (FLOVENT HFA) 44 MCG/ACT inhaler INHALE ONE PUFF INTO THE LUNGS TWO TIMES A DAY  Qty: 2 Inhaler, Refills: 2    Associated Diagnoses: Mild intermittent asthma without complication      ipratropium - albuterol 0.5 mg/2.5 mg/3 mL (DUONEB) 0.5-2.5 (3) MG/3ML nebulization Take 3 mLs by nebulization every 6 hours as needed for shortness of breath / dyspnea.  Qty: 60 vial, Refills: 1    Associated Diagnoses: Moderate persistent asthma         STOP taking these medications       predniSONE (DELTASONE) 20 MG tablet Comments:   Reason for Stopping:                     Consultations:   Consultation during this admission received from general surgery, Dr. Petar Ann          Brief History of Illness:   Patient is an 84-year-old gentleman who presented to the clinic with concerns for right lower quadrant abdominal pain which had been intermittently present for over a week. Patient does have significant tenderness on palpation and workup was performed, which showed a CT scan consistent with perforated appendicitis and patient was instructed to go to the emergency room where he was found to be hemodynamically stable. Discussion with general surgery occurred and it was elected that given his stability and story, as well as the amount of inflammation present on CT scan, that medical management be attempted and patient was admitted for IV antibiotics.           Hospital Course:    Patient was given an initial dose of Zosyn and then on recommendation from general surgery was transitioned to IV Levaquin and  Flagyl. Patient did have the development of symptoms concerning for sepsis or worsening infection in fact had improvement of his right lower quadrant abdominal pain symptoms and was transitioned to oral Flagyl and Levaquin as per general surgery's recommendations without difficulty. He will be discharged with ongoing antibiotic administration for a 14 day course and will have repeat CT scan as well as general surgery consultation as an outpatient in 6 weeks to discuss if and when elective appendectomy should be considered.         Physical Exam:   Vitals were reviewed  All vitals stable  Constitutional:   awake, alert, cooperative, no apparent distress, and appears stated age     Lungs:   No increased work of breathing, good air exchange, clear to auscultation bilaterally, no crackles or wheezing     Cardiovascular:   Paced rhythm     Abdomen:   Bowel sounds present, abdomen soft and non-tender, ongoing point tenderness in right lower quadrant but improved from previous and without guarding     Musculoskeletal:   no lower extremity pitting edema present     Neurologic:   Awake, alert, oriented to name, place and time.      Skin:   normal skin color, texture, turgor              Discharge Instructions and Follow-Up:   Discharge diet: Advance to a regular diet as tolerated   Discharge activity: Activity as tolerated   Discharge follow-up: Follow up with Coumadin clinic in 3 days  Follow up with primary care provider within 7-10 days  Follow up with Dr. Ann, general surgery, in 6 weeks           Discharge Disposition:   Discharged to home      Attestation:  I have reviewed today's vital signs, notes, medications, labs and imaging.    More than 30 minutes was spent on this discharge.      Shirley Quintanilla MD    Note: Chart documentation done in part with Dragon Voice Recognition software. Although reviewed after completion, some word and grammatical errors may remain.

## 2017-04-21 NOTE — PHARMACY-ANTICOAGULATION SERVICE
April 21, 2017, 11:24 AM, Patient Miguel Patten was educated on Warfarin therapy.  Geremias Sawant RPH

## 2017-04-21 NOTE — PHARMACY-ANTICOAGULATION SERVICE
Clinical Pharmacy - Warfarin Dosing Consult     Pharmacy has been consulted to manage this patient s warfarin therapy.  Indication: Atrial Fibrillation  Therapy Goal: INR 2-3  Warfarin Prior to Admission: Yes  Warfarin PTA Regimen: 2.5 mg on Monday, Wednesday and Friday, 3.75 mg all other days of the week.  Recent documented change in oral intake/nutrition: Unknown    INR   Date Value Ref Range Status   04/21/2017 4.72 (H) 0.86 - 1.14 Final   04/20/2017 3.34 (H) 0.86 - 1.14 Final       Recommend holding warfarin dose today.  Pharmacy will monitor Miguel Patten daily and order warfarin doses to achieve specified goal.      Please contact pharmacy as soon as possible if the warfarin needs to be held for a procedure or if the warfarin goals change.

## 2017-04-22 NOTE — PROGRESS NOTES
"Miguel Patten  Gender: male  : 1932  [address] [facility] [address]  [phone] (home)     Medical Record: [id number]  Pharmacy: [facility] [address]   Primary Care Provider: None    Parent's names are: Data Unavailable (mother) and Data Unavailable (father).      [facility]  2017     Discharge Phone Call:  Key Words/Key Times      Introduction - AIDET (Acknowledge, Introduce, Duration, Explanation)      Empathy-   We are calling to see how you are since your recent stay in the hospital?     Call back COMMENTS:       Clinical Questions -  (f/u appts, medication side effects/purpose, ability to care for self at home) \"For your safety, it is important to us that you understand the purpose and side effects of your medications, can you tell me what your new medications are?\"     Call back COMMENTS:       Staff Recognition -  We like to recognize staff and physicians who have done an excellent job.  Do you remember any people from your care team that you would like recognize?     Call back COMMENTS:       Very Good Care -  We want to provide very good care to all patients.  How was your care?     Call back COMMENTS:       Opportunities for Improvement -  Our goal is to be the best.  Do you have any suggestions for things that we could improve upon?     Call back COMMENTS:       Thank You     First attept at call back. No answer. No message left.         "

## 2017-04-24 ENCOUNTER — ANTICOAGULATION THERAPY VISIT (OUTPATIENT)
Dept: ANTICOAGULATION | Facility: CLINIC | Age: 82
End: 2017-04-24
Payer: MEDICARE

## 2017-04-24 ENCOUNTER — TELEPHONE (OUTPATIENT)
Dept: FAMILY MEDICINE | Facility: OTHER | Age: 82
End: 2017-04-24

## 2017-04-24 DIAGNOSIS — Z79.01 LONG-TERM (CURRENT) USE OF ANTICOAGULANTS: ICD-10-CM

## 2017-04-24 DIAGNOSIS — I50.9 CONGESTIVE HEART FAILURE, UNSPECIFIED CONGESTIVE HEART FAILURE CHRONICITY, UNSPECIFIED CONGESTIVE HEART FAILURE TYPE: ICD-10-CM

## 2017-04-24 LAB — INR POINT OF CARE: 1.8 (ref 0.86–1.14)

## 2017-04-24 PROCEDURE — 85610 PROTHROMBIN TIME: CPT | Mod: QW

## 2017-04-24 PROCEDURE — 99207 ZZC NO CHARGE NURSE ONLY: CPT

## 2017-04-24 PROCEDURE — 36416 COLLJ CAPILLARY BLOOD SPEC: CPT

## 2017-04-24 NOTE — PROGRESS NOTES
ANTICOAGULATION FOLLOW-UP CLINIC VISIT    Patient Name:  Miguel Patten  Date:  4/24/2017  Contact Type:  Face to Face    SUBJECTIVE:     Patient Findings     Positives Change in medications (levaquin and flagyl 4/21-5/3), Intentional hold of therapy (held on Thurd, Fri, Sat, Sun )           OBJECTIVE    INR Protime   Date Value Ref Range Status   04/24/2017 1.8 (A) 0.86 - 1.14 Final       ASSESSMENT / PLAN  INR assessment SUB    Recheck INR In: 4 DAYS    INR Location Clinic      Anticoagulation Summary as of 4/24/2017     INR goal 2.0-3.0   Today's INR 1.8!   Maintenance plan 2.5 mg (2.5 mg x 1) on Mon, Wed, Fri; 3.75 mg (2.5 mg x 1.5) all other days   Full instructions 4/25: 2.5 mg; 4/27: 2.5 mg; Otherwise 2.5 mg on Mon, Wed, Fri; 3.75 mg all other days   Weekly total 22.5 mg   Plan last modified Edward Lo RN (6/1/2016)   Next INR check 4/28/2017   Target end date     Indications   CHF (congestive heart failure) (H) [I50.9]  Long-term (current) use of anticoagulants [Z79.01] [Z79.01]         Anticoagulation Episode Summary     INR check location     Preferred lab     Send INR reminders to [facility]    Comments 2.5 mg tablets      Anticoagulation Care Providers     Provider Role Specialty Phone number    Rober العراقي MD [facility] [phone]            See the Encounter Report to view Anticoagulation Flowsheet and Dosing Calendar (Go to Encounters tab in chart review, and find the Anticoagulation Therapy Visit)    Dosage adjustment made based on physician directed care plan.    Pt discharged on 4/21. He was advised to hold coumadin throughout the weekend.     Ashly Melo RN

## 2017-04-24 NOTE — MR AVS SNAPSHOT
Miguel Patten   4/24/2017 3:00 PM   Anticoagulation Therapy Visit    Description:  84 year old male   Provider:  [facility]   Department:  Ph Anticoag           INR as of 4/24/2017     Today's INR 1.8!      Anticoagulation Summary as of 4/24/2017     INR goal 2.0-3.0   Today's INR 1.8!   Full instructions 4/25: 2.5 mg; 4/27: 2.5 mg; Otherwise 2.5 mg on Mon, Wed, Fri; 3.75 mg all other days   Next INR check 4/28/2017    Indications   CHF (congestive heart failure) (H) [I50.9]  Long-term (current) use of anticoagulants [Z79.01] [Z79.01]         Your next Anticoagulation Clinic appointment(s)     Apr 24, 2017  3:00 PM CDT   [facility] with PH ANTI [facility]AG   [facility]    [address] [place] [address]   [phone]            Apr 28, 2017  9:00 AM CDT   Anticoagulation Visit with PH ANTI COAG   [facility] ([address] [address]   [phone]              Contact Numbers     Clinic Number:         April 2017 Details    Sun Mon Tue Wed Thu Fri Sat           1                 2               3               4               5               6               7               8                 9               10               11               12               13               14               15                 16               17               18               19               20               21               22                 23               24      2.5 mg   See details      25      2.5 mg         26      2.5 mg         27      2.5 mg         28            29                 30                      Date Details   04/24 This INR check       Date of next INR:  4/28/2017         How to take your warfarin dose     To take:  2.5 mg Take 1 of the 2.5 mg tablets.

## 2017-04-24 NOTE — TELEPHONE ENCOUNTER
Type of Visit  INPATIENT  Diagnosis/Reason for Visit  Chronic Atrial Fibrillation (H), Ruptured Appendicitis  Date of Visit  04-  # of ED Visits in past year  2      Please call patient for follow up.

## 2017-04-24 NOTE — TELEPHONE ENCOUNTER
"Hospital/TCU/ED for chronic condition Discharge Protocol    \"Hi, my name is Quiana Ramos, a registered nurse, and I am calling from [facility].  I am calling to follow up and see how things are going for you after your recent emergency visit/hospital/TCU stay.\"    Tell me how you are doing now that you are home?\" Doing just fine. No concerns.\"      Discharge Instructions    \"Let's review your discharge instructions.  What is/are the follow-up recommendations?  Pt. Response: follow up with INR and Dr. Gonzales    \"Has an appointment with your primary care provider been scheduled?\"   Yes. (confirm)    \"When you see the provider, I would recommend that you bring your medications with you.\"    Medications    \"Tell me what changed about your medicines when you discharged?\"    Changes to chronic meds?    0-1    \"What questions do you have about your medications?\"    None     New diagnoses of heart failure, COPD, diabetes, or MI?    No          On warfarin: \"Were you given any recommendations for follow-up with the anticoagulation clinic?\" Yes - Anticoagulation clinic appointment is already scheduled at appropriate interval    Medication reconciliation completed? Yes  Was MTM referral placed (*Make sure to put transitions as reason for referral)?   No    Call Summary    \"What questions or concerns do you have about your recent visit and your follow-up care?\"     none    \"If you have questions or things don't continue to improve, we encourage you contact us through the main clinic number (give number).  Even if the clinic is not open, triage nurses are available 24/7 to help you.     We would like you to know that our clinic has extended hours (provide information).  We also have urgent care (provide details on closest location and hours/contact info)\"      \"Thank you for your time and take care!\"    Quiana Ramos RN  [facility]  "

## 2017-04-25 PROBLEM — E78.2 MIXED HYPERLIPIDEMIA: Status: ACTIVE | Noted: 2017-04-25

## 2017-04-27 NOTE — PROGRESS NOTES
"Miguel Patten  Gender: male  : 1932  [address] [facility] [address]  [phone] (home)     Medical Record: [id number]  Pharmacy: [facility] [address]   Primary Care Provider: None    Parent's names are: Data Unavailable (mother) and Data Unavailable (father).      [facility]  2017     Discharge Phone Call:  Key Words/Key Times      Introduction - AIDET (Acknowledge, Introduce, Duration, Explanation)      Empathy-   We are calling to see how you are since your recent stay in the hospital?     Call back COMMENTS: \"I am doing better, thanks\"      Clinical Questions -  (f/u appts, medication side effects/purpose, ability to care for self at home) \"For your safety, it is important to us that you understand the purpose and side effects of your medications, can you tell me what your new medications are?\"     Call back COMMENTS: Patient understand new medications side effects and purpose of.      Staff Recognition -  We like to recognize staff and physicians who have done an excellent job.  Do you remember any people from your care team that you would like recognize?     Call back COMMENTS: They all did a wonderful job, the 2 Sarahs were terrific and Lisa was wonderful.        Very Good Care -  We want to provide very good care to all patients.  How was your care?     Call back COMMENTS: My care was A+++.      Opportunities for Improvement -  Our goal is to be the best.  Do you have any suggestions for things that we could improve upon?     Call back COMMENTS: No comments.      Thank You       1st attempt @ Call back ; no answer and no message left. Srinivasa PIERCE   "

## 2017-04-28 ENCOUNTER — ANTICOAGULATION THERAPY VISIT (OUTPATIENT)
Dept: ANTICOAGULATION | Facility: CLINIC | Age: 82
End: 2017-04-28
Payer: MEDICARE

## 2017-04-28 DIAGNOSIS — Z79.01 LONG-TERM (CURRENT) USE OF ANTICOAGULANTS: ICD-10-CM

## 2017-04-28 DIAGNOSIS — I50.9 CONGESTIVE HEART FAILURE, UNSPECIFIED CONGESTIVE HEART FAILURE CHRONICITY, UNSPECIFIED CONGESTIVE HEART FAILURE TYPE: ICD-10-CM

## 2017-04-28 LAB — INR POINT OF CARE: 2.3 (ref 0.86–1.14)

## 2017-04-28 PROCEDURE — 36416 COLLJ CAPILLARY BLOOD SPEC: CPT

## 2017-04-28 PROCEDURE — 85610 PROTHROMBIN TIME: CPT | Mod: QW

## 2017-04-28 PROCEDURE — 99207 ZZC NO CHARGE NURSE ONLY: CPT

## 2017-04-28 NOTE — PROGRESS NOTES
ANTICOAGULATION FOLLOW-UP CLINIC VISIT    Patient Name:  Miguel Patten  Date:  4/28/2017  Contact Type:  Face to Face    SUBJECTIVE:     Patient Findings     Positives Change in medications (on levaquin and flagyl 4/21-5/3)           OBJECTIVE    INR Protime   Date Value Ref Range Status   04/28/2017 2.3 (A) 0.86 - 1.14 Final       ASSESSMENT / PLAN  INR assessment THER    Recheck INR In: 5 DAYS    INR Location Clinic      Anticoagulation Summary as of 4/28/2017     INR goal 2.0-3.0   Today's INR 2.3   Maintenance plan No maintenance plan   Full instructions 4/28: 2.5 mg; 4/29: 2.5 mg; 4/30: 2.5 mg; 5/1: 2.5 mg; 5/2: 2.5 mg   Plan last modified Ashly Melo RN (4/28/2017)   Next INR check 5/3/2017   Target end date     Indications   CHF (congestive heart failure) (H) [I50.9]  Long-term (current) use of anticoagulants [Z79.01] [Z79.01]         Anticoagulation Episode Summary     INR check location     Preferred lab     Send INR reminders to [facility]    Comments 2.5 mg tablets      Anticoagulation Care Providers     Provider Role Specialty Phone number    Rober العراقي MD [facility] [phone]            See the Encounter Report to view Anticoagulation Flowsheet and Dosing Calendar (Go to Encounters tab in chart review, and find the Anticoagulation Therapy Visit)    Dosage adjustment made based on physician directed care plan.    Ashly Melo RN

## 2017-04-28 NOTE — MR AVS SNAPSHOT
Miguel Patten   4/28/2017 9:00 AM   Anticoagulation Therapy Visit    Description:  84 year old male   Provider:  [facility]   Department:  Ph Anticoag           INR as of 4/28/2017     Today's INR 2.3      Anticoagulation Summary as of 4/28/2017     INR goal 2.0-3.0   Today's INR 2.3   Full instructions 4/28: 2.5 mg; 4/29: 2.5 mg; 4/30: 2.5 mg; 5/1: 2.5 mg; 5/2: 2.5 mg   Next INR check 5/3/2017    Indications   CHF (congestive heart failure) (H) [I50.9]  Long-term (current) use of anticoagulants [Z79.01] [Z79.01]         Your next Anticoagulation Clinic appointment(s)     Apr 28, 2017  9:00 AM CDT   Anticoagulation Visit with PH ANTI COAG   [facility] ([address] [address]   [phone]            May 03, 2017  9:00 AM CDT   Anticoagulation Visit with PH ANTI COAG   [facility] ([address] [address]   [phone]              Contact Numbers     Clinic Number:         April 2017 Details    Sun Mon Tue Wed Thu Fri Sat           1                 2               3               4               5               6               7               8                 9               10               11               12               13               14               15                 16               17               18               19               20               21               22                 23               24               25               26               27               28      2.5 mg   See details      29      2.5 mg           30      2.5 mg                Date Details   04/28 This INR check               How to take your warfarin dose     To take:  2.5 mg Take 1 of the 2.5 mg tablets.           May 2017 Details    Sun Mon Tue Wed Thu Fri Sat      1      2.5 mg         2      2.5 mg         3            4               5               6                 7                8               9               10               11               12               13                 14               15               16               17               18               19               20                 21               22               23               24               25               26               27                 28               29               30               31                   Date Details   No additional details    Date of next INR:  5/3/2017         How to take your warfarin dose     To take:  2.5 mg Take 1 of the 2.5 mg tablets.

## 2017-05-02 ENCOUNTER — OFFICE VISIT (OUTPATIENT)
Dept: FAMILY MEDICINE | Facility: OTHER | Age: 82
End: 2017-05-02
Payer: MEDICARE

## 2017-05-02 VITALS
WEIGHT: 186 LBS | HEART RATE: 81 BPM | BODY MASS INDEX: 24.65 KG/M2 | SYSTOLIC BLOOD PRESSURE: 130 MMHG | TEMPERATURE: 97.3 F | OXYGEN SATURATION: 97 % | DIASTOLIC BLOOD PRESSURE: 70 MMHG | HEIGHT: 73 IN | RESPIRATION RATE: 12 BRPM

## 2017-05-02 DIAGNOSIS — Z95.2 S/P AVR (AORTIC VALVE REPLACEMENT): ICD-10-CM

## 2017-05-02 DIAGNOSIS — J45.20 MILD INTERMITTENT ASTHMA WITHOUT COMPLICATION: ICD-10-CM

## 2017-05-02 DIAGNOSIS — I48.20 CHRONIC ATRIAL FIBRILLATION (H): ICD-10-CM

## 2017-05-02 DIAGNOSIS — I50.9 CONGESTIVE HEART FAILURE, UNSPECIFIED CONGESTIVE HEART FAILURE CHRONICITY, UNSPECIFIED CONGESTIVE HEART FAILURE TYPE: ICD-10-CM

## 2017-05-02 DIAGNOSIS — K35.32 RUPTURE OF APPENDIX: Primary | ICD-10-CM

## 2017-05-02 PROCEDURE — 99213 OFFICE O/P EST LOW 20 MIN: CPT | Performed by: INTERNAL MEDICINE

## 2017-05-02 ASSESSMENT — ANXIETY QUESTIONNAIRES
IF YOU CHECKED OFF ANY PROBLEMS ON THIS QUESTIONNAIRE, HOW DIFFICULT HAVE THESE PROBLEMS MADE IT FOR YOU TO DO YOUR WORK, TAKE CARE OF THINGS AT HOME, OR GET ALONG WITH OTHER PEOPLE: NOT DIFFICULT AT ALL
GAD7 TOTAL SCORE: 0
6. BECOMING EASILY ANNOYED OR IRRITABLE: NOT AT ALL
2. NOT BEING ABLE TO STOP OR CONTROL WORRYING: NOT AT ALL
1. FEELING NERVOUS, ANXIOUS, OR ON EDGE: NOT AT ALL
3. WORRYING TOO MUCH ABOUT DIFFERENT THINGS: NOT AT ALL
7. FEELING AFRAID AS IF SOMETHING AWFUL MIGHT HAPPEN: NOT AT ALL
5. BEING SO RESTLESS THAT IT IS HARD TO SIT STILL: NOT AT ALL

## 2017-05-02 ASSESSMENT — PAIN SCALES - GENERAL: PAINLEVEL: NO PAIN (0)

## 2017-05-02 ASSESSMENT — PATIENT HEALTH QUESTIONNAIRE - PHQ9: 5. POOR APPETITE OR OVEREATING: NOT AT ALL

## 2017-05-02 NOTE — MR AVS SNAPSHOT
After Visit Summary   5/2/2017    Miguel Patten    MRN: [id number]           Patient Information     Date Of Birth          12/16/1932        Visit Information        Provider Department      5/2/2017 2:00 PM Vladimir Gonzales DO [facility]        Today's Diagnoses     Rupture of appendix    -  1    Mild intermittent asthma without complication        Congestive heart failure, unspecified congestive heart failure chronicity, unspecified congestive heart failure type (H)        Chronic atrial fibrillation (H)        S/P AVR (aortic valve replacement)           Follow-ups after your visit        Your next 10 appointments already scheduled     May 24, 2017  9:15 AM CDT   Anticoagulation Visit with PH ANTI COAG   [facility] ([facility]    [address] [place] [address]   [phone]            May 31, 2017  9:00 AM CDT   Return Visit with CARDIO DEVICE NURSE   [facility] ([facility])    [address] [place] [address]   [phone]            May 31, 2017  9:30 AM CDT   Return Visit with Cristina Caputo MD   [facility] ([facility])    [address] [place] [address]   [phone]            Jun 02, 2017 12:30 PM CDT   CT ABDOMEN PELVIS W CONTRAST with PHCT1   [facility] CT Scan ([facility])    [address] [place] [address]   [phone]           Please bring any scans or X-rays taken at other hospitals, if similar tests were done. Also bring a list of your medicines, including vitamins, minerals and over-the-counter drugs. It is safest to leave personal items at home.  Be sure to tell your doctor:   If you have any allergies.   If there s any chance you are pregnant.   If you are breastfeeding.   If you have any special needs.  You may have contrast for this exam. To prepare:   Do not eat or drink for 2  hours before your exam. If you need to take medicine, you may take it with small sips of water. (We may ask you to take liquid medicine as well.)   The day before your exam, drink extra fluids at least six 8-ounce glasses (unless your doctor tells you to restrict your fluids).  Patients over 70 or patients with diabetes or kidney problems:   If you haven t had a blood test (creatinine test) within the last 30 days, go to your clinic or Diagnostic Imaging Department for this test.  If you have diabetes:   If your kidney function is normal, continue taking your metformin (Avandamet, Glucophage, Glucovance, Metaglip) on the day of your exam.   If your kidney function is abnormal, wait 48 hours before restarting this medicine.  You will have oral contrast for this exam:   You will drink the contrast at home. Get this from your clinic or Diagnostic Imaging Department. Please follow the directions given.  Please wear loose clothing, such as a sweat suit or jogging clothes. Avoid snaps, zippers and other metal. We may ask you to undress and put on a hospital gown.  If you have any questions, please call the Imaging Department where you will have your exam.            Jun 02, 2017  2:00 PM CDT   Return Visit with Petar Ann MD   [facility] ([address] [address] [phone]            Aug 10, 2017 10:00 AM CDT   Nurse Only with NL FLOAT TEAM D [address] [address]   [phone]              Who to contact     If you have questions or need follow up information about today's clinic visit or your schedule please contact [facility] directly at [phone].  Normal or non-critical lab and imaging results will be communicated to you by MyChart, letter or phone within 4 business days after the clinic has received the results. If you do not hear from us  "within 7 days, please contact the clinic through sofatutor or phone. If you have a critical or abnormal lab result, we will notify you by phone as soon as possible.  Submit refill requests through sofatutor or call your pharmacy and they will forward the refill request to us. Please allow 3 business days for your refill to be completed.          Additional Information About Your Visit        Care EveryWhere ID     This is your Care EveryWhere ID. This could be used by other organizations to access your [place] medical records  [id number]        Your Vitals Were     Pulse Temperature Respirations Height Pulse Oximetry BMI (Body Mass Index)    81 97.3  F (36.3  C) (Tympanic) 12 6' 1\" (1.854 m) 97% 24.54 kg/m2       Blood Pressure from Last 3 Encounters:   05/02/17 130/70   04/21/17 131/66   04/19/17 108/62    Weight from Last 3 Encounters:   05/02/17 186 lb (84.4 kg)   04/19/17 180 lb 8 oz (81.9 kg)   04/19/17 176 lb (79.8 kg)              Today, you had the following     No orders found for display       Primary Care Provider    None       No address on file        Thank you!     Thank you for choosing [facility]  for your care. Our goal is always to provide you with excellent care. Hearing back from our patients is one way we can continue to improve our services. Please take a few minutes to complete the written survey that you may receive in the mail after your visit with us. Thank you!             Your Updated Medication List - Protect others around you: Learn how to safely use, store and throw away your medicines at www.disposemymeds.org.          This list is accurate as of: 5/2/17 11:59 PM.  Always use your most recent med list.                   Brand Name Dispense Instructions for use    acetaminophen 325 MG tablet    TYLENOL    100 tablet    Take 2 tablets (650 mg) by mouth every 4 hours as needed for mild pain or fever       albuterol 108 (90 BASE) MCG/ACT Inhaler    PROAIR HFA/PROVENTIL " HFA/VENTOLIN HFA    1 Inhaler    Inhale 2 puffs into the lungs every 6 hours as needed for shortness of breath / dyspnea or wheezing       bicalutamide 50 MG tablet    CASODEX     Take 1 tablet (50 mg) by mouth daily       fluticasone 44 MCG/ACT Inhaler    FLOVENT HFA    2 Inhaler    INHALE ONE PUFF INTO THE LUNGS TWO TIMES A DAY       ipratropium - albuterol 0.5 mg/2.5 mg/3 mL 0.5-2.5 (3) MG/3ML neb solution    DUONEB    60 vial    Take 3 mLs by nebulization every 6 hours as needed for shortness of breath / dyspnea.       JANTOVEN 2.5 MG tablet   Generic drug:  warfarin     90 tablet    HOLD THIS MEDICATION on 4/21, 4/22, and 4/23.  Please follow up with coumadin clinic on Monday 4/24 as scheduled and then take as scheduled by coumadin clinic.       levofloxacin 500 MG tablet    LEVAQUIN    12 tablet    Take 1 tablet (500 mg) by mouth daily for 12 days       losartan 50 MG tablet    COZAAR    180 tablet    Take 1 tablet (50 mg) by mouth 2 times daily       LUPRON DEPOT (3-MONTH) 22.5 MG kit   Generic drug:  leuprolide     1 each    Inject 22.5 MG, IM q 4 months per Dr. Zeyad Guevara, pt's Urologist in [place]. 4/6/2017       metroNIDAZOLE 50 mg/mL Susp    FLAGYL    360 mL    Take 10 mLs (500 mg) by mouth 3 times daily for 12 days       senna-docusate 8.6-50 MG per tablet    SENOKOT-S;PERICOLACE    30 tablet    Take 1 tablet by mouth At Bedtime       simvastatin 40 MG tablet    ZOCOR    90 tablet    TAKE ONE TABLET BY MOUTH AT BEDTIME

## 2017-05-02 NOTE — NURSING NOTE
"Chief Complaint   Patient presents with     Hospital F/U     4/19/2017-4/21/2017 Appendicitis with perforation       Initial /70  Pulse 81  Temp 97.3  F (36.3  C) (Tympanic)  Resp 12  Ht 6' 1\" (1.854 m)  Wt 186 lb (84.4 kg)  SpO2 97%  BMI 24.54 kg/m2 Estimated body mass index is 24.54 kg/(m^2) as calculated from the following:    Height as of this encounter: 6' 1\" (1.854 m).    Weight as of this encounter: 186 lb (84.4 kg).  Medication Reconciliation: complete    "

## 2017-05-02 NOTE — PROGRESS NOTES
SUBJECTIVE:                                                    Miguel Patten is a 84 year old male who presents to clinic today for the following health issues:    The patient is anticipating surgical appendectomy when at the last moment the decision for conservative therapy was made. He is now doing well without any abdominal discomfort. Denies any fever, chills, diarrhea or other symptoms. He is concluding his antibiotics today.      Hospital Follow-up Visit:    Hospital/Nursing Home/IP Rehab Facility: [facility]  Date of Admission: 4/19/2017  Date of Discharge: 4/21/2017  Reason(s) for Admission: Appendicitis with perforation            Problems taking medications regularly:  None       Medication changes since discharge: None       Problems adhering to non-medication therapy:  None    Summary of hospitalization:  [facility] discharge summary reviewed  Diagnostic Tests/Treatments reviewed.  Follow up needed: none  Other Healthcare Providers Involved in Patient s Care:         None  Update since discharge: improved.     Post Discharge Medication Reconciliation: discharge medications reconciled and changed, per note/orders (see AVS).  Plan of care communicated with patient     Coding guidelines for this visit:  Type of Medical   Decision Making Face-to-Face Visit       within 7 Days of discharge Face-to-Face Visit        within 14 days of discharge   Moderate Complexity 26370 [id number]   High Complexity 04704 [id number]                Problem list and histories reviewed & adjusted, as indicated.  Additional history: as documented    Patient Active Problem List   Diagnosis     Malignant neoplasm of prostate (H)     Health Care Home     Advanced directives, counseling/discussion     Gout     History of total hip arthroplasty - bilateral     Osteoarthritis of hip - right     Atrial flutter (H)     Aneurysm of ascending aorta (H)     CHF (congestive heart failure) (H)     Hypertension goal BP (blood  pressure) < 140/90     Aortic regurgitation     S/P AVR (aortic valve replacement)     S/P ascending aortic replacement     Atrial fibrillation (H)     Long-term (current) use of anticoagulants [Z79.01]     Mild intermittent asthma without complication     Appendicitis with perforation     Chronic systolic congestive heart failure (H)     Mixed hyperlipidemia     Past Surgical History:   Procedure Laterality Date     ARTHROPLASTY HIP  1/21/2013    Procedure: ARTHROPLASTY HIP;  right total hip arthroplasty;  Surgeon: Rober Alves MD;  Location: [facility] OR     C NONSPECIFIC PROCEDURE      Hernia repair     C NONSPECIFIC PROCEDURE      Prostatectomy/cancer     C TOTAL HIP ARTHROPLASTY  1/21/13    Right     GENITOURINARY SURGERY  2001    Prostatectomy       ORTHOPEDIC SURGERY      Left SONALI     PHACOEMULSIFICATION WITH STANDARD INTRAOCULAR LENS IMPLANT Right 10/6/2016    Procedure: PHACOEMULSIFICATION WITH STANDARD INTRAOCULAR LENS IMPLANT;  Surgeon: Elder Rueda MD;  Location: [facility] OR     PHACOEMULSIFICATION WITH STANDARD INTRAOCULAR LENS IMPLANT Left 10/20/2016    Procedure: PHACOEMULSIFICATION WITH STANDARD INTRAOCULAR LENS IMPLANT;  Surgeon: Elder Rueda MD;  Location: [facility] OR     REPAIR ANEURYSM ASCENDING AORTA  7/17/2014    2. Aortic aneurysm repair with 32 mm Hemashield graft.      REPLACE VALVE AORTIC  7/17/2014    1. Aortic valve replacement with 23 mm [employer] Trifecta tissue valve.      s/p aneurysm repair by Dr. Friedman       s/p avr         Social History   Substance Use Topics     Smoking status: Never Smoker     Smokeless tobacco: Never Used     Alcohol use 0.0 oz/week     0 Standard drinks or equivalent per week      Comment: rarely, once a week or less     Family History   Problem Relation Age of Onset     Asthma Daughter      Asthma Father      Asthma Paternal Grandmother          Current Outpatient Prescriptions   Medication Sig Dispense Refill     acetaminophen (TYLENOL) 325 MG tablet  Take 2 tablets (650 mg) by mouth every 4 hours as needed for mild pain or fever 100 tablet      warfarin (JANTOVEN) 2.5 MG tablet HOLD THIS MEDICATION on 4/21, 4/22, and 4/23.  Please follow up with coumadin clinic on Monday 4/24 as scheduled and then take as scheduled by coumadin clinic. 90 tablet 3     levofloxacin (LEVAQUIN) 500 MG tablet Take 1 tablet (500 mg) by mouth daily for 12 days 12 tablet 0     senna-docusate (SENOKOT-S;PERICOLACE) 8.6-50 MG per tablet Take 1 tablet by mouth At Bedtime 30 tablet 0     metroNIDAZOLE (FLAGYL) 50 mg/mL SUSP Take 10 mLs (500 mg) by mouth 3 times daily for 12 days 360 mL 0     bicalutamide (CASODEX) 50 MG tablet Take 1 tablet (50 mg) by mouth daily       leuprolide (LUPRON DEPOT) 22.5 MG kit Inject 22.5 MG, IM q 4 months per Dr. Zeyad Guevara, pt's Urologist in [place]. 4/6/2017 1 each 3     albuterol (PROAIR HFA, PROVENTIL HFA, VENTOLIN HFA) 108 (90 BASE) MCG/ACT inhaler Inhale 2 puffs into the lungs every 6 hours as needed for shortness of breath / dyspnea or wheezing 1 Inhaler 6     losartan (COZAAR) 50 MG tablet Take 1 tablet (50 mg) by mouth 2 times daily 180 tablet 3     simvastatin (ZOCOR) 40 MG tablet TAKE ONE TABLET BY MOUTH AT BEDTIME 90 tablet 3     fluticasone (FLOVENT HFA) 44 MCG/ACT inhaler INHALE ONE PUFF INTO THE LUNGS TWO TIMES A DAY 2 Inhaler 2     ipratropium - albuterol 0.5 mg/2.5 mg/3 mL (DUONEB) 0.5-2.5 (3) MG/3ML nebulization Take 3 mLs by nebulization every 6 hours as needed for shortness of breath / dyspnea. 60 vial 1     Allergies   Allergen Reactions     No Known Drug Allergies        Reviewed and updated as needed this visit by clinical staff  Meds       Reviewed and updated as needed this visit by Provider         ROS:  C: NEGATIVE for fever, chills, change in weight  I: NEGATIVE for worrisome rashes, moles or lesions  E: NEGATIVE for vision changes or irritation  E/M: NEGATIVE for ear, mouth and throat problems  R: NEGATIVE for significant  "cough or SOB  B: NEGATIVE for masses, tenderness or discharge  CV: NEGATIVE for chest pain, palpitations or peripheral edema  GI: NEGATIVE for nausea, abdominal pain, heartburn, or change in bowel habits  : NEGATIVE for frequency, dysuria, or hematuria  M: NEGATIVE for significant arthralgias or myalgia  N: NEGATIVE for weakness, dizziness or paresthesias  E: NEGATIVE for temperature intolerance, skin/hair changes  P: NEGATIVE for changes in mood or affect    OBJECTIVE:                                                    /70  Pulse 81  Temp 97.3  F (36.3  C) (Tympanic)  Resp 12  Ht 6' 1\" (1.854 m)  Wt 186 lb (84.4 kg)  SpO2 97%  BMI 24.54 kg/m2  Body mass index is 24.54 kg/(m^2).  GENERAL: healthy, alert and no distress  EYES: Eyes grossly normal to inspection, PERRL and conjunctivae and sclerae normal  HENT: ear canals and TM's normal, nose and mouth without ulcers or lesions  NECK: no adenopathy, no asymmetry, masses, or scars and thyroid normal to palpation  RESP: lungs clear to auscultation - no rales, rhonchi or wheezes  CV: regular rate and rhythm, normal S1 S2, no S3 or S4, no murmur, no rub, no peripheral edema . peripheral pulses strong  ABDOMEN: soft, nontender, no hepatosplenomegaly, no masses and bowel sounds normal  MS: no gross musculoskeletal defects noted, no edema  SKIN: no suspicious lesions or rashes  NEURO: Normal strength and tone, mentation intact and speech normal  PSYCH: mentation appears normal, affect normal/bright    Diagnostic Test Results:  none      ASSESSMENT/PLAN:                                                    Current feelings and squeeze      ICD-10-CM    1. Rupture of appendix K35.2    2. Mild intermittent asthma without complication J45.20    3. Congestive heart failure, unspecified congestive heart failure chronicity, unspecified congestive heart failure type (H) I50.9    4. Chronic atrial fibrillation (H) I48.2    5. S/P AVR (aortic valve replacement) Z95.2  "                                  FOLLOW UP    I have asked the patient to make an appointment for follow up with me as needed or in 3 months.        I have carefully explained the diagnosis and treatment options with the patient. The patient has displayed an understanding of the above, and all subsequent questions were answered.         DO RICH Correa    Portions of this note were produced using [employer]  Although every attempt at real-time proof reading has been made, occasional grammar/syntax errors may have been missed.             Vladimir Gonzales DO  [facility]

## 2017-05-03 ENCOUNTER — ANTICOAGULATION THERAPY VISIT (OUTPATIENT)
Dept: ANTICOAGULATION | Facility: CLINIC | Age: 82
End: 2017-05-03
Payer: MEDICARE

## 2017-05-03 DIAGNOSIS — I50.33 ACUTE ON CHRONIC DIASTOLIC CONGESTIVE HEART FAILURE (H): ICD-10-CM

## 2017-05-03 DIAGNOSIS — Z79.01 LONG-TERM (CURRENT) USE OF ANTICOAGULANTS: ICD-10-CM

## 2017-05-03 LAB — INR POINT OF CARE: 3.3 (ref 0.86–1.14)

## 2017-05-03 PROCEDURE — 99207 ZZC NO CHARGE NURSE ONLY: CPT

## 2017-05-03 PROCEDURE — 36416 COLLJ CAPILLARY BLOOD SPEC: CPT

## 2017-05-03 PROCEDURE — 85610 PROTHROMBIN TIME: CPT | Mod: QW

## 2017-05-03 ASSESSMENT — ANXIETY QUESTIONNAIRES: GAD7 TOTAL SCORE: 0

## 2017-05-03 NOTE — PROGRESS NOTES
ANTICOAGULATION FOLLOW-UP CLINIC VISIT    Patient Name:  Miguel Patten  Date:  5/3/2017  Contact Type:  Face to Face    SUBJECTIVE:     Patient Findings     Positives Antibiotic use or infection (finishing antiboitics today)           OBJECTIVE    INR Protime   Date Value Ref Range Status   05/03/2017 3.3 (A) 0.86 - 1.14 Final       ASSESSMENT / PLAN  INR assessment SUPRA    Recheck INR In: 1 WEEK    INR Location Clinic      Anticoagulation Summary as of 5/3/2017     INR goal 2.0-3.0   Today's INR 3.3!   Maintenance plan No maintenance plan   Full instructions 5/3: 1.25 mg; 5/4: 2.5 mg; 5/5: 2.5 mg; 5/6: 2.5 mg; 5/7: 2.5 mg; 5/8: 2.5 mg; 5/9: 2.5 mg   Plan last modified Ashly Melo RN (4/28/2017)   Next INR check 5/10/2017   Target end date     Indications   CHF (congestive heart failure) (H) [I50.9]  Long-term (current) use of anticoagulants [Z79.01] [Z79.01]         Anticoagulation Episode Summary     INR check location     Preferred lab     Send INR reminders to [facility]    Comments 2.5 mg tablets      Anticoagulation Care Providers     Provider Role Specialty Phone number    Rober العراقي MD [facility] [phone]            See the Encounter Report to view Anticoagulation Flowsheet and Dosing Calendar (Go to Encounters tab in chart review, and find the Anticoagulation Therapy Visit)    Dosage adjustment made based on physician directed care plan.    Kerwin Faustin RN

## 2017-05-03 NOTE — MR AVS SNAPSHOT
Miguel Patten   5/3/2017 9:00 AM   Anticoagulation Therapy Visit    Description:  84 year old male   Provider:  [facility]   Department:  [facility]           INR as of 5/3/2017     Today's INR 3.3!      Anticoagulation Summary as of 5/3/2017     INR goal 2.0-3.0   Today's INR 3.3!   Full instructions 5/3: 1.25 mg; 5/4: 2.5 mg; 5/5: 2.5 mg; 5/6: 2.5 mg; 5/7: 2.5 mg; 5/8: 2.5 mg; 5/9: 2.5 mg   Next INR check 5/10/2017    Indications   CHF (congestive heart failure) (H) [I50.9]  Long-term (current) use of anticoagulants [Z79.01] [Z79.01]         Your next Anticoagulation Clinic appointment(s)     May 10, 2017 11:00 AM CDT   Anticoagulation Visit with [facility]   [facility]    [address] [place] [id number] [phone]              Contact Numbers     Clinic Number:         May 2017 Details    Sun Mon Tue Wed Thu Fri Sat      1               2               3      1.25 mg   See details      4      2.5 mg         5      2.5 mg         6      2.5 mg           7      2.5 mg         8      2.5 mg         9      2.5 mg         10            11               12               13                 14               15               16               17               18               19               20                 21               22               23               24               25               26               27                 28               29               30               31                   Date Details   05/03 This INR check       Date of next INR:  5/10/2017         How to take your warfarin dose     To take:  1.25 mg Take 0.5 of a 2.5 mg tablet.    To take:  2.5 mg Take 1 of the 2.5 mg tablets.

## 2017-05-10 ENCOUNTER — ANTICOAGULATION THERAPY VISIT (OUTPATIENT)
Dept: ANTICOAGULATION | Facility: CLINIC | Age: 82
End: 2017-05-10
Payer: MEDICARE

## 2017-05-10 DIAGNOSIS — I50.33 ACUTE ON CHRONIC DIASTOLIC CONGESTIVE HEART FAILURE (H): ICD-10-CM

## 2017-05-10 DIAGNOSIS — Z79.01 LONG-TERM (CURRENT) USE OF ANTICOAGULANTS: ICD-10-CM

## 2017-05-10 LAB — INR POINT OF CARE: 2 (ref 0.86–1.14)

## 2017-05-10 PROCEDURE — 36416 COLLJ CAPILLARY BLOOD SPEC: CPT

## 2017-05-10 PROCEDURE — 99207 ZZC NO CHARGE NURSE ONLY: CPT

## 2017-05-10 PROCEDURE — 85610 PROTHROMBIN TIME: CPT | Mod: QW

## 2017-05-10 NOTE — MR AVS SNAPSHOT
Miguel Patten   5/10/2017 11:00 AM   Anticoagulation Therapy Visit    Description:  84 year old male   Provider:  [facility]   Department:  [facility]           INR as of 5/10/2017     Today's INR 2.0      Anticoagulation Summary as of 5/10/2017     INR goal 2.0-3.0   Today's INR 2.0   Full instructions 2.5 mg every day   Next INR check 5/24/2017    Indications   CHF (congestive heart failure) (H) [I50.9]  Long-term (current) use of anticoagulants [Z79.01] [Z79.01]         Your next Anticoagulation Clinic appointment(s)     May 24, 2017  9:15 AM CDT   Anticoagulation Visit with [facility]   [facility] ([facility])    [address] [place] [id number] [phone]              Contact Numbers     Clinic Number:         May 2017 Details    Sun Mon Tue Wed Thu Fri Sat      1               2               3               4               5               6                 7               8               9               10      2.5 mg   See details      11      2.5 mg         12      2.5 mg         13      2.5 mg           14      2.5 mg         15      2.5 mg         16      2.5 mg         17      2.5 mg         18      2.5 mg         19      2.5 mg         20      2.5 mg           21      2.5 mg         22      2.5 mg         23      2.5 mg         24            25               26               27                 28               29               30               31                   Date Details   05/10 This INR check       Date of next INR:  5/24/2017         How to take your warfarin dose     To take:  2.5 mg Take 1 of the 2.5 mg tablets.

## 2017-05-10 NOTE — PROGRESS NOTES
ANTICOAGULATION FOLLOW-UP CLINIC VISIT    Patient Name:  Miguel Patten  Date:  5/10/2017  Contact Type:  Face to Face    SUBJECTIVE:     Patient Findings     Positives No Problem Findings           OBJECTIVE    INR Protime   Date Value Ref Range Status   05/10/2017 2.0 (A) 0.86 - 1.14 Final       ASSESSMENT / PLAN  INR assessment THER    Recheck INR In: 2 WEEKS    INR Location Clinic      Anticoagulation Summary as of 5/10/2017     INR goal 2.0-3.0   Today's INR 2.0   Maintenance plan 2.5 mg (2.5 mg x 1) every day   Full instructions 2.5 mg every day   Weekly total 17.5 mg   Plan last modified Ashly Melo, RN (5/10/2017)   Next INR check 5/24/2017   Target end date     Indications   CHF (congestive heart failure) (H) [I50.9]  Long-term (current) use of anticoagulants [Z79.01] [Z79.01]         Anticoagulation Episode Summary     INR check location     Preferred lab     Send INR reminders to [facility]    Comments 2.5 mg tablets      Anticoagulation Care Providers     Provider Role Specialty Phone number    Rober العراقي MD [facility] [phone]            See the Encounter Report to view Anticoagulation Flowsheet and Dosing Calendar (Go to Encounters tab in chart review, and find the Anticoagulation Therapy Visit)    Dosage adjustment made based on physician directed care plan.      Ashly Melo RN

## 2017-05-24 ENCOUNTER — ANTICOAGULATION THERAPY VISIT (OUTPATIENT)
Dept: ANTICOAGULATION | Facility: CLINIC | Age: 82
End: 2017-05-24
Payer: MEDICARE

## 2017-05-24 ENCOUNTER — HOSPITAL ENCOUNTER (OUTPATIENT)
Dept: CARDIOLOGY | Facility: CLINIC | Age: 82
Discharge: HOME | End: 2017-05-24
Attending: NURSE PRACTITIONER | Admitting: NURSE PRACTITIONER
Payer: MEDICARE

## 2017-05-24 DIAGNOSIS — Z79.01 LONG-TERM (CURRENT) USE OF ANTICOAGULANTS: ICD-10-CM

## 2017-05-24 DIAGNOSIS — I48.92 ATRIAL FLUTTER, UNSPECIFIED TYPE (H): ICD-10-CM

## 2017-05-24 DIAGNOSIS — I50.33 ACUTE ON CHRONIC DIASTOLIC CONGESTIVE HEART FAILURE (H): ICD-10-CM

## 2017-05-24 DIAGNOSIS — I42.9 SECONDARY CARDIOMYOPATHY (H): ICD-10-CM

## 2017-05-24 LAB — INR POINT OF CARE: 1.7 (ref 0.86–1.14)

## 2017-05-24 PROCEDURE — 93306 TTE W/DOPPLER COMPLETE: CPT

## 2017-05-24 PROCEDURE — 85610 PROTHROMBIN TIME: CPT | Mod: QW

## 2017-05-24 PROCEDURE — 99207 ZZC NO CHARGE NURSE ONLY: CPT

## 2017-05-24 PROCEDURE — 93306 TTE W/DOPPLER COMPLETE: CPT | Mod: 26 | Performed by: INTERNAL MEDICINE

## 2017-05-24 PROCEDURE — 36416 COLLJ CAPILLARY BLOOD SPEC: CPT

## 2017-05-24 NOTE — MR AVS SNAPSHOT
Miguel ANAND Sandor   5/24/2017 9:15 AM   Anticoagulation Therapy Visit    Description:  84 year old male   Provider:  [facility]   Department:  [facility]           INR as of 5/24/2017     Today's INR 1.7!      Anticoagulation Summary as of 5/24/2017     INR goal 2.0-3.0   Today's INR 1.7!   Full instructions 3.75 mg on Wed, Sat; 2.5 mg all other days   Next INR check 6/21/2017    Indications   CHF (congestive heart failure) (H) [I50.9]  Long-term (current) use of anticoagulants [Z79.01] [Z79.01]         Your next Anticoagulation Clinic appointment(s)     Jun 21, 2017  9:00 AM CDT   Anticoagulation [facility] with [facility]   [facility]    [address] [place] [id number]   [phone]              Contact Numbers     Clinic Number:         May 2017 Details    Sun Mon Tue Wed Thu Fri Sat      1               2               3               4               5               6                 7               8               9               10               11               12               13                 14               15               16               17               18               19               20                 21               22               23               24      3.75 mg   See details      25      2.5 mg         26      2.5 mg         27      3.75 mg           28      2.5 mg         29      2.5 mg         30      2.5 mg         31      3.75 mg             Date Details   05/24 This INR check               How to take your warfarin dose     To take:  2.5 mg Take 1 of the 2.5 mg tablets.    To take:  3.75 mg Take 1.5 of the 2.5 mg tablets.           June 2017 Details    Sun Mon Tue Wed Thu Fri Sat         1      2.5 mg         2      2.5 mg         3      3.75 mg           4      2.5 mg         5      2.5 mg         6      2.5 mg         7      3.75 mg         8      2.5 mg         9      2.5 mg         10      3.75 mg           11      2.5  mg         12      2.5 mg         13      2.5 mg         14      3.75 mg         15      2.5 mg         16      2.5 mg         17      3.75 mg           18      2.5 mg         19      2.5 mg         20      2.5 mg         21            22               23               24                 25               26               27               28               29               30                 Date Details   No additional details    Date of next INR:  6/21/2017         How to take your warfarin dose     To take:  2.5 mg Take 1 of the 2.5 mg tablets.    To take:  3.75 mg Take 1.5 of the 2.5 mg tablets.

## 2017-05-24 NOTE — PROGRESS NOTES
ANTICOAGULATION FOLLOW-UP CLINIC VISIT    Patient Name:  Miguel Ptaten  Date:  5/24/2017  Contact Type:  Face to Face    SUBJECTIVE:     Patient Findings     Positives No Problem Findings           OBJECTIVE    INR Protime   Date Value Ref Range Status   05/24/2017 1.7 (A) 0.86 - 1.14 Final       ASSESSMENT / PLAN  INR assessment SUB    Recheck INR In: 4 WEEKS    INR Location Clinic      Anticoagulation Summary as of 5/24/2017     INR goal 2.0-3.0   Today's INR 1.7!   Maintenance plan 3.75 mg (2.5 mg x 1.5) on Wed, Sat; 2.5 mg (2.5 mg x 1) all other days   Full instructions 3.75 mg on Wed, Sat; 2.5 mg all other days   Weekly total 20 mg   Plan last modified Ashly Melo RN (5/24/2017)   Next INR check 6/21/2017   Target end date     Indications   CHF (congestive heart failure) (H) [I50.9]  Long-term (current) use of anticoagulants [Z79.01] [Z79.01]         Anticoagulation Episode Summary     INR check location     Preferred lab     Send INR reminders to [facility]    Comments 2.5 mg tablets      Anticoagulation Care Providers     Provider Role Specialty Phone number    Rober العراقي MD [facility] [phone]            See the Encounter Report to view Anticoagulation Flowsheet and Dosing Calendar (Go to Encounters tab in chart review, and find the Anticoagulation Therapy Visit)    Dosage adjustment made based on physician directed care plan.    Ashly Melo RN

## 2017-05-31 ENCOUNTER — OFFICE VISIT (OUTPATIENT)
Dept: CARDIOLOGY | Facility: CLINIC | Age: 82
End: 2017-05-31
Payer: MEDICARE

## 2017-05-31 VITALS
SYSTOLIC BLOOD PRESSURE: 128 MMHG | WEIGHT: 182.4 LBS | DIASTOLIC BLOOD PRESSURE: 62 MMHG | HEIGHT: 73 IN | OXYGEN SATURATION: 98 % | HEART RATE: 60 BPM | RESPIRATION RATE: 12 BRPM | BODY MASS INDEX: 24.18 KG/M2

## 2017-05-31 DIAGNOSIS — I48.92 ATRIAL FLUTTER, UNSPECIFIED TYPE (H): ICD-10-CM

## 2017-05-31 DIAGNOSIS — Z95.0 CARDIAC PACEMAKER IN SITU: Primary | ICD-10-CM

## 2017-05-31 PROCEDURE — 93281 PM DEVICE PROGR EVAL MULTI: CPT

## 2017-05-31 PROCEDURE — 99214 OFFICE O/P EST MOD 30 MIN: CPT | Performed by: INTERNAL MEDICINE

## 2017-05-31 ASSESSMENT — PAIN SCALES - GENERAL: PAINLEVEL: NO PAIN (0)

## 2017-05-31 NOTE — MR AVS SNAPSHOT
After Visit Summary   5/31/2017    Miguel Patten    MRN: [id number]           Patient Information     Date Of Birth          12/16/1932        Visit Information        Provider Department      5/31/2017 9:30 AM Cristina Caputo MD [facility]        Today's Diagnoses     Atrial flutter, unspecified type (H)           Follow-ups after your visit        Additional Services     Follow-Up with Cardiac Advanced Practice Provider           Follow-Up with Electrophysiologist                 Your next 10 appointments already scheduled     Jun 02, 2017 12:30 PM CDT   CT ABDOMEN PELVIS W CONTRAST with PHCT1   [facility] CT Scan ([facility])    [address] [place] [id number] [phone]           Please bring any scans or X-rays taken at other hospitals, if similar tests were done. Also bring a list of your medicines, including vitamins, minerals and over-the-counter drugs. It is safest to leave personal items at home.  Be sure to tell your doctor:   If you have any allergies.   If there s any chance you are pregnant.   If you are breastfeeding.   If you have any special needs.  You may have contrast for this exam. To prepare:   Do not eat or drink for 2 hours before your exam. If you need to take medicine, you may take it with small sips of water. (We may ask you to take liquid medicine as well.)   The day before your exam, drink extra fluids at least six 8-ounce glasses (unless your doctor tells you to restrict your fluids).  Patients over 70 or patients with diabetes or kidney problems:   If you haven t had a blood test (creatinine test) within the last 30 days, go to your clinic or Diagnostic Imaging Department for this test.  If you have diabetes:   If your kidney function is normal, continue taking your metformin (Avandamet, Glucophage, Glucovance, Metaglip) on the day of your exam.   If your kidney function is abnormal, wait 48 hours before restarting  this medicine.  You will have oral contrast for this exam:   You will drink the contrast at home. Get this from your clinic or Diagnostic Imaging Department. Please follow the directions given.  Please wear loose clothing, such as a sweat suit or jogging clothes. Avoid snaps, zippers and other metal. We may ask you to undress and put on a hospital gown.  If you have any questions, please call the Imaging Department where you will have your exam.            Jun 02, 2017  2:00 PM CDT   [facility] Visit with Petar Ann MD   [facility]    [address] [place] [phone]   [phone]            Jun 21, 2017  9:00 AM CDT   Anticoagulation Visit with PH ANTI COAG   [facility]    [address] [place] [id number]   [phone]            Aug 10, 2017 10:00 AM CDT   Nurse Only with NL FLOAT TEAM D PMC   [facility])    [address] [place] [phone]   [phone]            Sep 06, 2017  1:15 PM CDT   Remote PPM Check with BENNETT TECH1   [facility] AT [place] ([facility])    [address] [place] [phone]           This appointment is for a remote check of your pacemaker.  This is not an appointment at the office.              Future tests that were ordered for you today     Open Future Orders        Priority Expected Expires Ordered    Follow-Up with Cardiac Advanced Practice Provider Routine 5/31/2018 10/13/2018 5/31/2017    Follow-Up with Electrophysiologist Routine 5/31/2019 10/13/2019 5/31/2017            Who to contact     If you have questions or need follow up information about today's clinic visit or your schedule please contact [facility] directly at [phone].  Normal or non-critical lab and imaging results will be communicated to you by Rodrigot, letter  "or phone within 4 business days after the clinic has received the results. If you do not hear from us within 7 days, please contact the clinic through Photodigmt or phone. If you have a critical or abnormal lab result, we will notify you by phone as soon as possible.  Submit refill requests through FortyCloud or call your pharmacy and they will forward the refill request to us. Please allow 3 business days for your refill to be completed.          Additional Information About Your Visit        Care EveryWhere ID     This is your Care EveryWhere ID. This could be used by other organizations to access your [place] medical records  [id number]        Your Vitals Were     Pulse Respirations Height Pulse Oximetry BMI (Body Mass Index)       60 12 1.854 m (6' 1\") 98% 24.06 kg/m2        Blood Pressure from Last 3 Encounters:   05/31/17 128/62   05/02/17 130/70   04/21/17 131/66    Weight from Last 3 Encounters:   05/31/17 82.7 kg (182 lb 6.4 oz)   05/02/17 84.4 kg (186 lb)   04/19/17 81.9 kg (180 lb 8 oz)              We Performed the Following     Follow-Up with Electrophysiologist        Primary Care Provider    None       No address on file        Thank you!     Thank you for choosing [facility]  for your care. Our goal is always to provide you with excellent care. Hearing back from our patients is one way we can continue to improve our services. Please take a few minutes to complete the written survey that you may receive in the mail after your visit with us. Thank you!             Your Updated Medication List - Protect others around you: Learn how to safely use, store and throw away your medicines at www.disposemymeds.org.          This list is accurate as of: 5/31/17  9:35 AM.  Always use your most recent med list.                   Brand Name Dispense Instructions for use    acetaminophen 325 MG tablet    TYLENOL    100 tablet    Take 2 tablets (650 mg) by mouth every 4 hours as needed for mild pain or " fever       albuterol 108 (90 BASE) MCG/ACT Inhaler    PROAIR HFA/PROVENTIL HFA/VENTOLIN HFA    1 Inhaler    Inhale 2 puffs into the lungs every 6 hours as needed for shortness of breath / dyspnea or wheezing       bicalutamide 50 MG tablet    CASODEX     Take 1 tablet (50 mg) by mouth daily       fluticasone 44 MCG/ACT Inhaler    FLOVENT HFA    2 Inhaler    INHALE ONE PUFF INTO THE LUNGS TWO TIMES A DAY       ipratropium - albuterol 0.5 mg/2.5 mg/3 mL 0.5-2.5 (3) MG/3ML neb solution    DUONEB    60 vial    Take 3 mLs by nebulization every 6 hours as needed for shortness of breath / dyspnea.       JANTOVEN 2.5 MG tablet   Generic drug:  warfarin     90 tablet    HOLD THIS MEDICATION on 4/21, 4/22, and 4/23.  Please follow up with coumadin clinic on Monday 4/24 as scheduled and then take as scheduled by coumadin clinic.       losartan 50 MG tablet    COZAAR    180 tablet    Take 1 tablet (50 mg) by mouth 2 times daily       LUPRON DEPOT (3-MONTH) 22.5 MG kit   Generic drug:  leuprolide     1 each    Inject 22.5 MG, IM q 4 months per Dr. Zeyad Guevara, pt's Urologist in [place]. 4/6/2017       senna-docusate 8.6-50 MG per tablet    SENOKOT-S;PERICOLACE    30 tablet    Take 1 tablet by mouth At Bedtime       simvastatin 40 MG tablet    ZOCOR    90 tablet    TAKE ONE TABLET BY MOUTH AT BEDTIME

## 2017-05-31 NOTE — PROGRESS NOTES
HPI and Plan:   See dictation    Orders Placed This Encounter   Procedures     Follow-Up with Cardiac Advanced Practice Provider     Follow-Up with Electrophysiologist       No orders of the defined types were placed in this encounter.      There are no discontinued medications.      Encounter Diagnosis   Name Primary?     Atrial flutter, unspecified type (H)        CURRENT MEDICATIONS:  Current Outpatient Prescriptions   Medication Sig Dispense Refill     acetaminophen (TYLENOL) 325 MG tablet Take 2 tablets (650 mg) by mouth every 4 hours as needed for mild pain or fever 100 tablet      warfarin (JANTOVEN) 2.5 MG tablet HOLD THIS MEDICATION on 4/21, 4/22, and 4/23.  Please follow up with coumadin clinic on Monday 4/24 as scheduled and then take as scheduled by coumadin clinic. 90 tablet 3     senna-docusate (SENOKOT-S;PERICOLACE) 8.6-50 MG per tablet Take 1 tablet by mouth At Bedtime 30 tablet 0     bicalutamide (CASODEX) 50 MG tablet Take 1 tablet (50 mg) by mouth daily       leuprolide (LUPRON DEPOT) 22.5 MG kit Inject 22.5 MG, IM q 4 months per Dr. Zeyad Guevara, pt's Urologist in [place]. 4/6/2017 1 each 3     albuterol (PROAIR HFA, PROVENTIL HFA, VENTOLIN HFA) 108 (90 BASE) MCG/ACT inhaler Inhale 2 puffs into the lungs every 6 hours as needed for shortness of breath / dyspnea or wheezing 1 Inhaler 6     losartan (COZAAR) 50 MG tablet Take 1 tablet (50 mg) by mouth 2 times daily 180 tablet 3     simvastatin (ZOCOR) 40 MG tablet TAKE ONE TABLET BY MOUTH AT BEDTIME 90 tablet 3     fluticasone (FLOVENT HFA) 44 MCG/ACT inhaler INHALE ONE PUFF INTO THE LUNGS TWO TIMES A DAY 2 Inhaler 2     ipratropium - albuterol 0.5 mg/2.5 mg/3 mL (DUONEB) 0.5-2.5 (3) MG/3ML nebulization Take 3 mLs by nebulization every 6 hours as needed for shortness of breath / dyspnea. 60 vial 1       ALLERGIES     Allergies   Allergen Reactions     No Known Drug Allergies        PAST MEDICAL HISTORY:  Past Medical History:   Diagnosis  Date     Arthritis      Ascending aortic aneurysm (H)     repaired with Hemashield graft 7/2014     Asthma      Complete AV block (H)     sp CRTP implant     Congestive heart failure (H)      Coronary artery disease     mild-moderate stenosis by angiography     H/O aortic valve replacement     bioprosthetic, 7/2014     Hypertension      Malignant neoplasm of prostate (H)     Prostate cancer     Permanent atrial fibrillation (H)     chronic       PAST SURGICAL HISTORY:  Past Surgical History:   Procedure Laterality Date     ARTHROPLASTY HIP  1/21/2013    Procedure: ARTHROPLASTY HIP;  right total hip arthroplasty;  Surgeon: Rober Alves MD;  Location: [facility] OR     C NONSPECIFIC PROCEDURE      Hernia repair     C NONSPECIFIC PROCEDURE      Prostatectomy/cancer     C TOTAL HIP ARTHROPLASTY  1/21/13    Right     GENITOURINARY SURGERY  2001    Prostatectomy       ORTHOPEDIC SURGERY      Left SONALI     PHACOEMULSIFICATION WITH STANDARD INTRAOCULAR LENS IMPLANT Right 10/6/2016    Procedure: PHACOEMULSIFICATION WITH STANDARD INTRAOCULAR LENS IMPLANT;  Surgeon: Elder Rueda MD;  Location: [facility] OR     PHACOEMULSIFICATION WITH STANDARD INTRAOCULAR LENS IMPLANT Left 10/20/2016    Procedure: PHACOEMULSIFICATION WITH STANDARD INTRAOCULAR LENS IMPLANT;  Surgeon: Elder Rueda MD;  Location: [facility] OR     REPAIR ANEURYSM ASCENDING AORTA  7/17/2014    2. Aortic aneurysm repair with 32 mm Hemashield graft.      REPLACE VALVE AORTIC  7/17/2014    1. Aortic valve replacement with 23 mm [employer] Trifecta tissue valve.      s/p aneurysm repair by Dr. Friedman       s/p avr         FAMILY HISTORY:  Family History   Problem Relation Age of Onset     Asthma Daughter      Asthma Father      Asthma Paternal Grandmother        SOCIAL HISTORY:  Social History     Social History     Marital status:      Spouse name: N/A     Number of children: N/A     Years of education: N/A     Social History Main Topics     Smoking status:  "Never Smoker     Smokeless tobacco: Never Used     Alcohol use 0.0 oz/week     0 Standard drinks or equivalent per week      Comment: rarely, once a week or less     Drug use: No     Sexual activity: No      Comment:  - live with friend - 3 children.     Other Topics Concern     Parent/Sibling W/ Cabg, Mi Or Angioplasty Before 65f 55m? No      Service Yes     Blood Transfusions No     Caffeine Concern No     Occupational Exposure No     Hobby Hazards No     Sleep Concern No     Stress Concern No     Weight Concern No     Special Diet No     Back Care No     Exercise Yes     Bike Helmet No     n/a     Seat Belt Yes     Self-Exams Yes     Social History Narrative       Review of Systems:  Skin:  Negative       Eyes:  Positive for glasses    ENT:  Positive for hearing loss    Respiratory:  Positive for dyspnea on exertion occ. shorntess of breath with lifting boxes and stairs   Cardiovascular:  Negative for;palpitations;chest pain;edema;lightheadedness;dizziness;fatigue      Gastroenterology: Negative      Genitourinary:  Negative      Musculoskeletal:  Negative      Neurologic:  Negative      Psychiatric:  Negative excessive stress ruptured appendix  Heme/Lymph/Imm:  Negative      Endocrine:  Negative        Physical Exam:  Vitals: /62 (BP Location: Left arm, Cuff Size: Adult Large)  Pulse 60  Resp 12  Ht 1.854 m (6' 1\")  Wt 82.7 kg (182 lb 6.4 oz)  SpO2 98%  BMI 24.06 kg/m2    Constitutional:  cooperative, alert and oriented, well developed, well nourished, in no acute distress thin      Skin:  warm and dry to the touch, no apparent skin lesions or masses noted        Head:  normocephalic, no masses or lesions        Eyes:  pupils equal and round, conjunctivae and lids unremarkable, sclera white, no xanthalasma, EOMS intact, no nystagmus        ENT:  no pallor or cyanosis, dentition good        Neck:  carotid pulses are full and equal bilaterally, JVP normal, no carotid bruit, no " thyromegaly JVP 10-12      Chest:  normal breath sounds, clear to auscultation, normal A-P diameter, normal symmetry, normal respiratory excursion, no use of accessory muscles inspiratory wheezes        Cardiac: regular rhythm;normal S1 and S2 irregular rhythm   crisp prosthetic valve sounds systolic murmur;grade 2   diastolic murmur;blowing;LUSB;grade 3      Abdomen:  not assessed this visit        Vascular: not assessed this visit                                        Extremities and Back:  no deformities, clubbing, cyanosis, erythema observed;no edema              Neurological:  affect appropriate, oriented to time, person and place              CC  Cristina Caputo MD   [facility]  [id number]  [place] [id number]

## 2017-05-31 NOTE — PROGRESS NOTES
HISTORY OF PRESENT ILLNESS:  I saw Mr. Hudson for followup of complete AV block.  He is an 84-year-old white male with history of ascending aortic aneurysm.  He is status post aneurysm repair with a bioprosthetic aortic valve.  He had complete AV block and currently has a [employer] BiV pacing pacemaker.  He is also having chronic atrial fibrillation and currently on warfarin without problem.      Symptomatically, he denies palpitation, dizziness or shortness of breath.  He has had no ER visit or hospitalization for the last year.      PHYSICAL EXAMINATION:   VITAL SIGNS:  Blood pressure was 128/62, heart rate 60 beats per minute, body weight 182 pounds.   EENT:  Unremarkable.   LUNGS:  Clear.   CARDIAC:  The pacemaker site looked well.  The cardiac rhythm was regular and heart sounds were normal.  He had a second-degree systolic murmur in the aortic valve area.   ABDOMEN:  Showed no hepatomegaly.   EXTREMITIES:  There was no pedal edema.      Today's pacemaker interrogation showed normal lead function.  He is in chronic atrial fibrillation with 100% ventricular pacing.  There were a few episodes of nonsustained asymptomatic VT.      The echocardiography a few days ago showed normal LV ejection fraction.      ASSESSMENT AND RECOMMENDATIONS:  Mr. Hudson is doing well symptomatically.  The pacemaker function is normal.  With the current medical therapy and device therapy, his LV function is normal.  I encouraged him to stay on the current medications without changes.  He is scheduled to return for a followup visit in a year.         HEAVEN HARVEY MD             D: 2017 09:42   T: 2017 17:54   MT: LAUREN      Name:     JAMI HUDSON   MRN:      [id number]        Account:      [id number]   :      1932           Service Date: 2017      Document: [id number]

## 2017-05-31 NOTE — MR AVS SNAPSHOT
After Visit Summary   5/31/2017    Miguel Patten    MRN: [id number]           Patient Information     Date Of Birth          12/16/1932        Visit Information        Provider Department      5/31/2017 9:00 AM CARDIO DEVICE NURSE [facility]        Today's Diagnoses     Cardiac pacemaker in situ    -  1       Follow-ups after your visit        Your next 10 appointments already scheduled     May 31, 2017  9:30 AM CDT   Return Visit with Cristina Caputo MD   [facility]    [address] [facility] [address]   [phone]            Jun 02, 2017 12:30 PM CDT   CT ABDOMEN PELVIS W CONTRAST with PHCT1   [facility] CT Scan ([facility])    [address] [facility] [phone]   [phone]           Please bring any scans or X-rays taken at other hospitals, if similar tests were done. Also bring a list of your medicines, including vitamins, minerals and over-the-counter drugs. It is safest to leave personal items at home.  Be sure to tell your doctor:   If you have any allergies.   If there s any chance you are pregnant.   If you are breastfeeding.   If you have any special needs.  You may have contrast for this exam. To prepare:   Do not eat or drink for 2 hours before your exam. If you need to take medicine, you may take it with small sips of water. (We may ask you to take liquid medicine as well.)   The day before your exam, drink extra fluids at least six 8-ounce glasses (unless your doctor tells you to restrict your fluids).  Patients over 70 or patients with diabetes or kidney problems:   If you haven t had a blood test (creatinine test) within the last 30 days, go to your clinic or Diagnostic Imaging Department for this test.  If you have diabetes:   If your kidney function is normal, continue taking your metformin (Avandamet, Glucophage, Glucovance, Metaglip) on the day of your exam.   If your kidney  function is abnormal, wait 48 hours before restarting this medicine.  You will have oral contrast for this exam:   You will drink the contrast at home. Get this from your clinic or Diagnostic Imaging Department. Please follow the directions given.  Please wear loose clothing, such as a sweat suit or jogging clothes. Avoid snaps, zippers and other metal. We may ask you to undress and put on a hospital gown.  If you have any questions, please call the Imaging Department where you will have your exam.            Jun 02, 2017  2:00 PM CDT   Return Visit with Petar Ann MD   [facility] ([facility])    [address] [place] [phone]   [phone]            Jun 21, 2017  9:00 AM CDT   Anticoagulation Visit with PH ANTI COAG   [facility] ([facility])    [address] [place] [phone]   [phone]            Aug 10, 2017 10:00 AM CDT   Nurse Only with NL FLOAT TEAM D PMC   [facility] ([facility])    [address] [place] [phone]   [phone]            Sep 06, 2017  1:15 PM CDT   Remote PPM Check with BENNETT TECH1   [facility] AT [place] ([facility])    [address] [place] [phone]           This appointment is for a remote check of your pacemaker.  This is not an appointment at the office.              Who to contact     If you have questions or need follow up information about today's clinic visit or your schedule please contact [facility] directly at [phone].  Normal or non-critical lab and imaging results will be communicated to you by MyChart, letter or phone within 4 business days after the clinic has received the results. If you do not hear from us within 7 days, please contact the clinic through MyChart or phone. If you have a critical or abnormal lab result, we will notify you  by phone as soon as possible.  Submit refill requests through Vivacta or call your pharmacy and they will forward the refill request to us. Please allow 3 business days for your refill to be completed.          Additional Information About Your Visit        Care EveryWhere ID     This is your Care EveryWhere ID. This could be used by other organizations to access your [place] medical records  [id number]         Blood Pressure from Last 3 Encounters:   05/02/17 130/70   04/21/17 131/66   04/19/17 108/62    Weight from Last 3 Encounters:   05/02/17 84.4 kg (186 lb)   04/19/17 81.9 kg (180 lb 8 oz)   04/19/17 79.8 kg (176 lb)              We Performed the Following     PM DEVICE PROGRAMMING EVAL, MULTI LEAD PACER ([id number])        Primary Care Provider    None       No address on file        Thank you!     Thank you for choosing [facility]  for your care. Our goal is always to provide you with excellent care. Hearing back from our patients is one way we can continue to improve our services. Please take a few minutes to complete the written survey that you may receive in the mail after your visit with us. Thank you!             Your Updated Medication List - Protect others around you: Learn how to safely use, store and throw away your medicines at www.disposemymeds.org.          This list is accurate as of: 5/31/17  9:14 AM.  Always use your most recent med list.                   Brand Name Dispense Instructions for use    acetaminophen 325 MG tablet    TYLENOL    100 tablet    Take 2 tablets (650 mg) by mouth every 4 hours as needed for mild pain or fever       albuterol 108 (90 BASE) MCG/ACT Inhaler    PROAIR HFA/PROVENTIL HFA/VENTOLIN HFA    1 Inhaler    Inhale 2 puffs into the lungs every 6 hours as needed for shortness of breath / dyspnea or wheezing       bicalutamide 50 MG tablet    CASODEX     Take 1 tablet (50 mg) by mouth daily       fluticasone 44 MCG/ACT Inhaler    FLOVENT HFA    2 Inhaler     INHALE ONE PUFF INTO THE LUNGS TWO TIMES A DAY       ipratropium - albuterol 0.5 mg/2.5 mg/3 mL 0.5-2.5 (3) MG/3ML neb solution    DUONEB    60 vial    Take 3 mLs by nebulization every 6 hours as needed for shortness of breath / dyspnea.       JANTOVEN 2.5 MG tablet   Generic drug:  warfarin     90 tablet    HOLD THIS MEDICATION on 4/21, 4/22, and 4/23.  Please follow up with coumadin clinic on Monday 4/24 as scheduled and then take as scheduled by coumadin clinic.       losartan 50 MG tablet    COZAAR    180 tablet    Take 1 tablet (50 mg) by mouth 2 times daily       LUPRON DEPOT (3-MONTH) 22.5 MG kit   Generic drug:  leuprolide     1 each    Inject 22.5 MG, IM q 4 months per Dr. Zeyad Guevara, pt's Urologist in [place]. 4/6/2017       senna-docusate 8.6-50 MG per tablet    SENOKOT-S;PERICOLACE    30 tablet    Take 1 tablet by mouth At Bedtime       simvastatin 40 MG tablet    ZOCOR    90 tablet    TAKE ONE TABLET BY MOUTH AT BEDTIME

## 2017-05-31 NOTE — PROGRESS NOTES
[employer] CRT-P/ (single) Pacemaker Device Check/Laila/ Dr Caputo  RVP: 99 %  LVP: 99%  Mode: VVIR        Underlying Rhythm: AF/ VR in the 40's  Heart Rate: Histogram is stable  Sensing: Stable    Pacing Threshold: Stable RV/ LV remains slightly elevated but stable   Impedance: WNL  Battery Status: 8.5 years estimated longevity  Atrial Arrhythmia: permanent AF/ remains on Warfarin  Ventricular Arrhythmia: 2 short NSVT lasting 4 beats  Setting Change: Slight bump on LV output;     Care Plan: Remote in 3 months.

## 2017-05-31 NOTE — LETTER
5/31/2017      RE: Miguel Patten  [address] [facility] [address]       Dear Colleague,    Thank you for the opportunity to participate in the care of your patient, Miguel Patten, at the [facility] at [facility]. Please see a copy of my visit note below.    HPI and Plan:   See dictation    Orders Placed This Encounter   Procedures     Follow-Up with Cardiac Advanced Practice Provider     Follow-Up with Electrophysiologist       No orders of the defined types were placed in this encounter.      There are no discontinued medications.      Encounter Diagnosis   Name Primary?     Atrial flutter, unspecified type (H)        CURRENT MEDICATIONS:  Current Outpatient Prescriptions   Medication Sig Dispense Refill     acetaminophen (TYLENOL) 325 MG tablet Take 2 tablets (650 mg) by mouth every 4 hours as needed for mild pain or fever 100 tablet      warfarin (JANTOVEN) 2.5 MG tablet HOLD THIS MEDICATION on 4/21, 4/22, and 4/23.  Please follow up with coumadin clinic on Monday 4/24 as scheduled and then take as scheduled by coumadin clinic. 90 tablet 3     senna-docusate (SENOKOT-S;PERICOLACE) 8.6-50 MG per tablet Take 1 tablet by mouth At Bedtime 30 tablet 0     bicalutamide (CASODEX) 50 MG tablet Take 1 tablet (50 mg) by mouth daily       leuprolide (LUPRON DEPOT) 22.5 MG kit Inject 22.5 MG, IM q 4 months per Dr. Zeyad Guevara, pt's Urologist in [place]. 4/6/2017 1 each 3     albuterol (PROAIR HFA, PROVENTIL HFA, VENTOLIN HFA) 108 (90 BASE) MCG/ACT inhaler Inhale 2 puffs into the lungs every 6 hours as needed for shortness of breath / dyspnea or wheezing 1 Inhaler 6     losartan (COZAAR) 50 MG tablet Take 1 tablet (50 mg) by mouth 2 times daily 180 tablet 3     simvastatin (ZOCOR) 40 MG tablet TAKE ONE TABLET BY MOUTH AT BEDTIME 90 tablet 3     fluticasone (FLOVENT HFA) 44 MCG/ACT inhaler INHALE ONE PUFF INTO THE LUNGS TWO TIMES A DAY 2 Inhaler 2      ipratropium - albuterol 0.5 mg/2.5 mg/3 mL (DUONEB) 0.5-2.5 (3) MG/3ML nebulization Take 3 mLs by nebulization every 6 hours as needed for shortness of breath / dyspnea. 60 vial 1       ALLERGIES     Allergies   Allergen Reactions     No Known Drug Allergies        PAST MEDICAL HISTORY:  Past Medical History:   Diagnosis Date     Arthritis      Ascending aortic aneurysm (H)     repaired with Hemashield graft 7/2014     Asthma      Complete AV block (H)     sp CRTP implant     Congestive heart failure (H)      Coronary artery disease     mild-moderate stenosis by angiography     H/O aortic valve replacement     bioprosthetic, 7/2014     Hypertension      Malignant neoplasm of prostate (H)     Prostate cancer     Permanent atrial fibrillation (H)     chronic       PAST SURGICAL HISTORY:  Past Surgical History:   Procedure Laterality Date     ARTHROPLASTY HIP  1/21/2013    Procedure: ARTHROPLASTY HIP;  right total hip arthroplasty;  Surgeon: Rober Alves MD;  Location: [facility] OR     C NONSPECIFIC PROCEDURE      Hernia repair     C NONSPECIFIC PROCEDURE      Prostatectomy/cancer     C TOTAL HIP ARTHROPLASTY  1/21/13    Right     GENITOURINARY SURGERY  2001    Prostatectomy       ORTHOPEDIC SURGERY      Left SONALI     PHACOEMULSIFICATION WITH STANDARD INTRAOCULAR LENS IMPLANT Right 10/6/2016    Procedure: PHACOEMULSIFICATION WITH STANDARD INTRAOCULAR LENS IMPLANT;  Surgeon: Elder Rueda MD;  Location: [facility] OR     PHACOEMULSIFICATION WITH STANDARD INTRAOCULAR LENS IMPLANT Left 10/20/2016    Procedure: PHACOEMULSIFICATION WITH STANDARD INTRAOCULAR LENS IMPLANT;  Surgeon: Elder Rueda MD;  Location: [facility] OR     REPAIR ANEURYSM ASCENDING AORTA  7/17/2014    2. Aortic aneurysm repair with 32 mm Hemashield graft.      REPLACE VALVE AORTIC  7/17/2014    1. Aortic valve replacement with 23 mm [employer] Trifecta tissue valve.      s/p aneurysm repair by Dr. Friedman       s/p avr         FAMILY HISTORY:  Family  "History   Problem Relation Age of Onset     Asthma Daughter      Asthma Father      Asthma Paternal Grandmother        SOCIAL HISTORY:  Social History     Social History     Marital status:      Spouse name: N/A     Number of children: N/A     Years of education: N/A     Social History Main Topics     Smoking status: Never Smoker     Smokeless tobacco: Never Used     Alcohol use 0.0 oz/week     0 Standard drinks or equivalent per week      Comment: rarely, once a week or less     Drug use: No     Sexual activity: No      Comment:  - live with friend - 3 children.     Other Topics Concern     Parent/Sibling W/ Cabg, Mi Or Angioplasty Before 65f 55m? No      Service Yes     Blood Transfusions No     Caffeine Concern No     Occupational Exposure No     Hobby Hazards No     Sleep Concern No     Stress Concern No     Weight Concern No     Special Diet No     Back Care No     Exercise Yes     Bike Helmet No     n/a     Seat Belt Yes     Self-Exams Yes     Social History Narrative       Review of Systems:  Skin:  Negative       Eyes:  Positive for glasses    ENT:  Positive for hearing loss    Respiratory:  Positive for dyspnea on exertion occ. shorntess of breath with lifting boxes and stairs   Cardiovascular:  Negative for;palpitations;chest pain;edema;lightheadedness;dizziness;fatigue      Gastroenterology: Negative      Genitourinary:  Negative      Musculoskeletal:  Negative      Neurologic:  Negative      Psychiatric:  Negative excessive stress ruptured appendix  Heme/Lymph/Imm:  Negative      Endocrine:  Negative        Physical Exam:  Vitals: /62 (BP Location: Left arm, Cuff Size: Adult Large)  Pulse 60  Resp 12  Ht 1.854 m (6' 1\")  Wt 82.7 kg (182 lb 6.4 oz)  SpO2 98%  BMI 24.06 kg/m2    Constitutional:  cooperative, alert and oriented, well developed, well nourished, in no acute distress thin      Skin:  warm and dry to the touch, no apparent skin lesions or masses noted    "     Head:  normocephalic, no masses or lesions        Eyes:  pupils equal and round, conjunctivae and lids unremarkable, sclera white, no xanthalasma, EOMS intact, no nystagmus        ENT:  no pallor or cyanosis, dentition good        Neck:  carotid pulses are full and equal bilaterally, JVP normal, no carotid bruit, no thyromegaly JVP 10-12      Chest:  normal breath sounds, clear to auscultation, normal A-P diameter, normal symmetry, normal respiratory excursion, no use of accessory muscles inspiratory wheezes        Cardiac: regular rhythm;normal S1 and S2 irregular rhythm   crisp prosthetic valve sounds systolic murmur;grade 2   diastolic murmur;blowing;LUSB;grade 3      Abdomen:  not assessed this visit        Vascular: not assessed this visit                                        Extremities and Back:  no deformities, clubbing, cyanosis, erythema observed;no edema              Neurological:  affect appropriate, oriented to time, person and place          HISTORY OF PRESENT ILLNESS:  I saw Mr. Patten for followup of complete AV block.  He is an 84-year-old white male with history of ascending aortic aneurysm.  He is status post aneurysm repair with a bioprosthetic aortic valve.  He had complete AV block and currently has a [employer] BiV pacing pacemaker.  He is also having chronic atrial fibrillation and currently on warfarin without problem.      Symptomatically, he denies palpitation, dizziness or shortness of breath.  He has had no ER visit or hospitalization for the last year.      PHYSICAL EXAMINATION:   VITAL SIGNS:  Blood pressure was 128/62, heart rate 60 beats per minute, body weight 182 pounds.   EENT:  Unremarkable.   LUNGS:  Clear.   CARDIAC:  The pacemaker site looked well.  The cardiac rhythm was regular and heart sounds were normal.  He had a second-degree systolic murmur in the aortic valve area.   ABDOMEN:  Showed no hepatomegaly.   EXTREMITIES:  There was no pedal edema.      Today's  pacemaker interrogation showed normal lead function.  He is in chronic atrial fibrillation with 100% ventricular pacing.  There were a few episodes of nonsustained asymptomatic VT.      The echocardiography a few days ago showed normal LV ejection fraction.      ASSESSMENT AND RECOMMENDATIONS:  Mr. Patten is doing well symptomatically.  The pacemaker function is normal.  With the current medical therapy and device therapy, his LV function is normal.  I encouraged him to stay on the current medications without changes.  He is scheduled to return for a followup visit in a year.         HEAVEN HARVEY MD

## 2017-06-02 ENCOUNTER — HOSPITAL ENCOUNTER (OUTPATIENT)
Dept: CT IMAGING | Facility: CLINIC | Age: 82
Discharge: HOME | End: 2017-06-02
Attending: SURGERY | Admitting: SURGERY
Payer: MEDICARE

## 2017-06-02 ENCOUNTER — OFFICE VISIT (OUTPATIENT)
Dept: SURGERY | Facility: CLINIC | Age: 82
End: 2017-06-02
Payer: MEDICARE

## 2017-06-02 DIAGNOSIS — K36 OTHER APPENDICITIS: Primary | ICD-10-CM

## 2017-06-02 DIAGNOSIS — K35.32 RUPTURED APPENDICITIS: ICD-10-CM

## 2017-06-02 LAB
CREAT BLD-MCNC: 0.7 MG/DL (ref 0.66–1.25)
GFR SERPL CREATININE-BSD FRML MDRD: >90 ML/MIN/1.7M2

## 2017-06-02 PROCEDURE — 74177 CT ABD & PELVIS W/CONTRAST: CPT

## 2017-06-02 PROCEDURE — 25000125 ZZHC RX 250: Performed by: RADIOLOGY

## 2017-06-02 PROCEDURE — 99213 OFFICE O/P EST LOW 20 MIN: CPT | Performed by: SURGERY

## 2017-06-02 PROCEDURE — 82565 ASSAY OF CREATININE: CPT | Performed by: SURGERY

## 2017-06-02 PROCEDURE — 25000128 H RX IP 250 OP 636: Performed by: RADIOLOGY

## 2017-06-02 RX ORDER — IOPAMIDOL 755 MG/ML
500 INJECTION, SOLUTION INTRAVASCULAR ONCE
Status: COMPLETED | OUTPATIENT
Start: 2017-06-02 | End: 2017-06-02

## 2017-06-02 RX ADMIN — SODIUM CHLORIDE 60 ML: 9 INJECTION, SOLUTION INTRAVENOUS at 12:53

## 2017-06-02 RX ADMIN — IOPAMIDOL 100 ML: 755 INJECTION, SOLUTION INTRAVENOUS at 12:53

## 2017-06-02 NOTE — MR AVS SNAPSHOT
After Visit Summary   6/2/2017    Miguel Patten    MRN: [id number]           Patient Information     Date Of Birth          12/16/1932        Visit Information        Provider Department      6/2/2017 2:00 PM Petar Ann MD [facility]        Today's Diagnoses     Other appendicitis    -  1       Follow-ups after your visit        Your next 10 appointments already scheduled     Jun 12, 2017  7:20 AM CDT   Pre-Op physical with Vladimir Gonzales DO   [facility]    [address] [place] [id number] [phone]            Jun 21, 2017  9:00 AM CDT   Anticoagulation Visit with [facility] ANTI COAG   [facility]    [address] [place] [id number] [phone]            Aug 10, 2017 10:00 AM CDT   Nurse Only with NL FLOAT TEAM D PMC   [facility])    [address] [place] [id number] [phone]            Sep 06, 2017  1:15 PM CDT   Remote PPM Check with BENNETT TECH1   [facility] [place] ([facility])    [address] [place] [id number] [phone]           This appointment is for a remote check of your pacemaker.  This is not an appointment at the office.              Who to contact     If you have questions or need follow up information about today's clinic visit or your schedule please contact [facility] directly at [phone].  Normal or non-critical lab and imaging results will be communicated to you by MyChart, letter or phone within 4 business days after the clinic has received the results. If you do not hear from us within 7 days, please contact the clinic through MyChart or phone. If you have a critical or abnormal lab result, we will notify you by phone as soon as possible.  Submit refill requests through [employer] or call your  pharmacy and they will forward the refill request to us. Please allow 3 business days for your refill to be completed.          Additional Information About Your Visit        Care EveryWhere ID     This is your Care EveryWhere ID. This could be used by other organizations to access your [place] medical records  [id number]         Blood Pressure from Last 3 Encounters:   05/31/17 128/62   05/02/17 130/70   04/21/17 131/66    Weight from Last 3 Encounters:   05/31/17 182 lb 6.4 oz (82.7 kg)   05/02/17 186 lb (84.4 kg)   04/19/17 180 lb 8 oz (81.9 kg)              Today, you had the following     No orders found for display       Primary Care Provider    None       No address on file        Thank you!     Thank you for choosing [facility]  for your care. Our goal is always to provide you with excellent care. Hearing back from our patients is one way we can continue to improve our services. Please take a few minutes to complete the written survey that you may receive in the mail after your visit with us. Thank you!             Your Updated Medication List - Protect others around you: Learn how to safely use, store and throw away your medicines at www.disposemymeds.org.          This list is accurate as of: 6/2/17 11:59 PM.  Always use your most recent med list.                   Brand Name Dispense Instructions for use    acetaminophen 325 MG tablet    TYLENOL    100 tablet    Take 2 tablets (650 mg) by mouth every 4 hours as needed for mild pain or fever       albuterol 108 (90 BASE) MCG/ACT Inhaler    PROAIR HFA/PROVENTIL HFA/VENTOLIN HFA    1 Inhaler    Inhale 2 puffs into the lungs every 6 hours as needed for shortness of breath / dyspnea or wheezing       bicalutamide 50 MG tablet    CASODEX     Take 1 tablet (50 mg) by mouth daily       fluticasone 44 MCG/ACT Inhaler    FLOVENT HFA    2 Inhaler    INHALE ONE PUFF INTO THE LUNGS TWO TIMES A DAY       ipratropium - albuterol 0.5 mg/2.5 mg/3 mL  0.5-2.5 (3) MG/3ML neb solution    DUONEB    60 vial    Take 3 mLs by nebulization every 6 hours as needed for shortness of breath / dyspnea.       JANTOVEN 2.5 MG tablet   Generic drug:  warfarin     90 tablet    HOLD THIS MEDICATION on 4/21, 4/22, and 4/23.  Please follow up with coumadin clinic on Monday 4/24 as scheduled and then take as scheduled by coumadin clinic.       losartan 50 MG tablet    COZAAR    180 tablet    TAKE ONE TABLET BY MOUTH TWICE A DAY       LUPRON DEPOT (3-MONTH) 22.5 MG kit   Generic drug:  leuprolide     1 each    Inject 22.5 MG, IM q 4 months per Dr. Zeyad Guevara, pt's Urologist in [place]. 4/6/2017       senna-docusate 8.6-50 MG per tablet    SENOKOT-S;PERICOLACE    30 tablet    Take 1 tablet by mouth At Bedtime       simvastatin 40 MG tablet    ZOCOR    90 tablet    TAKE ONE TABLET BY MOUTH AT BEDTIME

## 2017-06-02 NOTE — PROGRESS NOTES
Appropriate assistive devices provided during their visit yes(Yes, No, N/A)cane (list device).    Exam Table and/or cart placed in the lowest position yes(Yes, No, N/A)    Brakes on the table/carts/ wheelchairs used at all times yes(Yes, No, N/A)    Non Slip footwear applied  na(Yes, No, N/A)    Patient was accompanied by staff throughout visit. na(Yes, No, N/A)    Equipment safety straps used na(Yes, No, N/A)    Assist with toileting. na(Yes, No, N/A)

## 2017-06-04 NOTE — PROGRESS NOTES
[facility] NOTE  GENERAL SURGERY    PCP: None         Subjective:     Miguel Patten is a 84 year old male who presents for follow up after hospitalization for subacute appendicitis. He was treated with antibiotics and sent home.     Pain has been well controlled. Currently is not using pain medications. Eating a regular diet and having regular bladder/bowel function. Overall doing well.         Objective:     There were no vitals taken for this visit.   Constitutional: Awake, alert, in no acute distress.  Respiratory: Non-labored.   Cardiovascular: Regular rate and rhythm.   Abdomen: Abdomen is soft without tenderness         Assessment and Plan:       ICD-10-CM    1. Other appendicitis K36          Miguel Patten is a 84 year old male who is here to discuss plans for appendectomy.  He received his CT today which shows resolution of the inflammation with an enlarged appendix.  We had a bertha discussion that his co-morbidities AVR hx, pacemaker, CHF, COPD, need for anticoagulant puts him at high risk for surgery. I informed him that I would not plan for a surgery this time without a discussion with a PCP and cardiologist. Acute appendicitis can is increasingly being treated with antibiotics alone and for him this is a real option to avoid the risks of surgery. He does not have an appendocolith on CT and that increased his chances of not having another attack and successful management with antibiotics alone.  As he does not have a PCP he informed me that he has met with Dr. Chiang and very much likes him. I have asked the office staff to make an appoint for him to see Dr. Chiang. I very much am interested in hearing his opinion on risk for surgery and recommendations for management of his anticoagulant. I will render a decision after I have had the opinion of his PCP and cardiologist.  He may increase his activity.         Aliya Ann MD  [facility]

## 2017-06-12 ENCOUNTER — OFFICE VISIT (OUTPATIENT)
Dept: INTERNAL MEDICINE | Facility: CLINIC | Age: 82
End: 2017-06-12
Payer: MEDICARE

## 2017-06-12 VITALS
TEMPERATURE: 96.7 F | SYSTOLIC BLOOD PRESSURE: 138 MMHG | RESPIRATION RATE: 16 BRPM | OXYGEN SATURATION: 98 % | HEART RATE: 68 BPM | DIASTOLIC BLOOD PRESSURE: 60 MMHG | BODY MASS INDEX: 24.14 KG/M2 | WEIGHT: 183 LBS

## 2017-06-12 DIAGNOSIS — I10 HYPERTENSION GOAL BP (BLOOD PRESSURE) < 140/90: Primary | ICD-10-CM

## 2017-06-12 DIAGNOSIS — I50.22 CHRONIC SYSTOLIC CONGESTIVE HEART FAILURE (H): ICD-10-CM

## 2017-06-12 DIAGNOSIS — Z95.2 S/P AVR (AORTIC VALVE REPLACEMENT): ICD-10-CM

## 2017-06-12 DIAGNOSIS — K36 OTHER APPENDICITIS: ICD-10-CM

## 2017-06-12 DIAGNOSIS — I48.20 CHRONIC ATRIAL FIBRILLATION (H): ICD-10-CM

## 2017-06-12 PROCEDURE — 99213 OFFICE O/P EST LOW 20 MIN: CPT | Performed by: INTERNAL MEDICINE

## 2017-06-12 ASSESSMENT — PAIN SCALES - GENERAL: PAINLEVEL: NO PAIN (0)

## 2017-06-12 NOTE — NURSING NOTE
"Chief Complaint   Patient presents with     other     Dr. Ann wants providers opinion of apendix       Initial /60 (Cuff Size: Adult Regular)  Pulse 68  Temp 96.7  F (35.9  C) (Tympanic)  Resp 16  Wt 183 lb (83 kg)  SpO2 98%  BMI 24.14 kg/m2 Estimated body mass index is 24.14 kg/(m^2) as calculated from the following:    Height as of 5/31/17: 6' 1\" (1.854 m).    Weight as of this encounter: 183 lb (83 kg).  Medication Reconciliation: complete   Leeroy Jose MA      "

## 2017-06-12 NOTE — PROGRESS NOTES
SUBJECTIVE:                                                    Miguel Patten is a 84 year old male who presents to clinic today for the following health issues:    Discuss apendix per Dr. Ann      CHIEF COMPLAINT:    The patient is a uniquely energetic 84-year-old gentleman who previously had acute appendicitis. Due to his multiple comorbid conditions including a bioprosthetic heart valve, chronic congestive heart failure, emphysema, hypertension, chronic anticoagulated state, the decision to treat him conservatively with antibiotics was made. He responded very favorably and is currently completely symptom free. CAT scans demonstrated no stone in the appendix which decreases his likelihood of recurrence significantly. We've had lengthy discussion today and both the patient and I agree that at this time, it is probably not necessary to undergo elective appendectomy. He is fully aware that his symptoms may recur and at that time an emergent surgery may be necessary. With this in mind, he is fully willing to take this risk.                         PAST, FAMILY,SOCIAL HISTORY:     Medical  History:   has a past medical history of Arthritis; Ascending aortic aneurysm (H); Asthma; Complete AV block (H); Congestive heart failure (H); Coronary artery disease; H/O aortic valve replacement; Hypertension; Malignant neoplasm of prostate (H); and Permanent atrial fibrillation (H).     Surgical History:   has a past surgical history that includes NONSPECIFIC PROCEDURE; NONSPECIFIC PROCEDURE; orthopedic surgery; Abdomen surgery (2001); TOTAL HIP ARTHROPLASTY (1/21/13); Arthroplasty hip (1/21/2013); s/p avr; s/p aneurysm repair by Dr. Friedman; Replace valve aortic (7/17/2014); Repair aneurysm ascending aorta (7/17/2014); Phacoemulsification with standard intraocular lens implant (Right, 10/6/2016); and Phacoemulsification with standard intraocular lens implant (Left, 10/20/2016).     Social History:   reports that he has never  smoked. He has never used smokeless tobacco. He reports that he drinks alcohol. He reports that he does not use illicit drugs.     Family History:  family history includes Asthma in his daughter, father, and paternal grandmother.            MEDICATIONS  Current Outpatient Prescriptions   Medication Sig Dispense Refill     losartan (COZAAR) 50 MG tablet TAKE ONE TABLET BY MOUTH TWICE A  tablet 3     acetaminophen (TYLENOL) 325 MG tablet Take 2 tablets (650 mg) by mouth every 4 hours as needed for mild pain or fever 100 tablet      warfarin (JANTOVEN) 2.5 MG tablet HOLD THIS MEDICATION on 4/21, 4/22, and 4/23.  Please follow up with coumadin clinic on Monday 4/24 as scheduled and then take as scheduled by coumadin clinic. 90 tablet 3     senna-docusate (SENOKOT-S;PERICOLACE) 8.6-50 MG per tablet Take 1 tablet by mouth At Bedtime 30 tablet 0     bicalutamide (CASODEX) 50 MG tablet Take 1 tablet (50 mg) by mouth daily       leuprolide (LUPRON DEPOT) 22.5 MG kit Inject 22.5 MG, IM q 4 months per Dr. Zeyad Guevara, pt's Urologist in [place]. 4/6/2017 1 each 3     albuterol (PROAIR HFA, PROVENTIL HFA, VENTOLIN HFA) 108 (90 BASE) MCG/ACT inhaler Inhale 2 puffs into the lungs every 6 hours as needed for shortness of breath / dyspnea or wheezing 1 Inhaler 6     simvastatin (ZOCOR) 40 MG tablet TAKE ONE TABLET BY MOUTH AT BEDTIME 90 tablet 3     fluticasone (FLOVENT HFA) 44 MCG/ACT inhaler INHALE ONE PUFF INTO THE LUNGS TWO TIMES A DAY 2 Inhaler 2     ipratropium - albuterol 0.5 mg/2.5 mg/3 mL (DUONEB) 0.5-2.5 (3) MG/3ML nebulization Take 3 mLs by nebulization every 6 hours as needed for shortness of breath / dyspnea. 60 vial 1         --------------------------------------------------------------------------------------------------------------------                          REVIEW OF SYSTEMS:         LUNGS: Pt denies: cough,excess sputum, hemoptysis, or shortness of breath at rest. He does have some dyspnea with  exertion.   HEART: Pt denies: chest pain, arrythmia, syncope, tachy or bradyarrhythmia or excess edema. Pacemaker is recently been evaluated and is working well.   GI: Pt denies: nausea, vomitting, diarrhea, constipation, melena, or hematochezia.   NEURO: Pt denies: seizures, strokes, diplopia, weakness, paraesthesias, or paralysis.   SKIN: Pt denies: itching, rashes, discoloration, or specific lesions of concern. Denies recent hair loss.                          EXAMINATION:         /60 (Cuff Size: Adult Regular)  Pulse 68  Temp 96.7  F (35.9  C) (Tympanic)  Resp 16  Wt 183 lb (83 kg)  SpO2 98%  BMI 24.14 kg/m2   Constitutional: The patient appears to be in no acute distress. The patient appears to be adequately hydrated. No acute respiratory or hemodynamic distress is noted at this time.   LUNGS: clear bilaterally, airflow is brisk, no intercostal retraction or stridor is noted. No coughing is noted during visit.   HEART:  regular without rubs, clicks, gallops. Systolic ejection murmur is heard across the aortic valve. A slight diastolic rumble is also present. PMI is nondisplaced. Upstrokes are brisk. S1,S2 are heard.   GI: Abdomen is soft, without rebound, guarding or tenderness. Bowel sounds are appropriate. No renal bruits are heard.    NEURO: Pt is alert and appropriate. No neurologic lateralization is noted. Cranial nerves 2-12 are intact. Peripheral sensory and motor function are grossly normal                        DECISION MAKIN. Other appendicitis  Chronic stable    2. Hypertension goal BP (blood pressure) < 140/90  Controlled    3. S/P AVR (aortic valve replacement)  Continue anticoagulation    4. Chronic atrial fibrillation (H)  Continue anticoagulation    5. Chronic systolic congestive heart failure (H)  Currently stable                             FOLLOW UP    I have asked the patient to make an appointment for follow up with me in 6 months or as needed        I have carefully  explained the diagnosis and treatment options with the patient. The patient has displayed an understanding of the above, and all subsequent questions were answered.         DO THAIS Correa    Portions of this note were produced using [employer]  Although every attempt at real-time proof reading has been made, occasional grammar/syntax errors may have been missed.

## 2017-06-12 NOTE — MR AVS SNAPSHOT
After Visit Summary   6/12/2017    Miguel Patten    MRN: [id number]           Patient Information     Date Of Birth          12/16/1932        Visit Information        Provider Department      6/12/2017 7:20 AM Vladimir Gonzales DO [facility]        Today's Diagnoses     Hypertension goal BP (blood pressure) < 140/90    -  1    Other appendicitis        S/P AVR (aortic valve replacement)        Chronic atrial fibrillation (H)        Chronic systolic congestive heart failure (H)           Follow-ups after your visit        Your next 10 appointments already scheduled     Jun 21, 2017  9:00 AM CDT   Anticoagulation Visit with PH ANTI COAG   [facility]    [address] [place] [address]   [phone]            Aug 10, 2017 10:00 AM CDT   Nurse Only with NL FLOAT TEAM D [facility]   [facility]    [address] [place] [address]   [phone]            Sep 06, 2017  1:15 PM CDT   Remote PPM Check with BENNETT TECH1   [facility]YS[facility] AT [place] ([facility])    [address] [place] [id number] [phone]           This appointment is for a remote check of your pacemaker.  This is not an appointment at the office.              Who to contact     If you have questions or need follow up information about today's clinic visit or your schedule please contact [facility] directly at [phone].  Normal or non-critical lab and imaging results will be communicated to you by MyChart, letter or phone within 4 business days after the clinic has received the results. If you do not hear from us within 7 days, please contact the clinic through MyChart or phone. If you have a critical or abnormal lab result, we will notify you by phone as soon as possible.  Submit refill requests through [employer] or call your pharmacy  and they will forward the refill request to us. Please allow 3 business days for your refill to be completed.          Additional Information About Your Visit        Care EveryWhere ID     This is your Care EveryWhere ID. This could be used by other organizations to access your [facility] medical records  [id number]        Your Vitals Were     Pulse Temperature Respirations Pulse Oximetry BMI (Body Mass Index)       68 96.7  F (35.9  C) (Tympanic) 16 98% 24.14 kg/m2        Blood Pressure from Last 3 Encounters:   06/12/17 138/60   05/31/17 128/62   05/02/17 130/70    Weight from Last 3 Encounters:   06/12/17 183 lb (83 kg)   05/31/17 182 lb 6.4 oz (82.7 kg)   05/02/17 186 lb (84.4 kg)              Today, you had the following     No orders found for display       Primary Care Provider    None       No address on file        Thank you!     Thank you for choosing [facility]  for your care. Our goal is always to provide you with excellent care. Hearing back from our patients is one way we can continue to improve our services. Please take a few minutes to complete the written survey that you may receive in the mail after your visit with us. Thank you!             Your Updated Medication List - Protect others around you: Learn how to safely use, store and throw away your medicines at www.disposemymeds.org.          This list is accurate as of: 6/12/17  8:07 AM.  Always use your most recent med list.                   Brand Name Dispense Instructions for use    acetaminophen 325 MG tablet    TYLENOL    100 tablet    Take 2 tablets (650 mg) by mouth every 4 hours as needed for mild pain or fever       albuterol 108 (90 BASE) MCG/ACT Inhaler    PROAIR HFA/PROVENTIL HFA/VENTOLIN HFA    1 Inhaler    Inhale 2 puffs into the lungs every 6 hours as needed for shortness of breath / dyspnea or wheezing       bicalutamide 50 MG tablet    CASODEX     Take 1 tablet (50 mg) by mouth daily       fluticasone 44 MCG/ACT  Inhaler    FLOVENT HFA    2 Inhaler    INHALE ONE PUFF INTO THE LUNGS TWO TIMES A DAY       ipratropium - albuterol 0.5 mg/2.5 mg/3 mL 0.5-2.5 (3) MG/3ML neb solution    DUONEB    60 vial    Take 3 mLs by nebulization every 6 hours as needed for shortness of breath / dyspnea.       JANTOVEN 2.5 MG tablet   Generic drug:  warfarin     90 tablet    HOLD THIS MEDICATION on 4/21, 4/22, and 4/23.  Please follow up with coumadin clinic on Monday 4/24 as scheduled and then take as scheduled by coumadin clinic.       losartan 50 MG tablet    COZAAR    180 tablet    TAKE ONE TABLET BY MOUTH TWICE A DAY       LUPRON DEPOT (3-MONTH) 22.5 MG kit   Generic drug:  leuprolide     1 each    Inject 22.5 MG, IM q 4 months per Dr. Zeyad Guevara, pt's Urologist in [place]. 4/6/2017       senna-docusate 8.6-50 MG per tablet    SENOKOT-S;PERICOLACE    30 tablet    Take 1 tablet by mouth At Bedtime       simvastatin 40 MG tablet    ZOCOR    90 tablet    TAKE ONE TABLET BY MOUTH AT BEDTIME

## 2017-06-21 ENCOUNTER — ANTICOAGULATION THERAPY VISIT (OUTPATIENT)
Dept: ANTICOAGULATION | Facility: CLINIC | Age: 82
End: 2017-06-21
Payer: MEDICARE

## 2017-06-21 DIAGNOSIS — I50.33 ACUTE ON CHRONIC DIASTOLIC CONGESTIVE HEART FAILURE (H): ICD-10-CM

## 2017-06-21 DIAGNOSIS — Z79.01 LONG-TERM (CURRENT) USE OF ANTICOAGULANTS: ICD-10-CM

## 2017-06-21 LAB — INR POINT OF CARE: 1.8 (ref 0.86–1.14)

## 2017-06-21 PROCEDURE — 99207 ZZC NO CHARGE NURSE ONLY: CPT

## 2017-06-21 PROCEDURE — 85610 PROTHROMBIN TIME: CPT | Mod: QW

## 2017-06-21 PROCEDURE — 36416 COLLJ CAPILLARY BLOOD SPEC: CPT

## 2017-06-21 NOTE — MR AVS SNAPSHOT
Miguel ANAND Sandor   6/21/2017 9:00 AM   Anticoagulation Therapy Visit    Description:  84 year old male   Provider:  TARIQ ANTI COAHARMONY   Department:  [facility]           INR as of 6/21/2017     Today's INR 1.8!      Anticoagulation Summary as of 6/21/2017     INR goal 2.0-3.0   Today's INR 1.8!   Full instructions 2.5 mg on Mon, Wed, Fri; 3.75 mg all other days   Next INR check 7/19/2017    Indications   CHF (congestive heart failure) (H) [I50.9]  Long-term (current) use of anticoagulants [Z79.01] [Z79.01]         Your next Anticoagulation Clinic appointment(s)     Jul 19, 2017  9:00 AM CDT   Anticoagulation Visit with PH ANTI COAG   [facility] ([facility])    [address] [place] [address]   [phone]              Contact Numbers     Clinic Number:         June 2017 Details    Sun Mon Tue Wed Thu Fri Sat         1               2               3                 4               5               6               7               8               9               10                 11               12               13               14               15               16               17                 18               19               20               21      2.5 mg   See details      22      3.75 mg         23      2.5 mg         24      3.75 mg           25      3.75 mg         26      2.5 mg         27      3.75 mg         28      2.5 mg         29      3.75 mg         30      2.5 mg           Date Details   06/21 This INR check               How to take your warfarin dose     To take:  2.5 mg Take 1 of the 2.5 mg tablets.    To take:  3.75 mg Take 1.5 of the 2.5 mg tablets.           July 2017 Details    Sun Mon Tue Wed Thu Fri Sat           1      3.75 mg           2      3.75 mg         3      2.5 mg         4      3.75 mg         5      2.5 mg         6      3.75 mg         7      2.5 mg         8      3.75 mg           9      3.75 mg         10      2.5 mg         11       3.75 mg         12      2.5 mg         13      3.75 mg         14      2.5 mg         15      3.75 mg           16      3.75 mg         17      2.5 mg         18      3.75 mg         19            20               21               22                 23               24               25               26               27               28               29                 30               31                     Date Details   No additional details    Date of next INR:  7/19/2017         How to take your warfarin dose     To take:  2.5 mg Take 1 of the 2.5 mg tablets.    To take:  3.75 mg Take 1.5 of the 2.5 mg tablets.

## 2017-07-12 DIAGNOSIS — J45.20 MILD INTERMITTENT ASTHMA WITHOUT COMPLICATION: ICD-10-CM

## 2017-07-12 NOTE — TELEPHONE ENCOUNTER
flovent       Last Written Prescription Date: 4/11/16  Last Fill Quantity: 2, # refills: 2    Last Office Visit with [facility], [facility] or  Health prescribing provider:  6/12/17   Future Office Visit:    Next 5 appointments (look out 90 days)     Aug 10, 2017 10:00 AM CDT   Nurse Only with NL FLOAT TEAM D [facility] ([facility])    [address] [place] [address] [phone]                   Date of Last Asthma Action Plan Letter:   Asthma Action Plan Q1 Year    Topic Date Due     Asthma Action Plan - yearly  09/27/2017      Asthma Control Test:   ACT Total Scores 5/2/2017   ACT TOTAL SCORE (Goal Greater than or Equal to 20) 5   In the past 12 months, how many times did you visit the emergency room for your asthma without being admitted to the hospital? -   In the past 12 months, how many times were you hospitalized overnight because of your asthma? 0       Date of Last Spirometry Test:   No results found for this or any previous visit.

## 2017-07-13 RX ORDER — FLUTICASONE PROPIONATE 44 MCG
AEROSOL WITH ADAPTER (GRAM) INHALATION
Qty: 21.2 G | Refills: 1 | Status: SHIPPED | OUTPATIENT
Start: 2017-07-13 | End: 2018-03-14

## 2017-07-13 NOTE — TELEPHONE ENCOUNTER
Prescription approved per [facility] Refill Protocol.  ACT score is below goal.  Instructed to schedule appt. .........KARI Marvin

## 2017-07-19 ENCOUNTER — ANTICOAGULATION THERAPY VISIT (OUTPATIENT)
Dept: ANTICOAGULATION | Facility: CLINIC | Age: 82
End: 2017-07-19
Payer: MEDICARE

## 2017-07-19 DIAGNOSIS — Z79.01 LONG-TERM (CURRENT) USE OF ANTICOAGULANTS: ICD-10-CM

## 2017-07-19 DIAGNOSIS — I50.33 ACUTE ON CHRONIC DIASTOLIC CONGESTIVE HEART FAILURE (H): ICD-10-CM

## 2017-07-19 LAB — INR POINT OF CARE: 2.7 (ref 0.86–1.14)

## 2017-07-19 PROCEDURE — 99207 ZZC NO CHARGE NURSE ONLY: CPT

## 2017-07-19 PROCEDURE — 36416 COLLJ CAPILLARY BLOOD SPEC: CPT

## 2017-07-19 PROCEDURE — 85610 PROTHROMBIN TIME: CPT | Mod: QW

## 2017-07-19 NOTE — PROGRESS NOTES
ANTICOAGULATION FOLLOW-UP CLINIC VISIT    Patient Name:  Miguel Patten  Date:  7/19/2017  Contact Type:  Face to Face    SUBJECTIVE:     Patient Findings     Positives No Problem Findings           OBJECTIVE    INR Protime   Date Value Ref Range Status   07/19/2017 2.7 (A) 0.86 - 1.14 Final       ASSESSMENT / PLAN  INR assessment THER    Recheck INR In: 6 WEEKS    INR Location Clinic      Anticoagulation Summary as of 7/19/2017     INR goal 2.0-3.0   Today's INR 2.7   Maintenance plan 2.5 mg (2.5 mg x 1) on Mon, Wed, Fri; 3.75 mg (2.5 mg x 1.5) all other days   Full instructions 2.5 mg on Mon, Wed, Fri; 3.75 mg all other days   Weekly total 22.5 mg   No change documented Ashly Jones RN   Plan last modified Ashly Jones RN (6/21/2017)   Next INR check 8/30/2017   Target end date     Indications   CHF (congestive heart failure) (H) [I50.9]  Long-term (current) use of anticoagulants [Z79.01] [Z79.01]         Anticoagulation Episode Summary     INR check location     Preferred lab     Send INR reminders to [facility]    Comments 2.5 mg tablets      Anticoagulation Care Providers     Provider Role Specialty Phone number    Rober العراقي MD [facility] [phone]            See the Encounter Report to view Anticoagulation Flowsheet and Dosing Calendar (Go to Encounters tab in chart review, and find the Anticoagulation Therapy Visit)    Dosage adjustment made based on physician directed care plan.        Ashly Jones RN

## 2017-07-19 NOTE — MR AVS SNAPSHOT
Miguel ANAND Sandor   7/19/2017 9:00 AM   Anticoagulation Therapy Visit    Description:  84 year old male   Provider:  TARIQ ANTI COAG   Department:  [facility]           INR as of 7/19/2017     Today's INR 2.7      Anticoagulation Summary as of 7/19/2017     INR goal 2.0-3.0   Today's INR 2.7   Full instructions 2.5 mg on Mon, Wed, Fri; 3.75 mg all other days   Next INR check 8/30/2017    Indications   CHF (congestive heart failure) (H) [I50.9]  Long-term (current) use of anticoagulants [Z79.01] [Z79.01]         Your next Anticoagulation Clinic appointment(s)     Aug 30, 2017  9:00 AM CDT   Anticoagulation Visit with TARIQ ANTI JOY   [facility]    [address] [place] [address]   [phone]              Contact Numbers     Clinic Number:         July 2017 Details    Sun Mon Tue Wed Thu Fri Sat           1                 2               3               4               5               6               7               8                 9               10               11               12               13               14               15                 16               17               18               19      2.5 mg   See details      20      3.75 mg         21      2.5 mg         22      3.75 mg           23      3.75 mg         24      2.5 mg         25      3.75 mg         26      2.5 mg         27      3.75 mg         28      2.5 mg         29      3.75 mg           30      3.75 mg         31      2.5 mg               Date Details   07/19 This INR check               How to take your warfarin dose     To take:  2.5 mg Take 1 of the 2.5 mg tablets.    To take:  3.75 mg Take 1.5 of the 2.5 mg tablets.           August 2017 Details    Sun Mon Tue Wed Thu Fri Sat       1      3.75 mg         2      2.5 mg         3      3.75 mg         4      2.5 mg         5      3.75 mg           6      3.75 mg         7      2.5 mg         8      3.75 mg         9      2.5  mg         10      3.75 mg         11      2.5 mg         12      3.75 mg           13      3.75 mg         14      2.5 mg         15      3.75 mg         16      2.5 mg         17      3.75 mg         18      2.5 mg         19      3.75 mg           20      3.75 mg         21      2.5 mg         22      3.75 mg         23      2.5 mg         24      3.75 mg         25      2.5 mg         26      3.75 mg           27      3.75 mg         28      2.5 mg         29      3.75 mg         30            31                  Date Details   No additional details    Date of next INR:  8/30/2017         How to take your warfarin dose     To take:  2.5 mg Take 1 of the 2.5 mg tablets.    To take:  3.75 mg Take 1.5 of the 2.5 mg tablets.

## 2017-08-03 DIAGNOSIS — C61 MALIGNANT NEOPLASM OF PROSTATE (H): Primary | ICD-10-CM

## 2017-08-03 LAB — PSA SERPL-MCNC: 0.15 UG/L (ref 0–4)

## 2017-08-03 PROCEDURE — 84153 ASSAY OF PSA TOTAL: CPT | Performed by: UROLOGY

## 2017-08-03 PROCEDURE — 36415 COLL VENOUS BLD VENIPUNCTURE: CPT | Performed by: UROLOGY

## 2017-08-10 ENCOUNTER — ALLIED HEALTH/NURSE VISIT (OUTPATIENT)
Dept: FAMILY MEDICINE | Facility: CLINIC | Age: 82
End: 2017-08-10
Payer: MEDICARE

## 2017-08-10 DIAGNOSIS — C61 MALIGNANT NEOPLASM OF PROSTATE (H): Primary | ICD-10-CM

## 2017-08-10 PROCEDURE — 96372 THER/PROPH/DIAG INJ SC/IM: CPT

## 2017-08-10 NOTE — NURSING NOTE
Prior to injection verified patient identity using patient's name and date of birth.  Per orders of Dr. Dr. Zeyad Guevara (urologist at [facility]), injection of Lupron Depot given by Lazara Portillo. Patient instructed to remain in clinic for 15 minutes afterwards, and to report any adverse reaction to me immediately.  The following medication was given:     MEDICATION: Lupron Depot 22.5 mg  ROUTE: IM  SITE: LUQ - Gluteus-pt declined  ventrogluteal site  DOSE: 22.5 mg  LOT #: [id number]  :  [employer]  EXPIRATION DATE:  02/19/2019  NDC: 5754-6255-41  Lazara Portillo CMA (AAMA)

## 2017-08-10 NOTE — MR AVS SNAPSHOT
"              After Visit Summary   8/10/2017    Miguel Patten    MRN: [id number]           Patient Information     Date Of Birth          12/16/1932        Visit Information        Provider Department      8/10/2017 10:00 AM CHERISE ARGUELLES TEAM JOHANNY [facility]        Today's Diagnoses     Malignant neoplasm of prostate (H)    -  1       Follow-ups after your visit        Your next 10 appointments already scheduled     Aug 30, 2017  9:00 AM CDT   Anticoagulation Visit with PH ANTI COAG   [facility] ([facility])    [address] [address] [phone]            Sep 06, 2017  1:15 PM CDT   Remote PPM Check with BENNETT TECH1   [facility] PHYSICIANS HEART AT [place] ([facility])    [address] [address] [phone]           This appointment is for a remote check of your pacemaker.  This is not an appointment at the office.              Who to contact     If you have questions or need follow up information about today's clinic visit or your schedule please contact [facility] directly at [phone].  Normal or non-critical lab and imaging results will be communicated to you by "[employer]hart, letter or phone within 4 business days after the clinic has received the results. If you do not hear from us within 7 days, please contact the clinic through [employer] or phone. If you have a critical or abnormal lab result, we will notify you by phone as soon as possible.  Submit refill requests through [employer] or call your pharmacy and they will forward the refill request to us. Please allow 3 business days for your refill to be completed.          Additional Information About Your Visit        MyChart Information     [employer] lets you send messages to your doctor, view your test results, renew your prescriptions, schedule appointments and more. To sign up, go to [email or web address] . Click on \"Log " "in\" on the left side of the screen, which will take you to the Welcome page. Then click on \"Sign up Now\" on the right side of the page.     You will be asked to enter the access code listed below, as well as some personal information. Please follow the directions to create your username and password.     Your access code is: [email or web address]  Expires: 2017 10:21 AM     Your access code will  in 90 days. If you need help or a new code, please call your [facility] clinic or [phone].        Care EveryWhere ID     This is your Care EveryWhere ID. This could be used by other organizations to access your [facility] medical records  [id number]         Blood Pressure from Last 3 Encounters:   17 138/60   17 128/62   17 130/70    Weight from Last 3 Encounters:   17 183 lb (83 kg)   17 182 lb 6.4 oz (82.7 kg)   17 186 lb (84.4 kg)              We Performed the Following     INJECTION INTRAMUSCULAR OR SUB-Q     LEUPROLIDE ACETATE 3.75MGOR>        Primary Care Provider Office Phone # Fax #    [email or web address] Celestine Gonzales,  [phone] [address] [place] [address]        Equal Access to Services     [facility] : Hadii aad ku hadasho Soomaali, waaxda luqadaha, qaybta kaalmada adeegyada, waxay lul loving . So [facility] [phone].    ATENCIÓN: Si habla español, tiene a walker disposición servicios gratuitos de asistencia lingüística. Llame al [phone].    We comply with applicable federal civil rights laws and [place] laws. We do not discriminate on the basis of race, color, national origin, age, disability sex, sexual orientation or gender identity.            Thank you!     Thank you for choosing [facility]  for your care. Our goal is always to provide you with excellent care. Hearing back from our patients is one way we can continue to improve our services. Please take a few minutes to complete the written survey " that you may receive in the mail after your visit with us. Thank you!             Your Updated Medication List - Protect others around you: Learn how to safely use, store and throw away your medicines at www.disposemymeds.org.          This list is accurate as of: 8/10/17 10:21 AM.  Always use your most recent med list.                   Brand Name Dispense Instructions for use Diagnosis    acetaminophen 325 MG tablet    TYLENOL    100 tablet    Take 2 tablets (650 mg) by mouth every 4 hours as needed for mild pain or fever        albuterol 108 (90 BASE) MCG/ACT Inhaler    PROAIR HFA/PROVENTIL HFA/VENTOLIN HFA    1 Inhaler    Inhale 2 puffs into the lungs every 6 hours as needed for shortness of breath / dyspnea or wheezing    Mild intermittent asthma, uncomplicated       bicalutamide 50 MG tablet    CASODEX     Take 1 tablet (50 mg) by mouth daily        FLOVENT HFA 44 MCG/ACT Inhaler   Generic drug:  fluticasone     21.2 g    INHALE ONE PUFF BY MOUTH TWICE A DAY    Mild intermittent asthma without complication       ipratropium - albuterol 0.5 mg/2.5 mg/3 mL 0.5-2.5 (3) MG/3ML neb solution    DUONEB    60 vial    Take 3 mLs by nebulization every 6 hours as needed for shortness of breath / dyspnea.    Moderate persistent asthma       JANTOVEN 2.5 MG tablet   Generic drug:  warfarin     90 tablet    HOLD THIS MEDICATION on 4/21, 4/22, and 4/23.  Please follow up with coumadin clinic on Monday 4/24 as scheduled and then take as scheduled by coumadin clinic.    Chronic atrial fibrillation (H), Long-term (current) use of anticoagulants       losartan 50 MG tablet    COZAAR    180 tablet    TAKE ONE TABLET BY MOUTH TWICE A DAY    S/P AVR (aortic valve replacement)       LUPRON DEPOT (3-MONTH) 22.5 MG kit   Generic drug:  leuprolide     1 each    Inject 22.5 MG, IM q 4 months per Dr. Zeyad Guevara, pt's Urologist in [place]. 4/6/2017    Malignant neoplasm of prostate (H)       senna-docusate 8.6-50 MG per tablet     SENOKOT-S;PERICOLACE    30 tablet    Take 1 tablet by mouth At Bedtime    Ruptured appendicitis       simvastatin 40 MG tablet    ZOCOR    90 tablet    TAKE ONE TABLET BY MOUTH EVERY NIGHT AT BEDTIME    S/P AVR (aortic valve replacement)

## 2017-08-30 ENCOUNTER — TELEPHONE (OUTPATIENT)
Dept: ANTICOAGULATION | Facility: CLINIC | Age: 82
End: 2017-08-30

## 2017-08-30 ENCOUNTER — ANTICOAGULATION THERAPY VISIT (OUTPATIENT)
Dept: ANTICOAGULATION | Facility: CLINIC | Age: 82
End: 2017-08-30
Payer: MEDICARE

## 2017-08-30 DIAGNOSIS — Z79.01 LONG-TERM (CURRENT) USE OF ANTICOAGULANTS: ICD-10-CM

## 2017-08-30 DIAGNOSIS — I50.33 ACUTE ON CHRONIC DIASTOLIC CONGESTIVE HEART FAILURE (H): ICD-10-CM

## 2017-08-30 DIAGNOSIS — J45.20 MILD INTERMITTENT ASTHMA, UNCOMPLICATED: ICD-10-CM

## 2017-08-30 DIAGNOSIS — I48.20 CHRONIC ATRIAL FIBRILLATION (H): Primary | ICD-10-CM

## 2017-08-30 LAB — INR POINT OF CARE: 4.1 (ref 0.86–1.14)

## 2017-08-30 PROCEDURE — 99207 ZZC NO CHARGE NURSE ONLY: CPT

## 2017-08-30 PROCEDURE — 36416 COLLJ CAPILLARY BLOOD SPEC: CPT

## 2017-08-30 PROCEDURE — 85610 PROTHROMBIN TIME: CPT | Mod: QW

## 2017-08-30 RX ORDER — PREDNISONE 20 MG/1
TABLET ORAL
Qty: 10 TABLET | Refills: 0 | Status: SHIPPED | OUTPATIENT
Start: 2017-08-30 | End: 2018-04-12

## 2017-08-30 NOTE — TELEPHONE ENCOUNTER
Routing refill request to provider for review/approval because:  Drug not on the [facility] refill protocol   Drug not active on patient's medication list    Kori Álvarez RN

## 2017-08-30 NOTE — MR AVS SNAPSHOT
Miguel ANAND Sandor   8/30/2017 9:00 AM   Anticoagulation Therapy Visit    Description:  84 year old male   Provider:  PH ANTI COAG   Department:  [facility]           INR as of 8/30/2017     Today's INR 4.1!      Anticoagulation Summary as of 8/30/2017     INR goal 2.0-3.0   Today's INR 4.1!   Full instructions 8/30: Hold; Otherwise 2.5 mg on Mon, Wed, Fri; 3.75 mg all other days   Next INR check 10/25/2017    Indications   CHF (congestive heart failure) (H) [I50.9]  Long-term (current) use of anticoagulants [Z79.01] [Z79.01]         Your next Anticoagulation Clinic appointment(s)     Oct 25, 2017  9:00 AM CDT   [facility]agulation [facility] with PH ANTI COAG   [facility]    [address] [place] [address]   [phone]              Contact Numbers     Clinic Number:         August 2017 Details    Sun Mon Tue Wed Thu Fri Sat       1               2               3               4               5                 6               7               8               9               10               11               12                 13               14               15               16               17               18               19                 20               21               22               23               24               25               26                 27               28               29               30      Hold   See details      31      3.75 mg            Date Details   08/30 This INR check               How to take your warfarin dose     To take:  3.75 mg Take 1.5 of the 2.5 mg tablets.    Hold Do not take your warfarin dose. See the Details table to the right for additional instructions.                September 2017 Details    Sun Mon Tue Wed Thu Fri Sat          1      2.5 mg         2      3.75 mg           3      3.75 mg         4      2.5 mg         5      3.75 mg         6      2.5 mg         7      3.75 mg         8      2.5 mg         9       3.75 mg           10      3.75 mg         11      2.5 mg         12      3.75 mg         13      2.5 mg         14      3.75 mg         15      2.5 mg         16      3.75 mg           17      3.75 mg         18      2.5 mg         19      3.75 mg         20      2.5 mg         21      3.75 mg         22      2.5 mg         23      3.75 mg           24      3.75 mg         25      2.5 mg         26      3.75 mg         27      2.5 mg         28      3.75 mg         29      2.5 mg         30      3.75 mg          Date Details   No additional details            How to take your warfarin dose     To take:  2.5 mg Take 1 of the 2.5 mg tablets.    To take:  3.75 mg Take 1.5 of the 2.5 mg tablets.           October 2017 Details    Sun Mon Tue Wed Thu Fri Sat     1      3.75 mg         2      2.5 mg         3      3.75 mg         4      2.5 mg         5      3.75 mg         6      2.5 mg         7      3.75 mg           8      3.75 mg         9      2.5 mg         10      3.75 mg         11      2.5 mg         12      3.75 mg         13      2.5 mg         14      3.75 mg           15      3.75 mg         16      2.5 mg         17      3.75 mg         18      2.5 mg         19      3.75 mg         20      2.5 mg         21      3.75 mg           22      3.75 mg         23      2.5 mg         24      3.75 mg         25            26               27               28                 29               30               31                    Date Details   No additional details    Date of next INR:  10/25/2017         How to take your warfarin dose     To take:  2.5 mg Take 1 of the 2.5 mg tablets.    To take:  3.75 mg Take 1.5 of the 2.5 mg tablets.

## 2017-08-30 NOTE — TELEPHONE ENCOUNTER
Please review and renew this patients INR referral, orders pending. Thank you!      Ashly Jones RN

## 2017-08-30 NOTE — PROGRESS NOTES
ANTICOAGULATION FOLLOW-UP CLINIC VISIT    Patient Name:  Miguel Patten  Date:  8/30/2017  Contact Type:  Face to Face    SUBJECTIVE:     Patient Findings     Positives Change in medications (just finished 10 days of prednisone )           OBJECTIVE    INR Protime   Date Value Ref Range Status   08/30/2017 4.1 (A) 0.86 - 1.14 Final       ASSESSMENT / PLAN  INR assessment SUPRA    Recheck INR In: 8 WEEKS    INR Location Clinic      Anticoagulation Summary as of 8/30/2017     INR goal 2.0-3.0   Today's INR 4.1!   Maintenance plan 2.5 mg (2.5 mg x 1) on Mon, Wed, Fri; 3.75 mg (2.5 mg x 1.5) all other days   Full instructions 8/30: Hold; Otherwise 2.5 mg on Mon, Wed, Fri; 3.75 mg all other days   Weekly total 22.5 mg   Plan last modified Ashly Jones RN (6/21/2017)   Next INR check 10/25/2017   Target end date     Indications   CHF (congestive heart failure) (H) [I50.9]  Long-term (current) use of anticoagulants [Z79.01] [Z79.01]         Anticoagulation Episode Summary     INR check location     Preferred lab     Send INR reminders to [place] [facility] POOL    Comments 2.5 mg tablets      Anticoagulation Care Providers     Provider Role Specialty Phone number    Rober العراقي MD [facility] [phone]            See the Encounter Report to view Anticoagulation Flowsheet and Dosing Calendar (Go to Encounters tab in chart review, and find the Anticoagulation Therapy Visit)    Dosage adjustment made based on physician directed care plan.    Hold tomorrow (already took today's dose), then resume     Ashly Jones, RN

## 2017-08-30 NOTE — TELEPHONE ENCOUNTER
Prednisone      Last Written Prescription Date:  3/13/17  Last Fill Quantity: 10,   # refills: 3  Last Office Visit with [facility] prescribing provider: 6/12/17  Future Office visit:       Routing refill request to provider for review/approval because:  Drug not on the [facility] Coverity refill protocol or controlled substance

## 2017-09-06 ENCOUNTER — ALLIED HEALTH/NURSE VISIT (OUTPATIENT)
Dept: CARDIOLOGY | Facility: CLINIC | Age: 82
End: 2017-09-06
Payer: MEDICARE

## 2017-09-06 DIAGNOSIS — Z95.0 CARDIAC PACEMAKER IN SITU: Primary | ICD-10-CM

## 2017-09-06 PROCEDURE — 93294 REM INTERROG EVL PM/LDLS PM: CPT | Performed by: INTERNAL MEDICINE

## 2017-09-06 PROCEDURE — 93296 REM INTERROG EVL PM/IDS: CPT | Performed by: INTERNAL MEDICINE

## 2017-09-06 NOTE — MR AVS SNAPSHOT
After Visit Summary   9/6/2017    Miguel Patten    MRN: [id number]           Patient Information     Date Of Birth          12/16/1932        Visit Information        Provider Department      9/6/2017 1:15 PM BENNETT TECH1 [facility]        Today's Diagnoses     Cardiac pacemaker in situ    -  1       Follow-ups after your visit        Your next 10 appointments already scheduled     Sep 06, 2017  1:15 PM CDT   Remote PPM Check with BENNETT TECH1   [facility] ([facility])    [address] [place] [address] [phone]           This appointment is for a remote check of your pacemaker.  This is not an appointment at the office.            Oct 25, 2017  9:00 AM CDT   Anticoagulation Visit with PH ANTI COAG   [facility] ([facility])    [address] [address] [phone]              Who to contact     If you have questions or need follow up information about today's clinic visit or your schedule please contact [facility] directly at [phone].  Normal or non-critical lab and imaging results will be communicated to you by [employer], letter or phone within 4 business days after the clinic has received the results. If you do not hear from us within 7 days, please contact the clinic through [employer] or phone. If you have a critical or abnormal lab result, we will notify you by phone as soon as possible.  Submit refill requests through [employer] or call your pharmacy and they will forward the refill request to us. Please allow 3 business days for your refill to be completed.          Additional Information About Your Visit        MyChart Information     [employer] lets you send messages to your doctor, view your test results, renew your prescriptions, schedule appointments and more. To sign up, go to  "[email or web address] . Click on \"Log in\" on the left side of the screen, which will take you to the Welcome page. Then click on \"Sign up Now\" on the right side of the page.     You will be asked to enter the access code listed below, as well as some personal information. Please follow the directions to create your username and password.     Your access code is: [email or web address]  Expires: 2017 10:21 AM     Your access code will  in 90 days. If you need help or a new code, please call your [place] clinic or [phone].        Care EveryWhere ID     This is your Care EveryWhere ID. This could be used by other organizations to access your [place] medical records  [id number]         Blood Pressure from Last 3 Encounters:   17 138/60   17 128/62   17 130/70    Weight from Last 3 Encounters:   17 83 kg (183 lb)   17 82.7 kg (182 lb 6.4 oz)   17 84.4 kg (186 lb)              We Performed the Following     INTERROGATION DEVICE EVAL REMOTE, PACER/ICD (87590)     PM DEVICE INTERROGATE REMOTE (27648)        Primary Care Provider Office Phone # Fax #    Qzhtevki Celestine MaldonadoDO andrew [phone] [address] [place] [address]        Equal Access to Services     RODOLFO MURPHY AH: Hadii aad ku hadasho Soomaali, waaxda luqadaha, qaybta kaalmada adeegyada, waxay darinelin cory osorio. So [facility] [phone].    ATENCIÓN: Si habla español, tiene a walker disposición servicios gratuitos de asistencia lingüística. Llame al [phone].    We comply with applicable federal civil rights laws and [place] laws. We do not discriminate on the basis of race, color, national origin, age, disability sex, sexual orientation or gender identity.            Thank you!     Thank you for choosing [facility] [place]  for your care. Our goal is always to provide you with excellent care. Hearing back from our patients is one way we can " continue to improve our services. Please take a few minutes to complete the written survey that you may receive in the mail after your visit with us. Thank you!             Your Updated Medication List - Protect others around you: Learn how to safely use, store and throw away your medicines at www.disposemymeds.org.          This list is accurate as of: 9/6/17  8:36 AM.  Always use your most recent med list.                   Brand Name Dispense Instructions for use Diagnosis    acetaminophen 325 MG tablet    TYLENOL    100 tablet    Take 2 tablets (650 mg) by mouth every 4 hours as needed for mild pain or fever        albuterol 108 (90 BASE) MCG/ACT Inhaler    PROAIR HFA/PROVENTIL HFA/VENTOLIN HFA    1 Inhaler    Inhale 2 puffs into the lungs every 6 hours as needed for shortness of breath / dyspnea or wheezing    Mild intermittent asthma, uncomplicated       bicalutamide 50 MG tablet    CASODEX     Take 1 tablet (50 mg) by mouth daily        FLOVENT HFA 44 MCG/ACT Inhaler   Generic drug:  fluticasone     21.2 g    INHALE ONE PUFF BY MOUTH TWICE A DAY    Mild intermittent asthma without complication       ipratropium - albuterol 0.5 mg/2.5 mg/3 mL 0.5-2.5 (3) MG/3ML neb solution    DUONEB    60 vial    Take 3 mLs by nebulization every 6 hours as needed for shortness of breath / dyspnea.    Moderate persistent asthma       JANTOVEN 2.5 MG tablet   Generic drug:  warfarin     90 tablet    HOLD THIS MEDICATION on 4/21, 4/22, and 4/23.  Please follow up with coumadin clinic on Monday 4/24 as scheduled and then take as scheduled by coumadin clinic.    Chronic atrial fibrillation (H), Long-term (current) use of anticoagulants       losartan 50 MG tablet    COZAAR    180 tablet    TAKE ONE TABLET BY MOUTH TWICE A DAY    S/P AVR (aortic valve replacement)       LUPRON DEPOT (3-MONTH) 22.5 MG kit   Generic drug:  leuprolide     1 each    Inject 22.5 MG, IM q 4 months per Dr. Zeyad Guevara, pt's Urologist in [place].  4/6/2017    Malignant neoplasm of prostate (H)       predniSONE 20 MG tablet    DELTASONE    10 tablet    TAKE ONE TABLET BY MOUTH EVERY DAY AS NEEDED    Mild intermittent asthma, uncomplicated       senna-docusate 8.6-50 MG per tablet    SENOKOT-S;PERICOLACE    30 tablet    Take 1 tablet by mouth At Bedtime    Ruptured appendicitis       simvastatin 40 MG tablet    ZOCOR    90 tablet    TAKE ONE TABLET BY MOUTH EVERY NIGHT AT BEDTIME    S/P AVR (aortic valve replacement)

## 2017-09-06 NOTE — PROGRESS NOTES
[employer] CRT-P Remote PPM Device Check  : >99 %, Chronic Afib, taking Warfarin  Mode: VVIR        Presenting Rhythm: BIVP  Heart Rate: Adequate rates per histogram  Sensing: Stable    Pacing Threshold: Stable    Impedance: Stable  Battery Status: 8 years  Atrial Arrhythmia: N/A  Ventricular Arrhythmia: 5 ventricular high rates; 2 EGMs show slightly irregular VS events lasting 8- 18 beats, rates 130-185bpm. EF 55-60% (2017). Reviewed with KARI Saxena.        Care Plan: F/u PPM Latitude q 3 months. Gave patient results over the phone. Dedra,CVT

## 2017-10-16 ENCOUNTER — ALLIED HEALTH/NURSE VISIT (OUTPATIENT)
Dept: FAMILY MEDICINE | Facility: OTHER | Age: 82
End: 2017-10-16
Payer: MEDICARE

## 2017-10-16 DIAGNOSIS — Z23 NEED FOR PROPHYLACTIC VACCINATION AND INOCULATION AGAINST INFLUENZA: Primary | ICD-10-CM

## 2017-10-16 PROCEDURE — G0008 ADMIN INFLUENZA VIRUS VAC: HCPCS

## 2017-10-16 PROCEDURE — 99207 ZZC NO CHARGE NURSE ONLY: CPT

## 2017-10-16 PROCEDURE — 90662 IIV NO PRSV INCREASED AG IM: CPT

## 2017-10-16 NOTE — MR AVS SNAPSHOT
After Visit Summary   10/16/2017    Miguel Patten    MRN: [id number]           Patient Information     Date Of Birth          12/16/1932        Visit Information        Provider Department      10/16/2017 11:00 AM NL FLU SHOT [facility]        Today's Diagnoses     Need for prophylactic vaccination and inoculation against influenza    -  1       Follow-ups after your visit        Your next 10 appointments already scheduled     Oct 25, 2017  9:00 AM CDT   Anticoagulation Visit with PH ANTI COAG   [facility]    [address] [id number] [phone]            Dec 13, 2017  1:30 PM CST   Remote PPM Check with BENNETT TECH1   [facility] [place] ([facility])    [id number]  [facility] [address] [phone]           This appointment is for a remote check of your pacemaker.  This is not an appointment at the office.              Who to contact     If you have questions or need follow up information about today's clinic visit or your schedule please contact [facility] directly at [phone].  Normal or non-critical lab and imaging results will be communicated to you by [employer], letter or phone within 4 business days after the clinic has received the results. If you do not hear from us within 7 days, please contact the clinic through [employer] or phone. If you have a critical or abnormal lab result, we will notify you by phone as soon as possible.  Submit refill requests through [employer] or call your pharmacy and they will forward the refill request to us. Please allow 3 business days for your refill to be completed.          Additional Information About Your Visit        MyChart Information     [employer] lets you send messages to your doctor, view your test results, renew your prescriptions, schedule appointments and more. To sign up, go to  "[email or web address] . Click on \"Log in\" on the left side of the screen, which will take you to the Welcome page. Then click on \"Sign up Now\" on the right side of the page.     You will be asked to enter the access code listed below, as well as some personal information. Please follow the directions to create your username and password.     Your access code is: [email or web address]  Expires: 2017 10:21 AM     Your access code will  in 90 days. If you need help or a new code, please call your [place] clinic or [phone].        Care EveryWhere ID     This is your Care EveryWhere ID. This could be used by other organizations to access your [place] medical records  [id number]         Blood Pressure from Last 3 Encounters:   17 138/60   17 128/62   17 130/70    Weight from Last 3 Encounters:   17 183 lb (83 kg)   17 182 lb 6.4 oz (82.7 kg)   17 186 lb (84.4 kg)              We Performed the Following     FLU VACCINE, INCREASED ANTIGEN, PRESV FREE, AGE 65+ [04233]     Vaccine Administration, Initial [41464]        Primary Care Provider Office Phone # Fax #    Imfquouu Celestine GonzalesDO [phone] [address] [place] [address]        Equal Access to Services     [facility] AH: Hadii aad ku hadasho Soomaali, waaxda luqadaha, qaybta kaalmada adeegyada, waxay lul osorio. So [facility] [phone].    ATENCIÓN: Si habla español, tiene a walker disposición servicios gratuitos de asistencia lingüística. Llame al [phone].    We comply with applicable federal civil rights laws and [place] laws. We do not discriminate on the basis of race, color, national origin, age, disability, sex, sexual orientation, or gender identity.            Thank you!     Thank you for choosing [facility]  for your care. Our goal is always to provide you with excellent care. Hearing back from our patients is one way we can continue to " improve our services. Please take a few minutes to complete the written survey that you may receive in the mail after your visit with us. Thank you!             Your Updated Medication List - Protect others around you: Learn how to safely use, store and throw away your medicines at www.disposemymeds.org.          This list is accurate as of: 10/16/17 11:23 AM.  Always use your most recent med list.                   Brand Name Dispense Instructions for use Diagnosis    acetaminophen 325 MG tablet    TYLENOL    100 tablet    Take 2 tablets (650 mg) by mouth every 4 hours as needed for mild pain or fever        albuterol 108 (90 BASE) MCG/ACT Inhaler    PROAIR HFA/PROVENTIL HFA/VENTOLIN HFA    1 Inhaler    Inhale 2 puffs into the lungs every 6 hours as needed for shortness of breath / dyspnea or wheezing    Mild intermittent asthma, uncomplicated       bicalutamide 50 MG tablet    CASODEX     Take 1 tablet (50 mg) by mouth daily        FLOVENT HFA 44 MCG/ACT Inhaler   Generic drug:  fluticasone     21.2 g    INHALE ONE PUFF BY MOUTH TWICE A DAY    Mild intermittent asthma without complication       ipratropium - albuterol 0.5 mg/2.5 mg/3 mL 0.5-2.5 (3) MG/3ML neb solution    DUONEB    60 vial    Take 3 mLs by nebulization every 6 hours as needed for shortness of breath / dyspnea.    Moderate persistent asthma       JANTOVEN 2.5 MG tablet   Generic drug:  warfarin     90 tablet    HOLD THIS MEDICATION on 4/21, 4/22, and 4/23.  Please follow up with coumadin clinic on Monday 4/24 as scheduled and then take as scheduled by coumadin clinic.    Chronic atrial fibrillation (H), Long-term (current) use of anticoagulants       losartan 50 MG tablet    COZAAR    180 tablet    TAKE ONE TABLET BY MOUTH TWICE A DAY    S/P AVR (aortic valve replacement)       LUPRON DEPOT (3-MONTH) 22.5 MG kit   Generic drug:  leuprolide     1 each    Inject 22.5 MG, IM q 4 months per Dr. Zeyad Guevara, pt's Urologist in [place]. 4/6/2017     Malignant neoplasm of prostate (H)       predniSONE 20 MG tablet    DELTASONE    10 tablet    TAKE ONE TABLET BY MOUTH EVERY DAY AS NEEDED    Mild intermittent asthma, uncomplicated       senna-docusate 8.6-50 MG per tablet    SENOKOT-S;PERICOLACE    30 tablet    Take 1 tablet by mouth At Bedtime    Ruptured appendicitis       simvastatin 40 MG tablet    ZOCOR    90 tablet    TAKE ONE TABLET BY MOUTH EVERY NIGHT AT BEDTIME    S/P AVR (aortic valve replacement)

## 2017-10-16 NOTE — PROGRESS NOTES
Injectable Influenza Immunization Documentation    1.  Is the person to be vaccinated sick today?   No    2. Does the person to be vaccinated have an allergy to a component   of the vaccine?   No    3. Has the person to be vaccinated ever had a serious reaction   to influenza vaccine in the past?   No    4. Has the person to be vaccinated ever had Guillain-Barré syndrome?   No    Form completed by Miguel Patten    Prior to injection verified patient identity using patient's name and date of birth..  Patient instructed to remain in clinic for 15 minutes afterwards, and to report any adverse reaction to me immediately.  Zoey Moffett, CMA

## 2017-10-25 ENCOUNTER — ANTICOAGULATION THERAPY VISIT (OUTPATIENT)
Dept: ANTICOAGULATION | Facility: CLINIC | Age: 82
End: 2017-10-25
Payer: MEDICARE

## 2017-10-25 DIAGNOSIS — Z79.01 LONG-TERM (CURRENT) USE OF ANTICOAGULANTS: ICD-10-CM

## 2017-10-25 DIAGNOSIS — I50.33 ACUTE ON CHRONIC DIASTOLIC CONGESTIVE HEART FAILURE (H): ICD-10-CM

## 2017-10-25 LAB — INR POINT OF CARE: 2.4 (ref 0.86–1.14)

## 2017-10-25 PROCEDURE — 99207 ZZC NO CHARGE NURSE ONLY: CPT

## 2017-10-25 PROCEDURE — 36416 COLLJ CAPILLARY BLOOD SPEC: CPT

## 2017-10-25 PROCEDURE — 85610 PROTHROMBIN TIME: CPT | Mod: QW

## 2017-10-25 NOTE — PROGRESS NOTES
ANTICOAGULATION FOLLOW-UP CLINIC VISIT    Patient Name:  Miguel Patten  Date:  10/25/2017  Contact Type:  Face to Face    SUBJECTIVE:     Patient Findings     Positives No Problem Findings           OBJECTIVE    INR Protime   Date Value Ref Range Status   10/25/2017 2.4 (A) 0.86 - 1.14 Final       ASSESSMENT / PLAN  INR assessment THER    Recheck INR In: 8 WEEKS    INR Location Clinic      Anticoagulation Summary as of 10/25/2017     INR goal 2.0-3.0   Today's INR 2.4   Maintenance plan 2.5 mg (2.5 mg x 1) on Mon, Wed, Fri; 3.75 mg (2.5 mg x 1.5) all other days   Full instructions 2.5 mg on Mon, Wed, Fri; 3.75 mg all other days   Weekly total 22.5 mg   Plan last modified Ashly Jones RN (6/21/2017)   Next INR check 12/20/2017   Target end date     Indications   CHF (congestive heart failure) (H) [I50.9]  Long-term (current) use of anticoagulants [Z79.01] [Z79.01]         Anticoagulation Episode Summary     INR check location     Preferred lab     Send INR reminders to [facility] POOL    Comments 2.5 mg tablets      Anticoagulation Care Providers     Provider Role Specialty Phone number    Rober العراقي MD [facility] [phone]            See the Encounter Report to view Anticoagulation Flowsheet and Dosing Calendar (Go to Encounters tab in chart review, and find the Anticoagulation Therapy Visit)    Dosage adjustment made based on physician directed care plan.        Ashly Jones RN

## 2017-10-25 NOTE — MR AVS SNAPSHOT
Miguel ANAND Sandor   10/25/2017 9:00 AM   Anticoagulation Therapy Visit    Description:  84 year old male   Provider:  TARIQ ANTI COAG   Department:  Ph Anticoag           INR as of 10/25/2017     Today's INR 2.4      Anticoagulation Summary as of 10/25/2017     INR goal 2.0-3.0   Today's INR 2.4   Full instructions 2.5 mg on Mon, Wed, Fri; 3.75 mg all other days   Next INR check 12/20/2017    Indications   CHF (congestive heart failure) (H) [I50.9]  Long-term (current) use of anticoagulants [Z79.01] [Z79.01]         Your next Anticoagulation Clinic appointment(s)     Dec 20, 2017  9:00 AM CST   Anticoagulation Visit with TARIQ ANTI COAG   [facility]    [address] [place] [address]   [phone]              Contact Numbers     Clinic Number:         October 2017 Details    Sun Mon Tue Wed Thu Fri Sat     1               2               3               4               5               6               7                 8               9               10               11               12               13               14                 15               16               17               18               19               20               21                 22               23               24               25      2.5 mg   See details      26      3.75 mg         27      2.5 mg         28      3.75 mg           29      3.75 mg         30      2.5 mg         31      3.75 mg              Date Details   10/25 This INR check               How to take your warfarin dose     To take:  2.5 mg Take 1 of the 2.5 mg tablets.    To take:  3.75 mg Take 1.5 of the 2.5 mg tablets.           November 2017 Details    Sun Mon Tue Wed Thu Fri Sat        1      2.5 mg         2      3.75 mg         3      2.5 mg         4      3.75 mg           5      3.75 mg         6      2.5 mg         7      3.75 mg         8      2.5 mg         9      3.75 mg         10      2.5 mg         11       3.75 mg           12      3.75 mg         13      2.5 mg         14      3.75 mg         15      2.5 mg         16      3.75 mg         17      2.5 mg         18      3.75 mg           19      3.75 mg         20      2.5 mg         21      3.75 mg         22      2.5 mg         23      3.75 mg         24      2.5 mg         25      3.75 mg           26      3.75 mg         27      2.5 mg         28      3.75 mg         29      2.5 mg         30      3.75 mg            Date Details   No additional details            How to take your warfarin dose     To take:  2.5 mg Take 1 of the 2.5 mg tablets.    To take:  3.75 mg Take 1.5 of the 2.5 mg tablets.           December 2017 Details    Sun Mon Tue Wed Thu Fri Sat          1      2.5 mg         2      3.75 mg           3      3.75 mg         4      2.5 mg         5      3.75 mg         6      2.5 mg         7      3.75 mg         8      2.5 mg         9      3.75 mg           10      3.75 mg         11      2.5 mg         12      3.75 mg         13      2.5 mg         14      3.75 mg         15      2.5 mg         16      3.75 mg           17      3.75 mg         18      2.5 mg         19      3.75 mg         20            21               22               23                 24               25               26               27               28               29               30                 31                      Date Details   No additional details    Date of next INR:  12/20/2017         How to take your warfarin dose     To take:  2.5 mg Take 1 of the 2.5 mg tablets.    To take:  3.75 mg Take 1.5 of the 2.5 mg tablets.

## 2017-11-17 DIAGNOSIS — I48.91 ATRIAL FIBRILLATION (H): ICD-10-CM

## 2017-11-17 RX ORDER — WARFARIN SODIUM 2.5 MG/1
TABLET ORAL
Qty: 110 TABLET | Refills: 3 | Status: SHIPPED | OUTPATIENT
Start: 2017-11-17 | End: 2018-06-06

## 2017-12-07 DIAGNOSIS — C61 MALIGNANT NEOPLASM OF PROSTATE (H): Primary | ICD-10-CM

## 2017-12-07 LAB
ALBUMIN UR-MCNC: NEGATIVE MG/DL
APPEARANCE UR: CLEAR
BILIRUB UR QL STRIP: NEGATIVE
COLOR UR AUTO: YELLOW
GLUCOSE UR STRIP-MCNC: NEGATIVE MG/DL
HGB UR QL STRIP: ABNORMAL
KETONES UR STRIP-MCNC: NEGATIVE MG/DL
LEUKOCYTE ESTERASE UR QL STRIP: NEGATIVE
MUCOUS THREADS #/AREA URNS LPF: PRESENT /LPF
NITRATE UR QL: NEGATIVE
PH UR STRIP: 5 PH (ref 5–7)
PSA SERPL-MCNC: 0.1 UG/L (ref 0–4)
RBC #/AREA URNS AUTO: 2 /HPF (ref 0–2)
SOURCE: ABNORMAL
SP GR UR STRIP: 1.02 (ref 1–1.03)
UROBILINOGEN UR STRIP-MCNC: 2 MG/DL (ref 0–2)
WBC #/AREA URNS AUTO: 1 /HPF (ref 0–2)

## 2017-12-07 PROCEDURE — 36415 COLL VENOUS BLD VENIPUNCTURE: CPT | Performed by: UROLOGY

## 2017-12-07 PROCEDURE — 84153 ASSAY OF PSA TOTAL: CPT | Performed by: UROLOGY

## 2017-12-07 PROCEDURE — 81001 URINALYSIS AUTO W/SCOPE: CPT | Performed by: UROLOGY

## 2017-12-13 ENCOUNTER — ALLIED HEALTH/NURSE VISIT (OUTPATIENT)
Dept: CARDIOLOGY | Facility: CLINIC | Age: 82
End: 2017-12-13
Payer: MEDICARE

## 2017-12-13 DIAGNOSIS — Z95.0 CARDIAC PACEMAKER IN SITU: Primary | ICD-10-CM

## 2017-12-13 PROCEDURE — 93296 REM INTERROG EVL PM/IDS: CPT | Performed by: INTERNAL MEDICINE

## 2017-12-13 PROCEDURE — 93294 REM INTERROG EVL PM/LDLS PM: CPT | Performed by: INTERNAL MEDICINE

## 2017-12-13 NOTE — MR AVS SNAPSHOT
After Visit Summary   12/13/2017    Miguel Patten    MRN: [id number]           Patient Information     Date Of Birth          12/16/1932        Visit Information        Provider Department      12/13/2017 1:30 PM BENNETT TECH1 [facility]        Today's Diagnoses     Cardiac pacemaker in situ    -  1       Follow-ups after your visit        Your next 10 appointments already scheduled     Dec 13, 2017  1:30 PM CST   Remote PPM Check with BENNETT TECH1   [facility] ([facility])    [id number]  [facility] [address] [phone]           This appointment is for a remote check of your pacemaker.  This is not an appointment at the office.            Dec 20, 2017  9:00 AM CST   Anticoagulation Visit with PH ANTI COAG   [facility]    [address] [address] [phone]            Mar 22, 2018  2:30 PM CDT   Remote PPM Check with BENNETT TECH1   [facility] ([facility])    [address]  [facility] [address] [phone]           This appointment is for a remote check of your pacemaker.  This is not an appointment at the office.              Who to contact     If you have questions or need follow up information about today's clinic visit or your schedule please contact [facility] directly at [phone].  Normal or non-critical lab and imaging results will be communicated to you by MyChart, letter or phone within 4 business days after the clinic has received the results. If you do not hear from us within 7 days, please contact the clinic through MyChart or phone. If you have a critical or abnormal lab result, we will notify you by phone as soon as possible.  Submit refill requests through [employer] or call your pharmacy and they will forward the  "refill request to us. Please allow 3 business days for your refill to be completed.          Additional Information About Your Visit        MyChart Information     [employer] lets you send messages to your doctor, view your test results, renew your prescriptions, schedule appointments and more. To sign up, go to [email or web address] . Click on \"Log in\" on the left side of the screen, which will take you to the Welcome page. Then click on \"Sign up Now\" on the right side of the page.     You will be asked to enter the access code listed below, as well as some personal information. Please follow the directions to create your username and password.     Your access code is: [email or web address]  Expires: 3/13/2018  8:24 AM     Your access code will  in 90 days. If you need help or a new code, please call your [place] clinic or [phone].        Care EveryWhere ID     This is your Care EveryWhere ID. This could be used by other organizations to access your [place] medical records  [id number]         Blood Pressure from Last 3 Encounters:   17 138/60   17 128/62   17 130/70    Weight from Last 3 Encounters:   17 83 kg (183 lb)   17 82.7 kg (182 lb 6.4 oz)   17 84.4 kg (186 lb)              We Performed the Following     INTERROGATION DEVICE EVAL REMOTE, PACER/ICD (18519)     PM DEVICE INTERROGATE REMOTE (11700)        Primary Care Provider Office Phone # Fax #    Mbfsaiux Celestine Gonzales [phone] [address] [place] [address]        Equal Access to Services     [facility] : Hadii lio lambert hadasho Soomaali, waaxda luqadaha, qaybta kaalmada adeegyada, glen osorio. So [facility] [phone].    ATENCIÓN: Si habla español, tiene a walker disposición servicios gratuitos de asistencia lingüística. Llame al [phone].    We comply with applicable federal civil rights laws and [place] laws. We do not discriminate on the basis of " race, color, national origin, age, disability, sex, sexual orientation, or gender identity.            Thank you!     Thank you for choosing [facility]  for your care. Our goal is always to provide you with excellent care. Hearing back from our patients is one way we can continue to improve our services. Please take a few minutes to complete the written survey that you may receive in the mail after your visit with us. Thank you!             Your Updated Medication List - Protect others around you: Learn how to safely use, store and throw away your medicines at www.disposemymeds.org.          This list is accurate as of: 12/13/17  8:24 AM.  Always use your most recent med list.                   Brand Name Dispense Instructions for use Diagnosis    acetaminophen 325 MG tablet    TYLENOL    100 tablet    Take 2 tablets (650 mg) by mouth every 4 hours as needed for mild pain or fever        albuterol 108 (90 BASE) MCG/ACT Inhaler    PROAIR HFA/PROVENTIL HFA/VENTOLIN HFA    1 Inhaler    Inhale 2 puffs into the lungs every 6 hours as needed for shortness of breath / dyspnea or wheezing    Mild intermittent asthma, uncomplicated       bicalutamide 50 MG tablet    CASODEX     Take 1 tablet (50 mg) by mouth daily        FLOVENT HFA 44 MCG/ACT Inhaler   Generic drug:  fluticasone     21.2 g    INHALE ONE PUFF BY MOUTH TWICE A DAY    Mild intermittent asthma without complication       ipratropium - albuterol 0.5 mg/2.5 mg/3 mL 0.5-2.5 (3) MG/3ML neb solution    DUONEB    60 vial    Take 3 mLs by nebulization every 6 hours as needed for shortness of breath / dyspnea.    Moderate persistent asthma       * JANTOVEN 2.5 MG tablet   Generic drug:  warfarin     90 tablet    HOLD THIS MEDICATION on 4/21, 4/22, and 4/23.  Please follow up with coumadin clinic on Monday 4/24 as scheduled and then take as scheduled by coumadin clinic.    Chronic atrial fibrillation (H), Long-term (current) use of  anticoagulants       * warfarin 2.5 MG tablet    JANTOVEN    110 tablet    Take 2.5 mg on Monday, Wednesday, Friday and 3.75 mg all other days, or as directed by the coumadin clinic.    Atrial fibrillation (H)       losartan 50 MG tablet    COZAAR    180 tablet    TAKE ONE TABLET BY MOUTH TWICE A DAY    S/P AVR (aortic valve replacement)       LUPRON DEPOT (3-MONTH) 22.5 MG kit   Generic drug:  leuprolide     1 each    Inject 22.5 MG, IM q 4 months per Dr. Zeyad Guevara, pt's Urologist in [place]. 4/6/2017    Malignant neoplasm of prostate (H)       predniSONE 20 MG tablet    DELTASONE    10 tablet    TAKE ONE TABLET BY MOUTH EVERY DAY AS NEEDED    Mild intermittent asthma, uncomplicated       senna-docusate 8.6-50 MG per tablet    SENOKOT-S;PERICOLACE    30 tablet    Take 1 tablet by mouth At Bedtime    Ruptured appendicitis       simvastatin 40 MG tablet    ZOCOR    90 tablet    TAKE ONE TABLET BY MOUTH EVERY NIGHT AT BEDTIME    S/P AVR (aortic valve replacement)       * Notice:  This list has 2 medication(s) that are the same as other medications prescribed for you. Read the directions carefully, and ask your doctor or other care provider to review them with you.

## 2017-12-13 NOTE — PROGRESS NOTES
[employer] CRT-P Remote PPM Device Check  BIVP: 100 %, Chronic Afib, taking Warfarin  Mode: VVIR        Presenting Rhythm: BIVP  Heart Rate: Adequate rates per histogram  Sensing: Stable    Pacing Threshold: Stable    Impedance: Stable  Battery Status: 7.5 years  Atrial Arrhythmia: N/A  Ventricular Arrhythmia: 4 ventricular high rates. 2 EGMs for review show slightly irregular VS events lasting 4-11 beats, rates 165-230bpm. EF 55-60% (2017). Reviewed with KARI Tyler.        Care Plan: F/u PPM Latitude q 3 months. Gave patient results over the phone. Dedra,CVT

## 2017-12-20 ENCOUNTER — TELEPHONE (OUTPATIENT)
Dept: ANTICOAGULATION | Facility: CLINIC | Age: 82
End: 2017-12-20

## 2017-12-20 ENCOUNTER — ANTICOAGULATION THERAPY VISIT (OUTPATIENT)
Dept: ANTICOAGULATION | Facility: CLINIC | Age: 82
End: 2017-12-20
Payer: MEDICARE

## 2017-12-20 ENCOUNTER — ALLIED HEALTH/NURSE VISIT (OUTPATIENT)
Dept: FAMILY MEDICINE | Facility: CLINIC | Age: 82
End: 2017-12-20
Payer: MEDICARE

## 2017-12-20 DIAGNOSIS — I50.33 ACUTE ON CHRONIC DIASTOLIC CONGESTIVE HEART FAILURE (H): ICD-10-CM

## 2017-12-20 DIAGNOSIS — Z79.01 LONG-TERM (CURRENT) USE OF ANTICOAGULANTS: ICD-10-CM

## 2017-12-20 DIAGNOSIS — C61 MALIGNANT NEOPLASM OF PROSTATE (H): Primary | ICD-10-CM

## 2017-12-20 LAB — INR POINT OF CARE: 3.2 (ref 0.86–1.14)

## 2017-12-20 PROCEDURE — 36416 COLLJ CAPILLARY BLOOD SPEC: CPT

## 2017-12-20 PROCEDURE — 99207 ZZC NO CHARGE NURSE ONLY: CPT

## 2017-12-20 PROCEDURE — 85610 PROTHROMBIN TIME: CPT | Mod: QW

## 2017-12-20 PROCEDURE — 96372 THER/PROPH/DIAG INJ SC/IM: CPT

## 2017-12-20 NOTE — TELEPHONE ENCOUNTER
Pt came in on 12/7 for his Lupron shot but it was not available at the time. Miguel said the float nurse would let him know when they got more in but pt has not heard from anyone. He is wondering if they are back in. If not, is there an alternative shot he could him in place of it  Ashly Jones RN

## 2017-12-20 NOTE — MR AVS SNAPSHOT
Miguel ANAND Sandor   12/20/2017 9:00 AM   Anticoagulation Therapy Visit    Description:  85 year old male   Provider:  TARIQ ANTI COAG   Department:  [facility]           INR as of 12/20/2017     Today's INR 3.2!      Anticoagulation Summary as of 12/20/2017     INR goal 2.0-3.0   Today's INR 3.2!   Full instructions 12/21: 2.5 mg; Otherwise 2.5 mg on Mon, Wed, Fri; 3.75 mg all other days   Next INR check 2/14/2018    Indications   CHF (congestive heart failure) (H) [I50.9]  Long-term (current) use of anticoagulants [Z79.01] [Z79.01]         Your next Anticoagulation Clinic appointment(s)     Feb 14, 2018  9:00 AM CST   [facility] with TARIQ ANTI COAG   [facility]    [address] [place] [address]   [phone]              Contact Numbers     Clinic Number:         December 2017 Details    Sun Mon Tue Wed Thu Fri Sat          1               2                 3               4               5               6               7               8               9                 10               11               12               13               14               15               16                 17               18               19               20      2.5 mg   See details      21      2.5 mg         22      2.5 mg         23      3.75 mg           24      3.75 mg         25      2.5 mg         26      3.75 mg         27      2.5 mg         28      3.75 mg         29      2.5 mg         30      3.75 mg           31      3.75 mg                Date Details   12/20 This INR check               How to take your warfarin dose     To take:  2.5 mg Take 1 of the 2.5 mg tablets.    To take:  3.75 mg Take 1.5 of the 2.5 mg tablets.           January 2018 Details    Sun Mon Tue Wed Thu Fri Sat      1      2.5 mg         2      3.75 mg         3      2.5 mg         4      3.75 mg         5      2.5 mg         6      3.75 mg           7      3.75 mg         8       2.5 mg         9      3.75 mg         10      2.5 mg         11      3.75 mg         12      2.5 mg         13      3.75 mg           14      3.75 mg         15      2.5 mg         16      3.75 mg         17      2.5 mg         18      3.75 mg         19      2.5 mg         20      3.75 mg           21      3.75 mg         22      2.5 mg         23      3.75 mg         24      2.5 mg         25      3.75 mg         26      2.5 mg         27      3.75 mg           28      3.75 mg         29      2.5 mg         30      3.75 mg         31      2.5 mg             Date Details   No additional details            How to take your warfarin dose     To take:  2.5 mg Take 1 of the 2.5 mg tablets.    To take:  3.75 mg Take 1.5 of the 2.5 mg tablets.           February 2018 Details    Sun Mon Tue Wed Thu Fri Sat         1      3.75 mg         2      2.5 mg         3      3.75 mg           4      3.75 mg         5      2.5 mg         6      3.75 mg         7      2.5 mg         8      3.75 mg         9      2.5 mg         10      3.75 mg           11      3.75 mg         12      2.5 mg         13      3.75 mg         14            15               16               17                 18               19               20               21               22               23               24                 25               26               27               28                   Date Details   No additional details    Date of next INR:  2/14/2018         How to take your warfarin dose     To take:  2.5 mg Take 1 of the 2.5 mg tablets.    To take:  3.75 mg Take 1.5 of the 2.5 mg tablets.

## 2017-12-20 NOTE — MR AVS SNAPSHOT
"              After Visit Summary   12/20/2017    Miguel Patten    MRN: [id number]           Patient Information     Date Of Birth          12/16/1932        Visit Information        Provider Department      12/20/2017 1:30 PM CHERISE ARGUELLES TEAM JOHANNY [facility]        Today's Diagnoses     Malignant neoplasm of prostate (H)    -  1       Follow-ups after your visit        Your next 10 appointments already scheduled     Feb 14, 2018  9:00 AM CST   [facility]agulation [facility] with PH ANTI COAG   [facility]    [address] [address] [phone]            Mar 22, 2018  2:30 PM CDT   Remote PPM Check with BENNETT TECH1   [facility] ([facility])    [address] [id number]  [facility] [id number] [phone]           This appointment is for a remote check of your pacemaker.  This is not an appointment at the office.              Who to contact     If you have questions or need follow up information about today's clinic visit or your schedule please contact [facility] directly at [phone].  Normal or non-critical lab and imaging results will be communicated to you by [employer], letter or phone within 4 business days after the clinic has received the results. If you do not hear from us within 7 days, please contact the clinic through [employer] or phone. If you have a critical or abnormal lab result, we will notify you by phone as soon as possible.  Submit refill requests through [employer] or call your pharmacy and they will forward the refill request to us. Please allow 3 business days for your refill to be completed.          Additional Information About Your Visit        MyChart Information     [employer] lets you send messages to your doctor, view your test results, renew your prescriptions, schedule appointments and more. To sign up, go to [email or web address] . Click on \"Log " "in\" on the left side of the screen, which will take you to the Welcome page. Then click on \"Sign up Now\" on the right side of the page.     You will be asked to enter the access code listed below, as well as some personal information. Please follow the directions to create your username and password.     Your access code is: [email or web address]  Expires: 3/13/2018  8:24 AM     Your access code will  in 90 days. If you need help or a new code, please call your [place] clinic or [phone].        Care EveryWhere ID     This is your Care EveryWhere ID. This could be used by other organizations to access your [place] medical records  [id number]         Blood Pressure from Last 3 Encounters:   17 138/60   17 128/62   17 130/70    Weight from Last 3 Encounters:   17 183 lb (83 kg)   17 182 lb 6.4 oz (82.7 kg)   17 186 lb (84.4 kg)              We Performed the Following     INJECTION INTRAMUSCULAR OR SUB-Q     LEUPROLIDE ACETATE INJECITON        Primary Care Provider Office Phone # Fax #    Vkzvubit Celestine Gonzales [phone] [address] [place] [address]        Equal Access to Services     [facility] : Hadii aad ku hadasho Soomaali, waaxda luqadaha, qaybta kaalmada adeegyada, waxay lul haymansoor loving . So [facility] [phone].    ATENCIÓN: Si habla español, tiene a walker disposición servicios gratuitos de asistencia lingüística. Llame al [phone].    We comply with applicable federal civil rights laws and [place] laws. We do not discriminate on the basis of race, color, national origin, age, disability, sex, sexual orientation, or gender identity.            Thank you!     Thank you for choosing [facility]  for your care. Our goal is always to provide you with excellent care. Hearing back from our patients is one way we can continue to improve our services. Please take a few minutes to complete the written survey " that you may receive in the mail after your visit with us. Thank you!             Your Updated Medication List - Protect others around you: Learn how to safely use, store and throw away your medicines at www.disposemymeds.org.          This list is accurate as of: 12/20/17  1:50 PM.  Always use your most recent med list.                   Brand Name Dispense Instructions for use Diagnosis    acetaminophen 325 MG tablet    TYLENOL    100 tablet    Take 2 tablets (650 mg) by mouth every 4 hours as needed for mild pain or fever        albuterol 108 (90 BASE) MCG/ACT Inhaler    PROAIR HFA/PROVENTIL HFA/VENTOLIN HFA    1 Inhaler    Inhale 2 puffs into the lungs every 6 hours as needed for shortness of breath / dyspnea or wheezing    Mild intermittent asthma, uncomplicated       bicalutamide 50 MG tablet    CASODEX     Take 1 tablet (50 mg) by mouth daily        FLOVENT HFA 44 MCG/ACT Inhaler   Generic drug:  fluticasone     21.2 g    INHALE ONE PUFF BY MOUTH TWICE A DAY    Mild intermittent asthma without complication       ipratropium - albuterol 0.5 mg/2.5 mg/3 mL 0.5-2.5 (3) MG/3ML neb solution    DUONEB    60 vial    Take 3 mLs by nebulization every 6 hours as needed for shortness of breath / dyspnea.    Moderate persistent asthma       * JANTOVEN 2.5 MG tablet   Generic drug:  warfarin     90 tablet    HOLD THIS MEDICATION on 4/21, 4/22, and 4/23.  Please follow up with coumadin clinic on Monday 4/24 as scheduled and then take as scheduled by coumadin clinic.    Chronic atrial fibrillation (H), Long-term (current) use of anticoagulants       * warfarin 2.5 MG tablet    JANTOVEN    110 tablet    Take 2.5 mg on Monday, Wednesday, Friday and 3.75 mg all other days, or as directed by the coumadin clinic.    Atrial fibrillation (H)       losartan 50 MG tablet    COZAAR    180 tablet    TAKE ONE TABLET BY MOUTH TWICE A DAY    S/P AVR (aortic valve replacement)       LUPRON DEPOT (3-MONTH) 22.5 MG kit   Generic drug:   leuprolide     1 each    Inject 22.5 MG, IM q 4 months per Dr. Zeyad Guevara, pt's Urologist in [place]. 4/6/2017    Malignant neoplasm of prostate (H)       predniSONE 20 MG tablet    DELTASONE    10 tablet    TAKE ONE TABLET BY MOUTH EVERY DAY AS NEEDED    Mild intermittent asthma, uncomplicated       senna-docusate 8.6-50 MG per tablet    SENOKOT-S;PERICOLACE    30 tablet    Take 1 tablet by mouth At Bedtime    Ruptured appendicitis       simvastatin 40 MG tablet    ZOCOR    90 tablet    TAKE ONE TABLET BY MOUTH EVERY NIGHT AT BEDTIME    S/P AVR (aortic valve replacement)       * Notice:  This list has 2 medication(s) that are the same as other medications prescribed for you. Read the directions carefully, and ask your doctor or other care provider to review them with you.

## 2017-12-20 NOTE — NURSING NOTE
Prior to injection verified patient identity using patient's name and date of birth.  Patient / parent instructed to wait 15 minutes in waiting room after receiving injection and if feeling fine may leave otherwise to inform us immediately/Maddie Brenner)

## 2017-12-20 NOTE — TELEPHONE ENCOUNTER
I called The float nurse in [place] and she stated the medication is there and he can schedule a float nurse appointment.  I called the patient and informed him to schedule the float nurse appointment.

## 2017-12-20 NOTE — PROGRESS NOTES
ANTICOAGULATION FOLLOW-UP CLINIC VISIT    Patient Name:  Miguel Patten  Date:  12/20/2017  Contact Type:  Face to Face    SUBJECTIVE:     Patient Findings     Positives No Problem Findings           OBJECTIVE    INR Protime   Date Value Ref Range Status   12/20/2017 3.2 (A) 0.86 - 1.14 Final       ASSESSMENT / PLAN  INR assessment THER    Recheck INR In: 8 WEEKS    INR Location Clinic      Anticoagulation Summary as of 12/20/2017     INR goal 2.0-3.0   Today's INR 3.2!   Maintenance plan 2.5 mg (2.5 mg x 1) on Mon, Wed, Fri; 3.75 mg (2.5 mg x 1.5) all other days   Full instructions 12/21: 2.5 mg; Otherwise 2.5 mg on Mon, Wed, Fri; 3.75 mg all other days   Weekly total 22.5 mg   Plan last modified Ashly Jones RN (6/21/2017)   Next INR check 2/14/2018   Target end date     Indications   CHF (congestive heart failure) (H) [I50.9]  Long-term (current) use of anticoagulants [Z79.01] [Z79.01]         Anticoagulation Episode Summary     INR check location     Preferred lab     Send INR reminders to [facility]    Comments 2.5 mg tablets      Anticoagulation Care Providers     Provider Role Specialty Phone number    Rober الرعاقي MD [facility] [phone]            See the Encounter Report to view Anticoagulation Flowsheet and Dosing Calendar (Go to Encounters tab in chart review, and find the Anticoagulation Therapy Visit)    Dosage adjustment made based on physician directed care plan.      Ashly Jones, KARI

## 2017-12-20 NOTE — TELEPHONE ENCOUNTER
Is there a Lupron shortage?  Is it available?  Can he schedule an injection?    It seems that I don't really know anything today.      Please let me know the details of the situation.    Christian

## 2018-02-14 ENCOUNTER — ANTICOAGULATION THERAPY VISIT (OUTPATIENT)
Dept: ANTICOAGULATION | Facility: CLINIC | Age: 83
End: 2018-02-14
Payer: MEDICARE

## 2018-02-14 DIAGNOSIS — I50.33 ACUTE ON CHRONIC DIASTOLIC CONGESTIVE HEART FAILURE (H): ICD-10-CM

## 2018-02-14 DIAGNOSIS — Z79.01 LONG-TERM (CURRENT) USE OF ANTICOAGULANTS: ICD-10-CM

## 2018-02-14 LAB — INR POINT OF CARE: 2.4 (ref 0.86–1.14)

## 2018-02-14 PROCEDURE — 36416 COLLJ CAPILLARY BLOOD SPEC: CPT

## 2018-02-14 PROCEDURE — 85610 PROTHROMBIN TIME: CPT | Mod: QW

## 2018-02-14 PROCEDURE — 99207 ZZC NO CHARGE NURSE ONLY: CPT

## 2018-02-14 NOTE — PROGRESS NOTES
ANTICOAGULATION FOLLOW-UP CLINIC VISIT    Patient Name:  Miguel Patten  Date:  2/14/2018  Contact Type:  Face to Face    SUBJECTIVE:     Patient Findings     Positives No Problem Findings           OBJECTIVE    INR Protime   Date Value Ref Range Status   02/14/2018 2.4 (A) 0.86 - 1.14 Final       ASSESSMENT / PLAN  INR assessment THER    Recheck INR In: 8 WEEKS    INR Location Clinic      Anticoagulation Summary as of 2/14/2018     INR goal 2.0-3.0   Today's INR 2.4   Maintenance plan 2.5 mg (2.5 mg x 1) on Mon, Wed, Fri; 3.75 mg (2.5 mg x 1.5) all other days   Full instructions 2.5 mg on Mon, Wed, Fri; 3.75 mg all other days   Weekly total 22.5 mg   No change documented Ashly Jones RN   Plan last modified Ashly Jones RN (6/21/2017)   Next INR check 4/11/2018   Target end date     Indications   CHF (congestive heart failure) (H) [I50.9]  Long-term (current) use of anticoagulants [Z79.01] [Z79.01]         Anticoagulation Episode Summary     INR check location     Preferred lab     Send INR reminders to [facility]    Comments 2.5 mg tablets      Anticoagulation Care Providers     Provider Role Specialty Phone number    Rober العراقي MD [facility] [phone]            See the Encounter Report to view Anticoagulation Flowsheet and Dosing Calendar (Go to Encounters tab in chart review, and find the Anticoagulation Therapy Visit)    Dosage adjustment made based on physician directed care plan..    Ashly Jones RN

## 2018-02-14 NOTE — MR AVS SNAPSHOT
Miguel ANAND Sandor   2/14/2018 3:00 PM   Anticoagulation Therapy Visit    Description:  85 year old male   Provider:  [facility]   Department:  [facility]           INR as of 2/14/2018     Today's INR 2.4      Anticoagulation Summary as of 2/14/2018     INR goal 2.0-3.0   Today's INR 2.4   Full instructions 2.5 mg on Mon, Wed, Fri; 3.75 mg all other days   Next INR check 4/11/2018    Indications   CHF (congestive heart failure) (H) [I50.9]  Long-term (current) use of anticoagulants [Z79.01] [Z79.01]         Your next Anticoagulation Clinic appointment(s)     Apr 11, 2018  9:00 AM CDT   Anticoagulation Visit with [facility] ANTI COAHARMONY   [facility]    [address] [place] [id number]   [phone]              Contact Numbers     Clinic Number:         February 2018 Details    Sun Mon Tue Wed Thu Fri Sat         1               2               3                 4               5               6               7               8               9               10                 11               12               13               14      2.5 mg   See details      15      3.75 mg         16      2.5 mg         17      3.75 mg           18      3.75 mg         19      2.5 mg         20      3.75 mg         21      2.5 mg         22      3.75 mg         23      2.5 mg         24      3.75 mg           25      3.75 mg         26      2.5 mg         27      3.75 mg         28      2.5 mg             Date Details   02/14 This INR check               How to take your warfarin dose     To take:  2.5 mg Take 1 of the 2.5 mg tablets.    To take:  3.75 mg Take 1.5 of the 2.5 mg tablets.           March 2018 Details    Sun Mon Tue Wed Thu Fri Sat         1      3.75 mg         2      2.5 mg         3      3.75 mg           4      3.75 mg         5      2.5 mg         6      3.75 mg         7      2.5 mg         8      3.75 mg         9      2.5 mg         10      3.75 mg            11      3.75 mg         12      2.5 mg         13      3.75 mg         14      2.5 mg         15      3.75 mg         16      2.5 mg         17      3.75 mg           18      3.75 mg         19      2.5 mg         20      3.75 mg         21      2.5 mg         22      3.75 mg         23      2.5 mg         24      3.75 mg           25      3.75 mg         26      2.5 mg         27      3.75 mg         28      2.5 mg         29      3.75 mg         30      2.5 mg         31      3.75 mg          Date Details   No additional details            How to take your warfarin dose     To take:  2.5 mg Take 1 of the 2.5 mg tablets.    To take:  3.75 mg Take 1.5 of the 2.5 mg tablets.           April 2018 Details    Sun Mon Tue Wed Thu Fri Sat     1      3.75 mg         2      2.5 mg         3      3.75 mg         4      2.5 mg         5      3.75 mg         6      2.5 mg         7      3.75 mg           8      3.75 mg         9      2.5 mg         10      3.75 mg         11            12               13               14                 15               16               17               18               19               20               21                 22               23               24               25               26               27               28                 29               30                     Date Details   No additional details    Date of next INR:  4/11/2018         How to take your warfarin dose     To take:  2.5 mg Take 1 of the 2.5 mg tablets.    To take:  3.75 mg Take 1.5 of the 2.5 mg tablets.

## 2018-03-14 DIAGNOSIS — J45.20 MILD INTERMITTENT ASTHMA WITHOUT COMPLICATION: ICD-10-CM

## 2018-03-14 NOTE — TELEPHONE ENCOUNTER
"Requested Prescriptions   Pending Prescriptions Disp Refills     FLOVENT HFA 44 MCG/ACT Inhaler [Pharmacy Med Name: FLOVENT HFA 44MCG/ACT AERO] 21.2 g 1     Sig: INHALE ONE PUFF BY MOUTH TWICE A DAY. PLEASE SCHEDULE APPOINTMENT.    Inhaled Steroids Protocol Failed    3/14/2018 12:22 PM       Failed - Asthma control assessment score within normal limits in last 6 months    Please review ACT score.          Failed - Recent (6 mo) or future (30 days) visit within the authorizing provider's specialty    Patient had office visit in the last 6 months or has a visit in the next 30 days with authorizing provider or within the authorizing provider's specialty.  See \"Patient Info\" tab in inbasket, or \"Choose Columns\" in Meds & Orders section of the refill encounter.           Passed - Patient is age 12 or older          Last Written Prescription Date:  7/13/17  Last Fill Quantity: 21.2 g,  # refills: 1   Last Office Visit with [facility] or [facility] prescribing provider:  6-12-17   Future Office Visit:       "

## 2018-03-19 RX ORDER — FLUTICASONE PROPIONATE 44 UG/1
1 AEROSOL, METERED RESPIRATORY (INHALATION) 2 TIMES DAILY
Qty: 1 INHALER | Refills: 0 | Status: SHIPPED | OUTPATIENT
Start: 2018-03-19 | End: 2018-05-19

## 2018-03-19 NOTE — TELEPHONE ENCOUNTER
ACT Total Scores 9/27/2016 4/19/2017 5/2/2017   ACT TOTAL SCORE - - -   ASTHMA ER VISITS - - -   ASTHMA HOSPITALIZATIONS - - -   ACT TOTAL SCORE (Goal Greater than or Equal to 20) 24 25 5   In the past 12 months, how many times did you visit the emergency room for your asthma without being admitted to the hospital? 0 0 -   In the past 12 months, how many times were you hospitalized overnight because of your asthma? 0 0 0     Routing refill request to provider for review/approval because:  Labs out of range:  ACT  Labs not current:  ACT  T'd up 1 month for provider review.    Will forward to schedulers to schedule patient for OV.  Lillie Bryant RN

## 2018-03-22 ENCOUNTER — ALLIED HEALTH/NURSE VISIT (OUTPATIENT)
Dept: CARDIOLOGY | Facility: CLINIC | Age: 83
End: 2018-03-22
Payer: MEDICARE

## 2018-03-22 DIAGNOSIS — Z95.0 CARDIAC PACEMAKER IN SITU: Primary | ICD-10-CM

## 2018-03-22 PROCEDURE — 93294 REM INTERROG EVL PM/LDLS PM: CPT | Performed by: INTERNAL MEDICINE

## 2018-03-22 PROCEDURE — 93296 REM INTERROG EVL PM/IDS: CPT | Performed by: INTERNAL MEDICINE

## 2018-03-22 NOTE — PROGRESS NOTES
[employer] CRT-P Remote PPM Device Check  BIVP: 99 %, Chronic Afib, taking Warfarin  Mode: VVIR        Presenting Rhythm: BIVP  Heart Rate: Adequate rates per histogram  Sensing: Stable    Pacing Threshold: Stable    Impedance: Stable  Battery Status: 7.5 years  Atrial Arrhythmia: N/A  Ventricular Arrhythmia: 7 ventricular high rates. 2 EGMs for review show VS events lasting 5-7 beats, rates 140-195bpm. EF 55-60% (2017). Reviewed with SLangenbrunner,RN.         Care Plan: F/u annual threshold in [place] in 3 months, order placed. Phone line busy, sent letter with results.  MÓNICA ColmenaresT

## 2018-03-22 NOTE — MR AVS SNAPSHOT
After Visit Summary   3/22/2018    Miguel Patten    MRN: [id number]           Patient Information     Date Of Birth          12/16/1932        Visit Information        Provider Department      3/22/2018 2:30 PM SABRINA TAFOYA [facility]   [place]        Today's Diagnoses     Cardiac pacemaker in situ    -  1       Follow-ups after your visit        Additional Services     Follow-Up with Device Clinic                 Your next 10 appointments already scheduled     Mar 22, 2018  2:30 PM CDT   Remote PPM Check with SABRINA TAFOYA   [facility]   [place] ([facility])    [address] [place] [address] [address]  [facility] [id number] [phone] OPT 2           This appointment is for a remote check of your pacemaker.  This is not an appointment at the office.            Apr 11, 2018  9:00 AM CDT   Anticoagulation Visit with PH ANTI COAG   [facility]    [address] [place] [id number] [phone]            Apr 12, 2018  8:20 AM CDT   Office Visit with Vladimir Gonzales,    [facility]    [address] [place] [id number] [phone]           Bring a current list of meds and any records pertaining to this visit. For Physicals, please bring immunization records and any forms needing to be filled out. Please arrive 10 minutes early to complete paperwork.              Future tests that were ordered for you today     Open Future Orders        Priority Expected Expires Ordered    Follow-Up with Device Clinic Routine 6/22/2018 3/22/2019 3/22/2018            Who to contact     If you have questions or need follow up information about today's clinic visit or your schedule please contact [facility]   [place] directly at [phone].  Normal or non-critical lab and imaging results will be communicated to you by  "MyChart, letter or phone within 4 business days after the clinic has received the results. If you do not hear from us within 7 days, please contact the clinic through [employer] or phone. If you have a critical or abnormal lab result, we will notify you by phone as soon as possible.  Submit refill requests through [employer] or call your pharmacy and they will forward the refill request to us. Please allow 3 business days for your refill to be completed.          Additional Information About Your Visit        [employer] Information     [employer] lets you send messages to your doctor, view your test results, renew your prescriptions, schedule appointments and more. To sign up, go to [email or web address]/[employer] . Click on \"Log in\" on the left side of the screen, which will take you to the Welcome page. Then click on \"Sign up Now\" on the right side of the page.     You will be asked to enter the access code listed below, as well as some personal information. Please follow the directions to create your username and password.     Your access code is: [email or web address]  Expires: 2018 10:49 AM     Your access code will  in 90 days. If you need help or a new code, please call your [facility] clinic or [phone].        Care EveryWhere ID     This is your Care EveryWhere ID. This could be used by other organizations to access your [facility] medical records  [id number]         Blood Pressure from Last 3 Encounters:   17 138/60   17 128/62   17 130/70    Weight from Last 3 Encounters:   17 83 kg (183 lb)   17 82.7 kg (182 lb 6.4 oz)   17 84.4 kg (186 lb)              We Performed the Following     INTERROGATION DEVICE EVAL REMOTE, PACER/ICD (55764)     PM DEVICE INTERROGATE REMOTE (11630)        Primary Care Provider Office Phone # Fax #    Vladimir MaldonadoDO andrew [phone] [address] [place] [address]        Equal Access to Services     RODOLFO MURPHY AH: Hadbecky aad " petra Damon, waallyda luemmanuelleadaha, qagonsalota kadwayne barksdale, waxsridhar darinelin hayaajonathan nowakchyna purarodney la'corkyjonathan jo. So [facility] [phone].    ATENCIÓN: Si habla español, tiene a walker disposición servicios gratuitos de asistencia lingüística. Katelyn al [phone].    We comply with applicable federal civil rights laws and [place] laws. We do not discriminate on the basis of race, color, national origin, age, disability, sex, sexual orientation, or gender identity.            Thank you!     Thank you for choosing [facility]  for your care. Our goal is always to provide you with excellent care. Hearing back from our patients is one way we can continue to improve our services. Please take a few minutes to complete the written survey that you may receive in the mail after your visit with us. Thank you!             Your Updated Medication List - Protect others around you: Learn how to safely use, store and throw away your medicines at www.disposemymeds.org.          This list is accurate as of 3/22/18 10:49 AM.  Always use your most recent med list.                   Brand Name Dispense Instructions for use Diagnosis    acetaminophen 325 MG tablet    TYLENOL    100 tablet    Take 2 tablets (650 mg) by mouth every 4 hours as needed for mild pain or fever        albuterol 108 (90 BASE) MCG/ACT Inhaler    PROAIR HFA/PROVENTIL HFA/VENTOLIN HFA    1 Inhaler    Inhale 2 puffs into the lungs every 6 hours as needed for shortness of breath / dyspnea or wheezing    Mild intermittent asthma, uncomplicated       bicalutamide 50 MG tablet    CASODEX     Take 1 tablet (50 mg) by mouth daily        fluticasone 44 MCG/ACT Inhaler    FLOVENT HFA    1 Inhaler    Inhale 1 puff into the lungs 2 times daily Appointment needed for additional refills.    Mild intermittent asthma without complication       ipratropium - albuterol 0.5 mg/2.5 mg/3 mL 0.5-2.5 (3) MG/3ML neb solution    DUONEB    60 vial    Take 3 mLs  by nebulization every 6 hours as needed for shortness of breath / dyspnea.    Moderate persistent asthma       * JANTOVEN 2.5 MG tablet   Generic drug:  warfarin     90 tablet    HOLD THIS MEDICATION on 4/21, 4/22, and 4/23.  Please follow up with coumadin clinic on Monday 4/24 as scheduled and then take as scheduled by coumadin clinic.    Chronic atrial fibrillation (H), Long-term (current) use of anticoagulants       * warfarin 2.5 MG tablet    JANTOVEN    110 tablet    Take 2.5 mg on Monday, Wednesday, Friday and 3.75 mg all other days, or as directed by the coumadin clinic.    Atrial fibrillation (H)       losartan 50 MG tablet    COZAAR    180 tablet    TAKE ONE TABLET BY MOUTH TWICE A DAY    S/P AVR (aortic valve replacement)       LUPRON DEPOT (3-MONTH) 22.5 MG kit   Generic drug:  leuprolide     1 each    Inject 22.5 MG, IM q 4 months per Dr. Zeyad Guevara, pt's Urologist in [place]. 4/6/2017    Malignant neoplasm of prostate (H)       predniSONE 20 MG tablet    DELTASONE    10 tablet    TAKE ONE TABLET BY MOUTH EVERY DAY AS NEEDED    Mild intermittent asthma, uncomplicated       senna-docusate 8.6-50 MG per tablet    SENOKOT-S;PERICOLACE    30 tablet    Take 1 tablet by mouth At Bedtime    Ruptured appendicitis       simvastatin 40 MG tablet    ZOCOR    90 tablet    TAKE ONE TABLET BY MOUTH EVERY NIGHT AT BEDTIME    S/P AVR (aortic valve replacement)       * Notice:  This list has 2 medication(s) that are the same as other medications prescribed for you. Read the directions carefully, and ask your doctor or other care provider to review them with you.

## 2018-04-04 ENCOUNTER — TELEPHONE (OUTPATIENT)
Dept: INTERNAL MEDICINE | Facility: OTHER | Age: 83
End: 2018-04-04

## 2018-04-04 NOTE — TELEPHONE ENCOUNTER
Reason for Call: Request for an order or referral:    Order or referral being requested: Lupron inj    Date needed: as soon as possible    Has the patient been seen by the PCP for this problem? YES    Additional comments: Patient has an upcoming appointment with Dr. Gonzales. Is hoping that he can have his Lupron injectoin at the same appt. Just making sure that this is OK and that the orders are current and that we have it in stock. He will not need a call back if it is able to work out.     Phone number Patient can be reached at:  Home number on file [phone] (home)    Best Time:  any    Can we leave a detailed message on this number?  YES    Call taken on 4/4/2018 at 12:33 PM by Aurora Hernandez

## 2018-04-06 NOTE — TELEPHONE ENCOUNTER
Patient has an order from his urologist. He just wants to get the shot at the time of his visit with Dr. Gonzales. He was notified we can do that. Dominique WALLER

## 2018-04-11 ENCOUNTER — ANTICOAGULATION THERAPY VISIT (OUTPATIENT)
Dept: ANTICOAGULATION | Facility: CLINIC | Age: 83
End: 2018-04-11
Payer: MEDICARE

## 2018-04-11 DIAGNOSIS — Z79.01 LONG-TERM (CURRENT) USE OF ANTICOAGULANTS: ICD-10-CM

## 2018-04-11 DIAGNOSIS — I50.33 ACUTE ON CHRONIC DIASTOLIC CONGESTIVE HEART FAILURE (H): ICD-10-CM

## 2018-04-11 LAB — INR POINT OF CARE: 3.3 (ref 0.86–1.14)

## 2018-04-11 PROCEDURE — 85610 PROTHROMBIN TIME: CPT | Mod: QW

## 2018-04-11 PROCEDURE — 99207 ZZC NO CHARGE NURSE ONLY: CPT

## 2018-04-11 PROCEDURE — 36416 COLLJ CAPILLARY BLOOD SPEC: CPT

## 2018-04-11 NOTE — PROGRESS NOTES
ANTICOAGULATION FOLLOW-UP CLINIC VISIT    Patient Name:  Miguel Patten  Date:  4/11/2018  Contact Type:  Face to Face    SUBJECTIVE:     Patient Findings     Positives Unexplained INR or factor level change    Comments Already took dose this AM. He will take 2.5 mg tomorrow, then resume usual dose            OBJECTIVE    INR Protime   Date Value Ref Range Status   04/11/2018 3.3 (A) 0.86 - 1.14 Final       ASSESSMENT / PLAN  INR assessment THER    Recheck INR In: 8 WEEKS    INR Location Clinic      Anticoagulation Summary as of 4/11/2018     INR goal 2.0-3.0   Today's INR 3.3!   Maintenance plan 2.5 mg (2.5 mg x 1) on Mon, Wed, Fri; 3.75 mg (2.5 mg x 1.5) all other days   Full instructions 4/12: 2.5 mg; Otherwise 2.5 mg on Mon, Wed, Fri; 3.75 mg all other days   Weekly total 22.5 mg   Plan last modified Ashly Jones RN (6/21/2017)   Next INR check 6/6/2018   Target end date     Indications   CHF (congestive heart failure) (H) [I50.9]  Long-term (current) use of anticoagulants [Z79.01] [Z79.01]         Anticoagulation Episode Summary     INR check location     Preferred lab     Send INR reminders to [facility] MONI    Comments 2.5 mg tablets, book, takes in AM      Anticoagulation Care Providers     Provider Role Specialty Phone number    [facility]andonRober [facility] [phone]            See the Encounter Report to view Anticoagulation Flowsheet and Dosing Calendar (Go to Encounters tab in chart review, and find the Anticoagulation Therapy Visit)    Dosage adjustment made based on physician directed care plan.      Ashly Jones, RN

## 2018-04-11 NOTE — MR AVS SNAPSHOT
Miguel ANAND Sandor   4/11/2018 9:00 AM   Anticoagulation Therapy Visit    Description:  85 year old male   Provider:  [facility] AN[facility]AG   Department:  [facility]           INR as of 4/11/2018     Today's INR 3.3!      Anticoagulation Summary as of 4/11/2018     INR goal 2.0-3.0   Today's INR 3.3!   Full instructions 4/12: 2.5 mg; Otherwise 2.5 mg on Mon, Wed, Fri; 3.75 mg all other days   Next INR check 6/6/2018    Indications   CHF (congestive heart failure) (H) [I50.9]  Long-term (current) use of anticoagulants [Z79.01] [Z79.01]         Your next Anticoagulation Clinic appointment(s)     Jun 06, 2018  9:00 AM CDT   Anticoagulation Visit with TARIQ ANTI COAG   [facility] ([facility])    [address] [place] [id number]   [phone]              Contact Numbers     Clinic Number:         April 2018 Details    Sun Mon Tue Wed Thu Fri Sat     1               2               3               4               5               6               7                 8               9               10               11      2.5 mg   See details      12      2.5 mg         13      2.5 mg         14      3.75 mg           15      3.75 mg         16      2.5 mg         17      3.75 mg         18      2.5 mg         19      3.75 mg         20      2.5 mg         21      3.75 mg           22      3.75 mg         23      2.5 mg         24      3.75 mg         25      2.5 mg         26      3.75 mg         27      2.5 mg         28      3.75 mg           29      3.75 mg         30      2.5 mg               Date Details   04/11 This INR check               How to take your warfarin dose     To take:  2.5 mg Take 1 of the 2.5 mg tablets.    To take:  3.75 mg Take 1.5 of the 2.5 mg tablets.           May 2018 Details    Sun Mon Tue Wed Thu Fri Sat       1      3.75 mg         2      2.5 mg         3      3.75 mg         4      2.5 mg         5      3.75 mg           6      3.75 mg         7      2.5  mg         8      3.75 mg         9      2.5 mg         10      3.75 mg         11      2.5 mg         12      3.75 mg           13      3.75 mg         14      2.5 mg         15      3.75 mg         16      2.5 mg         17      3.75 mg         18      2.5 mg         19      3.75 mg           20      3.75 mg         21      2.5 mg         22      3.75 mg         23      2.5 mg         24      3.75 mg         25      2.5 mg         26      3.75 mg           27      3.75 mg         28      2.5 mg         29      3.75 mg         30      2.5 mg         31      3.75 mg            Date Details   No additional details            How to take your warfarin dose     To take:  2.5 mg Take 1 of the 2.5 mg tablets.    To take:  3.75 mg Take 1.5 of the 2.5 mg tablets.           June 2018 Details    Sun Mon Tue Wed Thu Fri Sat          1      2.5 mg         2      3.75 mg           3      3.75 mg         4      2.5 mg         5      3.75 mg         6            7               8               9                 10               11               12               13               14               15               16                 17               18               19               20               21               22               23                 24               25               26               27               28               29               30                Date Details   No additional details    Date of next INR:  6/6/2018         How to take your warfarin dose     To take:  2.5 mg Take 1 of the 2.5 mg tablets.    To take:  3.75 mg Take 1.5 of the 2.5 mg tablets.

## 2018-04-12 ENCOUNTER — OFFICE VISIT (OUTPATIENT)
Dept: INTERNAL MEDICINE | Facility: CLINIC | Age: 83
End: 2018-04-12
Payer: MEDICARE

## 2018-04-12 VITALS
HEART RATE: 69 BPM | SYSTOLIC BLOOD PRESSURE: 118 MMHG | DIASTOLIC BLOOD PRESSURE: 70 MMHG | OXYGEN SATURATION: 97 % | TEMPERATURE: 96.3 F | HEIGHT: 72 IN | WEIGHT: 197.6 LBS | BODY MASS INDEX: 26.76 KG/M2

## 2018-04-12 DIAGNOSIS — Z95.2 S/P AVR (AORTIC VALVE REPLACEMENT): ICD-10-CM

## 2018-04-12 DIAGNOSIS — Z23 NEED FOR VACCINATION: ICD-10-CM

## 2018-04-12 DIAGNOSIS — Z79.01 LONG-TERM (CURRENT) USE OF ANTICOAGULANTS: ICD-10-CM

## 2018-04-12 DIAGNOSIS — I10 HYPERTENSION GOAL BP (BLOOD PRESSURE) < 140/90: ICD-10-CM

## 2018-04-12 DIAGNOSIS — I50.33 ACUTE ON CHRONIC DIASTOLIC CONGESTIVE HEART FAILURE (H): ICD-10-CM

## 2018-04-12 DIAGNOSIS — I48.20 CHRONIC ATRIAL FIBRILLATION (H): ICD-10-CM

## 2018-04-12 DIAGNOSIS — J45.20 MILD INTERMITTENT ASTHMA WITHOUT COMPLICATION: ICD-10-CM

## 2018-04-12 DIAGNOSIS — E78.2 MIXED HYPERLIPIDEMIA: ICD-10-CM

## 2018-04-12 DIAGNOSIS — C61 MALIGNANT NEOPLASM OF PROSTATE (H): Primary | ICD-10-CM

## 2018-04-12 LAB
ANION GAP SERPL CALCULATED.3IONS-SCNC: 7 MMOL/L (ref 3–14)
BUN SERPL-MCNC: 16 MG/DL (ref 7–30)
CALCIUM SERPL-MCNC: 8.9 MG/DL (ref 8.5–10.1)
CHLORIDE SERPL-SCNC: 105 MMOL/L (ref 94–109)
CHOLEST SERPL-MCNC: 171 MG/DL
CO2 SERPL-SCNC: 30 MMOL/L (ref 20–32)
CREAT SERPL-MCNC: 0.71 MG/DL (ref 0.66–1.25)
ERYTHROCYTE [DISTWIDTH] IN BLOOD BY AUTOMATED COUNT: 14.7 % (ref 10–15)
GFR SERPL CREATININE-BSD FRML MDRD: >90 ML/MIN/1.7M2
GLUCOSE SERPL-MCNC: 86 MG/DL (ref 70–99)
HCT VFR BLD AUTO: 43.3 % (ref 40–53)
HDLC SERPL-MCNC: 90 MG/DL
HGB BLD-MCNC: 14.4 G/DL (ref 13.3–17.7)
LDLC SERPL CALC-MCNC: 65 MG/DL
MCH RBC QN AUTO: 31.6 PG (ref 26.5–33)
MCHC RBC AUTO-ENTMCNC: 33.3 G/DL (ref 31.5–36.5)
MCV RBC AUTO: 95 FL (ref 78–100)
NONHDLC SERPL-MCNC: 81 MG/DL
PLATELET # BLD AUTO: 130 10E9/L (ref 150–450)
POTASSIUM SERPL-SCNC: 4.1 MMOL/L (ref 3.4–5.3)
RBC # BLD AUTO: 4.55 10E12/L (ref 4.4–5.9)
SODIUM SERPL-SCNC: 142 MMOL/L (ref 133–144)
TRIGL SERPL-MCNC: 80 MG/DL
WBC # BLD AUTO: 6.3 10E9/L (ref 4–11)

## 2018-04-12 PROCEDURE — 80061 LIPID PANEL: CPT | Performed by: INTERNAL MEDICINE

## 2018-04-12 PROCEDURE — 90670 PCV13 VACCINE IM: CPT | Performed by: INTERNAL MEDICINE

## 2018-04-12 PROCEDURE — 85027 COMPLETE CBC AUTOMATED: CPT | Performed by: INTERNAL MEDICINE

## 2018-04-12 PROCEDURE — G0009 ADMIN PNEUMOCOCCAL VACCINE: HCPCS | Performed by: INTERNAL MEDICINE

## 2018-04-12 PROCEDURE — 80048 BASIC METABOLIC PNL TOTAL CA: CPT | Performed by: INTERNAL MEDICINE

## 2018-04-12 PROCEDURE — 36415 COLL VENOUS BLD VENIPUNCTURE: CPT | Performed by: INTERNAL MEDICINE

## 2018-04-12 PROCEDURE — 96372 THER/PROPH/DIAG INJ SC/IM: CPT | Performed by: INTERNAL MEDICINE

## 2018-04-12 PROCEDURE — 99214 OFFICE O/P EST MOD 30 MIN: CPT | Mod: 25 | Performed by: INTERNAL MEDICINE

## 2018-04-12 ASSESSMENT — PAIN SCALES - GENERAL: PAINLEVEL: NO PAIN (0)

## 2018-04-12 NOTE — NURSING NOTE
Chief Complaint   Patient presents with     Asthma       Initial /70 (BP Location: Right arm, Patient Position: Chair, Cuff Size: Adult Regular)  Pulse 69  Temp 96.3  F (35.7  C) (Temporal)  Ht 6' (1.829 m)  Wt 197 lb 9.6 oz (89.6 kg)  SpO2 97%  BMI 26.8 kg/m2 Estimated body mass index is 26.8 kg/(m^2) as calculated from the following:    Height as of this encounter: 6' (1.829 m).    Weight as of this encounter: 197 lb 9.6 oz (89.6 kg).  Medication Reconciliation: complete  Dominique WALLER

## 2018-04-12 NOTE — LETTER
April 13, 2018      Miguel Patten  [address] [address] [address]        Dear ,    We are writing to inform you of your test results.    The chemistry panel was entirely normal including blood sugar and kidney function.   Cholesterol is well controlled with an LDL of 65.   Blood cell count is normal without evidence of anemia or leukemia.   Resulted Orders   Lipid panel reflex to direct LDL Non-fasting   Result Value Ref Range    Cholesterol 171 <200 mg/dL    Triglycerides 80 <150 mg/dL      Comment:      Non Fasting    HDL Cholesterol 90 >39 mg/dL    LDL Cholesterol Calculated 65 <100 mg/dL      Comment:      Desirable:       <100 mg/dl    Non HDL Cholesterol 81 <130 mg/dL   CBC with platelets   Result Value Ref Range    WBC 6.3 4.0 - 11.0 10e9/L    RBC Count 4.55 4.4 - 5.9 10e12/L    Hemoglobin 14.4 13.3 - 17.7 g/dL    Hematocrit 43.3 40.0 - 53.0 %    MCV 95 78 - 100 fl    MCH 31.6 26.5 - 33.0 pg    MCHC 33.3 31.5 - 36.5 g/dL    RDW 14.7 10.0 - 15.0 %    Platelet Count 130 (L) 150 - 450 10e9/L   Basic metabolic panel   Result Value Ref Range    Sodium 142 133 - 144 mmol/L    Potassium 4.1 3.4 - 5.3 mmol/L    Chloride 105 94 - 109 mmol/L    Carbon Dioxide 30 20 - 32 mmol/L    Anion Gap 7 3 - 14 mmol/L    Glucose 86 70 - 99 mg/dL      Comment:      Non Fasting    Urea Nitrogen 16 7 - 30 mg/dL    Creatinine 0.71 0.66 - 1.25 mg/dL    GFR Estimate >90 >60 mL/min/1.7m2      Comment:      Non  GFR Calc    GFR Estimate If Black >90 >60 mL/min/1.7m2      Comment:       GFR Calc    Calcium 8.9 8.5 - 10.1 mg/dL       If you have any questions or concerns, please call the clinic at the number listed above.       Sincerely,        Vladimir Gonzales DO

## 2018-04-12 NOTE — PROGRESS NOTES
SUBJECTIVE:   Miguel Patten is a 85 year old male who presents to clinic today for the following health issues:    Miguel is here for a follow-up of his asthma. He notes that he's been breathing well at this point. He isusing his nebulizers regularly. He's had no complications with medication. He has a concurrent history of hypertension which is been well controlled. He's had a previous aortic valvular plasty is on chronic anticoagulation for this. He notes no bleeding or bruising. His atrial fibrillation continues he has had no syncope or near-syncope. He is watching his diet fairly well. He continues to try to limit the fatty food intake. He's had no signs of congestive heart failure, he denies any significant edema in the lower extremities.. His abdominal pain from the appendicitis is gone. He has no other complaints or pains. He reports things at home are good and he is playing a lot more bingo these days and feels he is in good health and ready for another 20 years.    Asthma Follow-Up    Was ACT completed today?    Yes    ACT Total Scores 4/12/2018   ACT TOTAL SCORE -   ASTHMA ER VISITS -   ASTHMA HOSPITALIZATIONS -   ACT TOTAL SCORE (Goal Greater than or Equal to 20) 25   In the past 12 months, how many times did you visit the emergency room for your asthma without being admitted to the hospital? 0   In the past 12 months, how many times were you hospitalized overnight because of your asthma? 0       Recent asthma triggers that patient is dealing with: None        Amount of exercise or physical activity: None    Problems taking medications regularly: No    Medication side effects: none    Diet: regular (no restrictions)          Problem list and histories reviewed & adjusted, as indicated.  Additional history: as documented    Patient Active Problem List   Diagnosis     Malignant neoplasm of prostate (H)     Health Care Home     Advanced directives, counseling/discussion     Gout     History of total hip  arthroplasty - bilateral     Osteoarthritis of hip - right     Atrial flutter (H)     Aneurysm of ascending aorta (H)     CHF (congestive heart failure) (H)     Hypertension goal BP (blood pressure) < 140/90     Aortic regurgitation     S/P AVR (aortic valve replacement)     S/P ascending aortic replacement     Atrial fibrillation (H)     Long-term (current) use of anticoagulants [Z79.01]     Mild intermittent asthma without complication     Appendicitis with perforation     Chronic systolic congestive heart failure (H)     Mixed hyperlipidemia     Past Surgical History:   Procedure Laterality Date     ARTHROPLASTY HIP  1/21/2013    Procedure: ARTHROPLASTY HIP;  right total hip arthroplasty;  Surgeon: Rober Alves MD;  Location: [facility]     C NONSPECIFIC PROCEDURE      Hernia repair     C NONSPECIFIC PROCEDURE      Prostatectomy/cancer     C TOTAL HIP ARTHROPLASTY  1/21/13    Right     GENITOURINARY SURGERY  2001    Prostatectomy       ORTHOPEDIC SURGERY      Left SONALI     PHACOEMULSIFICATION WITH STANDARD INTRAOCULAR LENS IMPLANT Right 10/6/2016    Procedure: PHACOEMULSIFICATION WITH STANDARD INTRAOCULAR LENS IMPLANT;  Surgeon: Elder Rueda MD;  Location: [facility] OR     PHACOEMULSIFICATION WITH STANDARD INTRAOCULAR LENS IMPLANT Left 10/20/2016    Procedure: PHACOEMULSIFICATION WITH STANDARD INTRAOCULAR LENS IMPLANT;  Surgeon: Elder Rueda MD;  Location: [facility] OR     REPAIR ANEURYSM ASCENDING AORTA  7/17/2014    2. Aortic aneurysm repair with 32 mm Hemashield graft.      REPLACE VALVE AORTIC  7/17/2014    1. Aortic valve replacement with 23 mm [employer] Trifecta tissue valve.      s/p aneurysm repair by Dr. Friedman       s/p avr         Social History   Substance Use Topics     Smoking status: Never Smoker     Smokeless tobacco: Never Used     Alcohol use 0.0 oz/week     0 Standard drinks or equivalent per week      Comment: rarely, once a week or less     Family History   Problem Relation Age of Onset      Asthma Father      Asthma Paternal Grandmother      Asthma Daughter          Current Outpatient Prescriptions   Medication Sig Dispense Refill     BETA BLOCKER NOT PRESCRIBED, INTENTIONAL, Beta Blocker not prescribed intentionally due to Hypotension (SBP < 90)  0     fluticasone (FLOVENT HFA) 44 MCG/ACT Inhaler Inhale 1 puff into the lungs 2 times daily Appointment needed for additional refills. 1 Inhaler 0     warfarin (JANTOVEN) 2.5 MG tablet Take 2.5 mg on Monday, Wednesday, Friday and 3.75 mg all other days, or as directed by the coumadin clinic. 110 tablet 3     simvastatin (ZOCOR) 40 MG tablet TAKE ONE TABLET BY MOUTH EVERY NIGHT AT BEDTIME 90 tablet 3     losartan (COZAAR) 50 MG tablet TAKE ONE TABLET BY MOUTH TWICE A  tablet 3     acetaminophen (TYLENOL) 325 MG tablet Take 2 tablets (650 mg) by mouth every 4 hours as needed for mild pain or fever 100 tablet      warfarin (JANTOVEN) 2.5 MG tablet HOLD THIS MEDICATION on 4/21, 4/22, and 4/23.  Please follow up with coumadin clinic on Monday 4/24 as scheduled and then take as scheduled by coumadin clinic. 90 tablet 3     bicalutamide (CASODEX) 50 MG tablet Take 1 tablet (50 mg) by mouth daily       albuterol (PROAIR HFA, PROVENTIL HFA, VENTOLIN HFA) 108 (90 BASE) MCG/ACT inhaler Inhale 2 puffs into the lungs every 6 hours as needed for shortness of breath / dyspnea or wheezing 1 Inhaler 6     ipratropium - albuterol 0.5 mg/2.5 mg/3 mL (DUONEB) 0.5-2.5 (3) MG/3ML nebulization Take 3 mLs by nebulization every 6 hours as needed for shortness of breath / dyspnea. 60 vial 1     leuprolide (LUPRON DEPOT) 22.5 MG kit Inject 22.5 MG, IM q 4 months per Dr. Zeyad Guevara, pt's Urologist in [place]. 4/6/2017 1 each 3     Allergies   Allergen Reactions     No Known Drug Allergies      BP Readings from Last 3 Encounters:   04/12/18 118/70   06/12/17 138/60   05/31/17 128/62    Wt Readings from Last 3 Encounters:   04/12/18 197 lb 9.6 oz (89.6 kg)   06/12/17  183 lb (83 kg)   05/31/17 182 lb 6.4 oz (82.7 kg)                  Labs reviewed in EPIC    Reviewed and updated as needed this visit by clinical staff  Tobacco  Allergies  Meds  Soc Hx      Reviewed and updated as needed this visit by Provider         ROS:  CONSTITUTIONAL: NEGATIVE for fever, chills, change in weight  INTEGUMENTARY/SKIN: NEGATIVE for worrisome rashes, moles or lesions  EYES: NEGATIVE for vision changes or irritation  EYES: POSITIVE for bleeding in his R eye since yesterday. Not bothersome to him.  ENT/MOUTH: NEGATIVE for ear, mouth and throat problems  RESP: NEGATIVE for significant cough or SOB  CV: NEGATIVE for chest pain, palpitations or peripheral edema  GI: NEGATIVE for nausea, abdominal pain, heartburn, or change in bowel habits  : NEGATIVE for frequency, dysuria, or hematuria  MUSCULOSKELETAL: NEGATIVE for significant arthralgias or myalgia  NEURO: NEGATIVE for weakness, dizziness or paresthesias  ENDOCRINE: NEGATIVE for temperature intolerance, skin/hair changes  HEME: NEGATIVE for bleeding problems  PSYCHIATRIC: NEGATIVE for changes in mood or affect    OBJECTIVE:     /70 (BP Location: Right arm, Patient Position: Chair, Cuff Size: Adult Regular)  Pulse 69  Temp 96.3  F (35.7  C) (Temporal)  Ht 6' (1.829 m)  Wt 197 lb 9.6 oz (89.6 kg)  SpO2 97%  BMI 26.8 kg/m2  Body mass index is 26.8 kg/(m^2).  GENERAL: healthy, alert and no distress  EYES: PERRL, EOMI. Mild to moderate subconjunctival hemorrhage of R eye. Blood seen throughout medial half of the sclera.  HENT: ear canals and TM's normal, nose and mouth without ulcers or lesions  NECK: no adenopathy, no asymmetry, masses, or scars and thyroid normal to palpation  RESP: lungs clear to auscultation - no rales, rhonchi or wheezes  CV: Irregularly irregular rate and rhythm, normal S1 S2, no S3 or S4, no murmur, click or rub, no peripheral edema and peripheral pulses strong  ABDOMEN: soft, nontender, no hepatosplenomegaly, no  masses and bowel sounds normal  MS: no gross musculoskeletal defects noted, no edema  SKIN: no suspicious lesions or rashes  NEURO: Normal strength and tone, mentation intact and speech normal  PSYCH: mentation appears normal, affect normal/bright    Diagnostic Test Results:  none     ASSESSMENT/PLAN:     1. Malignant neoplasm of prostate (H)  Received injection of Leuprolide today. Will continue.  - LEUPROLIDE ACETATE INJECITON    2. Hypertension goal BP (blood pressure) < 140/90  Stable  - Basic metabolic panel    3. S/P AVR (aortic valve replacement)  Stable    4. Long-term (current) use of anticoagulants [Z79.01]  Stable  - CBC with platelets    5. Acute on chronic diastolic congestive heart failure (H)  Stable  - BETA BLOCKER NOT PRESCRIBED, INTENTIONAL,; Beta Blocker not prescribed intentionally due to Hypotension (SBP < 90); Refill: 0    6. Mixed hyperlipidemia  Stable   - Lipid panel reflex to direct LDL Non-fasting    7. Need for vaccination  Received Prevnar today.  - Pneumococcal vaccine 13 valent PCV13 IM (Prevnar) [[id number]]  - ADMIN MEDICARE: Pneumococcal Vaccine ()      8. Chronic atrial fibrillation (H)  Continue chronic anticoagulation and follow INR    9. Mild intermittent asthma without complication  Currently stable, continue nebulizers as current.      Will continue follow up appts.        This patient has been interviewed, examined, diagnosed, and informed of the above by me personally.  Medical records and available pertinent information has been reviewed by me personally.  All decisions and discussion have been between myself and the patient/family.  This was done in the presence of Elder [email or web address] , who acted as a medical scribe and recorded the events above.  No diagnosis or decision making was made by the above-mentioned scribe.  The patient, and or his/her ensurors will not be billed for the presence or actions of this scribe.  The information recorded by the scribe has been reviewed by  me and found to be accurate.      Vladimir Gonzales,   [facility]

## 2018-04-12 NOTE — LETTER
My Asthma Action Plan  Name: Miguel Patten   YOB: 1932  Date: 4/12/2018   My doctor: Vladimir Gonzales, DO   My clinic: [facility]        My Control Medicine:   My Rescue Medicine:    My Asthma Severity:   Avoid your asthma triggers:              GREEN ZONE   Good Control    I feel good    No cough or wheeze    Can work, sleep and play without asthma symptoms       Take your asthma control medicine every day.     1. If exercise triggers your asthma, take your rescue medication    15 minutes before exercise or sports, and    During exercise if you have asthma symptoms  2. Spacer to use with inhaler: If you have a spacer, make sure to use it with your inhaler             YELLOW ZONE Getting Worse  I have ANY of these:    I do not feel good    Cough or wheeze    Chest feels tight    Wake up at night   1. Keep taking your Green Zone medications  2. Start taking your rescue medicine:    every 20 minutes for up to 1 hour. Then every 4 hours for 24-48 hours.  3. If you stay in the Yellow Zone for more than 12-24 hours, contact your doctor.  4. If you do not return to the Green Zone in 12-24 hours or you get worse, start taking your oral steroid medicine if prescribed by your provider.           RED ZONE Medical Alert - Get Help  I have ANY of these:    I feel awful    Medicine is not helping    Breathing getting harder    Trouble walking or talking    Nose opens wide to breathe       1. Take your rescue medicine NOW  2. If your provider has prescribed an oral steroid medicine, start taking it NOW  3. Call your doctor NOW  4. If you are still in the Red Zone after 20 minutes and you have not reached your doctor:    Take your rescue medicine again and    Call 911 or go to the emergency room right away    See your regular doctor within 2 weeks of an Emergency Room or Urgent Care visit for follow-up treatment.          Annual Reminders:  Meet with Asthma Educator,  Flu Shot in the  Fall, consider Pneumonia Vaccination for patients with asthma (aged 19 and older).    Pharmacy: [facility] [place] [address] [address]                      Asthma Triggers  How To Control Things That Make Your Asthma Worse    Triggers are things that make your asthma worse.  Look at the list below to help you find your triggers and what you can do about them.  You can help prevent asthma flare-ups by staying away from your triggers.      Trigger                                                          What you can do   Cigarette Smoke  Tobacco smoke can make asthma worse. Do not allow smoking in your home, car or around you.  Be sure no one smokes at a child s day care or school.  If you smoke, ask your health care provider for ways to help you quit.  Ask family members to quit too.  Ask your health care provider for a referral to Quit Plan to help you quit smoking, or call 3-756-063-PLAN.     Colds, Flu, Bronchitis  These are common triggers of asthma. Wash your hands often.  Don t touch your eyes, nose or mouth.  Get a flu shot every year.     Dust Mites  These are tiny bugs that live in cloth or carpet. They are too small to see. Wash sheets and blankets in hot water every week.   Encase pillows and mattress in dust mite proof covers.  Avoid having carpet if you can. If you have carpet, vacuum weekly.   Use a dust mask and HEPA vacuum.   Pollen and Outdoor Mold  Some people are allergic to trees, grass, or weed pollen, or molds. Try to keep your windows closed.  Limit time out doors when pollen count is high.   Ask you health care provider about taking medicine during allergy season.     Animal Dander  Some people are allergic to skin flakes, urine or saliva from pets with fur or feathers. Keep pets with fur or feathers out of your home.    If you can t keep the pet outdoors, then keep the pet out of your bedroom.  Keep the bedroom door closed.  Keep pets off cloth furniture and away from  stuffed toys.     Mice, Rats, and Cockroaches  Some people are allergic to the waste from these pests.   Cover food and garbage.  Clean up spills and food crumbs.  Store grease in the refrigerator.   Keep food out of the bedroom.   Indoor Mold  This can be a trigger if your home has high moisture. Fix leaking faucets, pipes, or other sources of water.   Clean moldy surfaces.  Dehumidify basement if it is damp and smelly.   Smoke, Strong Odors, and Sprays  These can reduce air quality. Stay away from strong odors and sprays, such as perfume, powder, hair spray, paints, smoke incense, paint, cleaning products, candles and new carpet.   Exercise or Sports  Some people with asthma have this trigger. Be active!  Ask your doctor about taking medicine before sports or exercise to prevent symptoms.    Warm up for 5-10 minutes before and after sports or exercise.     Other Triggers of Asthma  Cold air:  Cover your nose and mouth with a scarf.  Sometimes laughing or crying can be a trigger.  Some medicines and food can trigger asthma.

## 2018-04-12 NOTE — MR AVS SNAPSHOT
After Visit Summary   4/12/2018    Miguel Patten    MRN: [id number]           Patient Information     Date Of Birth          12/16/1932        Visit Information        Provider Department      4/12/2018 8:20 AM Vladimir Gonzales DO [facility]        Today's Diagnoses     Malignant neoplasm of prostate (H)    -  1    Hypertension goal BP (blood pressure) < 140/90        S/P AVR (aortic valve replacement)        Long-term (current) use of anticoagulants [Z79.01]        Acute on chronic diastolic congestive heart failure (H)        Mixed hyperlipidemia        Need for vaccination        Chronic atrial fibrillation (H)           Follow-ups after your visit        Follow-up notes from your care team     Return in about 4 months (around 8/12/2018).      Your next 10 appointments already scheduled     Jun 06, 2018  9:00 AM CDT   Anticoagulation Visit with [facility] ANTI COAG   [facility] ([address] [id number] [phone]            Jun 21, 2018  9:00 AM CDT   Return Visit with CARDIO DEVICE NURSE   [facility] ([address] [phone]            Jun 21, 2018  9:45 AM CDT   Return Visit with PHANI Mcdonnell CNP   [facility] ([address] [phone]              Who to contact     If you have questions or need follow up information about today's clinic visit or your schedule please contact [facility] directly at [phone].  Normal or non-critical lab and imaging results will be communicated to you by MyChart, letter or phone within 4 business days after the clinic has received the results. If you do not hear from us within 7 days, please contact the clinic through MyChart or  "phone. If you have a critical or abnormal lab result, we will notify you by phone as soon as possible.  Submit refill requests through [employer] or call your pharmacy and they will forward the refill request to us. Please allow 3 business days for your refill to be completed.          Additional Information About Your Visit        [employer] Information     [employer] lets you send messages to your doctor, view your test results, renew your prescriptions, schedule appointments and more. To sign up, go to [email or web address] . Click on \"Log in\" on the left side of the screen, which will take you to the Welcome page. Then click on \"Sign up Now\" on the right side of the page.     You will be asked to enter the access code listed below, as well as some personal information. Please follow the directions to create your username and password.     Your access code is: [email or web address]  Expires: 2018 10:49 AM     Your access code will  in 90 days. If you need help or a new code, please call your [facility] clinic or [phone].        Care EveryWhere ID     This is your Care EveryWhere ID. This could be used by other organizations to access your [facility] medical records  [id number]        Your Vitals Were     Pulse Temperature Height Pulse Oximetry BMI (Body Mass Index)       69 96.3  F (35.7  C) (Temporal) 6' (1.829 m) 97% 26.8 kg/m2        Blood Pressure from Last 3 Encounters:   18 118/70   17 138/60   17 128/62    Weight from Last 3 Encounters:   18 197 lb 9.6 oz (89.6 kg)   17 183 lb (83 kg)   17 182 lb 6.4 oz (82.7 kg)              We Performed the Following     ADMIN MEDICARE: Pneumococcal Vaccine ()     Basic metabolic panel     CBC with platelets     LEUPROLIDE ACETATE INJECITON     Lipid panel reflex to direct LDL Non-fasting     Pneumococcal vaccine 13 valent PCV13 IM (Prevnar) [[id number]]          Today's Medication Changes          These changes are accurate as of " 4/12/18 11:59 PM.  If you have any questions, ask your nurse or doctor.               Start taking these medicines.        Dose/Directions    BETA BLOCKER NOT PRESCRIBED (INTENTIONAL)   Used for:  Acute on chronic diastolic congestive heart failure (H)   Started by:  Vladimir Gonzales,         Beta Blocker not prescribed intentionally due to Hypotension (SBP < 90)   Refills:  0         Stop taking these medicines if you haven't already. Please contact your care team if you have questions.     predniSONE 20 MG tablet   Commonly known as:  DELTASONE   Stopped by:  Vladimir Gonzales DO                Where to get your medicines      Some of these will need a paper prescription and others can be bought over the counter.  Ask your nurse if you have questions.     You don't need a prescription for these medications     BETA BLOCKER NOT PRESCRIBED (INTENTIONAL)                Primary Care Provider Office Phone # Fax #    Vladimir Gonzales [phone] [address] [place] [address]        Equal Access to Services     RODOLFO MURPHY AH: Hadii lio ku hadasho Soomaali, waaxda luqadaha, qaybta kaalmada adeegyada, waxay idiin haycorkyn sadia loving . So [facility] [phone].    ATENCIÓN: Si habla español, tiene a walker disposición servicios gratuitos de asistencia lingüística. Llame al [phone].    We comply with applicable federal civil rights laws and [place] laws. We do not discriminate on the basis of race, color, national origin, age, disability, sex, sexual orientation, or gender identity.            Thank you!     Thank you for choosing [facility]  for your care. Our goal is always to provide you with excellent care. Hearing back from our patients is one way we can continue to improve our services. Please take a few minutes to complete the written survey that you may receive in the mail after your visit with us. Thank you!             Your  Updated Medication List - Protect others around you: Learn how to safely use, store and throw away your medicines at www.disposemymeds.org.          This list is accurate as of 4/12/18 11:59 PM.  Always use your most recent med list.                   Brand Name Dispense Instructions for use Diagnosis    acetaminophen 325 MG tablet    TYLENOL    100 tablet    Take 2 tablets (650 mg) by mouth every 4 hours as needed for mild pain or fever        albuterol 108 (90 Base) MCG/ACT Inhaler    PROAIR HFA/PROVENTIL HFA/VENTOLIN HFA    1 Inhaler    Inhale 2 puffs into the lungs every 6 hours as needed for shortness of breath / dyspnea or wheezing    Mild intermittent asthma, uncomplicated       BETA BLOCKER NOT PRESCRIBED (INTENTIONAL)      Beta Blocker not prescribed intentionally due to Hypotension (SBP < 90)    Acute on chronic diastolic congestive heart failure (H)       bicalutamide 50 MG tablet    CASODEX     Take 1 tablet (50 mg) by mouth daily        fluticasone 44 MCG/ACT Inhaler    FLOVENT HFA    1 Inhaler    Inhale 1 puff into the lungs 2 times daily Appointment needed for additional refills.    Mild intermittent asthma without complication       ipratropium - albuterol 0.5 mg/2.5 mg/3 mL 0.5-2.5 (3) MG/3ML neb solution    DUONEB    60 vial    Take 3 mLs by nebulization every 6 hours as needed for shortness of breath / dyspnea.    Moderate persistent asthma       * JANTOVEN 2.5 MG tablet   Generic drug:  warfarin     90 tablet    HOLD THIS MEDICATION on 4/21, 4/22, and 4/23.  Please follow up with coumadin clinic on Monday 4/24 as scheduled and then take as scheduled by coumadin clinic.    Chronic atrial fibrillation (H), Long-term (current) use of anticoagulants       * warfarin 2.5 MG tablet    JANTOVEN    110 tablet    Take 2.5 mg on Monday, Wednesday, Friday and 3.75 mg all other days, or as directed by the coumadin clinic.    Atrial fibrillation (H)       losartan 50 MG tablet    COZAAR    180 tablet    TAKE  ONE TABLET BY MOUTH TWICE A DAY    S/P AVR (aortic valve replacement)       LUPRON DEPOT (3-MONTH) 22.5 MG kit   Generic drug:  leuprolide     1 each    Inject 22.5 MG, IM q 4 months per Dr. Zeyad Guevara, pt's Urologist in [place]. 4/6/2017    Malignant neoplasm of prostate (H)       simvastatin 40 MG tablet    ZOCOR    90 tablet    TAKE ONE TABLET BY MOUTH EVERY NIGHT AT BEDTIME    S/P AVR (aortic valve replacement)       * Notice:  This list has 2 medication(s) that are the same as other medications prescribed for you. Read the directions carefully, and ask your doctor or other care provider to review them with you.

## 2018-04-13 ASSESSMENT — ASTHMA QUESTIONNAIRES: ACT_TOTALSCORE: 25

## 2018-04-13 ASSESSMENT — PATIENT HEALTH QUESTIONNAIRE - PHQ9: SUM OF ALL RESPONSES TO PHQ QUESTIONS 1-9: 0

## 2018-04-13 NOTE — PROGRESS NOTES
Please contact the patient and notify him of the following:  The chemistry panel was entirely normal including blood sugar and kidney function.  Cholesterol is well controlled with an LDL of 65.  Blood cell count is normal without evidence of anemia or leukemia.    Thank you.   DO THAIS Correa

## 2018-05-19 DIAGNOSIS — J45.20 MILD INTERMITTENT ASTHMA WITHOUT COMPLICATION: ICD-10-CM

## 2018-05-21 RX ORDER — FLUTICASONE PROPIONATE 44 MCG
AEROSOL WITH ADAPTER (GRAM) INHALATION
Qty: 10.6 G | Refills: 0 | Status: SHIPPED | OUTPATIENT
Start: 2018-05-21 | End: 2018-07-19

## 2018-05-21 NOTE — TELEPHONE ENCOUNTER
Prescription approved per [facility] [place].    Quiana Ramos RN  [facility]

## 2018-05-21 NOTE — TELEPHONE ENCOUNTER
"Requested Prescriptions   Pending Prescriptions Disp Refills     FLOVENT HFA 44 MCG/ACT Inhaler [Pharmacy Med Name: FLOVENT HFA 44MCG/ACT AERO] 10.6 g 0     Sig: INHALE ONE PUFF INTO THE LUNGS TWO TIMES A DAY (APPOINTMENT NEEDED FOR ADDITIONAL REFILLS)    Inhaled Steroids Protocol Passed    5/19/2018 11:08 AM       Passed - Patient is age 12 or older       Passed - Asthma control assessment score within normal limits in last 6 months    Please review ACT score.     ACT Total Scores 4/19/2017 5/2/2017 4/12/2018   ACT TOTAL SCORE - - -   ASTHMA ER VISITS - - -   ASTHMA HOSPITALIZATIONS - - -   ACT TOTAL SCORE (Goal Greater than or Equal to 20) 25 5 25   In the past 12 months, how many times did you visit the emergency room for your asthma without being admitted to the hospital? 0 - 0   In the past 12 months, how many times were you hospitalized overnight because of your asthma? 0 0 0            Passed - Recent (6 mo) or future (30 days) visit within the authorizing provider's specialty    Patient had office visit in the last 6 months or has a visit in the next 30 days with authorizing provider or within the authorizing provider's specialty.  See \"Patient Info\" tab in inbasket, or \"Choose Columns\" in Meds & Orders section of the refill encounter.            "

## 2018-06-06 ENCOUNTER — ANTICOAGULATION THERAPY VISIT (OUTPATIENT)
Dept: ANTICOAGULATION | Facility: CLINIC | Age: 83
End: 2018-06-06
Payer: MEDICARE

## 2018-06-06 DIAGNOSIS — Z79.01 LONG-TERM (CURRENT) USE OF ANTICOAGULANTS: ICD-10-CM

## 2018-06-06 DIAGNOSIS — I48.91 ATRIAL FIBRILLATION (H): ICD-10-CM

## 2018-06-06 DIAGNOSIS — Z85.46 HISTORY OF PROSTATE CANCER: Primary | ICD-10-CM

## 2018-06-06 DIAGNOSIS — Z95.2 S/P AVR (AORTIC VALVE REPLACEMENT): ICD-10-CM

## 2018-06-06 DIAGNOSIS — I50.33 ACUTE ON CHRONIC DIASTOLIC CONGESTIVE HEART FAILURE (H): ICD-10-CM

## 2018-06-06 LAB
ALBUMIN UR-MCNC: NEGATIVE MG/DL
APPEARANCE UR: ABNORMAL
BILIRUB UR QL STRIP: NEGATIVE
COLOR UR AUTO: YELLOW
GLUCOSE UR STRIP-MCNC: NEGATIVE MG/DL
HGB UR QL STRIP: ABNORMAL
INR POINT OF CARE: 3.3 (ref 0.86–1.14)
KETONES UR STRIP-MCNC: NEGATIVE MG/DL
LEUKOCYTE ESTERASE UR QL STRIP: NEGATIVE
MUCOUS THREADS #/AREA URNS LPF: PRESENT /LPF
NITRATE UR QL: NEGATIVE
PH UR STRIP: 5 PH (ref 5–7)
PSA SERPL-MCNC: 0.05 UG/L (ref 0–4)
RBC #/AREA URNS AUTO: 3 /HPF (ref 0–2)
SOURCE: ABNORMAL
SP GR UR STRIP: 1.02 (ref 1–1.03)
SQUAMOUS #/AREA URNS AUTO: <1 /HPF (ref 0–1)
UROBILINOGEN UR STRIP-MCNC: 2 MG/DL (ref 0–2)
WBC #/AREA URNS AUTO: 5 /HPF (ref 0–5)

## 2018-06-06 PROCEDURE — 36416 COLLJ CAPILLARY BLOOD SPEC: CPT

## 2018-06-06 PROCEDURE — 85610 PROTHROMBIN TIME: CPT | Mod: QW

## 2018-06-06 PROCEDURE — 99207 ZZC NO CHARGE NURSE ONLY: CPT

## 2018-06-06 PROCEDURE — 84153 ASSAY OF PSA TOTAL: CPT | Performed by: UROLOGY

## 2018-06-06 PROCEDURE — 81001 URINALYSIS AUTO W/SCOPE: CPT | Performed by: UROLOGY

## 2018-06-06 RX ORDER — WARFARIN SODIUM 2.5 MG/1
TABLET ORAL
Qty: 110 TABLET | Refills: 3 | COMMUNITY
Start: 2018-06-06 | End: 2018-10-31

## 2018-06-06 NOTE — PROGRESS NOTES
ANTICOAGULATION FOLLOW-UP CLINIC VISIT    Patient Name:  Miguel Patten  Date:  6/6/2018  Contact Type:  Face to Face    SUBJECTIVE:     Patient Findings     Positives Unexplained INR or factor level change           OBJECTIVE    INR Protime   Date Value Ref Range Status   06/06/2018 3.3 (A) 0.86 - 1.14 Final       ASSESSMENT / PLAN  INR assessment THER    Recheck INR In: 5 WEEKS    INR Location Clinic      Anticoagulation Summary as of 6/6/2018     INR goal 2.0-3.0   Today's INR 3.3!   Warfarin maintenance plan 3.75 mg (2.5 mg x 1.5) on Tue, Thu, Sat; 2.5 mg (2.5 mg x 1) all other days   Full warfarin instructions 3.75 mg on Tue, Thu, Sat; 2.5 mg all other days   Weekly warfarin total 21.25 mg   Plan last modified Ashly Jones RN (6/6/2018)   Next INR check 7/11/2018   Target end date     Indications   CHF (congestive heart failure) (H) [I50.9]  Long-term (current) use of anticoagulants [Z79.01] [Z79.01]         Anticoagulation Episode Summary     INR check location     Preferred lab     Send INR reminders to [facility]    Comments 2.5 mg tablets, book, takes in AM      Anticoagulation Care Providers     Provider Role Specialty Phone number    Rober العراقي MD [facility] [phone]            See the Encounter Report to view Anticoagulation Flowsheet and Dosing Calendar (Go to Encounters tab in chart review, and find the Anticoagulation Therapy Visit)    Dosage adjustment made based on physician directed care plan.      Ashly Jones RN

## 2018-06-06 NOTE — TELEPHONE ENCOUNTER
"Last Written Prescription Date:  6/02/2017  Last Fill Quantity: 180,  # refills: 3   Last office visit: 4/12/2018 with prescribing provider:  Dr. Gonzales   Future Office Visit:   Next 5 appointments (look out 90 days)     Jun 21, 2018  9:00 AM CDT   Return Visit with CARDIO DEVICE NURSE   [facility] ([facility]    [address] [place] [id number]   [phone]            Jun 21, 2018  9:45 AM CDT   Return Visit with PHANI Mcdonnell CNP   [facility] ([facility])    [address] [place] [id number]   [phone]                 Requested Prescriptions   Pending Prescriptions Disp Refills     losartan (COZAAR) 50 MG tablet [Pharmacy Med Name: LOSARTAN POTASSIUM 50MG TABS] 180 tablet 3     Sig: TAKE ONE TABLET BY MOUTH TWICE A DAY    Angiotensin-II Receptors Passed    6/6/2018  8:47 AM       Passed - Blood pressure under 140/90 in past 12 months    BP Readings from Last 3 Encounters:   04/12/18 118/70   06/12/17 138/60   05/31/17 128/62                Passed - Recent (12 mo) or future (30 days) visit within the authorizing provider's specialty    Patient had office visit in the last 12 months or has a visit in the next 30 days with authorizing provider or within the authorizing provider's specialty.  See \"Patient Info\" tab in inbasket, or \"Choose Columns\" in Meds & Orders section of the refill encounter.           Passed - Patient is age 18 or older       Passed - Normal serum creatinine on file in past 12 months    Recent Labs   Lab Test  04/12/18   0932   CR  0.71            Passed - Normal serum potassium on file in past 12 months    Recent Labs   Lab Test  04/12/18   0932   POTASSIUM  4.1                      "

## 2018-06-06 NOTE — MR AVS SNAPSHOT
Miguel ANAND Sandor   6/6/2018 9:00 AM   Anticoagulation Therapy Visit    Description:  85 year old male   Provider:  [facility] ANTI JOY   Department:  [facility]           INR as of 6/6/2018     Today's INR 3.3!      Anticoagulation Summary as of 6/6/2018     INR goal 2.0-3.0   Today's INR 3.3!   Full warfarin instructions 3.75 mg on Tue, Thu, Sat; 2.5 mg all other days   Next INR check 7/11/2018    Indications   CHF (congestive heart failure) (H) [I50.9]  Long-term (current) use of anticoagulants [Z79.01] [Z79.01]         Your next Anticoagulation Clinic appointment(s)     Jul 11, 2018  9:00 AM CDT   Anticoagulation Visit with PH ANTI COAG   [facility] ([facility])    [address] [place] [id number]   [phone]              Contact Numbers     Clinic Number:         June 2018 Details    Sun Mon Tue Wed Thu Fri Sat          1               2                 3               4               5               6      2.5 mg   See details      7      3.75 mg         8      2.5 mg         9      3.75 mg           10      2.5 mg         11      2.5 mg         12      3.75 mg         13      2.5 mg         14      3.75 mg         15      2.5 mg         16      3.75 mg           17      2.5 mg         18      2.5 mg         19      3.75 mg         20      2.5 mg         21      3.75 mg         22      2.5 mg         23      3.75 mg           24      2.5 mg         25      2.5 mg         26      3.75 mg         27      2.5 mg         28      3.75 mg         29      2.5 mg         30      3.75 mg          Date Details   06/06 This INR check               How to take your warfarin dose     To take:  2.5 mg Take 1 of the 2.5 mg tablets.    To take:  3.75 mg Take 1.5 of the 2.5 mg tablets.           July 2018 Details    Sun Mon Tue Wed Thu Fri Sat     1      2.5 mg         2      2.5 mg         3      3.75 mg         4      2.5 mg         5      3.75 mg         6      2.5 mg         7       3.75 mg           8      2.5 mg         9      2.5 mg         10      3.75 mg         11            12               13               14                 15               16               17               18               19               20               21                 22               23               24               25               26               27               28                 29               30               31                    Date Details   No additional details    Date of next INR:  7/11/2018         How to take your warfarin dose     To take:  2.5 mg Take 1 of the 2.5 mg tablets.    To take:  3.75 mg Take 1.5 of the 2.5 mg tablets.

## 2018-06-07 RX ORDER — LOSARTAN POTASSIUM 50 MG/1
TABLET ORAL
Qty: 180 TABLET | Refills: 3 | Status: SHIPPED | OUTPATIENT
Start: 2018-06-07 | End: 2019-06-03

## 2018-06-07 NOTE — TELEPHONE ENCOUNTER
Routing refill request to provider for review/approval because:  Drug not on the [facility] refill protocol for associated diagnosis    Quiana Ramos RN  [facility]

## 2018-06-21 ENCOUNTER — OFFICE VISIT (OUTPATIENT)
Dept: CARDIOLOGY | Facility: CLINIC | Age: 83
End: 2018-06-21
Payer: MEDICARE

## 2018-06-21 ENCOUNTER — TRANSFERRED RECORDS (OUTPATIENT)
Dept: HEALTH INFORMATION MANAGEMENT | Facility: CLINIC | Age: 83
End: 2018-06-21

## 2018-06-21 VITALS
DIASTOLIC BLOOD PRESSURE: 70 MMHG | HEIGHT: 72 IN | OXYGEN SATURATION: 97 % | BODY MASS INDEX: 26.41 KG/M2 | HEART RATE: 60 BPM | SYSTOLIC BLOOD PRESSURE: 140 MMHG | WEIGHT: 195 LBS

## 2018-06-21 DIAGNOSIS — I42.9 SECONDARY CARDIOMYOPATHY (H): Primary | ICD-10-CM

## 2018-06-21 DIAGNOSIS — I48.20 CHRONIC ATRIAL FIBRILLATION (H): ICD-10-CM

## 2018-06-21 DIAGNOSIS — Z95.0 CARDIAC PACEMAKER IN SITU: ICD-10-CM

## 2018-06-21 DIAGNOSIS — Z95.2 S/P AVR (AORTIC VALVE REPLACEMENT): ICD-10-CM

## 2018-06-21 PROCEDURE — 99213 OFFICE O/P EST LOW 20 MIN: CPT | Performed by: NURSE PRACTITIONER

## 2018-06-21 PROCEDURE — 93281 PM DEVICE PROGR EVAL MULTI: CPT

## 2018-06-21 NOTE — LETTER
6/21/2018    Vladimir Gonzales, DO  [address] [facility] [address] [place] [address]    RE: Miguel Patten       Dear Colleague,    I had the pleasure of seeing Miguel Patten in the [facility].    HPI and Plan: #[id number]  See dictation    No orders of the defined types were placed in this encounter.      No orders of the defined types were placed in this encounter.      There are no discontinued medications.      Encounter Diagnosis   Name Primary?     Atrial flutter, unspecified type (H)        CURRENT MEDICATIONS:  Current Outpatient Prescriptions   Medication Sig Dispense Refill     acetaminophen (TYLENOL) 325 MG tablet Take 2 tablets (650 mg) by mouth every 4 hours as needed for mild pain or fever 100 tablet      albuterol (PROAIR HFA, PROVENTIL HFA, VENTOLIN HFA) 108 (90 BASE) MCG/ACT inhaler Inhale 2 puffs into the lungs every 6 hours as needed for shortness of breath / dyspnea or wheezing 1 Inhaler 6     bicalutamide (CASODEX) 50 MG tablet Take 1 tablet (50 mg) by mouth daily       FLOVENT HFA 44 MCG/ACT Inhaler INHALE ONE PUFF INTO THE LUNGS TWO TIMES A DAY (APPOINTMENT NEEDED FOR ADDITIONAL REFILLS) 10.6 g 0     ipratropium - albuterol 0.5 mg/2.5 mg/3 mL (DUONEB) 0.5-2.5 (3) MG/3ML nebulization Take 3 mLs by nebulization every 6 hours as needed for shortness of breath / dyspnea. 60 vial 1     leuprolide (LUPRON DEPOT) 22.5 MG kit Inject 22.5 MG, IM q 4 months per Dr. Zeyad Guevara, pt's Urologist in [place]. 4/6/2017 1 each 3     losartan (COZAAR) 50 MG tablet TAKE ONE TABLET BY MOUTH TWICE A  tablet 3     simvastatin (ZOCOR) 40 MG tablet TAKE ONE TABLET BY MOUTH EVERY NIGHT AT BEDTIME 90 tablet 3     warfarin (JANTOVEN) 2.5 MG tablet Take 3.75 mg Tues, Thurs, Sat and 2.5 mg all other days, or as directed by the coumadin clinic. 110 tablet 3     BETA BLOCKER NOT PRESCRIBED, INTENTIONAL, Beta Blocker not prescribed intentionally due to Hypotension (SBP < 90)  0        ALLERGIES     Allergies   Allergen Reactions     No Known Drug Allergies        PAST MEDICAL HISTORY:  Past Medical History:   Diagnosis Date     Arthritis      Ascending aortic aneurysm (H)     repaired with Hemashield graft 7/2014     Asthma      Complete AV block (H)     sp CRTP implant     Congestive heart failure (H)      Coronary artery disease     mild-moderate stenosis by angiography     H/O aortic valve replacement     bioprosthetic, 7/2014     Hypertension      Malignant neoplasm of prostate (H)     Prostate cancer     Permanent atrial fibrillation (H)     chronic       PAST SURGICAL HISTORY:  Past Surgical History:   Procedure Laterality Date     ARTHROPLASTY HIP  1/21/2013    Procedure: ARTHROPLASTY HIP;  right total hip arthroplasty;  Surgeon: Rober Alves MD;  Location: [facility] OR     C NONSPECIFIC PROCEDURE      Hernia repair     C NONSPECIFIC PROCEDURE      Prostatectomy/cancer     C TOTAL HIP ARTHROPLASTY  1/21/13    Right     GENITOURINARY SURGERY  2001    Prostatectomy       ORTHOPEDIC SURGERY      Left SONALI     PHACOEMULSIFICATION WITH STANDARD INTRAOCULAR LENS IMPLANT Right 10/6/2016    Procedure: PHACOEMULSIFICATION WITH STANDARD INTRAOCULAR LENS IMPLANT;  Surgeon: Elder Rueda MD;  Location: [facility] OR     PHACOEMULSIFICATION WITH STANDARD INTRAOCULAR LENS IMPLANT Left 10/20/2016    Procedure: PHACOEMULSIFICATION WITH STANDARD INTRAOCULAR LENS IMPLANT;  Surgeon: Elder Rueda MD;  Location: [facility] OR     REPAIR ANEURYSM ASCENDING AORTA  7/17/2014    2. Aortic aneurysm repair with 32 mm Hemashield graft.      REPLACE VALVE AORTIC  7/17/2014    1. Aortic valve replacement with 23 mm [employer] Trifecta tissue valve.      s/p aneurysm repair by Dr. Friedman       s/p avr         FAMILY HISTORY:  Family History   Problem Relation Age of Onset     Asthma Father      Asthma Paternal Grandmother      Asthma Daughter        SOCIAL HISTORY:  Social History     Social History     Marital  status:      Spouse name: N/A     Number of children: N/A     Years of education: N/A     Social History Main Topics     Smoking status: Never Smoker     Smokeless tobacco: Never Used     Alcohol use 0.0 oz/week     0 Standard drinks or equivalent per week      Comment: rarely, once a week or less     Drug use: No     Sexual activity: No      Comment:  - live with friend - 3 children.     Other Topics Concern     Parent/Sibling W/ Cabg, Mi Or Angioplasty Before 65f 55m? No      Service Yes     Blood Transfusions No     Caffeine Concern No     Occupational Exposure No     Hobby Hazards No     Sleep Concern No     Stress Concern No     Weight Concern No     Special Diet No     Back Care No     Exercise Yes     Bike Helmet No     n/a     Seat Belt Yes     Self-Exams Yes     Social History Narrative       Review of Systems:  Skin:  Negative       Eyes:  Positive for glasses    ENT:  Positive for hearing loss hearing aids  Respiratory:  Positive for dyspnea on exertion starting to walk a little more    Cardiovascular:  Negative for;palpitations;chest pain;edema;lightheadedness;dizziness;fatigue      Gastroenterology: Negative      Genitourinary:  Negative      Musculoskeletal:  Positive for arthritis    Neurologic:  Negative      Psychiatric:  Negative      Heme/Lymph/Imm:  Negative      Endocrine:           Physical Exam:  Vitals: /70 (BP Location: Right arm, Patient Position: Fowlers, Cuff Size: Adult Regular)  Pulse 60  Ht 1.829 m (6')  Wt 88.5 kg (195 lb)  SpO2 97%  BMI 26.45 kg/m2    Constitutional:  cooperative, alert and oriented, well developed, well nourished, in no acute distress        Skin:  warm and dry to the touch, no apparent skin lesions or masses noted   pacemaker incision in the left infraclavicular area was well-healed      Head:  normocephalic, no masses or lesions        Eyes:  pupils equal and round, conjunctivae and lids unremarkable, sclera white, no xanthalasma,  EOMS intact, no nystagmus        ENT:  no pallor or cyanosis, dentition good        Neck:  carotid pulses are full and equal bilaterally, JVP normal, no carotid bruit        Respiratory:  normal breath sounds, clear to auscultation, normal A-P diameter, normal symmetry, normal respiratory excursion, no use of accessory muscles         Cardiac: regular rhythm;normal S1 and S2 irregular rhythm   crisp prosthetic valve sounds systolic murmur;grade 1        not assessed this visit                                        GI:  not assessed this visit        Extremities and Muscular Skeletal:  no deformities, clubbing, cyanosis, erythema observed;no edema              Neurological:           Psych:           CC  Cristina Caputo MD  [address]  [place] [address]                Thank you for allowing me to participate in the care of your patient.      Sincerely,     Lillie Caban, NICKO, APRN [facility]    cc:   Cristina Caputo MD  [address]  [place] [address]

## 2018-06-21 NOTE — MR AVS SNAPSHOT
After Visit Summary   6/21/2018    Miguel Patten    MRN: [id number]           Patient Information     Date Of Birth          12/16/1932        Visit Information        Provider Department      6/21/2018 9:45 AM Lillie Caban APRN CNP [facility]        Today's Diagnoses     Atrial flutter, unspecified type (H)          Care Instructions    No changes  See  next year and call with any concerns          Follow-ups after your visit        Your next 10 appointments already scheduled     Jul 11, 2018  9:00 AM CDT   Anticoagulation [facility] with [facility] ANTI COAG   [facility]    [address] [facility] [id number] [phone]            Oct 01, 2018 10:00 AM CDT   Remote PPM Check with BENNETT TECH1   [facility] ([facility])    [address] [place], [place] [id number]  [facility] [id number] [phone] OPT 2           This appointment is for a remote check of your pacemaker.  This is not an appointment at the office.              Who to contact     If you have questions or need follow up information about today's clinic visit or your schedule please contact [facility] directly at [phone].  Normal or non-critical lab and imaging results will be communicated to you by MyChart, letter or phone within 4 business days after the clinic has received the results. If you do not hear from us within 7 days, please contact the clinic through MyChart or phone. If you have a critical or abnormal lab result, we will notify you by phone as soon as possible.  Submit refill requests through [employer] or call your pharmacy and they will forward the refill request to us. Please allow 3 business days for your refill to be completed.          Additional Information About Your Visit        Care EveryWhere ID     This is your Care EveryWhere ID. This  could be used by other organizations to access your [place] medical records  [id number]        Your Vitals Were     Pulse Height Pulse Oximetry BMI (Body Mass Index)          60 1.829 m (6') 97% 26.45 kg/m2         Blood Pressure from Last 3 Encounters:   06/21/18 140/70   04/12/18 118/70   06/12/17 138/60    Weight from Last 3 Encounters:   06/21/18 88.5 kg (195 lb)   04/12/18 89.6 kg (197 lb 9.6 oz)   06/12/17 83 kg (183 lb)              We Performed the Following     Follow-Up with Cardiac Advanced Practice Provider        Primary Care Provider Office Phone # Fax #    Vladimir Maldonadoandrew, [phone]        [address] [place] [address]        Equal Access to Services     RODOLFO MURPHY : Hadii lio lambert hadasho Soomaali, waaxda luqadaha, qaybta kaalmada adeegyada, glen loving . So [facility] [phone].    ATENCIÓN: Si habla español, tiene a walker disposición servicios gratuitos de asistencia lingüística. [facility] [phone].    We comply with applicable federal civil rights laws and [place] laws. We do not discriminate on the basis of race, color, national origin, age, disability, sex, sexual orientation, or gender identity.            Thank you!     Thank you for choosing [facility]  for your care. Our goal is always to provide you with excellent care. Hearing back from our patients is one way we can continue to improve our services. Please take a few minutes to complete the written survey that you may receive in the mail after your visit with us. Thank you!             Your Updated Medication List - Protect others around you: Learn how to safely use, store and throw away your medicines at www.disposemymeds.org.          This list is accurate as of 6/21/18 10:19 AM.  Always use your most recent med list.                   Brand Name Dispense Instructions for use Diagnosis    acetaminophen 325 MG tablet    TYLENOL     100 tablet    Take 2 tablets (650 mg) by mouth every 4 hours as needed for mild pain or fever        albuterol 108 (90 Base) MCG/ACT Inhaler    PROAIR HFA/PROVENTIL HFA/VENTOLIN HFA    1 Inhaler    Inhale 2 puffs into the lungs every 6 hours as needed for shortness of breath / dyspnea or wheezing    Mild intermittent asthma, uncomplicated       BETA BLOCKER NOT PRESCRIBED (INTENTIONAL)      Beta Blocker not prescribed intentionally due to Hypotension (SBP < 90)    Acute on chronic diastolic congestive heart failure (H)       bicalutamide 50 MG tablet    CASODEX     Take 1 tablet (50 mg) by mouth daily        FLOVENT HFA 44 MCG/ACT Inhaler   Generic drug:  fluticasone     10.6 g    INHALE ONE PUFF INTO THE LUNGS TWO TIMES A DAY (APPOINTMENT NEEDED FOR ADDITIONAL REFILLS)    Mild intermittent asthma without complication       ipratropium - albuterol 0.5 mg/2.5 mg/3 mL 0.5-2.5 (3) MG/3ML neb solution    DUONEB    60 vial    Take 3 mLs by nebulization every 6 hours as needed for shortness of breath / dyspnea.    Moderate persistent asthma       JANTOVEN 2.5 MG tablet   Generic drug:  warfarin     110 tablet    Take 3.75 mg Tues, Thurs, Sat and 2.5 mg all other days, or as directed by the coumadin clinic.    Atrial fibrillation (H)       losartan 50 MG tablet    COZAAR    180 tablet    TAKE ONE TABLET BY MOUTH TWICE A DAY    S/P AVR (aortic valve replacement)       LUPRON DEPOT (3-MONTH) 22.5 MG kit   Generic drug:  leuprolide     1 each    Inject 22.5 MG, IM q 4 months per Dr. Zeyad Guevara, pt's Urologist in [place]. 4/6/2017    Malignant neoplasm of prostate (H)       simvastatin 40 MG tablet    ZOCOR    90 tablet    TAKE ONE TABLET BY MOUTH EVERY NIGHT AT BEDTIME    S/P AVR (aortic valve replacement)
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
## 2018-06-21 NOTE — PROGRESS NOTES
[place] [employer] CRT- P (S) Device Check/ Laila/ Lillie Caban  RVP: 99 %  LVP: 99%  Mode: VVIR        Underlying Rhythm: AF/ Slow VR in the 40's  Heart Rate: Histogram is stable with adequate HR distribution  Sensing: Stable    Pacing Threshold: Stable (LV runs a bit high but is stable)   Impedance: WNL  Battery Status: 7 years estimated longevity  Device Site: No issues   Atrial Arrhythmia: permanent AF/  Warfarin  Ventricular Arrhythmia: 10 NSVT episodes logged , 2 with tracings. These show 4 beats at  160-170 bpm.   Setting Change: LV output adjusted for safety margin sml    Care Plan: Remote in 3 months. sml
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
## 2018-06-21 NOTE — MR AVS SNAPSHOT
After Visit Summary   6/21/2018    Miguel Patten    MRN: [id number]           Patient Information     Date Of Birth          12/16/1932        Visit Information        Provider Department      6/21/2018 9:00 AM CARDIO DEVICE NURSE [facility]        Today's Diagnoses     Cardiac pacemaker in situ           Follow-ups after your visit        Your next 10 appointments already scheduled     Jun 21, 2018  9:45 AM CDT   Return Visit with PHANI Mcdonnell CNP   [facility] ([facility])    [address] [place] [phone] [phone]            Jul 11, 2018  9:00 AM CDT   Anticoagulation Visit with PH ANTI COAG   [facility] ([facility])    [address] [place] [id number] [phone]            Oct 01, 2018 10:00 AM CDT   Remote PPM Check with BENNETT TECH1   [facility] ([facility])    [address] [place] [address] [address]  [facility] [id number] [phone] OPT 2           This appointment is for a remote check of your pacemaker.  This is not an appointment at the office.              Who to contact     If you have questions or need follow up information about today's clinic visit or your schedule please contact [facility] directly at [phone].  Normal or non-critical lab and imaging results will be communicated to you by MyChart, letter or phone within 4 business days after the clinic has received the results. If you do not hear from us within 7 days, please contact the clinic through MyChart or phone. If you have a critical or abnormal lab result, we will notify you by phone as soon as possible.  Submit refill requests through [employer] or call your pharmacy and they will forward the refill request to us. Please allow 3 business days for your refill to be  completed.          Additional Information About Your Visit        Care EveryWhere ID     This is your Care EveryWhere ID. This could be used by other organizations to access your [place] medical records  [id number]         Blood Pressure from Last 3 Encounters:   04/12/18 118/70   06/12/17 138/60   05/31/17 128/62    Weight from Last 3 Encounters:   04/12/18 89.6 kg (197 lb 9.6 oz)   06/12/17 83 kg (183 lb)   05/31/17 82.7 kg (182 lb 6.4 oz)              We Performed the Following     Follow-Up with Device Clinic     PM DEVICE PROGRAMMING EVAL, MULTI LEAD PACER ([id number])        Primary Care Provider Office Phone # Fax #    Vladimir Celestine Gonzales, [phone] [address] [place] [address]        Equal Access to Services     RODOLFO MURPHY : Hadii aad ku hadasho Soomaali, waaxda luqadaha, qaybta kaalmada adeegyada, glen mccarty haycorkyn sadia loving . So [facility] [phone].    ATENCIÓN: Si habla español, tiene a walker disposición servicios gratuitos de asistencia lingüística. Katelyn al [phone].    We comply with applicable federal civil rights laws and [place] laws. We do not discriminate on the basis of race, color, national origin, age, disability, sex, sexual orientation, or gender identity.            Thank you!     Thank you for choosing [facility]  for your care. Our goal is always to provide you with excellent care. Hearing back from our patients is one way we can continue to improve our services. Please take a few minutes to complete the written survey that you may receive in the mail after your visit with us. Thank you!             Your Updated Medication List - Protect others around you: Learn how to safely use, store and throw away your medicines at www.disposemymeds.org.          This list is accurate as of 6/21/18  9:32 AM.  Always use your most recent med list.                   Brand Name Dispense Instructions for use  Diagnosis    acetaminophen 325 MG tablet    TYLENOL    100 tablet    Take 2 tablets (650 mg) by mouth every 4 hours as needed for mild pain or fever        albuterol 108 (90 Base) MCG/ACT Inhaler    PROAIR HFA/PROVENTIL HFA/VENTOLIN HFA    1 Inhaler    Inhale 2 puffs into the lungs every 6 hours as needed for shortness of breath / dyspnea or wheezing    Mild intermittent asthma, uncomplicated       BETA BLOCKER NOT PRESCRIBED (INTENTIONAL)      Beta Blocker not prescribed intentionally due to Hypotension (SBP < 90)    Acute on chronic diastolic congestive heart failure (H)       bicalutamide 50 MG tablet    CASODEX     Take 1 tablet (50 mg) by mouth daily        FLOVENT HFA 44 MCG/ACT Inhaler   Generic drug:  fluticasone     10.6 g    INHALE ONE PUFF INTO THE LUNGS TWO TIMES A DAY (APPOINTMENT NEEDED FOR ADDITIONAL REFILLS)    Mild intermittent asthma without complication       ipratropium - albuterol 0.5 mg/2.5 mg/3 mL 0.5-2.5 (3) MG/3ML neb solution    DUONEB    60 vial    Take 3 mLs by nebulization every 6 hours as needed for shortness of breath / dyspnea.    Moderate persistent asthma       JANTOVEN 2.5 MG tablet   Generic drug:  warfarin     110 tablet    Take 3.75 mg Tues, Thurs, Sat and 2.5 mg all other days, or as directed by the coumadin clinic.    Atrial fibrillation (H)       losartan 50 MG tablet    COZAAR    180 tablet    TAKE ONE TABLET BY MOUTH TWICE A DAY    S/P AVR (aortic valve replacement)       LUPRON DEPOT (3-MONTH) 22.5 MG kit   Generic drug:  leuprolide     1 each    Inject 22.5 MG, IM q 4 months per Dr. Zeyad Guevara, pt's Urologist in [place]. 4/6/2017    Malignant neoplasm of prostate (H)       simvastatin 40 MG tablet    ZOCOR    90 tablet    TAKE ONE TABLET BY MOUTH EVERY NIGHT AT BEDTIME    S/P AVR (aortic valve replacement)

## 2018-06-21 NOTE — PROGRESS NOTES
HPI and Plan: #[id number]  See dictation    No orders of the defined types were placed in this encounter.      No orders of the defined types were placed in this encounter.      There are no discontinued medications.      Encounter Diagnosis   Name Primary?     Atrial flutter, unspecified type (H)        CURRENT MEDICATIONS:  Current Outpatient Prescriptions   Medication Sig Dispense Refill     acetaminophen (TYLENOL) 325 MG tablet Take 2 tablets (650 mg) by mouth every 4 hours as needed for mild pain or fever 100 tablet      albuterol (PROAIR HFA, PROVENTIL HFA, VENTOLIN HFA) 108 (90 BASE) MCG/ACT inhaler Inhale 2 puffs into the lungs every 6 hours as needed for shortness of breath / dyspnea or wheezing 1 Inhaler 6     bicalutamide (CASODEX) 50 MG tablet Take 1 tablet (50 mg) by mouth daily       FLOVENT HFA 44 MCG/ACT Inhaler INHALE ONE PUFF INTO THE LUNGS TWO TIMES A DAY (APPOINTMENT NEEDED FOR ADDITIONAL REFILLS) 10.6 g 0     ipratropium - albuterol 0.5 mg/2.5 mg/3 mL (DUONEB) 0.5-2.5 (3) MG/3ML nebulization Take 3 mLs by nebulization every 6 hours as needed for shortness of breath / dyspnea. 60 vial 1     leuprolide (LUPRON DEPOT) 22.5 MG kit Inject 22.5 MG, IM q 4 months per Dr. Zeyad Guevara, pt's Urologist in [place]. 4/6/2017 1 each 3     losartan (COZAAR) 50 MG tablet TAKE ONE TABLET BY MOUTH TWICE A  tablet 3     simvastatin (ZOCOR) 40 MG tablet TAKE ONE TABLET BY MOUTH EVERY NIGHT AT BEDTIME 90 tablet 3     warfarin (JANTOVEN) 2.5 MG tablet Take 3.75 mg Tues, Thurs, Sat and 2.5 mg all other days, or as directed by the coumadin clinic. 110 tablet 3     BETA BLOCKER NOT PRESCRIBED, INTENTIONAL, Beta Blocker not prescribed intentionally due to Hypotension (SBP < 90)  0       ALLERGIES     Allergies   Allergen Reactions     No Known Drug Allergies        PAST MEDICAL HISTORY:  Past Medical History:   Diagnosis Date     Arthritis      Ascending aortic aneurysm (H)     repaired with Hemashield graft  7/2014     Asthma      Complete AV block (H)     sp CRTP implant     Congestive heart failure (H)      Coronary artery disease     mild-moderate stenosis by angiography     H/O aortic valve replacement     bioprosthetic, 7/2014     Hypertension      Malignant neoplasm of prostate (H)     Prostate cancer     Permanent atrial fibrillation (H)     chronic       PAST SURGICAL HISTORY:  Past Surgical History:   Procedure Laterality Date     ARTHROPLASTY HIP  1/21/2013    Procedure: ARTHROPLASTY HIP;  right total hip arthroplasty;  Surgeon: Rober Alves MD;  Location: [facility] OR     C NONSPECIFIC PROCEDURE      Hernia repair     C NONSPECIFIC PROCEDURE      Prostatectomy/cancer     C TOTAL HIP ARTHROPLASTY  1/21/13    Right     GENITOURINARY SURGERY  2001    Prostatectomy       ORTHOPEDIC SURGERY      Left SONALI     PHACOEMULSIFICATION WITH STANDARD INTRAOCULAR LENS IMPLANT Right 10/6/2016    Procedure: PHACOEMULSIFICATION WITH STANDARD INTRAOCULAR LENS IMPLANT;  Surgeon: Elder Rueda MD;  Location: [facility] OR     PHACOEMULSIFICATION WITH STANDARD INTRAOCULAR LENS IMPLANT Left 10/20/2016    Procedure: PHACOEMULSIFICATION WITH STANDARD INTRAOCULAR LENS IMPLANT;  Surgeon: Elder Rueda MD;  Location: [facility] OR     REPAIR ANEURYSM ASCENDING AORTA  7/17/2014    2. Aortic aneurysm repair with 32 mm Hemashield graft.      REPLACE VALVE AORTIC  7/17/2014    1. Aortic valve replacement with 23 mm [employer] Trifecta tissue valve.      s/p aneurysm repair by Dr. Friedman       s/p avr         FAMILY HISTORY:  Family History   Problem Relation Age of Onset     Asthma Father      Asthma Paternal Grandmother      Asthma Daughter        SOCIAL HISTORY:  Social History     Social History     Marital status:      Spouse name: N/A     Number of children: N/A     Years of education: N/A     Social History Main Topics     Smoking status: Never Smoker     Smokeless tobacco: Never Used     Alcohol use 0.0 oz/week     0 Standard  drinks or equivalent per week      Comment: rarely, once a week or less     Drug use: No     Sexual activity: No      Comment:  - live with friend - 3 children.     Other Topics Concern     Parent/Sibling W/ Cabg, Mi Or Angioplasty Before 65f 55m? No      Service Yes     Blood Transfusions No     Caffeine Concern No     Occupational Exposure No     Hobby Hazards No     Sleep Concern No     Stress Concern No     Weight Concern No     Special Diet No     Back Care No     Exercise Yes     Bike Helmet No     n/a     Seat Belt Yes     Self-Exams Yes     Social History Narrative       Review of Systems:  Skin:  Negative       Eyes:  Positive for glasses    ENT:  Positive for hearing loss hearing aids  Respiratory:  Positive for dyspnea on exertion starting to walk a little more    Cardiovascular:  Negative for;palpitations;chest pain;edema;lightheadedness;dizziness;fatigue      Gastroenterology: Negative      Genitourinary:  Negative      Musculoskeletal:  Positive for arthritis    Neurologic:  Negative      Psychiatric:  Negative      Heme/Lymph/Imm:  Negative      Endocrine:           Physical Exam:  Vitals: /70 (BP Location: Right arm, Patient Position: Fowlers, Cuff Size: Adult Regular)  Pulse 60  Ht 1.829 m (6')  Wt 88.5 kg (195 lb)  SpO2 97%  BMI 26.45 kg/m2    Constitutional:  cooperative, alert and oriented, well developed, well nourished, in no acute distress        Skin:  warm and dry to the touch, no apparent skin lesions or masses noted   pacemaker incision in the left infraclavicular area was well-healed      Head:  normocephalic, no masses or lesions        Eyes:  pupils equal and round, conjunctivae and lids unremarkable, sclera white, no xanthalasma, EOMS intact, no nystagmus        ENT:  no pallor or cyanosis, dentition good        Neck:  carotid pulses are full and equal bilaterally, JVP normal, no carotid bruit        Respiratory:  normal breath sounds, clear to auscultation,  normal A-P diameter, normal symmetry, normal respiratory excursion, no use of accessory muscles         Cardiac: regular rhythm;normal S1 and S2 irregular rhythm   crisp prosthetic valve sounds systolic murmur;grade 1        not assessed this visit                                        GI:  not assessed this visit        Extremities and Muscular Skeletal:  no deformities, clubbing, cyanosis, erythema observed;no edema              Neurological:           Psych:           CC  Cristina Caputo MD  [address]  [address]  [place] [address]

## 2018-06-22 DIAGNOSIS — Z95.2 S/P AVR (AORTIC VALVE REPLACEMENT): ICD-10-CM

## 2018-06-22 NOTE — TELEPHONE ENCOUNTER
"Requested Prescriptions   Pending Prescriptions Disp Refills     simvastatin (ZOCOR) 40 MG tablet [Pharmacy Med Name: SIMVASTATIN 40MG TABS] 90 tablet 0     Sig: TAKE ONE TABLET BY MOUTH EVERY NIGHT AT BEDTIME    Statins Protocol Passed    6/22/2018 12:05 PM       Passed - LDL on file in past 12 months    Recent Labs   Lab Test  04/12/18   0932   LDL  65            Passed - No abnormal creatine kinase in past 12 months    No lab results found.            Passed - Recent (12 mo) or future (30 days) visit within the authorizing provider's specialty    Patient had office visit in the last 12 months or has a visit in the next 30 days with authorizing provider or within the authorizing provider's specialty.  See \"Patient Info\" tab in inbasket, or \"Choose Columns\" in Meds & Orders section of the refill encounter.           Passed - Patient is age 18 or older          Last Written Prescription Date:  6-16-17  Last Fill Quantity: 90,  # refills: 3   Last Office Visit with [facility], [facility] or [facility] prescribing provider:  4/12/18   Future Office Visit:       "

## 2018-06-25 RX ORDER — SIMVASTATIN 40 MG
TABLET ORAL
Qty: 90 TABLET | Refills: 1 | Status: SHIPPED | OUTPATIENT
Start: 2018-06-25 | End: 2018-12-21

## 2018-06-28 ENCOUNTER — MEDICAL CORRESPONDENCE (OUTPATIENT)
Dept: HEALTH INFORMATION MANAGEMENT | Facility: CLINIC | Age: 83
End: 2018-06-28

## 2018-07-11 ENCOUNTER — ANTICOAGULATION THERAPY VISIT (OUTPATIENT)
Dept: ANTICOAGULATION | Facility: CLINIC | Age: 83
End: 2018-07-11
Payer: MEDICARE

## 2018-07-11 DIAGNOSIS — Z79.01 LONG-TERM (CURRENT) USE OF ANTICOAGULANTS: ICD-10-CM

## 2018-07-11 DIAGNOSIS — I50.33 ACUTE ON CHRONIC DIASTOLIC CONGESTIVE HEART FAILURE (H): ICD-10-CM

## 2018-07-11 LAB — INR POINT OF CARE: 2.1 (ref 0.86–1.14)

## 2018-07-11 PROCEDURE — 99207 ZZC NO CHARGE NURSE ONLY: CPT

## 2018-07-11 PROCEDURE — 85610 PROTHROMBIN TIME: CPT | Mod: QW

## 2018-07-11 PROCEDURE — 36416 COLLJ CAPILLARY BLOOD SPEC: CPT

## 2018-07-11 NOTE — MR AVS SNAPSHOT
Miguel ANAND Sandor   7/11/2018 9:00 AM   Anticoagulation Therapy Visit    Description:  85 year old male   Provider:  [facility]AG   Department:  Ph Anticoag           INR as of 7/11/2018     Today's INR 2.1      Anticoagulation Summary as of 7/11/2018     INR goal 2.0-3.0   Today's INR 2.1   Full warfarin instructions 3.75 mg on Tue, Thu, Sat; 2.5 mg all other days   Next INR check 8/15/2018    Indications   CHF (congestive heart failure) (H) [I50.9]  Long-term (current) use of anticoagulants [Z79.01] [Z79.01]         Your next Anticoagulation Clinic appointment(s)     Aug 15, 2018  9:45 AM CDT   Anticoagulation Visit with PH ANTI COAG   [facility]    [address] [place] [phone]   [phone]              Contact Numbers     Clinic Number:         July 2018 Details    Sun Mon Tue Wed Thu Fri Sat     1               2               3               4               5               6               7                 8               9               10               11      2.5 mg   See details      12      3.75 mg         13      2.5 mg         14      3.75 mg           15      2.5 mg         16      2.5 mg         17      3.75 mg         18      2.5 mg         19      3.75 mg         20      2.5 mg         21      3.75 mg           22      2.5 mg         23      2.5 mg         24      3.75 mg         25      2.5 mg         26      3.75 mg         27      2.5 mg         28      3.75 mg           29      2.5 mg         30      2.5 mg         31      3.75 mg              Date Details   07/11 This INR check               How to take your warfarin dose     To take:  2.5 mg Take 1 of the 2.5 mg tablets.    To take:  3.75 mg Take 1.5 of the 2.5 mg tablets.           August 2018 Details    Sun Mon Tue Wed Thu Fri Sat        1      2.5 mg         2      3.75 mg         3      2.5 mg         4      3.75 mg           5      2.5 mg         6      2.5 mg         7       3.75 mg         8      2.5 mg         9      3.75 mg         10      2.5 mg         11      3.75 mg           12      2.5 mg         13      2.5 mg         14      3.75 mg         15            16               17               18                 19               20               21               22               23               24               25                 26               27               28               29               30               31                 Date Details   No additional details    Date of next INR:  8/15/2018         How to take your warfarin dose     To take:  2.5 mg Take 1 of the 2.5 mg tablets.    To take:  3.75 mg Take 1.5 of the 2.5 mg tablets.

## 2018-07-11 NOTE — PROGRESS NOTES
ANTICOAGULATION FOLLOW-UP CLINIC VISIT    Patient Name:  Miguel Patten  Date:  7/11/2018  Contact Type:  Face to Face    SUBJECTIVE:     Patient Findings     Positives No Problem Findings           OBJECTIVE    INR Protime   Date Value Ref Range Status   07/11/2018 2.1 (A) 0.86 - 1.14 Final       ASSESSMENT / PLAN  INR assessment THER    Recheck INR In: 4 WEEKS    INR Location Clinic      Anticoagulation Summary as of 7/11/2018     INR goal 2.0-3.0   Today's INR 2.1   Warfarin maintenance plan 3.75 mg (2.5 mg x 1.5) on Tue, Thu, Sat; 2.5 mg (2.5 mg x 1) all other days   Full warfarin instructions 3.75 mg on Tue, Thu, Sat; 2.5 mg all other days   Weekly warfarin total 21.25 mg   No change documented Kerwin Faustin RN   Plan last modified Ashly Jones RN (6/6/2018)   Next INR check 8/15/2018   Target end date     Indications   CHF (congestive heart failure) (H) [I50.9]  Long-term (current) use of anticoagulants [Z79.01] [Z79.01]         Anticoagulation Episode Summary     INR check location     Preferred lab     Send INR reminders to [facility]    Comments 2.5 mg tablets, book, takes in AM      Anticoagulation Care Providers     Provider Role Specialty Phone number    Rober العراقي MD [facility] [phone]            See the Encounter Report to view Anticoagulation Flowsheet and Dosing Calendar (Go to Encounters tab in chart review, and find the Anticoagulation Therapy Visit)    Dosage adjustment made based on physician directed care plan.    Kerwin Faustin RN

## 2018-07-19 DIAGNOSIS — J45.20 MILD INTERMITTENT ASTHMA WITHOUT COMPLICATION: ICD-10-CM

## 2018-07-19 NOTE — TELEPHONE ENCOUNTER
"Requested Prescriptions   Pending Prescriptions Disp Refills     FLOVENT HFA 44 MCG/ACT Inhaler [Pharmacy Med Name: FLOVENT HFA 44MCG/ACT AERO] 10.6 g 0    Last Written Prescription Date:  5/21/18  Last Fill Quantity: 10.6 g,  # refills: 0   Last office visit: 12/20/2017 with prescribing provider:  4/12/18   Future Office Visit:     Sig: INHALE ONE PUFF INTO THE LUNGS TWO TIMES A DAY (APPOINTMENT NEEDED FOR ADDITIONAL REFILLS)    Inhaled Steroids Protocol Passed    7/19/2018 12:27 PM       Passed - Patient is age 12 or older       Passed - Asthma control assessment score within normal limits in last 6 months    Please review ACT score.          Passed - Recent (6 mo) or future (30 days) visit within the authorizing provider's specialty    Patient had office visit in the last 6 months or has a visit in the next 30 days with authorizing provider or within the authorizing provider's specialty.  See \"Patient Info\" tab in inbasket, or \"Choose Columns\" in Meds & Orders section of the refill encounter.              "

## 2018-07-20 RX ORDER — FLUTICASONE PROPIONATE 44 MCG
AEROSOL WITH ADAPTER (GRAM) INHALATION
Qty: 10.6 G | Refills: 0 | Status: SHIPPED | OUTPATIENT
Start: 2018-07-20 | End: 2018-09-21

## 2018-07-20 NOTE — TELEPHONE ENCOUNTER
ACT Total Scores 4/19/2017 5/2/2017 4/12/2018   ACT TOTAL SCORE - - -   ASTHMA ER VISITS - - -   ASTHMA HOSPITALIZATIONS - - -   ACT TOTAL SCORE (Goal Greater than or Equal to 20) 25 5 25   In the past 12 months, how many times did you visit the emergency room for your asthma without being admitted to the hospital? 0 - 0   In the past 12 months, how many times were you hospitalized overnight because of your asthma? 0 0 0

## 2018-07-20 NOTE — TELEPHONE ENCOUNTER
Prescription approved per [facility] Refill Protocol.    Quiana Ramos RN  [facility]

## 2018-08-15 ENCOUNTER — ANTICOAGULATION THERAPY VISIT (OUTPATIENT)
Dept: ANTICOAGULATION | Facility: CLINIC | Age: 83
End: 2018-08-15
Payer: MEDICARE

## 2018-08-15 DIAGNOSIS — Z79.01 LONG-TERM (CURRENT) USE OF ANTICOAGULANTS: ICD-10-CM

## 2018-08-15 DIAGNOSIS — I50.9 CHF (CONGESTIVE HEART FAILURE) (H): ICD-10-CM

## 2018-08-15 LAB — INR POINT OF CARE: 2.9 (ref 0.86–1.14)

## 2018-08-15 PROCEDURE — 36416 COLLJ CAPILLARY BLOOD SPEC: CPT

## 2018-08-15 PROCEDURE — 85610 PROTHROMBIN TIME: CPT | Mod: QW

## 2018-08-15 PROCEDURE — 99207 ZZC NO CHARGE NURSE ONLY: CPT

## 2018-08-15 NOTE — PROGRESS NOTES
ANTICOAGULATION FOLLOW-UP CLINIC VISIT    Patient Name:  Miguel Patten  Date:  8/15/2018  Contact Type:  Face to Face    SUBJECTIVE:     Patient Findings     Positives No Problem Findings           OBJECTIVE    INR Protime   Date Value Ref Range Status   08/15/2018 2.9 (A) 0.86 - 1.14 Final       ASSESSMENT / PLAN  INR assessment THER    Recheck INR In: 8 WEEKS    INR Location Clinic      Anticoagulation Summary as of 8/15/2018     INR goal 2.0-3.0   Today's INR 2.9   Warfarin maintenance plan 3.75 mg (2.5 mg x 1.5) on Tue, Thu, Sat; 2.5 mg (2.5 mg x 1) all other days   Full warfarin instructions 3.75 mg on Tue, Thu, Sat; 2.5 mg all other days   Weekly warfarin total 21.25 mg   No change documented Kerwin Faustin RN   Plan last modified Ashly Jones RN (6/6/2018)   Next INR check 10/10/2018   Target end date     Indications   CHF (congestive heart failure) (H) [I50.9]  Long-term (current) use of anticoagulants [Z79.01] [Z79.01]         Anticoagulation Episode Summary     INR check location     Preferred lab     Send INR reminders to [facility]    Comments 2.5 mg tablets, book, takes in AM      Anticoagulation Care Providers     Provider Role Specialty Phone number    Rober العراقي MD [facility] [phone]            See the Encounter Report to view Anticoagulation Flowsheet and Dosing Calendar (Go to Encounters tab in chart review, and find the Anticoagulation Therapy Visit)    Dosage adjustment made based on physician directed care plan.    Kerwin Faustin RN

## 2018-08-15 NOTE — MR AVS SNAPSHOT
Miguel ANAND Patten   8/15/2018 9:45 AM   Anticoagulation Therapy Visit    Description:  85 year old male   Provider:  TARIQ ANTI JOY   Department:  [facility]           INR as of 8/15/2018     Today's INR 2.9      Anticoagulation Summary as of 8/15/2018     INR goal 2.0-3.0   Today's INR 2.9   Full warfarin instructions 3.75 mg on Tue, Thu, Sat; 2.5 mg all other days   Next INR check 10/10/2018    Indications   CHF (congestive heart failure) (H) [I50.9]  Long-term (current) use of anticoagulants [Z79.01] [Z79.01]         Your next Anticoagulation Clinic appointment(s)     Oct 10, 2018  9:00 AM CDT   Anticoagulation Visit with PH ANTI COAG   [facility] ([address] [address]   [phone]              Contact Numbers     Clinic Number:         August 2018 Details    Sun Mon Tue Wed Thu Fri Sat        1               2               3               4                 5               6               7               8               9               10               11                 12               13               14               15      2.5 mg   See details      16      3.75 mg         17      2.5 mg         18      3.75 mg           19      2.5 mg         20      2.5 mg         21      3.75 mg         22      2.5 mg         23      3.75 mg         24      2.5 mg         25      3.75 mg           26      2.5 mg         27      2.5 mg         28      3.75 mg         29      2.5 mg         30      3.75 mg         31      2.5 mg           Date Details   08/15 This INR check               How to take your warfarin dose     To take:  2.5 mg Take 1 of the 2.5 mg tablets.    To take:  3.75 mg Take 1.5 of the 2.5 mg tablets.           September 2018 Details    Sun Mon Tue Wed Thu Fri Sat           1      3.75 mg           2      2.5 mg         3      2.5 mg         4      3.75 mg         5      2.5 mg         6      3.75 mg         7      2.5 mg          8      3.75 mg           9      2.5 mg         10      2.5 mg         11      3.75 mg         12      2.5 mg         13      3.75 mg         14      2.5 mg         15      3.75 mg           16      2.5 mg         17      2.5 mg         18      3.75 mg         19      2.5 mg         20      3.75 mg         21      2.5 mg         22      3.75 mg           23      2.5 mg         24      2.5 mg         25      3.75 mg         26      2.5 mg         27      3.75 mg         28      2.5 mg         29      3.75 mg           30      2.5 mg                Date Details   No additional details            How to take your warfarin dose     To take:  2.5 mg Take 1 of the 2.5 mg tablets.    To take:  3.75 mg Take 1.5 of the 2.5 mg tablets.           October 2018 Details    Sun Mon Tue Wed Thu Fri Sat      1      2.5 mg         2      3.75 mg         3      2.5 mg         4      3.75 mg         5      2.5 mg         6      3.75 mg           7      2.5 mg         8      2.5 mg         9      3.75 mg         10            11               12               13                 14               15               16               17               18               19               20                 21               22               23               24               25               26               27                 28               29               30               31                   Date Details   No additional details    Date of next INR:  10/10/2018         How to take your warfarin dose     To take:  2.5 mg Take 1 of the 2.5 mg tablets.    To take:  3.75 mg Take 1.5 of the 2.5 mg tablets.

## 2018-09-21 DIAGNOSIS — J45.20 MILD INTERMITTENT ASTHMA WITHOUT COMPLICATION: ICD-10-CM

## 2018-09-21 NOTE — TELEPHONE ENCOUNTER
"Requested Prescriptions   Pending Prescriptions Disp Refills     FLOVENT HFA 44 MCG/ACT Inhaler [Pharmacy Med Name: FLOVENT HFA 44MCG/ACT AERO] 10.6 g 0    Last Written Prescription Date:  07/20/2018  Last Fill Quantity: 10.6g,  # refills: 0   Last office visit: 12/20/2017 with prescribing provider:  04/12/2018Jaquan    Future Office Visit:     Sig: INHALE ONE PUFF INTO THE LUNGS TWO TIMES A DAY (APPOINTMENT NEEDED FOR ADDITIONAL REFILLS)    Inhaled Steroids Protocol Passed    9/21/2018 12:46 PM       Passed - Patient is age 12 or older       Passed - Asthma control assessment score within normal limits in last 6 months    Please review ACT score.          Passed - Recent (6 mo) or future (30 days) visit within the authorizing provider's specialty    Patient had office visit in the last 6 months or has a visit in the next 30 days with authorizing provider or within the authorizing provider's specialty.  See \"Patient Info\" tab in inbasket, or \"Choose Columns\" in Meds & Orders section of the refill encounter.              "

## 2018-09-24 RX ORDER — FLUTICASONE PROPIONATE 44 MCG
AEROSOL WITH ADAPTER (GRAM) INHALATION
Qty: 10.6 G | Refills: 0 | Status: SHIPPED | OUTPATIENT
Start: 2018-09-24 | End: 2018-11-19

## 2018-09-24 NOTE — TELEPHONE ENCOUNTER
Routing refill request to provider for review/approval because:  Lou given x1 and patient did not follow up, please advise  Patient needs to be seen because:  Due for follow up    NILO Lee, RN  [facility]

## 2018-10-01 ENCOUNTER — ALLIED HEALTH/NURSE VISIT (OUTPATIENT)
Dept: CARDIOLOGY | Facility: CLINIC | Age: 83
End: 2018-10-01
Payer: MEDICARE

## 2018-10-01 DIAGNOSIS — Z95.0 CARDIAC PACEMAKER IN SITU: Primary | ICD-10-CM

## 2018-10-01 PROCEDURE — 93294 REM INTERROG EVL PM/LDLS PM: CPT | Performed by: INTERNAL MEDICINE

## 2018-10-01 PROCEDURE — 93296 REM INTERROG EVL PM/IDS: CPT | Performed by: INTERNAL MEDICINE

## 2018-10-01 NOTE — MR AVS SNAPSHOT
"              After Visit Summary   10/1/2018    Miguel Patten    MRN: [id number]           Patient Information     Date Of Birth          12/16/1932        Visit Information        Provider Department      10/1/2018 10:00 AM [facility]        Today's Diagnoses     Cardiac pacemaker in situ    -  1       Follow-ups after your visit        Your next 10 appointments already scheduled     Oct 10, 2018  9:00 AM CDT   Anticoagulation Visit with PH ANTI COAG   [facility]    [address] [place] [id number] [phone]              Who to contact     If you have questions or need follow up information about today's clinic visit or your schedule please contact [facility] directly at [phone].  Normal or non-critical lab and imaging results will be communicated to you by MyChart, letter or phone within 4 business days after the clinic has received the results. If you do not hear from us within 7 days, please contact the clinic through MyChart or phone. If you have a critical or abnormal lab result, we will notify you by phone as soon as possible.  Submit refill requests through [employer] or call your pharmacy and they will forward the refill request to us. Please allow 3 business days for your refill to be completed.          Additional Information About Your Visit        MyChart Information     [employer] lets you send messages to your doctor, view your test results, renew your prescriptions, schedule appointments and more. To sign up, go to [email or web address] . Click on \"Log in\" on the left side of the screen, which will take you to the Welcome page. Then click on \"Sign up Now\" on the right side of the page.     You will be asked to enter the access code listed below, as well as some personal information. Please follow the directions to create your username and " password.     Your access code is: [email or web address]  Expires: 2018 10:21 AM     Your access code will  in 90 days. If you need help or a new code, please call your [facility] or [phone].        Care EveryWhere ID     This is your Care EveryWhere ID. This could be used by other organizations to access your [facility] medical records  [id number]         Blood Pressure from Last 3 Encounters:   18 140/70   18 118/70   17 138/60    Weight from Last 3 Encounters:   18 88.5 kg (195 lb)   18 89.6 kg (197 lb 9.6 oz)   17 83 kg (183 lb)              We Performed the Following     INTERROGATION DEVICE EVAL REMOTE, PACER/ICD (34238)     PM DEVICE INTERROGATE REMOTE (66726)        Primary Care Provider Office Phone # Fax #    Xnrylgts Celestine Gonzales,  [phone]       [address] [place] [address]        Equal Access to Services     [facility]: Hadii aad ku hadasho Soomaali, waaxda luqadaha, qaybta kaalmada adeegyada, glen loving . So [facility] [phone].    ATENCIÓN: Si habla español, tiene a walker disposición servicios gratuitos de asistencia lingüística. Katelyn al [phone].    We comply with applicable federal civil rights laws and [place] laws. We do not discriminate on the basis of race, color, national origin, age, disability, sex, sexual orientation, or gender identity.            Thank you!     Thank you for choosing [facility]  for your care. Our goal is always to provide you with excellent care. Hearing back from our patients is one way we can continue to improve our services. Please take a few minutes to complete the written survey that you may receive in the mail after your visit with us. Thank you!             Your Updated Medication List - Protect others around you: Learn how to safely use, store and throw away your medicines at www.disposemymeds.org.          This  list is accurate as of 10/1/18 10:21 AM.  Always use your most recent med list.                   Brand Name Dispense Instructions for use Diagnosis    acetaminophen 325 MG tablet    TYLENOL    100 tablet    Take 2 tablets (650 mg) by mouth every 4 hours as needed for mild pain or fever        albuterol 108 (90 Base) MCG/ACT inhaler    PROAIR HFA/PROVENTIL HFA/VENTOLIN HFA    1 Inhaler    Inhale 2 puffs into the lungs every 6 hours as needed for shortness of breath / dyspnea or wheezing    Mild intermittent asthma, uncomplicated       BETA BLOCKER NOT PRESCRIBED (INTENTIONAL)      Beta Blocker not prescribed intentionally due to Hypotension (SBP < 90)    Acute on chronic diastolic congestive heart failure (H)       bicalutamide 50 MG tablet    CASODEX     Take 1 tablet (50 mg) by mouth daily        FLOVENT HFA 44 MCG/ACT Inhaler   Generic drug:  fluticasone     10.6 g    INHALE ONE PUFF INTO THE LUNGS TWO TIMES A DAY (APPOINTMENT NEEDED FOR ADDITIONAL REFILLS)    Mild intermittent asthma without complication       ipratropium - albuterol 0.5 mg/2.5 mg/3 mL 0.5-2.5 (3) MG/3ML neb solution    DUONEB    60 vial    Take 3 mLs by nebulization every 6 hours as needed for shortness of breath / dyspnea.    Moderate persistent asthma       JANTOVEN 2.5 MG tablet   Generic drug:  warfarin     110 tablet    Take 3.75 mg Tues, Thurs, Sat and 2.5 mg all other days, or as directed by the coumadin clinic.    Atrial fibrillation (H)       losartan 50 MG tablet    COZAAR    180 tablet    TAKE ONE TABLET BY MOUTH TWICE A DAY    S/P AVR (aortic valve replacement)       LUPRON DEPOT (3-MONTH) 22.5 MG kit   Generic drug:  leuprolide     1 each    Inject 22.5 MG, IM q 4 months per Dr. Zeyad Guevara, pt's Urologist in [place]. 4/6/2017    Malignant neoplasm of prostate (H)       simvastatin 40 MG tablet    ZOCOR    90 tablet    TAKE ONE TABLET BY MOUTH EVERY NIGHT AT BEDTIME    S/P AVR (aortic valve replacement)

## 2018-10-01 NOTE — PROGRESS NOTES
[employer] Int[employer] CRT-P (BiV) Remote PPM Device Check  RVP: 99% LVP: 99%  Mode: VVIR        Presenting Rhythm: BiVP  Heart Rate: adequate heart rates per histogram  Sensing: stable    Pacing Threshold: not performed    Impedance: stable  Battery Status: 6.5 years remaining  Atrial Arrhythmia: chronic Afib, taking Warfarin  Ventricular Arrhythmia: 9 vent high rates for NSVT (CHB). 2 EGMs available showing episodes lasting 2 seconds each, rates 170 - 200bpm. No assoc symptoms. EF 55 - 60%. Pt is DNR/DNI. Reviewed findings with Nayeli PIERCE.      Care Plan: F/U Lat[employer] NXT q 3 months. Order in for annual OV to be scheduled in May 2019.  Gave results and next transmission date over the phone to pt. Celia ARMSTRONG

## 2018-10-10 ENCOUNTER — TELEPHONE (OUTPATIENT)
Dept: INTERNAL MEDICINE | Facility: OTHER | Age: 83
End: 2018-10-10

## 2018-10-10 ENCOUNTER — ANTICOAGULATION THERAPY VISIT (OUTPATIENT)
Dept: ANTICOAGULATION | Facility: CLINIC | Age: 83
End: 2018-10-10
Payer: MEDICARE

## 2018-10-10 ENCOUNTER — ALLIED HEALTH/NURSE VISIT (OUTPATIENT)
Dept: FAMILY MEDICINE | Facility: CLINIC | Age: 83
End: 2018-10-10
Payer: MEDICARE

## 2018-10-10 DIAGNOSIS — C61 MALIGNANT NEOPLASM OF PROSTATE (H): ICD-10-CM

## 2018-10-10 DIAGNOSIS — I50.9 CHF (CONGESTIVE HEART FAILURE) (H): ICD-10-CM

## 2018-10-10 LAB — INR POINT OF CARE: 2.8 (ref 0.86–1.14)

## 2018-10-10 PROCEDURE — 99207 ZZC NO CHARGE NURSE ONLY: CPT

## 2018-10-10 PROCEDURE — 85610 PROTHROMBIN TIME: CPT | Mod: QW

## 2018-10-10 PROCEDURE — 36416 COLLJ CAPILLARY BLOOD SPEC: CPT

## 2018-10-10 NOTE — PROGRESS NOTES
ANTICOAGULATION FOLLOW-UP CLINIC VISIT    Patient Name:  Miguel Patten  Date:  10/10/2018  Contact Type:  Face to Face    SUBJECTIVE:     Patient Findings     Positives No Problem Findings           OBJECTIVE    INR Protime   Date Value Ref Range Status   10/10/2018 2.8 (A) 0.86 - 1.14 Final       ASSESSMENT / PLAN  INR assessment THER    Recheck INR In: 8 WEEKS    INR Location Clinic      Anticoagulation Summary as of 10/10/2018     INR goal 2.0-3.0   Today's INR 2.8   Warfarin maintenance plan 3.75 mg (2.5 mg x 1.5) on Tue, Thu, Sat; 2.5 mg (2.5 mg x 1) all other days   Full warfarin instructions 3.75 mg on Tue, Thu, Sat; 2.5 mg all other days   Weekly warfarin total 21.25 mg   No change documented Ashly Jones RN   Plan last modified Ashly Jones RN (6/6/2018)   Next INR check 12/5/2018   Target end date     Indications   CHF (congestive heart failure) (H) [I50.9]  Long-term (current) use of anticoagulants [Z79.01] [Z79.01]         Anticoagulation Episode Summary     INR check location     Preferred lab     Send INR reminders to [facility]    Comments 2.5 mg tablets, book, takes in AM      Anticoagulation Care Providers     Provider Role Specialty Phone number    Rober العراقي MD [facility] [phone]            See the Encounter Report to view Anticoagulation Flowsheet and Dosing Calendar (Go to Encounters tab in chart review, and find the Anticoagulation Therapy Visit)    Dosage adjustment made based on physician directed care plan.      Ashly Jones RN

## 2018-10-10 NOTE — MR AVS SNAPSHOT
Miguel ANAND Patten   10/10/2018 9:00 AM   Anticoagulation Therapy Visit    Description:  85 year old male   Provider:  TARIQ ANTI COAHARMONY   Department:  [facility]           INR as of 10/10/2018     Today's INR 2.8      Anticoagulation Summary as of 10/10/2018     INR goal 2.0-3.0   Today's INR 2.8   Full warfarin instructions 3.75 mg on Tue, Thu, Sat; 2.5 mg all other days   Next INR check 12/5/2018    Indications   CHF (congestive heart failure) (H) [I50.9]  Long-term (current) use of anticoagulants [Z79.01] [Z79.01]         Your next Anticoagulation Clinic appointment(s)     Dec 05, 2018  9:00 AM CST   Anticoagulation Visit with PH ANTI COAG   [facility]    [address] [place] [address]   [phone]              Contact Numbers     Clinic Number:         October 2018 Details    Sun Mon Tue Wed Thu Fri Sat      1               2               3               4               5               6                 7               8               9               10      2.5 mg   See details      11      3.75 mg         12      2.5 mg         13      3.75 mg           14      2.5 mg         15      2.5 mg         16      3.75 mg         17      2.5 mg         18      3.75 mg         19      2.5 mg         20      3.75 mg           21      2.5 mg         22      2.5 mg         23      3.75 mg         24      2.5 mg         25      3.75 mg         26      2.5 mg         27      3.75 mg           28      2.5 mg         29      2.5 mg         30      3.75 mg         31      2.5 mg             Date Details   10/10 This INR check               How to take your warfarin dose     To take:  2.5 mg Take 1 of the 2.5 mg tablets.    To take:  3.75 mg Take 1.5 of the 2.5 mg tablets.           November 2018 Details    Sun Mon Tue Wed Thu Fri Sat         1      3.75 mg         2      2.5 mg         3      3.75 mg           4      2.5 mg         5      2.5 mg         6      3.75  mg         7      2.5 mg         8      3.75 mg         9      2.5 mg         10      3.75 mg           11      2.5 mg         12      2.5 mg         13      3.75 mg         14      2.5 mg         15      3.75 mg         16      2.5 mg         17      3.75 mg           18      2.5 mg         19      2.5 mg         20      3.75 mg         21      2.5 mg         22      3.75 mg         23      2.5 mg         24      3.75 mg           25      2.5 mg         26      2.5 mg         27      3.75 mg         28      2.5 mg         29      3.75 mg         30      2.5 mg           Date Details   No additional details            How to take your warfarin dose     To take:  2.5 mg Take 1 of the 2.5 mg tablets.    To take:  3.75 mg Take 1.5 of the 2.5 mg tablets.           December 2018 Details    Sun Mon Tue Wed Thu Fri Sat           1      3.75 mg           2      2.5 mg         3      2.5 mg         4      3.75 mg         5            6               7               8                 9               10               11               12               13               14               15                 16               17               18               19               20               21               22                 23               24               25               26               27               28               29                 30               31                     Date Details   No additional details    Date of next INR:  12/5/2018         How to take your warfarin dose     To take:  2.5 mg Take 1 of the 2.5 mg tablets.    To take:  3.75 mg Take 1.5 of the 2.5 mg tablets.

## 2018-10-10 NOTE — TELEPHONE ENCOUNTER
Reason for Call:  Other call back    Detailed comments: Patient had come in today to have an Lupron injection. Was told there was not an authorization for this. When he got home he called Dr. Zeyad Guevara at the [facility]. He told him if there was any issues with this to have our office call him. Please call Dr. Guevara at [phone] and he can assist with this. Once it has been addressed please call Octavio to get an appt scheduled.     Phone Number Patient can be reached at: Home number on file [phone] (home)    Best Time: any    Can we leave a detailed message on this number? YES    Call taken on 10/10/2018 at 4:18 PM by Aurora Hernandez

## 2018-10-12 NOTE — TELEPHONE ENCOUNTER
I am waiting on a response from my supervisor weather we are able to do this off outside orders. Edyta Wolf MA     10/12/2018

## 2018-10-15 NOTE — TELEPHONE ENCOUNTER
Patient called back and is still waiting for an answer about this. He said he has done this here before.   Thank you,  Jessica Hernandez   for [facility]

## 2018-10-16 NOTE — TELEPHONE ENCOUNTER
Pt is having orders faxed here. This needs to be ordered, so wont be able to schedule pt until next week.

## 2018-10-16 NOTE — TELEPHONE ENCOUNTER
Received a call from Arlene at [facility], about the order and stated she faxed it again on the 10th but to [facility] this time.  She faxed it again just now to [facility] and we did get it, I routed it to Edyta.    Carrie ORLANDO

## 2018-10-22 ENCOUNTER — ALLIED HEALTH/NURSE VISIT (OUTPATIENT)
Dept: FAMILY MEDICINE | Facility: CLINIC | Age: 83
End: 2018-10-22
Payer: MEDICARE

## 2018-10-22 DIAGNOSIS — C61 MALIGNANT NEOPLASM OF PROSTATE (H): Primary | ICD-10-CM

## 2018-10-22 PROCEDURE — 96372 THER/PROPH/DIAG INJ SC/IM: CPT

## 2018-10-22 NOTE — NURSING NOTE
Chief Complaint   Patient presents with     Imm/Inj     Lupron     Prior to injection verified patient identity using patient's name and date of birth.  Due to injection administration, patient instructed to remain in clinic for 15 minutes  afterwards, and to report any adverse reaction to me immediately.    The following medication was given:     MEDICATION: Lupron Depot 22.5 mg  ROUTE: IM  SITE: RUQ - Gluteus  DOSE: 22.5  LOT #: [id number]  :  Juliet  EXPIRATION DATE:  11/18/20  NDC#: 0703-8248-34

## 2018-10-22 NOTE — MR AVS SNAPSHOT
"              After Visit Summary   10/22/2018    Miguel Patten    MRN: [id number]           Patient Information     Date Of Birth          12/16/1932        Visit Information        Provider Department      10/22/2018 9:00 AM PH FLOAT [facility]        Today's Diagnoses     Malignant neoplasm of prostate (H)    -  1       Follow-ups after your visit        Your next 10 appointments already scheduled     Dec 05, 2018  9:00 AM CST   Anticoagulation Visit with PH ANTI COAG   [facility]    [address] [address] [phone]            Jan 08, 2019  3:45 PM CST   Remote PPM Check with BENNETT TECH1   [facility] ([facility])    [address]  [facility] [address] [phone] OPT 2           This appointment is for a remote check of your pacemaker.  This is not an appointment at the office.              Who to contact     If you have questions or need follow up information about today's clinic visit or your schedule please contact [facility] directly at [phone].  Normal or non-critical lab and imaging results will be communicated to you by One Diary[employer], letter or phone within 4 business days after the clinic has received the results. If you do not hear from us within 7 days, please contact the clinic through [employer] or phone. If you have a critical or abnormal lab result, we will notify you by phone as soon as possible.  Submit refill requests through [employer] or call your pharmacy and they will forward the refill request to us. Please allow 3 business days for your refill to be completed.          Additional Information About Your Visit        One DiaryharRockpack Information     [employer] lets you send messages to your doctor, view your test results, renew your prescriptions, schedule appointments and more. To sign up, go to [email or web address] . Click on \"Log in\" " "on the left side of the screen, which will take you to the Welcome page. Then click on \"Sign up Now\" on the right side of the page.     You will be asked to enter the access code listed below, as well as some personal information. Please follow the directions to create your username and password.     Your access code is: [email or web address]  Expires: 2018 10:21 AM     Your access code will  in 90 days. If you need help or a new code, please call your [facility] clinic or [phone].        Care EveryWhere ID     This is your Care EveryWhere ID. This could be used by other organizations to access your [facility] medical records  [id number]         Blood Pressure from Last 3 Encounters:   18 140/70   18 118/70   17 138/60    Weight from Last 3 Encounters:   18 195 lb (88.5 kg)   18 197 lb 9.6 oz (89.6 kg)   17 183 lb (83 kg)              We Performed the Following     LEUPROLIDE ACETATE 7.5MG        Primary Care Provider Office Phone # Fax #    Vladimir Celestine Gonzales,  [phone]        [address] [place] [address]        Equal Access to Services     RODOLFO MURPHY AH: Hadii lio ku hadasho Soomaali, waaxda luqadaha, qaybta kaalmada adeegyada, waxay lul haymansoor loving . So [facility] [phone].    ATENCIÓN: Si habla español, tiene a walker disposición servicios gratuitos de asistencia lingüística. Llame al [phone].    We comply with applicable federal civil rights laws and [place] laws. We do not discriminate on the basis of race, color, national origin, age, disability, sex, sexual orientation, or gender identity.            Thank you!     Thank you for choosing [facility]  for your care. Our goal is always to provide you with excellent care. Hearing back from our patients is one way we can continue to improve our services. Please take a few minutes to complete the written survey that you may receive in the mail after your " visit with us. Thank you!             Your Updated Medication List - Protect others around you: Learn how to safely use, store and throw away your medicines at www.disposemymeds.org.          This list is accurate as of 10/22/18  9:17 AM.  Always use your most recent med list.                   Brand Name Dispense Instructions for use Diagnosis    acetaminophen 325 MG tablet    TYLENOL    100 tablet    Take 2 tablets (650 mg) by mouth every 4 hours as needed for mild pain or fever        albuterol 108 (90 Base) MCG/ACT inhaler    PROAIR HFA/PROVENTIL HFA/VENTOLIN HFA    1 Inhaler    Inhale 2 puffs into the lungs every 6 hours as needed for shortness of breath / dyspnea or wheezing    Mild intermittent asthma, uncomplicated       BETA BLOCKER NOT PRESCRIBED (INTENTIONAL)      Beta Blocker not prescribed intentionally due to Hypotension (SBP < 90)    Acute on chronic diastolic congestive heart failure (H)       bicalutamide 50 MG tablet    CASODEX     Take 1 tablet (50 mg) by mouth daily        FLOVENT HFA 44 MCG/ACT Inhaler   Generic drug:  fluticasone     10.6 g    INHALE ONE PUFF INTO THE LUNGS TWO TIMES A DAY (APPOINTMENT NEEDED FOR ADDITIONAL REFILLS)    Mild intermittent asthma without complication       ipratropium - albuterol 0.5 mg/2.5 mg/3 mL 0.5-2.5 (3) MG/3ML neb solution    DUONEB    60 vial    Take 3 mLs by nebulization every 6 hours as needed for shortness of breath / dyspnea.    Moderate persistent asthma       JANTOVEN 2.5 MG tablet   Generic drug:  warfarin     110 tablet    Take 3.75 mg Tues, Thurs, Sat and 2.5 mg all other days, or as directed by the coumadin clinic.    Atrial fibrillation (H)       losartan 50 MG tablet    COZAAR    180 tablet    TAKE ONE TABLET BY MOUTH TWICE A DAY    S/P AVR (aortic valve replacement)       LUPRON DEPOT (3-MONTH) 22.5 MG kit   Generic drug:  leuprolide     1 each    Inject 22.5 MG, IM q 6 months per Dr. Zeyad Guevara, pt's Urologist in [place]. 10/17/2018     Malignant neoplasm of prostate (H)       simvastatin 40 MG tablet    ZOCOR    90 tablet    TAKE ONE TABLET BY MOUTH EVERY NIGHT AT BEDTIME    S/P AVR (aortic valve replacement)

## 2018-10-31 DIAGNOSIS — I48.91 ATRIAL FIBRILLATION (H): ICD-10-CM

## 2018-10-31 RX ORDER — WARFARIN SODIUM 2.5 MG/1
TABLET ORAL
Qty: 110 TABLET | Refills: 3 | Status: SHIPPED | OUTPATIENT
Start: 2018-10-31 | End: 2018-12-19

## 2018-10-31 NOTE — TELEPHONE ENCOUNTER
"Requested Prescriptions   Pending Prescriptions Disp Refills     warfarin (JANTOVEN) 2.5 MG tablet 110 tablet 3    Last Written Prescription Date:  6/6/18  Last Fill Quantity: 110,  # refills: 3   Last office visit: 10/22/2018 with prescribing provider:  4/12/18   Future Office Visit:     Sig: Take 3.75 mg Tues, Thurs, Sat and 2.5 mg all other days, or as directed by the coumadin clinic.    Vitamin K Antagonists Failed    10/31/2018  1:10 PM       Failed - INR is within goal in the past 6 weeks    Confirm INR is within goal in the past 6 weeks.     Recent Labs   Lab Test 10/10/18   INR  2.8*                      Passed - Recent (12 mo) or future (30 days) visit within the authorizing provider's specialty    Patient had office visit in the last 12 months or has a visit in the next 30 days with authorizing provider or within the authorizing provider's specialty.  See \"Patient Info\" tab in inbasket, or \"Choose Columns\" in Meds & Orders section of the refill encounter.             Passed - Patient is 18 years of age or older        "

## 2018-11-15 ENCOUNTER — ALLIED HEALTH/NURSE VISIT (OUTPATIENT)
Dept: FAMILY MEDICINE | Facility: OTHER | Age: 83
End: 2018-11-15
Payer: MEDICARE

## 2018-11-15 DIAGNOSIS — Z23 NEED FOR PROPHYLACTIC VACCINATION AND INOCULATION AGAINST INFLUENZA: Primary | ICD-10-CM

## 2018-11-15 PROCEDURE — 99207 ZZC NO CHARGE NURSE ONLY: CPT

## 2018-11-15 PROCEDURE — 90662 IIV NO PRSV INCREASED AG IM: CPT

## 2018-11-15 PROCEDURE — G0008 ADMIN INFLUENZA VIRUS VAC: HCPCS

## 2018-11-15 NOTE — PROGRESS NOTES
Prior to injection verified patient identity using patient's name and date of birth.  Due to injection administration, patient instructed to remain in clinic for 15 minutes  afterwards, and to report any adverse reaction to me immediately.    Injectable Influenza Immunization Documentation    1.  Is the person to be vaccinated sick today?   No    2. Does the person to be vaccinated have an allergy to a component   of the vaccine?   No  Egg Allergy Algorithm Link    3. Has the person to be vaccinated ever had a serious reaction   to influenza vaccine in the past?   No    4. Has the person to be vaccinated ever had Guillain-Barré syndrome?   No    Form completed by Lenora Barfield MA

## 2018-11-15 NOTE — MR AVS SNAPSHOT
After Visit Summary   11/15/2018    Miguel Patten    MRN: [id number]           Patient Information     Date Of Birth          12/16/1932        Visit Information        Provider Department      11/15/2018 3:30 PM NL FLU SHOT [facility]        Today's Diagnoses     Need for prophylactic vaccination and inoculation against influenza    -  1       Follow-ups after your visit        Your next 10 appointments already scheduled     Dec 05, 2018  9:00 AM CST   Anticoagulation Visit with PH ANTI COAG   [facility]    [address] [address] [phone]            Jan 08, 2019  3:45 PM CST   Remote PPM Check with BENNETT TECH1   [facility] ([facility])    [address]  [facility] [address] [phone] OPT 2           This appointment is for a remote check of your pacemaker.  This is not an appointment at the office.              Who to contact     If you have questions or need follow up information about today's clinic visit or your schedule please contact [facility] directly at [phone].  Normal or non-critical lab and imaging results will be communicated to you by MyChart, letter or phone within 4 business days after the clinic has received the results. If you do not hear from us within 7 days, please contact the clinic through MyChart or phone. If you have a critical or abnormal lab result, we will notify you by phone as soon as possible.  Submit refill requests through [employer] or call your pharmacy and they will forward the refill request to us. Please allow 3 business days for your refill to be completed.          Additional Information About Your Visit        Care EveryWhere ID     This is your Care EveryWhere ID. This could be used by other organizations to access your [facility] medical records  [id number]         Blood Pressure  from Last 3 Encounters:   06/21/18 140/70   04/12/18 118/70   06/12/17 138/60    Weight from Last 3 Encounters:   06/21/18 195 lb (88.5 kg)   04/12/18 197 lb 9.6 oz (89.6 kg)   06/12/17 183 lb (83 kg)              We Performed the Following     FLU VACCINE, INCREASED ANTIGEN, PRESV FREE, AGE 65+ [78511]     Vaccine Administration, Initial [37770]        Primary Care Provider Office Phone # Fax #    Vladimir Gonzales, [phone]       [address] [place] [address]        Equal Access to Services     NATHALY MURPHY : Kade Damon, wakiana gonzalez, rukhsana mendiolamaranjan barksdale, glen osorio. So [facility] [phone].    ATENCIÓN: Si habla español, tiene a walker disposición servicios gratuitos de asistencia lingüística. Llame al [phone].    We comply with applicable federal civil rights laws and [place] laws. We do not discriminate on the basis of race, color, national origin, age, disability, sex, sexual orientation, or gender identity.            Thank you!     Thank you for choosing [facility]  for your care. Our goal is always to provide you with excellent care. Hearing back from our patients is one way we can continue to improve our services. Please take a few minutes to complete the written survey that you may receive in the mail after your visit with us. Thank you!             Your Updated Medication List - Protect others around you: Learn how to safely use, store and throw away your medicines at www.disposemymeds.org.          This list is accurate as of 11/15/18  3:47 PM.  Always use your most recent med list.                   Brand Name Dispense Instructions for use Diagnosis    acetaminophen 325 MG tablet    TYLENOL    100 tablet    Take 2 tablets (650 mg) by mouth every 4 hours as needed for mild pain or fever        albuterol 108 (90 Base) MCG/ACT inhaler    PROAIR HFA/PROVENTIL HFA/VENTOLIN HFA    1 Inhaler     Inhale 2 puffs into the lungs every 6 hours as needed for shortness of breath / dyspnea or wheezing    Mild intermittent asthma, uncomplicated       BETA BLOCKER NOT PRESCRIBED (INTENTIONAL)      Beta Blocker not prescribed intentionally due to Hypotension (SBP < 90)    Acute on chronic diastolic congestive heart failure (H)       bicalutamide 50 MG tablet    CASODEX     Take 1 tablet (50 mg) by mouth daily        FLOVENT HFA 44 MCG/ACT Inhaler   Generic drug:  fluticasone     10.6 g    INHALE ONE PUFF INTO THE LUNGS TWO TIMES A DAY (APPOINTMENT NEEDED FOR ADDITIONAL REFILLS)    Mild intermittent asthma without complication       ipratropium - albuterol 0.5 mg/2.5 mg/3 mL 0.5-2.5 (3) MG/3ML neb solution    DUONEB    60 vial    Take 3 mLs by nebulization every 6 hours as needed for shortness of breath / dyspnea.    Moderate persistent asthma       losartan 50 MG tablet    COZAAR    180 tablet    TAKE ONE TABLET BY MOUTH TWICE A DAY    S/P AVR (aortic valve replacement)       LUPRON DEPOT (3-MONTH) 22.5 MG kit   Generic drug:  leuprolide     1 each    Inject 22.5 MG, IM q 6 months per Dr. Zeyad Guevara, pt's Urologist in [place]. 10/17/2018    Malignant neoplasm of prostate (H)       simvastatin 40 MG tablet    ZOCOR    90 tablet    TAKE ONE TABLET BY MOUTH EVERY NIGHT AT BEDTIME    S/P AVR (aortic valve replacement)       warfarin 2.5 MG tablet    JANTOVEN    110 tablet    Take 3.75 mg Tues, Thurs, Sat and 2.5 mg all other days, or as directed by the coumadin clinic.    Atrial fibrillation (H)

## 2018-11-19 DIAGNOSIS — J45.20 MILD INTERMITTENT ASTHMA WITHOUT COMPLICATION: ICD-10-CM

## 2018-11-20 RX ORDER — FLUTICASONE PROPIONATE 44 MCG
AEROSOL WITH ADAPTER (GRAM) INHALATION
Qty: 10.6 G | Refills: 0 | Status: SHIPPED | OUTPATIENT
Start: 2018-11-20 | End: 2019-02-04

## 2018-11-20 NOTE — TELEPHONE ENCOUNTER
"Requested Prescriptions   Pending Prescriptions Disp Refills     FLOVENT HFA 44 MCG/ACT Inhaler [Pharmacy Med Name: FLOVENT HFA 44MCG/ACT AERO] 10.6 g 0    Last Written Prescription Date:  9/24/18  Last Fill Quantity: 10.6g,  # refills: 0   Last office visit: 4/12/18 with prescribing provider:  Christian   Future Office Visit:  None   Sig: INHALE ONE PUFF BY MOUTH TWICE A DAY     Inhaled Steroids Protocol Failed    11/19/2018 12:43 PM       Failed - Asthma control assessment score within normal limits in last 6 months    Please review ACT score.   ACT Total Scores 4/19/2017 5/2/2017 4/12/2018   ACT TOTAL SCORE - - -   ASTHMA ER VISITS - - -   ASTHMA HOSPITALIZATIONS - - -   ACT TOTAL SCORE (Goal Greater than or Equal to 20) 25 5 25   In the past 12 months, how many times did you visit the emergency room for your asthma without being admitted to the hospital? 0 - 0   In the past 12 months, how many times were you hospitalized overnight because of your asthma? 0 0 0            Passed - Patient is age 12 or older       Passed - Recent (6 mo) or future (30 days) visit within the authorizing provider's specialty    Patient had office visit in the last 6 months or has a visit in the next 30 days with authorizing provider or within the authorizing provider's specialty.  See \"Patient Info\" tab in inbasket, or \"Choose Columns\" in Meds & Orders section of the refill encounter.            Medication is being filled for 1 time refill only due to:  Patient has office visit coming up and ACT due.    Michelle Martin R.N. Primary Care      "

## 2018-12-05 ENCOUNTER — ANTICOAGULATION THERAPY VISIT (OUTPATIENT)
Dept: ANTICOAGULATION | Facility: CLINIC | Age: 83
End: 2018-12-05
Payer: MEDICARE

## 2018-12-05 DIAGNOSIS — I50.9 CHF (CONGESTIVE HEART FAILURE) (H): ICD-10-CM

## 2018-12-05 LAB — INR POINT OF CARE: 3.6 (ref 0.86–1.14)

## 2018-12-05 PROCEDURE — 85610 PROTHROMBIN TIME: CPT | Mod: QW

## 2018-12-05 PROCEDURE — 36416 COLLJ CAPILLARY BLOOD SPEC: CPT

## 2018-12-05 PROCEDURE — 99207 ZZC NO CHARGE NURSE ONLY: CPT

## 2018-12-05 NOTE — MR AVS SNAPSHOT
Miguel ANAND Sandor   12/5/2018 9:00 AM   Anticoagulation Therapy Visit    Description:  85 year old male   Provider:  TARIQ ANTI COAHARMONY   Department:  Ph Anticoag           INR as of 12/5/2018     Today's INR 3.6!      Anticoagulation Summary as of 12/5/2018     INR goal 2.0-3.0   Today's INR 3.6!   Full warfarin instructions 12/6: 1.25 mg; Otherwise 3.75 mg on Tue, Sat; 2.5 mg all other days   Next INR check 12/19/2018    Indications   CHF (congestive heart failure) (H) [I50.9]  Long-term (current) use of anticoagulants [Z79.01] [Z79.01]         Your next Anticoagulation Clinic appointment(s)     Dec 19, 2018  9:00 AM CST   Anticoagulation Visit with TARIQ ANTI JOY   [facility]    [address] [place] [address]   [phone]              Contact Numbers     Clinic Number:         December 2018 Details    Sun Mon Tue Wed Thu Fri Sat           1                 2               3               4               5      2.5 mg   See details      6      1.25 mg         7      2.5 mg         8      3.75 mg           9      2.5 mg         10      2.5 mg         11      3.75 mg         12      2.5 mg         13      2.5 mg         14      2.5 mg         15      3.75 mg           16      2.5 mg         17      2.5 mg         18      3.75 mg         19            20               21               22                 23               24               25               26               27               28               29                 30               31                     Date Details   12/05 This INR check       Date of next INR:  12/19/2018         How to take your warfarin dose     To take:  1.25 mg Take 0.5 of a 2.5 mg tablet.    To take:  2.5 mg Take 1 of the 2.5 mg tablets.    To take:  3.75 mg Take 1.5 of the 2.5 mg tablets.

## 2018-12-05 NOTE — PROGRESS NOTES
ANTICOAGULATION FOLLOW-UP CLINIC VISIT    Patient Name:  Miguel Patten  Date:  12/5/2018  Contact Type:  Face to Face    SUBJECTIVE:     Patient Findings     Positives Change in diet/appetite (possibly less greens )    Comments Pt already took 2.5 mg today; will take 1.25 mg tomorrow  Ashly Jones RN             OBJECTIVE    INR Protime   Date Value Ref Range Status   12/05/2018 3.6 (A) 0.86 - 1.14 Final       ASSESSMENT / PLAN  INR assessment SUPRA    Recheck INR In: 2 WEEKS    INR Location Clinic      Anticoagulation Summary as of 12/5/2018     INR goal 2.0-3.0   Today's INR 3.6!   Warfarin maintenance plan 3.75 mg (2.5 mg x 1.5) on Tue, Sat; 2.5 mg (2.5 mg x 1) all other days   Full warfarin instructions 12/6: 1.25 mg; Otherwise 3.75 mg on Tue, Sat; 2.5 mg all other days   Weekly warfarin total 20 mg   Plan last modified Ashly Jones RN (12/5/2018)   Next INR check 12/19/2018   Target end date     Indications   CHF (congestive heart failure) (H) [I50.9]  Long-term (current) use of anticoagulants [Z79.01] [Z79.01]         Anticoagulation Episode Summary     INR check location     Preferred lab     Send INR reminders to [facility]    Comments 2.5 mg tablets, book, takes in AM      Anticoagulation Care Providers     Provider Role Specialty Phone number    Rober العراقي MD [facility] [phone]            See the Encounter Report to view Anticoagulation Flowsheet and Dosing Calendar (Go to Encounters tab in chart review, and find the Anticoagulation Therapy Visit)    Dosage adjustment made based on physician directed care plan.      Ashly Jones RN

## 2018-12-11 DIAGNOSIS — C61 MALIGNANT NEOPLASM OF PROSTATE (H): Primary | ICD-10-CM

## 2018-12-11 LAB
ALBUMIN UR-MCNC: 30 MG/DL
AMORPH CRY #/AREA URNS HPF: ABNORMAL /HPF
APPEARANCE UR: ABNORMAL
BILIRUB UR QL STRIP: NEGATIVE
COLOR UR AUTO: ABNORMAL
GLUCOSE UR STRIP-MCNC: NEGATIVE MG/DL
HGB UR QL STRIP: ABNORMAL
HYALINE CASTS #/AREA URNS LPF: 18 /LPF (ref 0–2)
KETONES UR STRIP-MCNC: NEGATIVE MG/DL
LEUKOCYTE ESTERASE UR QL STRIP: NEGATIVE
MUCOUS THREADS #/AREA URNS LPF: PRESENT /LPF
NITRATE UR QL: NEGATIVE
PH UR STRIP: 5 PH (ref 5–7)
PSA SERPL-MCNC: 0.06 UG/L (ref 0–4)
RBC #/AREA URNS AUTO: 6 /HPF (ref 0–2)
SOURCE: ABNORMAL
SP GR UR STRIP: 1.03 (ref 1–1.03)
SQUAMOUS #/AREA URNS AUTO: <1 /HPF (ref 0–1)
UROBILINOGEN UR STRIP-MCNC: 4 MG/DL (ref 0–2)
WBC #/AREA URNS AUTO: 1 /HPF (ref 0–5)

## 2018-12-11 PROCEDURE — 81001 URINALYSIS AUTO W/SCOPE: CPT | Performed by: UROLOGY

## 2018-12-11 PROCEDURE — 84153 ASSAY OF PSA TOTAL: CPT | Performed by: UROLOGY

## 2018-12-11 PROCEDURE — 36415 COLL VENOUS BLD VENIPUNCTURE: CPT | Performed by: UROLOGY

## 2018-12-19 ENCOUNTER — ANTICOAGULATION THERAPY VISIT (OUTPATIENT)
Dept: ANTICOAGULATION | Facility: CLINIC | Age: 83
End: 2018-12-19
Payer: MEDICARE

## 2018-12-19 DIAGNOSIS — I50.9 CHF (CONGESTIVE HEART FAILURE) (H): ICD-10-CM

## 2018-12-19 DIAGNOSIS — I48.91 ATRIAL FIBRILLATION (H): ICD-10-CM

## 2018-12-19 LAB — INR POINT OF CARE: 2.6 (ref 0.86–1.14)

## 2018-12-19 PROCEDURE — 85610 PROTHROMBIN TIME: CPT | Mod: QW

## 2018-12-19 PROCEDURE — 99207 ZZC NO CHARGE NURSE ONLY: CPT

## 2018-12-19 PROCEDURE — 36416 COLLJ CAPILLARY BLOOD SPEC: CPT

## 2018-12-19 RX ORDER — WARFARIN SODIUM 2.5 MG/1
TABLET ORAL
Qty: 110 TABLET | Refills: 3 | COMMUNITY
Start: 2018-12-19 | End: 2019-11-20

## 2018-12-19 NOTE — PROGRESS NOTES
ANTICOAGULATION FOLLOW-UP CLINIC VISIT    Patient Name:  Miguel Patten  Date:  2018  Contact Type:  Face to Face    SUBJECTIVE:     Patient Findings     Positives:   No Problem Findings           OBJECTIVE    INR Protime   Date Value Ref Range Status   2018 2.6 (A) 0.86 - 1.14 Final       ASSESSMENT / PLAN  INR assessment THER    Recheck INR In: 8 WEEKS    INR Location Clinic      Anticoagulation Summary  As of 2018    INR goal:   2.0-3.0   TTR:   59.2 % (2.7 y)   INR used for dosin.6 (2018)   Warfarin maintenance plan:   3.75 mg (2.5 mg x 1.5) every Tue, Sat; 2.5 mg (2.5 mg x 1) all other days   Full warfarin instructions:   3.75 mg every Tue, Sat; 2.5 mg all other days   Weekly warfarin total:   20 mg   No change documented:   Ashly Jones RN   Plan last modified:   Ashly Jones RN (2018)   Next INR check:   2019   Target end date:       Indications    CHF (congestive heart failure) (H) [I50.9]  Long-term (current) use of anticoagulants [Z79.01] [Z79.01]             Anticoagulation Episode Summary     INR check location:       Preferred lab:       Send INR reminders to:   CESAR MONTENEGRO    Comments:   2.5 mg tablets, book, takes in AM      Anticoagulation Care Providers     Provider Role Specialty Phone number    Rober العراقي MD [facility] [phone]            See the Encounter Report to view Anticoagulation Flowsheet and Dosing Calendar (Go to Encounters tab in chart review, and find the Anticoagulation Therapy Visit)    Dosage adjustment made based on physician directed care plan.      Ashly Jones RN

## 2018-12-21 DIAGNOSIS — Z95.2 S/P AVR (AORTIC VALVE REPLACEMENT): ICD-10-CM

## 2018-12-24 RX ORDER — SIMVASTATIN 40 MG
TABLET ORAL
Qty: 90 TABLET | Refills: 0 | Status: SHIPPED | OUTPATIENT
Start: 2018-12-24 | End: 2019-03-25

## 2018-12-24 NOTE — TELEPHONE ENCOUNTER
"Requested Prescriptions   Pending Prescriptions Disp Refills     simvastatin (ZOCOR) 40 MG tablet [Pharmacy Med Name: SIMVASTATIN 40MG TABS] 90 tablet 1    Last Written Prescription Date:  6/25/18  Last Fill Quantity: 90,  # refills: 1   Last office visit: 4/12/2018 with prescribing provider:     Future Office Visit:     Sig: TAKE ONE TABLET BY MOUTH EVERY NIGHT AT BEDTIME    Statins Protocol Failed - 12/21/2018 10:22 AM       Failed - Recent (12 mo) or future (30 days) visit within the authorizing provider's specialty    Patient had office visit in the last 12 months or has a visit in the next 30 days with authorizing provider or within the authorizing provider's specialty.  See \"Patient Info\" tab in inbasket, or \"Choose Columns\" in Meds & Orders section of the refill encounter.           Passed - LDL on file in past 12 months    Recent Labs   Lab Test 04/12/18  0932   LDL 65          Passed - No abnormal creatine kinase in past 12 months    No lab results found.          Passed - Patient is age 18 or older      Prescription approved per [facility] Refill Protocol.        "

## 2019-01-08 ENCOUNTER — ANCILLARY PROCEDURE (OUTPATIENT)
Dept: CARDIOLOGY | Facility: CLINIC | Age: 84
End: 2019-01-08
Attending: INTERNAL MEDICINE
Payer: MEDICARE

## 2019-01-08 DIAGNOSIS — I42.9 CARDIOMYOPATHY (H): ICD-10-CM

## 2019-01-08 PROCEDURE — 93294 REM INTERROG EVL PM/LDLS PM: CPT | Performed by: INTERNAL MEDICINE

## 2019-01-08 PROCEDURE — 93296 REM INTERROG EVL PM/IDS: CPT | Performed by: INTERNAL MEDICINE

## 2019-01-09 LAB
ICDO DEVICE COMMENTS: NORMAL
MDC_IDC_EPISODE_DTM: NORMAL
MDC_IDC_EPISODE_DURATION: 8 S
MDC_IDC_EPISODE_DURATION: 9 S
MDC_IDC_EPISODE_ID: NORMAL
MDC_IDC_EPISODE_TYPE: NORMAL
MDC_IDC_LEAD_IMPLANT_DT: NORMAL
MDC_IDC_LEAD_IMPLANT_DT: NORMAL
MDC_IDC_LEAD_LOCATION: NORMAL
MDC_IDC_LEAD_LOCATION: NORMAL
MDC_IDC_LEAD_LOCATION_DETAIL_1: NORMAL
MDC_IDC_LEAD_LOCATION_DETAIL_1: NORMAL
MDC_IDC_LEAD_MFG: NORMAL
MDC_IDC_LEAD_MFG: NORMAL
MDC_IDC_LEAD_MODEL: NORMAL
MDC_IDC_LEAD_MODEL: NORMAL
MDC_IDC_LEAD_POLARITY_TYPE: NORMAL
MDC_IDC_LEAD_POLARITY_TYPE: NORMAL
MDC_IDC_LEAD_SERIAL: NORMAL
MDC_IDC_LEAD_SERIAL: NORMAL
MDC_IDC_MSMT_BATTERY_DTM: NORMAL
MDC_IDC_MSMT_BATTERY_REMAINING_LONGEVITY: 78 MO
MDC_IDC_MSMT_BATTERY_REMAINING_PERCENTAGE: 100 %
MDC_IDC_MSMT_BATTERY_STATUS: NORMAL
MDC_IDC_MSMT_LEADCHNL_LV_IMPEDANCE_VALUE: 526 OHM
MDC_IDC_MSMT_LEADCHNL_LV_PACING_THRESHOLD_AMPLITUDE: 1.8 V
MDC_IDC_MSMT_LEADCHNL_LV_PACING_THRESHOLD_PULSEWIDTH: 1 MS
MDC_IDC_MSMT_LEADCHNL_RV_IMPEDANCE_VALUE: 482 OHM
MDC_IDC_PG_IMPLANT_DTM: NORMAL
MDC_IDC_PG_MFG: NORMAL
MDC_IDC_PG_MODEL: NORMAL
MDC_IDC_PG_SERIAL: NORMAL
MDC_IDC_PG_TYPE: NORMAL
MDC_IDC_SESS_CLINIC_NAME: NORMAL
MDC_IDC_SESS_DTM: NORMAL
MDC_IDC_SESS_TYPE: NORMAL
MDC_IDC_SET_BRADY_AT_MODE_SWITCH_RATE: 170 {BEATS}/MIN
MDC_IDC_SET_BRADY_LOWRATE: 60 {BEATS}/MIN
MDC_IDC_SET_BRADY_MAX_SENSOR_RATE: 120 {BEATS}/MIN
MDC_IDC_SET_BRADY_MODE: NORMAL
MDC_IDC_SET_CRT_LVRV_DELAY: 0 MS
MDC_IDC_SET_CRT_PACED_CHAMBERS: NORMAL
MDC_IDC_SET_LEADCHNL_LV_PACING_AMPLITUDE: 2.6 V
MDC_IDC_SET_LEADCHNL_LV_PACING_ANODE_ELECTRODE_1: NORMAL
MDC_IDC_SET_LEADCHNL_LV_PACING_ANODE_LOCATION_1: NORMAL
MDC_IDC_SET_LEADCHNL_LV_PACING_CATHODE_ELECTRODE_1: NORMAL
MDC_IDC_SET_LEADCHNL_LV_PACING_CATHODE_LOCATION_1: NORMAL
MDC_IDC_SET_LEADCHNL_LV_PACING_PULSEWIDTH: 0.7 MS
MDC_IDC_SET_LEADCHNL_LV_SENSING_ADAPTATION_MODE: NORMAL
MDC_IDC_SET_LEADCHNL_LV_SENSING_ANODE_ELECTRODE_1: NORMAL
MDC_IDC_SET_LEADCHNL_LV_SENSING_ANODE_LOCATION_1: NORMAL
MDC_IDC_SET_LEADCHNL_LV_SENSING_CATHODE_ELECTRODE_1: NORMAL
MDC_IDC_SET_LEADCHNL_LV_SENSING_CATHODE_LOCATION_1: NORMAL
MDC_IDC_SET_LEADCHNL_LV_SENSING_SENSITIVITY: 2.5 MV
MDC_IDC_SET_LEADCHNL_RA_SENSING_ADAPTATION_MODE: NORMAL
MDC_IDC_SET_LEADCHNL_RA_SENSING_SENSITIVITY: 0.5 MV
MDC_IDC_SET_LEADCHNL_RV_PACING_AMPLITUDE: 2.4 V
MDC_IDC_SET_LEADCHNL_RV_PACING_CAPTURE_MODE: NORMAL
MDC_IDC_SET_LEADCHNL_RV_PACING_POLARITY: NORMAL
MDC_IDC_SET_LEADCHNL_RV_PACING_PULSEWIDTH: 0.5 MS
MDC_IDC_SET_LEADCHNL_RV_SENSING_ADAPTATION_MODE: NORMAL
MDC_IDC_SET_LEADCHNL_RV_SENSING_POLARITY: NORMAL
MDC_IDC_SET_LEADCHNL_RV_SENSING_SENSITIVITY: 2.5 MV
MDC_IDC_SET_ZONE_DETECTION_INTERVAL: 375 MS
MDC_IDC_SET_ZONE_TYPE: NORMAL
MDC_IDC_SET_ZONE_VENDOR_TYPE: NORMAL
MDC_IDC_STAT_BRADY_DTM_END: NORMAL
MDC_IDC_STAT_BRADY_DTM_START: NORMAL
MDC_IDC_STAT_BRADY_RA_PERCENT_PACED: 0 %
MDC_IDC_STAT_BRADY_RV_PERCENT_PACED: 99 %
MDC_IDC_STAT_CRT_DTM_END: NORMAL
MDC_IDC_STAT_CRT_DTM_START: NORMAL
MDC_IDC_STAT_CRT_LV_PERCENT_PACED: 99 %
MDC_IDC_STAT_EPISODE_RECENT_COUNT: 0
MDC_IDC_STAT_EPISODE_RECENT_COUNT: 13
MDC_IDC_STAT_EPISODE_RECENT_COUNT_DTM_END: NORMAL
MDC_IDC_STAT_EPISODE_RECENT_COUNT_DTM_START: NORMAL
MDC_IDC_STAT_EPISODE_TYPE: NORMAL
MDC_IDC_STAT_EPISODE_VENDOR_TYPE: NORMAL

## 2019-02-04 DIAGNOSIS — J45.20 MILD INTERMITTENT ASTHMA WITHOUT COMPLICATION: ICD-10-CM

## 2019-02-06 RX ORDER — FLUTICASONE PROPIONATE 44 MCG
AEROSOL WITH ADAPTER (GRAM) INHALATION
Qty: 10.6 G | Refills: 0 | Status: SHIPPED | OUTPATIENT
Start: 2019-02-06 | End: 2019-03-25

## 2019-02-06 NOTE — TELEPHONE ENCOUNTER
FLOVENT  Last Written Prescription Date:  11/20/18  Last Fill Quantity: 1,  # refills: 0 - APPT NEEDED for additional refills.   Last office visit: 11/15/2018 with prescribing provider:  Future Office Visit:  NONE  ACT Total Scores 4/19/2017 5/2/2017 4/12/2018   ACT TOTAL SCORE - - -   ASTHMA ER VISITS - - -   ASTHMA HOSPITALIZATIONS - - -   ACT TOTAL SCORE (Goal Greater than or Equal to 20) 25 5 25   In the past 12 months, how many times did you visit the emergency room for your asthma without being admitted to the hospital? 0 - 0   In the past 12 months, how many times were you hospitalized overnight because of your asthma? 0 0 0     Routing refill request to provider for review/approval because:  Lou given x1 and patient did not follow up, please advise  KARI Marvin

## 2019-02-13 ENCOUNTER — ANTICOAGULATION THERAPY VISIT (OUTPATIENT)
Dept: ANTICOAGULATION | Facility: CLINIC | Age: 84
End: 2019-02-13
Payer: MEDICARE

## 2019-02-13 DIAGNOSIS — I50.9 CHF (CONGESTIVE HEART FAILURE) (H): ICD-10-CM

## 2019-02-13 LAB — INR POINT OF CARE: 2.4 (ref 0.9–1.1)

## 2019-02-13 PROCEDURE — 85610 PROTHROMBIN TIME: CPT | Mod: QW

## 2019-02-13 PROCEDURE — 36416 COLLJ CAPILLARY BLOOD SPEC: CPT

## 2019-02-13 PROCEDURE — 99207 ZZC NO CHARGE NURSE ONLY: CPT

## 2019-02-13 NOTE — PROGRESS NOTES
ANTICOAGULATION FOLLOW-UP CLINIC VISIT    Patient Name:  Miguel Patten  Date:  2019  Contact Type:  Face to Face    SUBJECTIVE:     Patient Findings     Positives:   No Problem Findings           OBJECTIVE    INR Protime   Date Value Ref Range Status   2019 2.4 (A) 0.9 - 1.1 Final       ASSESSMENT / PLAN  INR assessment THER    Recheck INR In: 8 DAYS    INR Location Clinic      Anticoagulation Summary  As of 2019    INR goal:   2.0-3.0   TTR:   61.4 % (2.8 y)   INR used for dosin.4 (2019)   Warfarin maintenance plan:   3.75 mg (2.5 mg x 1.5) every Tue, Sat; 2.5 mg (2.5 mg x 1) all other days   Full warfarin instructions:   3.75 mg every Tue, Sat; 2.5 mg all other days   Weekly warfarin total:   20 mg   No change documented:   Ashly Jones RN   Plan last modified:   Ashly Jones RN (2018)   Next INR check:   4/10/2019   Target end date:       Indications    CHF (congestive heart failure) (H) [I50.9]  Long-term (current) use of anticoagulants [Z79.01] [Z79.01]             Anticoagulation Episode Summary     INR check location:       Preferred lab:       Send INR reminders to:   CESAR MONTENEGRO    Comments:   2.5 mg tablets, book, takes in AM      Anticoagulation Care Providers     Provider Role Specialty Phone number    Rober العراقي MD [facility] [phone]            See the Encounter Report to view Anticoagulation Flowsheet and Dosing Calendar (Go to Encounters tab in chart review, and find the Anticoagulation Therapy Visit)    Dosage adjustment made based on physician directed care plan.      Ashly Jones RN

## 2019-03-25 DIAGNOSIS — Z95.2 S/P AVR (AORTIC VALVE REPLACEMENT): ICD-10-CM

## 2019-03-25 DIAGNOSIS — J45.20 MILD INTERMITTENT ASTHMA WITHOUT COMPLICATION: ICD-10-CM

## 2019-03-26 RX ORDER — FLUTICASONE PROPIONATE 44 MCG
AEROSOL WITH ADAPTER (GRAM) INHALATION
Qty: 10.6 G | Refills: 0 | Status: SHIPPED | OUTPATIENT
Start: 2019-03-26 | End: 2019-05-16

## 2019-03-26 RX ORDER — SIMVASTATIN 40 MG
TABLET ORAL
Qty: 90 TABLET | Refills: 2 | Status: SHIPPED | OUTPATIENT
Start: 2019-03-26 | End: 2020-01-03

## 2019-03-26 NOTE — TELEPHONE ENCOUNTER
"simvastatin  Last Written Prescription Date:  12/24/2018  Last Fill Quantity: 90,  # refills: 0   Last office visit: 11/15/2018 with prescribing provider:      Future Office Visit:      Requested Prescriptions   Pending Prescriptions Disp Refills     simvastatin (ZOCOR) 40 MG tablet [Pharmacy Med Name: SIMVASTATIN 40MG TABS] 90 tablet 0     Sig: TAKE ONE TABLET BY MOUTH EVERY NIGHT AT BEDTIME    Statins Protocol Passed - 3/25/2019  4:49 PM       Passed - LDL on file in past 12 months    Recent Labs   Lab Test 04/12/18  0932   LDL 65            Passed - No abnormal creatine kinase in past 12 months    No lab results found.            Passed - Recent (12 mo) or future (30 days) visit within the authorizing provider's specialty    Patient had office visit in the last 12 months or has a visit in the next 30 days with authorizing provider or within the authorizing provider's specialty.  See \"Patient Info\" tab in inbasket, or \"Choose Columns\" in Meds & Orders section of the refill encounter.             Passed - Medication is active on med list       Passed - Patient is age 18 or older          Prescription approved per [facility] Ref[facility] Protocol.      Zulma Cuevas RN on 3/26/2019 at 4:03 PM    "

## 2019-03-26 NOTE — TELEPHONE ENCOUNTER
Routing refill request to provider for review/approval because:  ACT score <20    BASSAM LeeN, RN  [facility]

## 2019-03-26 NOTE — TELEPHONE ENCOUNTER
"Requested Prescriptions   Pending Prescriptions Disp Refills     FLOVENT HFA 44 MCG/ACT inhaler [Pharmacy Med Name: FLOVENT HFA 44MCG/ACT AERO] 10.6 g 0    Last Written Prescription Date:  2/6/19  Last Fill Quantity: 10.6 g,  # refills: 0   Last office visit: 11/15/2018 with prescribing provider:  4/12/18   Future Office Visit:     Sig: INHALE ONE PUFF BY MOUTH TWICE A DAY -- NEEDS TO BE SEEN FOR FURTHER REFILLS    Inhaled Steroids Protocol Failed - 3/25/2019  4:49 PM       Failed - Asthma control assessment score within normal limits in last 6 months    Please review ACT score.   ACT Total Scores 4/19/2017 5/2/2017 4/12/2018   ACT TOTAL SCORE - - -   ASTHMA ER VISITS - - -   ASTHMA HOSPITALIZATIONS - - -   ACT TOTAL SCORE (Goal Greater than or Equal to 20) 25 5 25   In the past 12 months, how many times did you visit the emergency room for your asthma without being admitted to the hospital? 0 - 0   In the past 12 months, how many times were you hospitalized overnight because of your asthma? 0 0 0          Passed - Patient is age 12 or older       Passed - Medication is active on med list       Passed - Recent (6 mo) or future (30 days) visit within the authorizing provider's specialty    Patient had office visit in the last 6 months or has a visit in the next 30 days with authorizing provider or within the authorizing provider's specialty.  See \"Patient Info\" tab in inbasket, or \"Choose Columns\" in Meds & Orders section of the refill encounter.            "

## 2019-04-08 ENCOUNTER — TELEPHONE (OUTPATIENT)
Dept: FAMILY MEDICINE | Facility: OTHER | Age: 84
End: 2019-04-08

## 2019-04-08 NOTE — TELEPHONE ENCOUNTER
Reason for Call: Request for an order or referral:    Order or referral being requested: Lupron injection     Date needed: at your convenience    Has the patient been seen by the PCP for this problem? NO    Additional comments: Pt gets this ordered through his Urologist outside of [place]. We can no longer administer it if was ordered by an outside provider. Pt is wondering if you can order this for him so he can have it done here? Please advise.     Phone number Patient can be reached at:  Home number on file [phone] (home)    Best Time:  Any     Can we leave a detailed message on this number?  YES    Call taken on 4/8/2019 at 11:43 AM by Maritza Mcmanus

## 2019-04-10 ENCOUNTER — ANTICOAGULATION THERAPY VISIT (OUTPATIENT)
Dept: ANTICOAGULATION | Facility: CLINIC | Age: 84
End: 2019-04-10
Payer: MEDICARE

## 2019-04-10 DIAGNOSIS — I50.9 CHF (CONGESTIVE HEART FAILURE) (H): ICD-10-CM

## 2019-04-10 LAB — INR POINT OF CARE: 2.3 (ref 0.9–1.1)

## 2019-04-10 PROCEDURE — 85610 PROTHROMBIN TIME: CPT | Mod: QW

## 2019-04-10 PROCEDURE — 36416 COLLJ CAPILLARY BLOOD SPEC: CPT

## 2019-04-10 PROCEDURE — 99207 ZZC NO CHARGE NURSE ONLY: CPT

## 2019-04-10 NOTE — TELEPHONE ENCOUNTER
Miguel stopped into the clinic today and is still waiting for an answer on this. Please contact patient. Thanks.

## 2019-04-10 NOTE — PROGRESS NOTES
ANTICOAGULATION FOLLOW-UP CLINIC VISIT    Patient Name:  Miguel Patten  Date:  4/10/2019  Contact Type:  Face to Face    SUBJECTIVE:     Patient Findings     Comments:   The patient was assessed for diet, medication, and activity level changes, missed or extra doses, bruising or bleeding, with no problem findings.  Ashly Jones RN             OBJECTIVE    INR Protime   Date Value Ref Range Status   04/10/2019 2.3 (A) 0.9 - 1.1 Final       ASSESSMENT / PLAN  INR assessment THER    Recheck INR In: 8 WEEKS    INR Location Clinic      Anticoagulation Summary  As of 4/10/2019    INR goal:   2.0-3.0   TTR:   63.4 % (3 y)   INR used for dosin.3 (4/10/2019)   Warfarin maintenance plan:   3.75 mg (2.5 mg x 1.5) every Tue, Sat; 2.5 mg (2.5 mg x 1) all other days   Full warfarin instructions:   3.75 mg every Tue, Sat; 2.5 mg all other days   Weekly warfarin total:   20 mg   No change documented:   Ashly Jones RN   Plan last modified:   Ashly Jones RN (2018)   Next INR check:   2019   Target end date:       Indications    CHF (congestive heart failure) (H) [I50.9]  Long-term (current) use of anticoagulants [Z79.01] [Z79.01]             Anticoagulation Episode Summary     INR check location:       Preferred lab:       Send INR reminders to:   CESAR MONTENEGRO    Comments:   2.5 mg tablets, book, takes in AM      Anticoagulation Care Providers     Provider Role Specialty Phone number    Rober العراقي MD [facility] [phone]            See the Encounter Report to view Anticoagulation Flowsheet and Dosing Calendar (Go to Encounters tab in chart review, and find the Anticoagulation Therapy Visit)    Dosage adjustment made based on physician directed care plan.      Ashly Jones RN

## 2019-04-11 NOTE — TELEPHONE ENCOUNTER
It has been clarified.  If I am not going to assume complete management of his prostate cancer (and I am not since I am not a urologist) then I will not be able to order the Lupron.  Additionally, if he is out of system urologist wishes to order the Lupron, that can be administered at the infusion clinic in [place].  It cannot be administered in [place].    I apologize for the delay, it took a while to obtain the appropriate answer.    Please explain this to the patient and he will have to have his outside urologist send in the appropriate orders to the infusion center.    Thank you.    Christian

## 2019-04-11 NOTE — TELEPHONE ENCOUNTER
Called and LM for patient to call back. Please relay message to patient. Also explain that the urologist needs to send the order by fax to the [facility] fax #  [phone]. If he has any questions in regards to it as well please call infusion at [phone].     Bianca English MA

## 2019-04-16 ENCOUNTER — ANCILLARY PROCEDURE (OUTPATIENT)
Dept: CARDIOLOGY | Facility: CLINIC | Age: 84
End: 2019-04-16
Payer: MEDICARE

## 2019-04-16 DIAGNOSIS — I42.9 CARDIOMYOPATHY (H): ICD-10-CM

## 2019-04-16 PROCEDURE — 93296 REM INTERROG EVL PM/IDS: CPT | Performed by: INTERNAL MEDICINE

## 2019-04-17 ENCOUNTER — INFUSION THERAPY VISIT (OUTPATIENT)
Dept: INFUSION THERAPY | Facility: CLINIC | Age: 84
End: 2019-04-17
Attending: INTERNAL MEDICINE
Payer: MEDICARE

## 2019-04-17 VITALS
RESPIRATION RATE: 16 BRPM | BODY MASS INDEX: 26.5 KG/M2 | WEIGHT: 195.4 LBS | DIASTOLIC BLOOD PRESSURE: 72 MMHG | OXYGEN SATURATION: 99 % | TEMPERATURE: 97 F | HEART RATE: 71 BPM | SYSTOLIC BLOOD PRESSURE: 167 MMHG

## 2019-04-17 DIAGNOSIS — C61 MALIGNANT NEOPLASM OF PROSTATE (H): Primary | ICD-10-CM

## 2019-04-17 PROCEDURE — 25000128 H RX IP 250 OP 636: Performed by: INTERNAL MEDICINE

## 2019-04-17 PROCEDURE — 96402 CHEMO HORMON ANTINEOPL SQ/IM: CPT

## 2019-04-17 RX ADMIN — LEUPROLIDE ACETATE 22.5 MG: KIT at 10:39

## 2019-04-17 ASSESSMENT — PAIN SCALES - GENERAL: PAINLEVEL: NO PAIN (0)

## 2019-04-17 NOTE — PROGRESS NOTES
Infusion Nursing Note:  Miguel Patten presents today for Lupron injection.    Patient seen by provider today: No   present during visit today: Not Applicable.    Note: N/A.    Intravenous Access:  No Intravenous access/labs at this visit.    Treatment Conditions:  Not Applicable.      Post Infusion Assessment:  Patient tolerated injection without incident.  Patient observed for 10 minutes post injection per protocol.       Discharge Plan:   Discharge instructions reviewed with: Patient.  Patient discharged in stable condition accompanied by: self.  Departure Mode: Ambulatory.    Dannielle Tillman RN

## 2019-04-18 LAB
MDC_IDC_EPISODE_DTM: NORMAL
MDC_IDC_EPISODE_DURATION: 16 S
MDC_IDC_EPISODE_DURATION: 7 S
MDC_IDC_EPISODE_DURATION: 8 S
MDC_IDC_EPISODE_ID: NORMAL
MDC_IDC_EPISODE_TYPE: NORMAL
MDC_IDC_LEAD_IMPLANT_DT: NORMAL
MDC_IDC_LEAD_IMPLANT_DT: NORMAL
MDC_IDC_LEAD_LOCATION: NORMAL
MDC_IDC_LEAD_LOCATION: NORMAL
MDC_IDC_LEAD_LOCATION_DETAIL_1: NORMAL
MDC_IDC_LEAD_LOCATION_DETAIL_1: NORMAL
MDC_IDC_LEAD_MFG: NORMAL
MDC_IDC_LEAD_MFG: NORMAL
MDC_IDC_LEAD_MODEL: NORMAL
MDC_IDC_LEAD_MODEL: NORMAL
MDC_IDC_LEAD_POLARITY_TYPE: NORMAL
MDC_IDC_LEAD_POLARITY_TYPE: NORMAL
MDC_IDC_LEAD_SERIAL: NORMAL
MDC_IDC_LEAD_SERIAL: NORMAL
MDC_IDC_MSMT_BATTERY_DTM: NORMAL
MDC_IDC_MSMT_BATTERY_REMAINING_LONGEVITY: 72 MO
MDC_IDC_MSMT_BATTERY_REMAINING_PERCENTAGE: 100 %
MDC_IDC_MSMT_BATTERY_STATUS: NORMAL
MDC_IDC_MSMT_LEADCHNL_LV_IMPEDANCE_VALUE: 567 OHM
MDC_IDC_MSMT_LEADCHNL_LV_PACING_THRESHOLD_AMPLITUDE: 1.8 V
MDC_IDC_MSMT_LEADCHNL_LV_PACING_THRESHOLD_PULSEWIDTH: 1 MS
MDC_IDC_MSMT_LEADCHNL_RV_IMPEDANCE_VALUE: 497 OHM
MDC_IDC_PG_IMPLANT_DTM: NORMAL
MDC_IDC_PG_MFG: NORMAL
MDC_IDC_PG_MODEL: NORMAL
MDC_IDC_PG_SERIAL: NORMAL
MDC_IDC_PG_TYPE: NORMAL
MDC_IDC_SESS_CLINIC_NAME: NORMAL
MDC_IDC_SESS_DTM: NORMAL
MDC_IDC_SESS_TYPE: NORMAL
MDC_IDC_SET_BRADY_AT_MODE_SWITCH_RATE: 170 {BEATS}/MIN
MDC_IDC_SET_BRADY_LOWRATE: 60 {BEATS}/MIN
MDC_IDC_SET_BRADY_MAX_SENSOR_RATE: 120 {BEATS}/MIN
MDC_IDC_SET_BRADY_MODE: NORMAL
MDC_IDC_SET_CRT_LVRV_DELAY: 0 MS
MDC_IDC_SET_CRT_PACED_CHAMBERS: NORMAL
MDC_IDC_SET_LEADCHNL_LV_PACING_AMPLITUDE: 2.6 V
MDC_IDC_SET_LEADCHNL_LV_PACING_ANODE_ELECTRODE_1: NORMAL
MDC_IDC_SET_LEADCHNL_LV_PACING_ANODE_LOCATION_1: NORMAL
MDC_IDC_SET_LEADCHNL_LV_PACING_CATHODE_ELECTRODE_1: NORMAL
MDC_IDC_SET_LEADCHNL_LV_PACING_CATHODE_LOCATION_1: NORMAL
MDC_IDC_SET_LEADCHNL_LV_PACING_PULSEWIDTH: 0.7 MS
MDC_IDC_SET_LEADCHNL_LV_SENSING_ADAPTATION_MODE: NORMAL
MDC_IDC_SET_LEADCHNL_LV_SENSING_ANODE_ELECTRODE_1: NORMAL
MDC_IDC_SET_LEADCHNL_LV_SENSING_ANODE_LOCATION_1: NORMAL
MDC_IDC_SET_LEADCHNL_LV_SENSING_CATHODE_ELECTRODE_1: NORMAL
MDC_IDC_SET_LEADCHNL_LV_SENSING_CATHODE_LOCATION_1: NORMAL
MDC_IDC_SET_LEADCHNL_LV_SENSING_SENSITIVITY: 2.5 MV
MDC_IDC_SET_LEADCHNL_RA_SENSING_ADAPTATION_MODE: NORMAL
MDC_IDC_SET_LEADCHNL_RA_SENSING_SENSITIVITY: 0.5 MV
MDC_IDC_SET_LEADCHNL_RV_PACING_AMPLITUDE: 2.4 V
MDC_IDC_SET_LEADCHNL_RV_PACING_CAPTURE_MODE: NORMAL
MDC_IDC_SET_LEADCHNL_RV_PACING_POLARITY: NORMAL
MDC_IDC_SET_LEADCHNL_RV_PACING_PULSEWIDTH: 0.5 MS
MDC_IDC_SET_LEADCHNL_RV_SENSING_ADAPTATION_MODE: NORMAL
MDC_IDC_SET_LEADCHNL_RV_SENSING_POLARITY: NORMAL
MDC_IDC_SET_LEADCHNL_RV_SENSING_SENSITIVITY: 2.5 MV
MDC_IDC_SET_ZONE_DETECTION_INTERVAL: 375 MS
MDC_IDC_SET_ZONE_TYPE: NORMAL
MDC_IDC_SET_ZONE_VENDOR_TYPE: NORMAL
MDC_IDC_STAT_BRADY_DTM_END: NORMAL
MDC_IDC_STAT_BRADY_DTM_START: NORMAL
MDC_IDC_STAT_BRADY_RA_PERCENT_PACED: 0 %
MDC_IDC_STAT_BRADY_RV_PERCENT_PACED: 99 %
MDC_IDC_STAT_CRT_DTM_END: NORMAL
MDC_IDC_STAT_CRT_DTM_START: NORMAL
MDC_IDC_STAT_CRT_LV_PERCENT_PACED: 99 %
MDC_IDC_STAT_EPISODE_RECENT_COUNT: 0
MDC_IDC_STAT_EPISODE_RECENT_COUNT: 19
MDC_IDC_STAT_EPISODE_RECENT_COUNT_DTM_END: NORMAL
MDC_IDC_STAT_EPISODE_RECENT_COUNT_DTM_START: NORMAL
MDC_IDC_STAT_EPISODE_TYPE: NORMAL
MDC_IDC_STAT_EPISODE_VENDOR_TYPE: NORMAL

## 2019-05-16 DIAGNOSIS — J45.20 MILD INTERMITTENT ASTHMA WITHOUT COMPLICATION: ICD-10-CM

## 2019-05-16 RX ORDER — FLUTICASONE PROPIONATE 44 MCG
AEROSOL WITH ADAPTER (GRAM) INHALATION
Qty: 10.6 G | Refills: 0 | Status: SHIPPED | OUTPATIENT
Start: 2019-05-16 | End: 2019-08-01

## 2019-05-16 NOTE — TELEPHONE ENCOUNTER
"Requested Prescriptions   Pending Prescriptions Disp Refills     FLOVENT HFA 44 MCG/ACT inhaler [Pharmacy Med Name: FLOVENT HFA 44MCG/ACT AERO] 10.6 g 0     Sig: INHALE ONE PUFF BY MOUTH TWICE A DAY -- NEEDS TO BE SEEN FOR FURTHER REFILLS   Last Written Prescription Date:  3/26/19  Last Fill Quantity: 10.6 g,  # refills: 0   Last office visit: 11/15/2018 with prescribing provider:  4/12/18   Future Office Visit:   Next 5 appointments (look out 90 days)    Jun 20, 2019  9:00 AM CDT  Return Visit with Cristina Caputo MD  [facility] [address] [place] [id number] [phone]           Inhaled Steroids Protocol Failed - 5/16/2019 12:33 PM        Failed - Asthma control assessment score within normal limits in last 6 months     Please review ACT score.   ACT Total Scores 4/19/2017 5/2/2017 4/12/2018   ACT TOTAL SCORE - - -   ASTHMA ER VISITS - - -   ASTHMA HOSPITALIZATIONS - - -   ACT TOTAL SCORE (Goal Greater than or Equal to 20) 25 5 25   In the past 12 months, how many times did you visit the emergency room for your asthma without being admitted to the hospital? 0 - 0   In the past 12 months, how many times were you hospitalized overnight because of your asthma? 0 0 0           Passed - Patient is age 12 or older        Passed - Medication is active on med list        Passed - Recent (6 mo) or future (30 days) visit within the authorizing provider's specialty     Patient had office visit in the last 6 months or has a visit in the next 30 days with authorizing provider or within the authorizing provider's specialty.  See \"Patient Info\" tab in inbasket, or \"Choose Columns\" in Meds & Orders section of the refill encounter.            "

## 2019-05-16 NOTE — TELEPHONE ENCOUNTER
Routing refill request to provider for review/approval because:  Lou given x1 and patient did not follow up, please advise  Patient needs to be seen because it has been more than 1 year since last office visit.    BASSAM LeeN, RN  [facility]

## 2019-05-21 ENCOUNTER — OFFICE VISIT (OUTPATIENT)
Dept: FAMILY MEDICINE | Facility: OTHER | Age: 84
End: 2019-05-21
Payer: MEDICARE

## 2019-05-21 VITALS
BODY MASS INDEX: 26.24 KG/M2 | RESPIRATION RATE: 16 BRPM | WEIGHT: 193.7 LBS | TEMPERATURE: 98.5 F | HEART RATE: 66 BPM | SYSTOLIC BLOOD PRESSURE: 124 MMHG | HEIGHT: 72 IN | OXYGEN SATURATION: 98 % | DIASTOLIC BLOOD PRESSURE: 72 MMHG

## 2019-05-21 DIAGNOSIS — I10 HYPERTENSION GOAL BP (BLOOD PRESSURE) < 140/90: ICD-10-CM

## 2019-05-21 DIAGNOSIS — I48.20 CHRONIC ATRIAL FIBRILLATION (H): Primary | ICD-10-CM

## 2019-05-21 DIAGNOSIS — J45.20 MILD INTERMITTENT ASTHMA, UNCOMPLICATED: ICD-10-CM

## 2019-05-21 DIAGNOSIS — Z79.01 LONG TERM CURRENT USE OF ANTICOAGULANT THERAPY: ICD-10-CM

## 2019-05-21 DIAGNOSIS — I50.40 COMBINED SYSTOLIC AND DIASTOLIC CONGESTIVE HEART FAILURE, UNSPECIFIED HF CHRONICITY (H): ICD-10-CM

## 2019-05-21 DIAGNOSIS — E78.2 MIXED HYPERLIPIDEMIA: ICD-10-CM

## 2019-05-21 LAB
ALBUMIN SERPL-MCNC: 4.4 G/DL (ref 3.4–5)
ALP SERPL-CCNC: 57 U/L (ref 40–150)
ALT SERPL W P-5'-P-CCNC: 22 U/L (ref 0–70)
ANION GAP SERPL CALCULATED.3IONS-SCNC: 6 MMOL/L (ref 3–14)
AST SERPL W P-5'-P-CCNC: 22 U/L (ref 0–45)
BILIRUB DIRECT SERPL-MCNC: 0.5 MG/DL (ref 0–0.2)
BILIRUB SERPL-MCNC: 1.9 MG/DL (ref 0.2–1.3)
BUN SERPL-MCNC: 19 MG/DL (ref 7–30)
CALCIUM SERPL-MCNC: 9 MG/DL (ref 8.5–10.1)
CHLORIDE SERPL-SCNC: 106 MMOL/L (ref 94–109)
CHOLEST SERPL-MCNC: 147 MG/DL
CO2 SERPL-SCNC: 30 MMOL/L (ref 20–32)
CREAT SERPL-MCNC: 0.79 MG/DL (ref 0.66–1.25)
ERYTHROCYTE [DISTWIDTH] IN BLOOD BY AUTOMATED COUNT: 14.4 % (ref 10–15)
GFR SERPL CREATININE-BSD FRML MDRD: 81 ML/MIN/{1.73_M2}
GLUCOSE SERPL-MCNC: 84 MG/DL (ref 70–99)
HCT VFR BLD AUTO: 41.4 % (ref 40–53)
HDLC SERPL-MCNC: 86 MG/DL
HGB BLD-MCNC: 13.7 G/DL (ref 13.3–17.7)
LDLC SERPL CALC-MCNC: 51 MG/DL
MCH RBC QN AUTO: 31.6 PG (ref 26.5–33)
MCHC RBC AUTO-ENTMCNC: 33.1 G/DL (ref 31.5–36.5)
MCV RBC AUTO: 96 FL (ref 78–100)
NONHDLC SERPL-MCNC: 61 MG/DL
PLATELET # BLD AUTO: 133 10E9/L (ref 150–450)
POTASSIUM SERPL-SCNC: 4.5 MMOL/L (ref 3.4–5.3)
PROT SERPL-MCNC: 7.2 G/DL (ref 6.8–8.8)
RBC # BLD AUTO: 4.33 10E12/L (ref 4.4–5.9)
SODIUM SERPL-SCNC: 142 MMOL/L (ref 133–144)
TRIGL SERPL-MCNC: 48 MG/DL
WBC # BLD AUTO: 5.2 10E9/L (ref 4–11)

## 2019-05-21 PROCEDURE — 36415 COLL VENOUS BLD VENIPUNCTURE: CPT | Performed by: INTERNAL MEDICINE

## 2019-05-21 PROCEDURE — 85027 COMPLETE CBC AUTOMATED: CPT | Performed by: INTERNAL MEDICINE

## 2019-05-21 PROCEDURE — 80061 LIPID PANEL: CPT | Performed by: INTERNAL MEDICINE

## 2019-05-21 PROCEDURE — 80076 HEPATIC FUNCTION PANEL: CPT | Performed by: INTERNAL MEDICINE

## 2019-05-21 PROCEDURE — 99214 OFFICE O/P EST MOD 30 MIN: CPT | Performed by: INTERNAL MEDICINE

## 2019-05-21 PROCEDURE — 80048 BASIC METABOLIC PNL TOTAL CA: CPT | Performed by: INTERNAL MEDICINE

## 2019-05-21 RX ORDER — ALBUTEROL SULFATE 90 UG/1
2 AEROSOL, METERED RESPIRATORY (INHALATION) EVERY 6 HOURS PRN
Qty: 18 G | Refills: 3 | COMMUNITY
Start: 2019-05-21 | End: 2021-01-13

## 2019-05-21 ASSESSMENT — ASTHMA QUESTIONNAIRES
QUESTION_3 LAST FOUR WEEKS HOW OFTEN DID YOUR ASTHMA SYMPTOMS (WHEEZING, COUGHING, SHORTNESS OF BREATH, CHEST TIGHTNESS OR PAIN) WAKE YOU UP AT NIGHT OR EARLIER THAN USUAL IN THE MORNING: NOT AT ALL
QUESTION_1 LAST FOUR WEEKS HOW MUCH OF THE TIME DID YOUR ASTHMA KEEP YOU FROM GETTING AS MUCH DONE AT WORK, SCHOOL OR AT HOME: NONE OF THE TIME
QUESTION_2 LAST FOUR WEEKS HOW OFTEN HAVE YOU HAD SHORTNESS OF BREATH: NOT AT ALL
QUESTION_5 LAST FOUR WEEKS HOW WOULD YOU RATE YOUR ASTHMA CONTROL: COMPLETELY CONTROLLED
ACT_TOTALSCORE: 25
QUESTION_4 LAST FOUR WEEKS HOW OFTEN HAVE YOU USED YOUR RESCUE INHALER OR NEBULIZER MEDICATION (SUCH AS ALBUTEROL): NOT AT ALL

## 2019-05-21 ASSESSMENT — PATIENT HEALTH QUESTIONNAIRE - PHQ9: SUM OF ALL RESPONSES TO PHQ QUESTIONS 1-9: 0

## 2019-05-21 ASSESSMENT — MIFFLIN-ST. JEOR: SCORE: 1596.62

## 2019-05-21 ASSESSMENT — PAIN SCALES - GENERAL: PAINLEVEL: NO PAIN (0)

## 2019-05-21 NOTE — LETTER
May 30, 2019      Miguel Patten  [id number] [address] [address]        Dear ,    We are writing to inform you of your test results.    The chemistry panel is normal including the blood sugar and kidney function.   Liver tests are essentially normal.  The bilirubin is minimally increased but has been so for several years.     The cholesterol is well controlled with an LDL of 51.   The blood cell count shows no evidence of anemia or leukemia.  The platelet count is slightly decreased but stable.   Feel free to contact me via the office or My Chart if you have any questions regarding the above.     Resulted Orders   Lipid Profile   Result Value Ref Range    Cholesterol 147 <200 mg/dL    Triglycerides 48 <150 mg/dL    HDL Cholesterol 86 >39 mg/dL    LDL Cholesterol Calculated 51 <100 mg/dL      Comment:      Desirable:       <100 mg/dl    Non HDL Cholesterol 61 <130 mg/dL   CBC with platelets   Result Value Ref Range    WBC 5.2 4.0 - 11.0 10e9/L    RBC Count 4.33 (L) 4.4 - 5.9 10e12/L    Hemoglobin 13.7 13.3 - 17.7 g/dL    Hematocrit 41.4 40.0 - 53.0 %    MCV 96 78 - 100 fl    MCH 31.6 26.5 - 33.0 pg    MCHC 33.1 31.5 - 36.5 g/dL    RDW 14.4 10.0 - 15.0 %    Platelet Count 133 (L) 150 - 450 10e9/L   Basic metabolic panel   Result Value Ref Range    Sodium 142 133 - 144 mmol/L    Potassium 4.5 3.4 - 5.3 mmol/L    Chloride 106 94 - 109 mmol/L    Carbon Dioxide 30 20 - 32 mmol/L    Anion Gap 6 3 - 14 mmol/L    Glucose 84 70 - 99 mg/dL    Urea Nitrogen 19 7 - 30 mg/dL    Creatinine 0.79 0.66 - 1.25 mg/dL    GFR Estimate 81 >60 mL/min/[1.73_m2]      Comment:      Non  GFR Calc  Starting 12/18/2018, serum creatinine based estimated GFR (eGFR) will be   calculated using the Chronic Kidney Disease Epidemiology Collaboration   (CKD-EPI) equation.      GFR Estimate If Black >90 >60 mL/min/[1.73_m2]      Comment:       GFR Calc  Starting 12/18/2018, serum creatinine based estimated  GFR (eGFR) will be   calculated using the Chronic Kidney Disease Epidemiology Collaboration   (CKD-EPI) equation.      Calcium 9.0 8.5 - 10.1 mg/dL   Hepatic panel   Result Value Ref Range    Bilirubin Direct 0.5 (H) 0.0 - 0.2 mg/dL    Bilirubin Total 1.9 (H) 0.2 - 1.3 mg/dL    Albumin 4.4 3.4 - 5.0 g/dL    Protein Total 7.2 6.8 - 8.8 g/dL    Alkaline Phosphatase 57 40 - 150 U/L    ALT 22 0 - 70 U/L    AST 22 0 - 45 U/L       If you have any questions or concerns, please call the clinic at the number listed above.       Sincerely,        Vladimir Gonzales, DO

## 2019-05-21 NOTE — PROGRESS NOTES
Subjective     Miguel Patten is a 86 year old male who presents to clinic today for the following health issues:    HPI   Asthma Follow-Up    Was ACT completed today?  No      Do you have a cough?  No    Are you experiencing any wheezing in your chest?  No    Do you have any shortness of breath?  No     How often are you using a short-acting (rescue) inhaler or nebulizer, such as Albuterol?  Never    How many days per week do you miss taking your asthma controller medication?  0    Please describe any recent triggers for your asthma: Patient is unaware of triggers    Have you had any Emergency Room Visits, Urgent Care Visits, or Hospital Admissions since your last office visit?  No       Amount of exercise or physical activity: walks    Problems taking medications regularly: No    Medication side effects: none    Diet: regular (no restrictions)                    Chief Complaint         The patient is a pleasant 86-year-old gentleman who appears to be substantially younger than his stated age.  He presents today for follow-up of his hypertension and chronic atrial fibrillation.  He is on chronic anticoagulation and has had no bruising or bleeding.  He also has had some recent shortness of breath which is consistent with his mild intermittent asthma.  He is using the albuterol on as-needed basis.  Finds that it is occasionally necessary.  He continues to take the Flovent regularly as well as an occasional DuoNeb treatment rarely.  He has a history of prostate cancer and is on Lupron.  He said no recent recurrence or symptoms.                         PAST, FAMILY,SOCIAL HISTORY:     Medical  History:   has a past medical history of Arthritis, Ascending aortic aneurysm (H), Asthma, Complete AV block (H), Congestive heart failure (H), Coronary artery disease, H/O aortic valve replacement, Hypertension, Malignant neoplasm of prostate (H), and Permanent atrial fibrillation (H).     Surgical History:   has a past  surgical history that includes NONSPECIFIC PROCEDURE; NONSPECIFIC PROCEDURE; orthopedic surgery; Abdomen surgery (2001); TOTAL HIP ARTHROPLASTY (1/21/13); Arthroplasty hip (1/21/2013); s/p avr; s/p aneurysm repair by Dr. Friedman; Replace valve aortic (7/17/2014); Repair aneurysm ascending aorta (7/17/2014); Phacoemulsification with standard intraocular lens implant (Right, 10/6/2016); and Phacoemulsification with standard intraocular lens implant (Left, 10/20/2016).     Social History:   reports that he has never smoked. He has never used smokeless tobacco. He reports that he drinks alcohol. He reports that he does not use drugs.     Family History:  family history includes Asthma in his daughter, father, and paternal grandmother.            MEDICATIONS  Current Outpatient Medications   Medication Sig Dispense Refill     acetaminophen (TYLENOL) 325 MG tablet Take 2 tablets (650 mg) by mouth every 4 hours as needed for mild pain or fever 100 tablet      albuterol (PROAIR HFA/PROVENTIL HFA/VENTOLIN HFA) 108 (90 Base) MCG/ACT inhaler Inhale 2 puffs into the lungs every 6 hours as needed for shortness of breath / dyspnea or wheezing 18 g 3     bicalutamide (CASODEX) 50 MG tablet Take 1 tablet (50 mg) by mouth daily       FLOVENT HFA 44 MCG/ACT inhaler INHALE ONE PUFF BY MOUTH TWICE A DAY -- NEEDS TO BE SEEN FOR FURTHER REFILLS 10.6 g 0     losartan (COZAAR) 50 MG tablet TAKE ONE TABLET BY MOUTH TWICE A  tablet 3     simvastatin (ZOCOR) 40 MG tablet TAKE ONE TABLET BY MOUTH EVERY NIGHT AT BEDTIME 90 tablet 2     warfarin (JANTOVEN) 2.5 MG tablet Take 3.75 mg Tues, Sat and 2.5 mg all other days, or as directed by the coumadin clinic. 110 tablet 3     BETA BLOCKER NOT PRESCRIBED, INTENTIONAL, Beta Blocker not prescribed intentionally due to Hypotension (SBP < 90)  0     ipratropium - albuterol 0.5 mg/2.5 mg/3 mL (DUONEB) 0.5-2.5 (3) MG/3ML nebulization Take 3 mLs by nebulization every 6 hours as needed for shortness of  breath / dyspnea. (Patient not taking: Reported on 4/17/2019) 60 vial 1     leuprolide (LUPRON DEPOT, 3-MONTH,) 22.5 MG kit Inject 22.5 MG, IM q 6 months per Dr. Zeyad Guevara, pt's Urologist in [place]. 10/17/2018 1 each 3         --------------------------------------------------------------------------------------------------------------------                              Review of Systems       LUNGS: Pt denies: cough, excess sputum, hemoptysis, or shortness of breath.   HEART: Pt denies: chest pain, arrhythmia, syncope, tachy or bradyarrhythmia.   GI: Pt denies: nausea, vomiting, diarrhea, constipation, melena, or hematochezia.   NEURO: Pt denies: seizures, strokes, diplopia, weakness, paraesthesias, or paralysis.   SKIN: Pt denies: itching, rashes, discoloration, or specific lesions of concern. Denies recent hair loss.   PSYCH: The patient denies significant depression, anxiety, mood imbalance. Specifically denies any suicidal ideation.                                     Examination      /72   Pulse 66   Temp 98.5  F (36.9  C) (Temporal)   Resp 16   Ht 1.829 m (6')   Wt 87.9 kg (193 lb 11.2 oz)   SpO2 98%   BMI 26.27 kg/m     Constitutional: The patient appears to be in no acute distress. The patient appears to be adequately hydrated. No acute respiratory or hemodynamic distress is noted at this time.   LUNGS: clear with an occasional expiratory wheeze bilaterally, airflow is brisk, no intercostal retraction or stridor is noted. No coughing is noted during visit.   HEART:  regular without rubs, clicks, gallops, or murmurs. PMI is nondisplaced. Upstrokes are brisk. S1,S2 are heard.   GI: Abdomen is soft, without rebound, guarding or tenderness. Bowel sounds are appropriate. No renal bruits are heard.   NEURO: Pt is alert and appropriate. No neurologic lateralization is noted. Cranial nerves 2-12 are intact. Peripheral sensory and motor function are grossly normal.    SKIN:  warm and dry. No  "erythema, or rashes are noted. No specific lesions of concern are noted.    PSYCH: The patient appears grossly appropriate. Maintains good eye contact, does not have any jittery or atypical motion. Displays appropriate affect.                                           Decision Making    1. Mild intermittent asthma, uncomplicated  Continue current nebulizer therapy  - albuterol (PROAIR HFA/PROVENTIL HFA/VENTOLIN HFA) 108 (90 Base) MCG/ACT inhaler; Inhale 2 puffs into the lungs every 6 hours as needed for shortness of breath / dyspnea or wheezing  Dispense: 18 g; Refill: 3    2. Chronic atrial fibrillation (H)  Continue anticoagulation and follow  - INR CLINIC REFERRAL    3. Long term current use of anticoagulant therapy  Check CBC for possible anemia  - CBC with platelets    4. Hypertension goal BP (blood pressure) < 140/90  Check renal function  - Basic metabolic panel    5. Combined systolic and diastolic congestive heart failure, unspecified HF chronicity (H)  And electrolytes  - Basic metabolic panel    6. Mixed hyperlipidemia  Check liver and lipid and adjust medications accordingly  - Lipid Profile  - Hepatic panel                           FOLLOW UP   I have asked the patient to make an appointment for followup with me in 4 months for a \"Annual Medicare Wellness Visit\"        I have carefully explained the diagnosis and treatment options to the patient.  The patient has displayed an understanding of the above, and all subsequent questions were answered.      DO THAIS Correa    Portions of this note were produced using [employer]  Although every attempt at real-time proof reading has been made, occasional grammar/syntax errors may have been missed.           "

## 2019-05-22 ASSESSMENT — ASTHMA QUESTIONNAIRES: ACT_TOTALSCORE: 25

## 2019-05-30 NOTE — RESULT ENCOUNTER NOTE
Dear Miguel, your recent test results are attached.    The chemistry panel is normal including the blood sugar and kidney function.  Liver tests are essentially normal.  The bilirubin is minimally increased but has been so for several years.    The cholesterol is well controlled with an LDL of 51.  The blood cell count shows no evidence of anemia or leukemia.  The platelet count is slightly decreased but stable.  Feel free to contact me via the office or My Chart if you have any questions regarding the above.

## 2019-06-03 DIAGNOSIS — Z95.2 S/P AVR (AORTIC VALVE REPLACEMENT): ICD-10-CM

## 2019-06-03 RX ORDER — LOSARTAN POTASSIUM 50 MG/1
TABLET ORAL
Qty: 180 TABLET | Refills: 1 | Status: SHIPPED | OUTPATIENT
Start: 2019-06-03 | End: 2019-12-09

## 2019-06-03 NOTE — TELEPHONE ENCOUNTER
Prescription approved per [facility].    BASSAM LeeN, RN  [facility]

## 2019-06-03 NOTE — TELEPHONE ENCOUNTER
"Requested Prescriptions   Pending Prescriptions Disp Refills     losartan (COZAAR) 50 MG tablet [Pharmacy Med Name: LOSARTAN POTASSIUM 50MG TABS] 180 tablet 3     Sig: TAKE ONE TABLET BY MOUTH TWICE A DAY   Last Written Prescription Date:  6/7/18  Last Fill Quantity: 180,  # refills: 3   Last office visit: 5/21/2019 with prescribing provider:  5/21/19   Future Office Visit:   Next 5 appointments (look out 90 days)    Jun 20, 2019  9:00 AM CDT  Return Visit with Cristina Caputo MD  [facility] ([facility]) [address] [place] [id number] [phone]           Angiotensin-II Receptors Passed - 6/3/2019 12:56 PM        Passed - Blood pressure under 140/90 in past 12 months     BP Readings from Last 3 Encounters:   05/21/19 124/72   04/17/19 167/72   06/21/18 140/70                 Passed - Recent (12 mo) or future (30 days) visit within the authorizing provider's specialty     Patient had office visit in the last 12 months or has a visit in the next 30 days with authorizing provider or within the authorizing provider's specialty.  See \"Patient Info\" tab in inbasket, or \"Choose Columns\" in Meds & Orders section of the refill encounter.              Passed - Medication is active on med list        Passed - Patient is age 18 or older        Passed - Normal serum creatinine on file in past 12 months     Recent Labs   Lab Test 05/21/19  1501  06/02/17  1255   CR 0.79   < >  --    CREAT  --   --  0.7    < > = values in this interval not displayed.             Passed - Normal serum potassium on file in past 12 months     Recent Labs   Lab Test 05/21/19  1501   POTASSIUM 4.5                    "

## 2019-06-05 ENCOUNTER — ANTICOAGULATION THERAPY VISIT (OUTPATIENT)
Dept: ANTICOAGULATION | Facility: CLINIC | Age: 84
End: 2019-06-05
Payer: MEDICARE

## 2019-06-05 DIAGNOSIS — I50.40 COMBINED SYSTOLIC AND DIASTOLIC CONGESTIVE HEART FAILURE, UNSPECIFIED HF CHRONICITY (H): ICD-10-CM

## 2019-06-05 DIAGNOSIS — Z85.46 PERSONAL HISTORY OF MALIGNANT NEOPLASM OF PROSTATE: Primary | ICD-10-CM

## 2019-06-05 DIAGNOSIS — R31.29 MICROSCOPIC HEMATURIA: ICD-10-CM

## 2019-06-05 LAB
ALBUMIN UR-MCNC: 30 MG/DL
APPEARANCE UR: CLEAR
BILIRUB UR QL STRIP: NEGATIVE
COLOR UR AUTO: ABNORMAL
GLUCOSE UR STRIP-MCNC: NEGATIVE MG/DL
HGB UR QL STRIP: ABNORMAL
INR POINT OF CARE: 2.2 (ref 0.9–1.1)
KETONES UR STRIP-MCNC: NEGATIVE MG/DL
LEUKOCYTE ESTERASE UR QL STRIP: NEGATIVE
MUCOUS THREADS #/AREA URNS LPF: PRESENT /LPF
NITRATE UR QL: NEGATIVE
PH UR STRIP: 6 PH (ref 5–7)
PSA SERPL-MCNC: 0.05 UG/L (ref 0–4)
RBC #/AREA URNS AUTO: 8 /HPF (ref 0–2)
SOURCE: ABNORMAL
SP GR UR STRIP: 1.02 (ref 1–1.03)
UROBILINOGEN UR STRIP-MCNC: 4 MG/DL (ref 0–2)
WBC #/AREA URNS AUTO: 1 /HPF (ref 0–5)

## 2019-06-05 PROCEDURE — 84153 ASSAY OF PSA TOTAL: CPT | Performed by: UROLOGY

## 2019-06-05 PROCEDURE — 36416 COLLJ CAPILLARY BLOOD SPEC: CPT

## 2019-06-05 PROCEDURE — 87086 URINE CULTURE/COLONY COUNT: CPT | Performed by: UROLOGY

## 2019-06-05 PROCEDURE — 81001 URINALYSIS AUTO W/SCOPE: CPT | Performed by: UROLOGY

## 2019-06-05 PROCEDURE — 99207 ZZC NO CHARGE NURSE ONLY: CPT

## 2019-06-05 PROCEDURE — 85610 PROTHROMBIN TIME: CPT | Mod: QW

## 2019-06-05 NOTE — PROGRESS NOTES
ANTICOAGULATION FOLLOW-UP CLINIC VISIT    Patient Name:  Miguel Patten  Date:  2019  Contact Type:  Face to Face    SUBJECTIVE:  Patient Findings     Comments:   The patient was assessed for diet, medication, and activity level changes, missed or extra doses, bruising or bleeding, with no problem findings.  Ashly Jones RN          Clinical Outcomes     Negatives:   Major bleeding event, Thromboembolic event, Anticoagulation-related hospital admission, Anticoagulation-related ED visit, Anticoagulation-related fatality    Comments:   The patient was assessed for diet, medication, and activity level changes, missed or extra doses, bruising or bleeding, with no problem findings.  Ashly Jones RN             OBJECTIVE    INR Protime   Date Value Ref Range Status   2019 2.2 (A) 0.9 - 1.1 Final       ASSESSMENT / PLAN  INR assessment THER    Recheck INR In: 8 WEEKS    INR Location Clinic      Anticoagulation Summary  As of 2019    INR goal:   2.0-3.0   TTR:   65.2 % (3.1 y)   INR used for dosin.2 (2019)   Warfarin maintenance plan:   3.75 mg (2.5 mg x 1.5) every Tue, Sat; 2.5 mg (2.5 mg x 1) all other days   Full warfarin instructions:   3.75 mg every Tue, Sat; 2.5 mg all other days   Weekly warfarin total:   20 mg   No change documented:   Ashly Jones RN   Plan last modified:   Ashly Jones RN (2018)   Next INR check:   2019   Target end date:       Indications    CHF (congestive heart failure) (H) [I50.9]  Long-term (current) use of anticoagulants [Z79.01] [Z79.01]             Anticoagulation Episode Summary     INR check location:       Preferred lab:       Send INR reminders to:   [facility] MONI    Comments:   2.5 mg tablets, book, takes in AM      Anticoagulation Care Providers     Provider Role Specialty Phone number    Rober العراقي MD [facility] [phone]            See the Encounter Report to view Anticoagulation Flowsheet and Dosing  Calendar (Go to Encounters tab in chart review, and find the Anticoagulation Therapy Visit)    Dosage adjustment made based on physician directed care plan.      Ashly Jones RN

## 2019-06-06 LAB
BACTERIA SPEC CULT: NORMAL
BACTERIA SPEC CULT: NORMAL
Lab: NORMAL
SPECIMEN SOURCE: NORMAL

## 2019-06-20 ENCOUNTER — ANCILLARY PROCEDURE (OUTPATIENT)
Dept: CARDIOLOGY | Facility: CLINIC | Age: 84
End: 2019-06-20
Attending: INTERNAL MEDICINE
Payer: MEDICARE

## 2019-06-20 ENCOUNTER — OFFICE VISIT (OUTPATIENT)
Dept: CARDIOLOGY | Facility: CLINIC | Age: 84
End: 2019-06-20
Payer: MEDICARE

## 2019-06-20 VITALS
WEIGHT: 192 LBS | HEART RATE: 60 BPM | OXYGEN SATURATION: 97 % | BODY MASS INDEX: 26.01 KG/M2 | RESPIRATION RATE: 12 BRPM | HEIGHT: 72 IN | SYSTOLIC BLOOD PRESSURE: 128 MMHG | DIASTOLIC BLOOD PRESSURE: 64 MMHG

## 2019-06-20 DIAGNOSIS — I48.92 ATRIAL FLUTTER, UNSPECIFIED TYPE (H): ICD-10-CM

## 2019-06-20 DIAGNOSIS — Z95.2 H/O AORTIC VALVE REPLACEMENT: Primary | ICD-10-CM

## 2019-06-20 DIAGNOSIS — I44.2 COMPLETE ATRIOVENTRICULAR BLOCK (H): ICD-10-CM

## 2019-06-20 DIAGNOSIS — Z95.0 CARDIAC PACEMAKER IN SITU: Primary | ICD-10-CM

## 2019-06-20 DIAGNOSIS — I42.9 CARDIOMYOPATHY (H): ICD-10-CM

## 2019-06-20 PROCEDURE — 93281 PM DEVICE PROGR EVAL MULTI: CPT | Performed by: INTERNAL MEDICINE

## 2019-06-20 PROCEDURE — 99214 OFFICE O/P EST MOD 30 MIN: CPT | Performed by: INTERNAL MEDICINE

## 2019-06-20 ASSESSMENT — PAIN SCALES - GENERAL: PAINLEVEL: NO PAIN (0)

## 2019-06-20 ASSESSMENT — MIFFLIN-ST. JEOR: SCORE: 1588.91

## 2019-06-20 NOTE — LETTER
6/20/2019      Vladimir Gonzales DO  [address] [place] [id number]      RE: Miguel Patten       Dear Colleague,    I had the pleasure of seeing Miguel Patten in the [facility].    Service Date: 06/20/2019      HISTORY OF PRESENT ILLNESS:  I saw Mr. Patten for followup of aortic valve disease, atrial fibrillation and complete AV block.  He had aortic regurgitation and received a bioprosthetic aortic valve replacement with a graft repair of the ascending aorta aneurysm previously.  The patient had a BiV pacemaker implantation for complete AV block on 03/19/2015.  He has chronic atrial fibrillation and is on warfarin therapy.  The last echocardiography in 05/2017 reported normal LV ejection fraction with mild aortic regurgitation.  Symptomatically, he has been doing well without palpitation, fatigue, shortness of breath or chest pain.  He uses a cane for walking, and he continues to drive a car.      PHYSICAL EXAMINATION:   VITAL SIGNS:  Blood pressure was 128/64, heart rate 60 beats per minute, body weight 192 pounds.   HEENT:  Eyes and ENT were unremarkable.   CHEST:  The pacemaker site looked well.   LUNGS:  Clear.   CARDIAC:  Rhythm was regular, and the heart sounds were normal with a III/VI soft systolic murmur in the aortic valve area.   ABDOMEN:  Examination showed no hepatomegaly.   EXTREMITIES:  There was no pedal edema.      ASSESSMENT AND RECOMMENDATIONS:  Mr. Patten is doing well symptomatically.  His weight is stable, and blood pressure is normal.  I have ordered a repeat echocardiography for assessment of the aortic valve.  Otherwise, he will continue the current medications and return for followup in 1 year.  If he is stable, he can continue to see MsDavis Mee annually and see me again only as needed.          cc:   Vladimir Gonzales DO    [address]  [address]         HEAVEN HARVEY MD             D:  2019   T: 2019   MT: JAMES      Name:     JAMI HUDSON   MRN:      [id number]        Account:      [id number]   :      1932           Service Date: 2019      Document: [id number]           Outpatient Encounter Medications as of 2019   Medication Sig Dispense Refill     acetaminophen (TYLENOL) 325 MG tablet Take 2 tablets (650 mg) by mouth every 4 hours as needed for mild pain or fever 100 tablet      albuterol (PROAIR HFA/PROVENTIL HFA/VENTOLIN HFA) 108 (90 Base) MCG/ACT inhaler Inhale 2 puffs into the lungs every 6 hours as needed for shortness of breath / dyspnea or wheezing 18 g 3     BETA BLOCKER NOT PRESCRIBED, INTENTIONAL, Beta Blocker not prescribed intentionally due to Hypotension (SBP < 90)  0     bicalutamide (CASODEX) 50 MG tablet Take 1 tablet (50 mg) by mouth daily       FLOVENT HFA 44 MCG/ACT inhaler INHALE ONE PUFF BY MOUTH TWICE A DAY -- NEEDS TO BE SEEN FOR FURTHER REFILLS 10.6 g 0     ipratropium - albuterol 0.5 mg/2.5 mg/3 mL (DUONEB) 0.5-2.5 (3) MG/3ML nebulization Take 3 mLs by nebulization every 6 hours as needed for shortness of breath / dyspnea. 60 vial 1     leuprolide (LUPRON DEPOT, 3-MONTH,) 22.5 MG kit Inject 22.5 MG, IM q 6 months per Dr. Zeyad Guevara, pt's Urologist in [place]. 10/17/2018 1 each 3     losartan (COZAAR) 50 MG tablet TAKE ONE TABLET BY MOUTH TWICE A  tablet 1     simvastatin (ZOCOR) 40 MG tablet TAKE ONE TABLET BY MOUTH EVERY NIGHT AT BEDTIME 90 tablet 2     warfarin (JANTOVEN) 2.5 MG tablet Take 3.75 mg Tues, Sat and 2.5 mg all other days, or as directed by the coumadin clinic. 110 tablet 3     No facility-administered encounter medications on file as of 2019.      Again, thank you for allowing me to participate in the care of your patient.      Sincerely,    Cristina Caputo MD     [facility]

## 2019-06-20 NOTE — LETTER
6/20/2019    Vladimir Gonzales, DO  [address] [place] [address]    RE: Miguel Patten       Dear Colleague,    I had the pleasure of seeing Miguel Patten in the [facility].    HPI and Plan:   See dictation    Orders Placed This Encounter   Procedures     Follow-Up with Cardiac Advanced Practice Provider     Echocardiogram Complete       No orders of the defined types were placed in this encounter.      There are no discontinued medications.      Encounter Diagnoses   Name Primary?     Atrial flutter, unspecified type (H)      H/O aortic valve replacement Yes     Complete atrioventricular block (H)        CURRENT MEDICATIONS:  Current Outpatient Medications   Medication Sig Dispense Refill     acetaminophen (TYLENOL) 325 MG tablet Take 2 tablets (650 mg) by mouth every 4 hours as needed for mild pain or fever 100 tablet      albuterol (PROAIR HFA/PROVENTIL HFA/VENTOLIN HFA) 108 (90 Base) MCG/ACT inhaler Inhale 2 puffs into the lungs every 6 hours as needed for shortness of breath / dyspnea or wheezing 18 g 3     BETA BLOCKER NOT PRESCRIBED, INTENTIONAL, Beta Blocker not prescribed intentionally due to Hypotension (SBP < 90)  0     bicalutamide (CASODEX) 50 MG tablet Take 1 tablet (50 mg) by mouth daily       FLOVENT HFA 44 MCG/ACT inhaler INHALE ONE PUFF BY MOUTH TWICE A DAY -- NEEDS TO BE SEEN FOR FURTHER REFILLS 10.6 g 0     ipratropium - albuterol 0.5 mg/2.5 mg/3 mL (DUONEB) 0.5-2.5 (3) MG/3ML nebulization Take 3 mLs by nebulization every 6 hours as needed for shortness of breath / dyspnea. 60 vial 1     leuprolide (LUPRON DEPOT, 3-MONTH,) 22.5 MG kit Inject 22.5 MG, IM q 6 months per Dr. Zeyad Guevara, pt's Urologist in [place]. 10/17/2018 1 each 3     losartan (COZAAR) 50 MG tablet TAKE ONE TABLET BY MOUTH TWICE A  tablet 1     simvastatin (ZOCOR) 40 MG tablet TAKE ONE TABLET BY MOUTH EVERY NIGHT AT BEDTIME 90 tablet 2     warfarin (JANTOVEN) 2.5 MG  tablet Take 3.75 mg Tues, Sat and 2.5 mg all other days, or as directed by the coumadin clinic. 110 tablet 3       ALLERGIES     Allergies   Allergen Reactions     No Known Drug Allergies        PAST MEDICAL HISTORY:  Past Medical History:   Diagnosis Date     Arthritis      Ascending aortic aneurysm (H)     repaired with Hemashield graft 7/2014     Asthma      Complete AV block (H)     sp CRTP implant     Congestive heart failure (H)      Coronary artery disease     mild-moderate stenosis by angiography     H/O aortic valve replacement     bioprosthetic, 7/2014     Hypertension      Malignant neoplasm of prostate (H)     Prostate cancer     Permanent atrial fibrillation (H)     chronic       PAST SURGICAL HISTORY:  Past Surgical History:   Procedure Laterality Date     ARTHROPLASTY HIP  1/21/2013    Procedure: ARTHROPLASTY HIP;  right total hip arthroplasty;  Surgeon: Rober Alves MD;  [facility]     C NONSPECIFIC PROCEDURE      Hernia repair     C NONSPECIFIC PROCEDURE      Prostatectomy/cancer     C TOTAL HIP ARTHROPLASTY  1/21/13    Right     GENITOURINARY SURGERY  2001    Prostatectomy       ORTHOPEDIC SURGERY      Left SONALI     PHACOEMULSIFICATION WITH STANDARD INTRAOCULAR LENS IMPLANT Right 10/6/2016    Procedure: PHACOEMULSIFICATION WITH STANDARD INTRAOCULAR LENS IMPLANT;  Surgeon: Elder Rueda MD;  [facility]     PHACOEMULSIFICATION WITH STANDARD INTRAOCULAR LENS IMPLANT Left 10/20/2016    Procedure: PHACOEMULSIFICATION WITH STANDARD INTRAOCULAR LENS IMPLANT;  Surgeon: Elder Rueda MD;  [facility]     REPAIR ANEURYSM ASCENDING AORTA  7/17/2014    2. Aortic aneurysm repair with 32 mm Hemashield graft.      REPLACE VALVE AORTIC  7/17/2014    1. Aortic valve replacement with 23 mm [employer] Trifecta tissue valve.      s/p aneurysm repair by Dr. Friedman       s/p avr         FAMILY HISTORY:  Family History   Problem Relation Age of Onset     Asthma Father      Asthma  Paternal Grandmother      Asthma Daughter        SOCIAL HISTORY:  Social History     Socioeconomic History     Marital status:      Spouse name: Not on file     Number of children: Not on file     Years of education: Not on file     Highest education level: Not on file   Occupational History     Not on file   Social Needs     Financial resource strain: Not on file     Food insecurity:     Worry: Not on file     Inability: Not on file     Transportation needs:     Medical: Not on file     Non-medical: Not on file   Tobacco Use     Smoking status: Never Smoker     Smokeless tobacco: Never Used   Substance and Sexual Activity     Alcohol use: Yes     Alcohol/week: 0.0 oz     Comment: rarely, once a week or less     Drug use: No     Sexual activity: Not Currently     Partners: Female     Comment:  - live with friend - 3 children.   Lifestyle     Physical activity:     Days per week: Not on file     Minutes per session: Not on file     Stress: Not on file   Relationships     Social connections:     Talks on phone: Not on file     Gets together: Not on file     Attends Restorationism service: Not on file     Active member of club or organization: Not on file     Attends meetings of clubs or organizations: Not on file     Relationship status: Not on file     Intimate partner violence:     Fear of current or ex partner: Not on file     Emotionally abused: Not on file     Physically abused: Not on file     Forced sexual activity: Not on file   Other Topics Concern     Parent/sibling w/ CABG, MI or angioplasty before 65F 55M? No      Service Yes     Blood Transfusions No     Caffeine Concern No     Occupational Exposure No     Hobby Hazards No     Sleep Concern No     Stress Concern No     Weight Concern No     Special Diet No     Back Care No     Exercise Yes     Bike Helmet No     Comment: n/a     Seat Belt Yes     Self-Exams Yes   Social History Narrative     Not on file       Review of Systems:  Skin:   Negative bruising warfarin   Eyes:  Positive for glasses recent new prescription  ENT:  Positive for hearing loss hearing aids  Respiratory:  Positive for dyspnea on exertion     Cardiovascular:  Negative for;palpitations;chest pain;edema;lightheadedness;dizziness;fatigue      Gastroenterology: Negative      Genitourinary:  Negative      Musculoskeletal:  Positive for arthritis    Neurologic:  Negative      Psychiatric:  Negative      Heme/Lymph/Imm:  Negative      Endocrine:  Negative        Physical Exam:  Vitals: /64 (BP Location: Left arm, Cuff Size: Adult Regular)   Pulse 60   Resp 12   Ht 1.829 m (6')   Wt 87.1 kg (192 lb)   SpO2 97%   BMI 26.04 kg/m       Constitutional:  cooperative, alert and oriented, well developed, well nourished, in no acute distress thin      Skin:  warm and dry to the touch, no apparent skin lesions or masses noted   pacemaker incision in the left infraclavicular area was well-healed      Head:  normocephalic, no masses or lesions        Eyes:  pupils equal and round, conjunctivae and lids unremarkable, sclera white, no xanthalasma, EOMS intact, no nystagmus        Lymph:      ENT:  no pallor or cyanosis, dentition good        Neck:  carotid pulses are full and equal bilaterally, JVP normal, no carotid bruit JVP 10-12      Respiratory:  normal breath sounds, clear to auscultation, normal A-P diameter, normal symmetry, normal respiratory excursion, no use of accessory muscles inspiratory wheezes       Cardiac: regular rhythm;normal S1 and S2 irregular rhythm   crisp prosthetic valve sounds systolic murmur;grade 1   diastolic murmur;blowing;LUSB;grade 3    not assessed this visit                                        GI:  not assessed this visit        Extremities and Muscular Skeletal:  no deformities, clubbing, cyanosis, erythema observed;no edema              Neurological:           Psych:           CC  Cristina Caputo MD  [address]  [place]  [address]                Thank you for allowing me to participate in the care of your patient.      Sincerely,     Cristina Caputo MD     [facility]    cc:   Cristina Caputo MD  [address]  [address]  [place] [address]

## 2019-06-20 NOTE — PROGRESS NOTES
Service Date: 2019      HISTORY OF PRESENT ILLNESS:  I saw Mr. Patten for followup of aortic valve disease, atrial fibrillation and complete AV block.  He had aortic regurgitation and received a bioprosthetic aortic valve replacement with a graft repair of the ascending aorta aneurysm previously.  The patient had a BiV pacemaker implantation for complete AV block on 2015.  He has chronic atrial fibrillation and is on warfarin therapy.  The last echocardiography in 2017 reported normal LV ejection fraction with mild aortic regurgitation.  Symptomatically, he has been doing well without palpitation, fatigue, shortness of breath or chest pain.  He uses a cane for walking, and he continues to drive a car.      PHYSICAL EXAMINATION:   VITAL SIGNS:  Blood pressure was 128/64, heart rate 60 beats per minute, body weight 192 pounds.   HEENT:  Eyes and ENT were unremarkable.   CHEST:  The pacemaker site looked well.   LUNGS:  Clear.   CARDIAC:  Rhythm was regular, and the heart sounds were normal with a III/VI soft systolic murmur in the aortic valve area.   ABDOMEN:  Examination showed no hepatomegaly.   EXTREMITIES:  There was no pedal edema.      ASSESSMENT AND RECOMMENDATIONS:  Mr. Patten is doing well symptomatically.  His weight is stable, and blood pressure is normal.  I have ordered a repeat echocardiography for assessment of the aortic valve.  Otherwise, he will continue the current medications and return for followup in 1 year.  If he is stable, he can continue to see Ms. Caban annually and see me again only as needed.      cc:   [place] [address]         HEAVEN HARVEY MD             D: 2019   T: 2019   MT: JAMES      Name:     JAMI PATTEN   MRN:      [id number]        Account:      [id number]   :      1932           Service Date: 2019      Document: [id number]

## 2019-06-20 NOTE — PROGRESS NOTES
HPI and Plan:   See dictation    Orders Placed This Encounter   Procedures     Follow-Up with Cardiac Advanced Practice Provider     Echocardiogram Complete       No orders of the defined types were placed in this encounter.      There are no discontinued medications.      Encounter Diagnoses   Name Primary?     Atrial flutter, unspecified type (H)      H/O aortic valve replacement Yes     Complete atrioventricular block (H)        CURRENT MEDICATIONS:  Current Outpatient Medications   Medication Sig Dispense Refill     acetaminophen (TYLENOL) 325 MG tablet Take 2 tablets (650 mg) by mouth every 4 hours as needed for mild pain or fever 100 tablet      albuterol (PROAIR HFA/PROVENTIL HFA/VENTOLIN HFA) 108 (90 Base) MCG/ACT inhaler Inhale 2 puffs into the lungs every 6 hours as needed for shortness of breath / dyspnea or wheezing 18 g 3     BETA BLOCKER NOT PRESCRIBED, INTENTIONAL, Beta Blocker not prescribed intentionally due to Hypotension (SBP < 90)  0     bicalutamide (CASODEX) 50 MG tablet Take 1 tablet (50 mg) by mouth daily       FLOVENT HFA 44 MCG/ACT inhaler INHALE ONE PUFF BY MOUTH TWICE A DAY -- NEEDS TO BE SEEN FOR FURTHER REFILLS 10.6 g 0     ipratropium - albuterol 0.5 mg/2.5 mg/3 mL (DUONEB) 0.5-2.5 (3) MG/3ML nebulization Take 3 mLs by nebulization every 6 hours as needed for shortness of breath / dyspnea. 60 vial 1     leuprolide (LUPRON DEPOT, 3-MONTH,) 22.5 MG kit Inject 22.5 MG, IM q 6 months per Dr. Zeyad Guevara, pt's Urologist in [place]. 10/17/2018 1 each 3     losartan (COZAAR) 50 MG tablet TAKE ONE TABLET BY MOUTH TWICE A  tablet 1     simvastatin (ZOCOR) 40 MG tablet TAKE ONE TABLET BY MOUTH EVERY NIGHT AT BEDTIME 90 tablet 2     warfarin (JANTOVEN) 2.5 MG tablet Take 3.75 mg Tues, Sat and 2.5 mg all other days, or as directed by the coumadin clinic. 110 tablet 3       ALLERGIES     Allergies   Allergen Reactions     No Known Drug Allergies        PAST MEDICAL HISTORY:  Past  Medical History:   Diagnosis Date     Arthritis      Ascending aortic aneurysm (H)     repaired with Hemashield graft 7/2014     Asthma      Complete AV block (H)     sp CRTP implant     Congestive heart failure (H)      Coronary artery disease     mild-moderate stenosis by angiography     H/O aortic valve replacement     bioprosthetic, 7/2014     Hypertension      Malignant neoplasm of prostate (H)     Prostate cancer     Permanent atrial fibrillation (H)     chronic       PAST SURGICAL HISTORY:  Past Surgical History:   Procedure Laterality Date     ARTHROPLASTY HIP  1/21/2013    Procedure: ARTHROPLASTY HIP;  right total hip arthroplasty;  Surgeon: Rober Alves MD;  [facility]     C NONSPECIFIC PROCEDURE      Hernia repair     C NONSPECIFIC PROCEDURE      Prostatectomy/cancer     C TOTAL HIP ARTHROPLASTY  1/21/13    Right     GENITOURINARY SURGERY  2001    Prostatectomy       ORTHOPEDIC SURGERY      Left SONALI     PHACOEMULSIFICATION WITH STANDARD INTRAOCULAR LENS IMPLANT Right 10/6/2016    Procedure: PHACOEMULSIFICATION WITH STANDARD INTRAOCULAR LENS IMPLANT;  Surgeon: Elder Rueda MD;  [facility]     PHACOEMULSIFICATION WITH STANDARD INTRAOCULAR LENS IMPLANT Left 10/20/2016    Procedure: PHACOEMULSIFICATION WITH STANDARD INTRAOCULAR LENS IMPLANT;  Surgeon: Elder Rueda MD;  [facility]     REPAIR ANEURYSM ASCENDING AORTA  7/17/2014    2. Aortic aneurysm repair with 32 mm Hemashield graft.      REPLACE VALVE AORTIC  7/17/2014    1. Aortic valve replacement with 23 mm [employer] Miguel Trifecta tissue valve.      s/p aneurysm repair by Dr. Friedman       s/p avr         FAMILY HISTORY:  Family History   Problem Relation Age of Onset     Asthma Father      Asthma Paternal Grandmother      Asthma Daughter        SOCIAL HISTORY:  Social History     Socioeconomic History     Marital status:      Spouse name: Not on file     Number of children: Not on file     Years of education: Not  on file     Highest education level: Not on file   Occupational History     Not on file   Social Needs     Financial resource strain: Not on file     Food insecurity:     Worry: Not on file     Inability: Not on file     Transportation needs:     Medical: Not on file     Non-medical: Not on file   Tobacco Use     Smoking status: Never Smoker     Smokeless tobacco: Never Used   Substance and Sexual Activity     Alcohol use: Yes     Alcohol/week: 0.0 oz     Comment: rarely, once a week or less     Drug use: No     Sexual activity: Not Currently     Partners: Female     Comment:  - live with friend - 3 children.   Lifestyle     Physical activity:     Days per week: Not on file     Minutes per session: Not on file     Stress: Not on file   Relationships     Social connections:     Talks on phone: Not on file     Gets together: Not on file     Attends Restorationism service: Not on file     Active member of club or organization: Not on file     Attends meetings of clubs or organizations: Not on file     Relationship status: Not on file     Intimate partner violence:     Fear of current or ex partner: Not on file     Emotionally abused: Not on file     Physically abused: Not on file     Forced sexual activity: Not on file   Other Topics Concern     Parent/sibling w/ CABG, MI or angioplasty before 65F 55M? No      Service Yes     Blood Transfusions No     Caffeine Concern No     Occupational Exposure No     Hobby Hazards No     Sleep Concern No     Stress Concern No     Weight Concern No     Special Diet No     Back Care No     Exercise Yes     Bike Helmet No     Comment: n/a     Seat Belt Yes     Self-Exams Yes   Social History Narrative     Not on file       Review of Systems:  Skin:  Negative bruising warfarin   Eyes:  Positive for glasses recent new prescription  ENT:  Positive for hearing loss hearing aids  Respiratory:  Positive for dyspnea on exertion     Cardiovascular:  Negative for;palpitations;chest  pain;edema;lightheadedness;dizziness;fatigue      Gastroenterology: Negative      Genitourinary:  Negative      Musculoskeletal:  Positive for arthritis    Neurologic:  Negative      Psychiatric:  Negative      Heme/Lymph/Imm:  Negative      Endocrine:  Negative        Physical Exam:  Vitals: /64 (BP Location: Left arm, Cuff Size: Adult Regular)   Pulse 60   Resp 12   Ht 1.829 m (6')   Wt 87.1 kg (192 lb)   SpO2 97%   BMI 26.04 kg/m      Constitutional:  cooperative, alert and oriented, well developed, well nourished, in no acute distress thin      Skin:  warm and dry to the touch, no apparent skin lesions or masses noted   pacemaker incision in the left infraclavicular area was well-healed      Head:  normocephalic, no masses or lesions        Eyes:  pupils equal and round, conjunctivae and lids unremarkable, sclera white, no xanthalasma, EOMS intact, no nystagmus        Lymph:      ENT:  no pallor or cyanosis, dentition good        Neck:  carotid pulses are full and equal bilaterally, JVP normal, no carotid bruit JVP 10-12      Respiratory:  normal breath sounds, clear to auscultation, normal A-P diameter, normal symmetry, normal respiratory excursion, no use of accessory muscles inspiratory wheezes       Cardiac: regular rhythm;normal S1 and S2 irregular rhythm   crisp prosthetic valve sounds systolic murmur;grade 1   diastolic murmur;blowing;LUSB;grade 3    not assessed this visit                                        GI:  not assessed this visit        Extremities and Muscular Skeletal:  no deformities, clubbing, cyanosis, erythema observed;no edema              Neurological:           Psych:           CC  Cristina Caputo MD  [address]  [address]  [place] [address]

## 2019-06-21 LAB
MDC_IDC_LEAD_IMPLANT_DT: NORMAL
MDC_IDC_LEAD_IMPLANT_DT: NORMAL
MDC_IDC_LEAD_LOCATION: NORMAL
MDC_IDC_LEAD_LOCATION: NORMAL
MDC_IDC_LEAD_LOCATION_DETAIL_1: NORMAL
MDC_IDC_LEAD_LOCATION_DETAIL_1: NORMAL
MDC_IDC_LEAD_MFG: NORMAL
MDC_IDC_LEAD_MFG: NORMAL
MDC_IDC_LEAD_MODEL: NORMAL
MDC_IDC_LEAD_MODEL: NORMAL
MDC_IDC_LEAD_POLARITY_TYPE: NORMAL
MDC_IDC_LEAD_POLARITY_TYPE: NORMAL
MDC_IDC_LEAD_SERIAL: NORMAL
MDC_IDC_LEAD_SERIAL: NORMAL
MDC_IDC_MSMT_BATTERY_STATUS: NORMAL
MDC_IDC_MSMT_LEADCHNL_LV_PACING_THRESHOLD_AMPLITUDE: 1.7 V
MDC_IDC_MSMT_LEADCHNL_LV_PACING_THRESHOLD_PULSEWIDTH: 1 MS
MDC_IDC_MSMT_LEADCHNL_LV_SENSING_INTR_AMPL: 19.6 MV
MDC_IDC_MSMT_LEADCHNL_RA_SENSING_INTR_AMPL: NORMAL
MDC_IDC_MSMT_LEADCHNL_RV_PACING_THRESHOLD_AMPLITUDE: 0.5 V
MDC_IDC_MSMT_LEADCHNL_RV_PACING_THRESHOLD_PULSEWIDTH: 0.5 MS
MDC_IDC_MSMT_LEADCHNL_RV_SENSING_INTR_AMPL: 3.7 MV
MDC_IDC_PG_IMPLANT_DTM: NORMAL
MDC_IDC_PG_MFG: NORMAL
MDC_IDC_PG_MODEL: NORMAL
MDC_IDC_PG_SERIAL: NORMAL
MDC_IDC_PG_TYPE: NORMAL
MDC_IDC_SESS_CLINIC_NAME: NORMAL
MDC_IDC_SESS_DTM: NORMAL
MDC_IDC_SESS_TYPE: NORMAL
MDC_IDC_SET_BRADY_AT_MODE_SWITCH_MODE: NORMAL
MDC_IDC_SET_BRADY_LOWRATE: 60 {BEATS}/MIN
MDC_IDC_SET_BRADY_MAX_SENSOR_RATE: 120 {BEATS}/MIN
MDC_IDC_SET_BRADY_MODE: NORMAL
MDC_IDC_SET_BRADY_PAV_DELAY_HIGH: 180 MS
MDC_IDC_SET_BRADY_PAV_DELAY_LOW: 180 MS
MDC_IDC_SET_BRADY_SAV_DELAY_LOW: 120 MS
MDC_IDC_SET_CRT_LVRV_DELAY: 0 MS
MDC_IDC_SET_CRT_PACED_CHAMBERS: NORMAL
MDC_IDC_SET_LEADCHNL_LV_PACING_AMPLITUDE: 2.6 V
MDC_IDC_SET_LEADCHNL_LV_PACING_ANODE_ELECTRODE_1: NORMAL
MDC_IDC_SET_LEADCHNL_LV_PACING_ANODE_LOCATION_1: NORMAL
MDC_IDC_SET_LEADCHNL_LV_PACING_CAPTURE_MODE: NORMAL
MDC_IDC_SET_LEADCHNL_LV_PACING_CATHODE_ELECTRODE_1: NORMAL
MDC_IDC_SET_LEADCHNL_LV_PACING_CATHODE_LOCATION_1: NORMAL
MDC_IDC_SET_LEADCHNL_LV_PACING_POLARITY: NORMAL
MDC_IDC_SET_LEADCHNL_LV_PACING_PULSEWIDTH: 0.7 MS
MDC_IDC_SET_LEADCHNL_LV_SENSING_ADAPTATION_MODE: NORMAL
MDC_IDC_SET_LEADCHNL_LV_SENSING_ANODE_ELECTRODE_1: NORMAL
MDC_IDC_SET_LEADCHNL_LV_SENSING_ANODE_LOCATION_1: NORMAL
MDC_IDC_SET_LEADCHNL_LV_SENSING_CATHODE_ELECTRODE_1: NORMAL
MDC_IDC_SET_LEADCHNL_LV_SENSING_CATHODE_LOCATION_1: NORMAL
MDC_IDC_SET_LEADCHNL_LV_SENSING_POLARITY: NORMAL
MDC_IDC_SET_LEADCHNL_LV_SENSING_SENSITIVITY: 2.5 MV
MDC_IDC_SET_LEADCHNL_RA_PACING_ANODE_ELECTRODE_1: NORMAL
MDC_IDC_SET_LEADCHNL_RA_PACING_ANODE_LOCATION_1: NORMAL
MDC_IDC_SET_LEADCHNL_RA_PACING_CAPTURE_MODE: NORMAL
MDC_IDC_SET_LEADCHNL_RA_PACING_CATHODE_ELECTRODE_1: NORMAL
MDC_IDC_SET_LEADCHNL_RA_PACING_CATHODE_LOCATION_1: NORMAL
MDC_IDC_SET_LEADCHNL_RA_PACING_POLARITY: NORMAL
MDC_IDC_SET_LEADCHNL_RA_SENSING_ADAPTATION_MODE: NORMAL
MDC_IDC_SET_LEADCHNL_RA_SENSING_ANODE_ELECTRODE_1: NORMAL
MDC_IDC_SET_LEADCHNL_RA_SENSING_ANODE_LOCATION_1: NORMAL
MDC_IDC_SET_LEADCHNL_RA_SENSING_CATHODE_ELECTRODE_1: NORMAL
MDC_IDC_SET_LEADCHNL_RA_SENSING_CATHODE_LOCATION_1: NORMAL
MDC_IDC_SET_LEADCHNL_RA_SENSING_POLARITY: NORMAL
MDC_IDC_SET_LEADCHNL_RA_SENSING_SENSITIVITY: 0.5 MV
MDC_IDC_SET_LEADCHNL_RV_PACING_AMPLITUDE: 2.4 V
MDC_IDC_SET_LEADCHNL_RV_PACING_ANODE_ELECTRODE_1: NORMAL
MDC_IDC_SET_LEADCHNL_RV_PACING_ANODE_LOCATION_1: NORMAL
MDC_IDC_SET_LEADCHNL_RV_PACING_CAPTURE_MODE: NORMAL
MDC_IDC_SET_LEADCHNL_RV_PACING_CATHODE_ELECTRODE_1: NORMAL
MDC_IDC_SET_LEADCHNL_RV_PACING_CATHODE_LOCATION_1: NORMAL
MDC_IDC_SET_LEADCHNL_RV_PACING_POLARITY: NORMAL
MDC_IDC_SET_LEADCHNL_RV_PACING_PULSEWIDTH: 0.5 MS
MDC_IDC_SET_LEADCHNL_RV_SENSING_ADAPTATION_MODE: NORMAL
MDC_IDC_SET_LEADCHNL_RV_SENSING_ANODE_ELECTRODE_1: NORMAL
MDC_IDC_SET_LEADCHNL_RV_SENSING_ANODE_LOCATION_1: NORMAL
MDC_IDC_SET_LEADCHNL_RV_SENSING_CATHODE_ELECTRODE_1: NORMAL
MDC_IDC_SET_LEADCHNL_RV_SENSING_CATHODE_LOCATION_1: NORMAL
MDC_IDC_SET_LEADCHNL_RV_SENSING_POLARITY: NORMAL
MDC_IDC_SET_LEADCHNL_RV_SENSING_SENSITIVITY: 2.5 MV
MDC_IDC_SET_ZONE_DETECTION_INTERVAL: 375 MS
MDC_IDC_SET_ZONE_TYPE: NORMAL
MDC_IDC_SET_ZONE_VENDOR_TYPE: NORMAL
MDC_IDC_STAT_BRADY_RV_PERCENT_PACED: 99 %
MDC_IDC_STAT_CRT_LV_PERCENT_PACED: 99 %
MDC_IDC_STAT_EPISODE_RECENT_COUNT: 20
MDC_IDC_STAT_EPISODE_RECENT_COUNT_DTM_END: NORMAL
MDC_IDC_STAT_EPISODE_RECENT_COUNT_DTM_START: NORMAL
MDC_IDC_STAT_EPISODE_TOTAL_COUNT: 86
MDC_IDC_STAT_EPISODE_TOTAL_COUNT_DTM_END: NORMAL
MDC_IDC_STAT_EPISODE_TYPE: NORMAL
MDC_IDC_STAT_EPISODE_TYPE: NORMAL
MDC_IDC_STAT_EPISODE_VENDOR_TYPE: NORMAL
MDC_IDC_STAT_EPISODE_VENDOR_TYPE: NORMAL

## 2019-06-27 ENCOUNTER — HOSPITAL ENCOUNTER (OUTPATIENT)
Dept: CARDIOLOGY | Facility: CLINIC | Age: 84
Discharge: HOME | End: 2019-06-27
Attending: INTERNAL MEDICINE | Admitting: INTERNAL MEDICINE
Payer: MEDICARE

## 2019-06-27 DIAGNOSIS — Z95.2 H/O AORTIC VALVE REPLACEMENT: ICD-10-CM

## 2019-06-27 PROCEDURE — 93306 TTE W/DOPPLER COMPLETE: CPT

## 2019-06-27 PROCEDURE — 93306 TTE W/DOPPLER COMPLETE: CPT | Mod: 26 | Performed by: INTERNAL MEDICINE

## 2019-07-01 ENCOUNTER — TELEPHONE (OUTPATIENT)
Dept: CARDIOLOGY | Facility: CLINIC | Age: 84
End: 2019-07-01

## 2019-07-31 ENCOUNTER — ANTICOAGULATION THERAPY VISIT (OUTPATIENT)
Dept: ANTICOAGULATION | Facility: CLINIC | Age: 84
End: 2019-07-31
Payer: MEDICARE

## 2019-07-31 DIAGNOSIS — I50.40 COMBINED SYSTOLIC AND DIASTOLIC CONGESTIVE HEART FAILURE, UNSPECIFIED HF CHRONICITY (H): ICD-10-CM

## 2019-07-31 DIAGNOSIS — Z79.01 LONG TERM CURRENT USE OF ANTICOAGULANT THERAPY: ICD-10-CM

## 2019-07-31 LAB — INR POINT OF CARE: 2.4 (ref 0.9–1.1)

## 2019-07-31 PROCEDURE — 99207 ZZC NO CHARGE NURSE ONLY: CPT

## 2019-07-31 PROCEDURE — 85610 PROTHROMBIN TIME: CPT | Mod: QW

## 2019-07-31 PROCEDURE — 36416 COLLJ CAPILLARY BLOOD SPEC: CPT

## 2019-07-31 NOTE — PROGRESS NOTES
ANTICOAGULATION FOLLOW-UP CLINIC VISIT    Patient Name:  Miguel Patten  Date:  2019  Contact Type:  Face to Face    SUBJECTIVE:  Patient Findings     Comments:   The patient was assessed for diet, medication, and activity level changes, missed or extra doses, bruising or bleeding, with no problem findings.  Ashly Jones RN          Clinical Outcomes     Negatives:   Major bleeding event, Thromboembolic event, Anticoagulation-related hospital admission, Anticoagulation-related ED visit, Anticoagulation-related fatality    Comments:   The patient was assessed for diet, medication, and activity level changes, missed or extra doses, bruising or bleeding, with no problem findings.  Ashly Jones RN             OBJECTIVE    INR Protime   Date Value Ref Range Status   2019 2.4 (A) 0.9 - 1.1 Final       ASSESSMENT / PLAN  INR assessment THER    Recheck INR In: 8 WEEKS    INR Location Clinic      Anticoagulation Summary  As of 2019    INR goal:   2.0-3.0   TTR:   66.8 % (3.3 y)   INR used for dosin.4 (2019)   Warfarin maintenance plan:   3.75 mg (2.5 mg x 1.5) every Tue, Sat; 2.5 mg (2.5 mg x 1) all other days   Full warfarin instructions:   3.75 mg every Tue, Sat; 2.5 mg all other days   Weekly warfarin total:   20 mg   No change documented:   Ashly Jones RN   Plan last modified:   Ashly Jones RN (2018)   Next INR check:   2019   Target end date:       Indications    CHF (congestive heart failure) (H) [I50.9]  Long-term (current) use of anticoagulants [Z79.01] [Z79.01]             Anticoagulation Episode Summary     INR check location:       Preferred lab:       Send INR reminders to:   [facility] [place]    Comments:   2.5 mg tablets, book, takes in AM      Anticoagulation Care Providers     Provider Role Specialty Phone number    Rober العراقي MD [facility] [phone]            See the Encounter Report to view Anticoagulation Flowsheet  and Dosing Calendar (Go to Encounters tab in chart review, and find the Anticoagulation Therapy Visit)    Dosage adjustment made based on physician directed care plan.      Ashly Jones RN

## 2019-08-01 DIAGNOSIS — J45.20 MILD INTERMITTENT ASTHMA WITHOUT COMPLICATION: ICD-10-CM

## 2019-08-02 RX ORDER — FLUTICASONE PROPIONATE 44 MCG
AEROSOL WITH ADAPTER (GRAM) INHALATION
Qty: 10.6 G | Refills: 0 | Status: SHIPPED | OUTPATIENT
Start: 2019-08-02 | End: 2019-09-27

## 2019-08-02 NOTE — TELEPHONE ENCOUNTER
"Requested Prescriptions   Pending Prescriptions Disp Refills     FLOVENT HFA 44 MCG/ACT inhaler [Pharmacy Med Name: FLOVENT HFA 44MCG/ACT AERO] 10.6 g 0     Sig: INHALE ONE PUFF BY MOUTH TWICE A DAY (NEEDS TO BE SEEN FOR FURTHER REFILLS)   Last Written Prescription Date:  5/16/19  Last Fill Quantity: 10.6 g,  # refills:  0  Last office visit: 5/21/2019 with prescribing provider:  5/21/19   Future Office Visit:        Inhaled Steroids Protocol Passed - 8/1/2019 10:17 AM        Passed - Patient is age 12 or older        Passed - Asthma control assessment score within normal limits in last 6 months     Please review ACT score.   ACT Total Scores 5/2/2017 4/12/2018 5/21/2019   ACT TOTAL SCORE - - -   ASTHMA ER VISITS - - -   ASTHMA HOSPITALIZATIONS - - -   ACT TOTAL SCORE (Goal Greater than or Equal to 20) 5 25 25   In the past 12 months, how many times did you visit the emergency room for your asthma without being admitted to the hospital? - 0 0   In the past 12 months, how many times were you hospitalized overnight because of your asthma? 0 0 0           Passed - Medication is active on med list        Passed - Recent (6 mo) or future (30 days) visit within the authorizing provider's specialty     Patient had office visit in the last 6 months or has a visit in the next 30 days with authorizing provider or within the authorizing provider's specialty.  See \"Patient Info\" tab in inbasket, or \"Choose Columns\" in Meds & Orders section of the refill encounter.            "

## 2019-08-02 NOTE — TELEPHONE ENCOUNTER
Prescription approved per [facility] Refill Protocol.    BASSAM LeeN, RN  [facility]

## 2019-09-25 ENCOUNTER — ANTICOAGULATION THERAPY VISIT (OUTPATIENT)
Dept: ANTICOAGULATION | Facility: CLINIC | Age: 84
End: 2019-09-25
Payer: MEDICARE

## 2019-09-25 DIAGNOSIS — I50.40 COMBINED SYSTOLIC AND DIASTOLIC CONGESTIVE HEART FAILURE, UNSPECIFIED HF CHRONICITY (H): ICD-10-CM

## 2019-09-25 DIAGNOSIS — Z79.01 LONG TERM CURRENT USE OF ANTICOAGULANT THERAPY: ICD-10-CM

## 2019-09-25 LAB — INR POINT OF CARE: 2.1 (ref 0.9–1.1)

## 2019-09-25 PROCEDURE — 85610 PROTHROMBIN TIME: CPT | Mod: QW

## 2019-09-25 PROCEDURE — 99207 ZZC NO CHARGE NURSE ONLY: CPT

## 2019-09-25 PROCEDURE — 36416 COLLJ CAPILLARY BLOOD SPEC: CPT

## 2019-09-25 NOTE — PROGRESS NOTES
ANTICOAGULATION FOLLOW-UP CLINIC VISIT    Patient Name:  Miguel Patten  Date:  2019  Contact Type:  Face to Face    SUBJECTIVE:  Patient Findings     Comments:   The patient was assessed for diet, medication, and activity level changes, missed or extra doses, bruising or bleeding, with no problem findings.  Ashly Jones RN          Clinical Outcomes     Negatives:   Major bleeding event, Thromboembolic event, Anticoagulation-related hospital admission, Anticoagulation-related ED visit, Anticoagulation-related fatality    Comments:   The patient was assessed for diet, medication, and activity level changes, missed or extra doses, bruising or bleeding, with no problem findings.  Ashly Jones RN             OBJECTIVE    INR Protime   Date Value Ref Range Status   2019 2.1 (A) 0.9 - 1.1 Final       ASSESSMENT / PLAN  INR assessment THER    Recheck INR In: 8 WEEKS    INR Location Clinic      Anticoagulation Summary  As of 2019    INR goal:   2.0-3.0   TTR:   68.3 % (3.4 y)   INR used for dosin.1 (2019)   Warfarin maintenance plan:   3.75 mg (2.5 mg x 1.5) every Tue, Sat; 2.5 mg (2.5 mg x 1) all other days   Full warfarin instructions:   3.75 mg every Tue, Sat; 2.5 mg all other days   Weekly warfarin total:   20 mg   No change documented:   Ashly Jones RN   Plan last modified:   Ashly Jones RN (2018)   Next INR check:   2019   Target end date:       Indications    CHF (congestive heart failure) (H) [I50.9]  Long-term (current) use of anticoagulants [Z79.01] [Z79.01]             Anticoagulation Episode Summary     INR check location:       Preferred lab:       Send INR reminders to:   [facility] [facility]    Comments:   2.5 mg tablets, book, takes in AM      Anticoagulation Care Providers     Provider Role Specialty Phone number    Rober العراقي MD [facility] [phone]            See the Encounter Report to view Anticoagulation Flowsheet  and Dosing Calendar (Go to Encounters tab in chart review, and find the Anticoagulation Therapy Visit)    Dosage adjustment made based on physician directed care plan.      Ashly Jones RN

## 2019-09-27 DIAGNOSIS — J45.20 MILD INTERMITTENT ASTHMA WITHOUT COMPLICATION: ICD-10-CM

## 2019-09-27 RX ORDER — FLUTICASONE PROPIONATE 44 UG/1
1 AEROSOL, METERED RESPIRATORY (INHALATION) 2 TIMES DAILY
Qty: 10.6 G | Refills: 0 | Status: SHIPPED | OUTPATIENT
Start: 2019-09-27 | End: 2019-11-20

## 2019-09-27 NOTE — TELEPHONE ENCOUNTER
"  Requested Prescriptions   Pending Prescriptions Disp Refills     FLOVENT HFA 44 MCG/ACT inhaler [Pharmacy Med Name: FLOVENT HFA 44MCG/ACT AERO] 10.6 g 0     Sig: INHALE ONE PUFF BY MOUTH TWICE A DAY (NEEDS TO BE SEEN FOR FURTHER REFILLS)   Last Written Prescription Date:  8/2/2019  Last Fill Quantity: 10.6g,  # refills: 0   Last office visit: 5/21/2019 with prescribing provider:     Future Office Visit:        Inhaled Steroids Protocol Passed - 9/27/2019 10:04 AM        Passed - Patient is age 12 or older        Passed - Asthma control assessment score within normal limits in last 6 months     Please review ACT score.   ACT Total Scores 5/2/2017 4/12/2018 5/21/2019   ACT TOTAL SCORE - - -   ASTHMA ER VISITS - - -   ASTHMA HOSPITALIZATIONS - - -   ACT TOTAL SCORE (Goal Greater than or Equal to 20) 5 25 25   In the past 12 months, how many times did you visit the emergency room for your asthma without being admitted to the hospital? - 0 0   In the past 12 months, how many times were you hospitalized overnight because of your asthma? 0 0 0             Passed - Medication is active on med list        Passed - Recent (6 mo) or future (30 days) visit within the authorizing provider's specialty     Patient had office visit in the last 6 months or has a visit in the next 30 days with authorizing provider or within the authorizing provider's specialty.  See \"Patient Info\" tab in inbasket, or \"Choose Columns\" in Meds & Orders section of the refill encounter.          Prescription approved per [facility] Refill Protocol.  Michelle Martin RN      "

## 2019-09-30 ENCOUNTER — ANCILLARY PROCEDURE (OUTPATIENT)
Dept: CARDIOLOGY | Facility: CLINIC | Age: 84
End: 2019-09-30
Attending: INTERNAL MEDICINE
Payer: MEDICARE

## 2019-09-30 DIAGNOSIS — Z95.0 CARDIAC PACEMAKER IN SITU: Primary | ICD-10-CM

## 2019-09-30 DIAGNOSIS — I42.9 CARDIOMYOPATHY (H): ICD-10-CM

## 2019-09-30 DIAGNOSIS — Z95.0 CARDIAC PACEMAKER IN SITU: ICD-10-CM

## 2019-09-30 PROCEDURE — 93295 DEV INTERROG REMOTE 1/2/MLT: CPT | Performed by: INTERNAL MEDICINE

## 2019-09-30 PROCEDURE — 93296 REM INTERROG EVL PM/IDS: CPT | Performed by: INTERNAL MEDICINE

## 2019-10-16 LAB
MDC_IDC_EPISODE_DTM: NORMAL
MDC_IDC_EPISODE_DURATION: 7 S
MDC_IDC_EPISODE_DURATION: 8 S
MDC_IDC_EPISODE_DURATION: 8 S
MDC_IDC_EPISODE_DURATION: 9 S
MDC_IDC_EPISODE_DURATION: 9 S
MDC_IDC_EPISODE_ID: NORMAL
MDC_IDC_EPISODE_TYPE: NORMAL
MDC_IDC_LEAD_IMPLANT_DT: NORMAL
MDC_IDC_LEAD_IMPLANT_DT: NORMAL
MDC_IDC_LEAD_LOCATION: NORMAL
MDC_IDC_LEAD_LOCATION: NORMAL
MDC_IDC_LEAD_LOCATION_DETAIL_1: NORMAL
MDC_IDC_LEAD_LOCATION_DETAIL_1: NORMAL
MDC_IDC_LEAD_MFG: NORMAL
MDC_IDC_LEAD_MFG: NORMAL
MDC_IDC_LEAD_MODEL: NORMAL
MDC_IDC_LEAD_MODEL: NORMAL
MDC_IDC_LEAD_POLARITY_TYPE: NORMAL
MDC_IDC_LEAD_POLARITY_TYPE: NORMAL
MDC_IDC_LEAD_SERIAL: NORMAL
MDC_IDC_LEAD_SERIAL: NORMAL
MDC_IDC_MSMT_BATTERY_DTM: NORMAL
MDC_IDC_MSMT_BATTERY_REMAINING_LONGEVITY: 66 MO
MDC_IDC_MSMT_BATTERY_REMAINING_PERCENTAGE: 91 %
MDC_IDC_MSMT_BATTERY_STATUS: NORMAL
MDC_IDC_MSMT_LEADCHNL_LV_IMPEDANCE_VALUE: 526 OHM
MDC_IDC_MSMT_LEADCHNL_LV_PACING_THRESHOLD_AMPLITUDE: 1.7 V
MDC_IDC_MSMT_LEADCHNL_LV_PACING_THRESHOLD_PULSEWIDTH: 1 MS
MDC_IDC_MSMT_LEADCHNL_RV_IMPEDANCE_VALUE: 485 OHM
MDC_IDC_PG_IMPLANT_DTM: NORMAL
MDC_IDC_PG_MFG: NORMAL
MDC_IDC_PG_MODEL: NORMAL
MDC_IDC_PG_SERIAL: NORMAL
MDC_IDC_PG_TYPE: NORMAL
MDC_IDC_SESS_CLINIC_NAME: NORMAL
MDC_IDC_SESS_DTM: NORMAL
MDC_IDC_SESS_TYPE: NORMAL
MDC_IDC_SET_BRADY_AT_MODE_SWITCH_RATE: 170 {BEATS}/MIN
MDC_IDC_SET_BRADY_LOWRATE: 60 {BEATS}/MIN
MDC_IDC_SET_BRADY_MAX_SENSOR_RATE: 120 {BEATS}/MIN
MDC_IDC_SET_BRADY_MODE: NORMAL
MDC_IDC_SET_CRT_LVRV_DELAY: 0 MS
MDC_IDC_SET_CRT_PACED_CHAMBERS: NORMAL
MDC_IDC_SET_LEADCHNL_LV_PACING_AMPLITUDE: 2.6 V
MDC_IDC_SET_LEADCHNL_LV_PACING_ANODE_ELECTRODE_1: NORMAL
MDC_IDC_SET_LEADCHNL_LV_PACING_ANODE_LOCATION_1: NORMAL
MDC_IDC_SET_LEADCHNL_LV_PACING_CATHODE_ELECTRODE_1: NORMAL
MDC_IDC_SET_LEADCHNL_LV_PACING_CATHODE_LOCATION_1: NORMAL
MDC_IDC_SET_LEADCHNL_LV_PACING_PULSEWIDTH: 0.7 MS
MDC_IDC_SET_LEADCHNL_LV_SENSING_ADAPTATION_MODE: NORMAL
MDC_IDC_SET_LEADCHNL_LV_SENSING_ANODE_ELECTRODE_1: NORMAL
MDC_IDC_SET_LEADCHNL_LV_SENSING_ANODE_LOCATION_1: NORMAL
MDC_IDC_SET_LEADCHNL_LV_SENSING_CATHODE_ELECTRODE_1: NORMAL
MDC_IDC_SET_LEADCHNL_LV_SENSING_CATHODE_LOCATION_1: NORMAL
MDC_IDC_SET_LEADCHNL_LV_SENSING_SENSITIVITY: 2.5 MV
MDC_IDC_SET_LEADCHNL_RA_SENSING_ADAPTATION_MODE: NORMAL
MDC_IDC_SET_LEADCHNL_RA_SENSING_SENSITIVITY: 0.5 MV
MDC_IDC_SET_LEADCHNL_RV_PACING_AMPLITUDE: 2.4 V
MDC_IDC_SET_LEADCHNL_RV_PACING_CAPTURE_MODE: NORMAL
MDC_IDC_SET_LEADCHNL_RV_PACING_POLARITY: NORMAL
MDC_IDC_SET_LEADCHNL_RV_PACING_PULSEWIDTH: 0.5 MS
MDC_IDC_SET_LEADCHNL_RV_SENSING_ADAPTATION_MODE: NORMAL
MDC_IDC_SET_LEADCHNL_RV_SENSING_POLARITY: NORMAL
MDC_IDC_SET_LEADCHNL_RV_SENSING_SENSITIVITY: 2.5 MV
MDC_IDC_SET_ZONE_DETECTION_INTERVAL: 375 MS
MDC_IDC_SET_ZONE_TYPE: NORMAL
MDC_IDC_SET_ZONE_VENDOR_TYPE: NORMAL
MDC_IDC_STAT_BRADY_DTM_END: NORMAL
MDC_IDC_STAT_BRADY_DTM_START: NORMAL
MDC_IDC_STAT_BRADY_RA_PERCENT_PACED: 0 %
MDC_IDC_STAT_BRADY_RV_PERCENT_PACED: 99 %
MDC_IDC_STAT_CRT_DTM_END: NORMAL
MDC_IDC_STAT_CRT_DTM_START: NORMAL
MDC_IDC_STAT_CRT_LV_PERCENT_PACED: 99 %
MDC_IDC_STAT_EPISODE_RECENT_COUNT: 0
MDC_IDC_STAT_EPISODE_RECENT_COUNT: 5
MDC_IDC_STAT_EPISODE_RECENT_COUNT_DTM_END: NORMAL
MDC_IDC_STAT_EPISODE_RECENT_COUNT_DTM_START: NORMAL
MDC_IDC_STAT_EPISODE_TYPE: NORMAL
MDC_IDC_STAT_EPISODE_VENDOR_TYPE: NORMAL

## 2019-10-22 ENCOUNTER — INFUSION THERAPY VISIT (OUTPATIENT)
Dept: INFUSION THERAPY | Facility: CLINIC | Age: 84
End: 2019-10-22
Attending: INTERNAL MEDICINE
Payer: MEDICARE

## 2019-10-22 VITALS
OXYGEN SATURATION: 97 % | RESPIRATION RATE: 16 BRPM | BODY MASS INDEX: 26.41 KG/M2 | HEART RATE: 61 BPM | TEMPERATURE: 97.5 F | SYSTOLIC BLOOD PRESSURE: 150 MMHG | WEIGHT: 194.7 LBS | DIASTOLIC BLOOD PRESSURE: 81 MMHG

## 2019-10-22 DIAGNOSIS — C61 MALIGNANT NEOPLASM OF PROSTATE (H): Primary | ICD-10-CM

## 2019-10-22 PROCEDURE — 96402 CHEMO HORMON ANTINEOPL SQ/IM: CPT

## 2019-10-22 PROCEDURE — 25000128 H RX IP 250 OP 636: Performed by: INTERNAL MEDICINE

## 2019-10-22 RX ADMIN — LEUPROLIDE ACETATE 22.5 MG: KIT at 11:21

## 2019-10-22 ASSESSMENT — PAIN SCALES - GENERAL: PAINLEVEL: NO PAIN (0)

## 2019-10-22 NOTE — PROGRESS NOTES
Infusion Nursing Note:  Miguel Patten presents today for Lupron injection.    Patient seen by provider today: No   present during visit today: Not Applicable.    Note: N/A.    Intravenous Access:  No Intravenous access/labs at this visit.    Treatment Conditions:  Not Applicable.      Post Infusion Assessment:  Patient tolerated injection without incident.  Patient observed for 10 minutes post Lupron injection per protocol.       Discharge Plan:   Discharge instructions reviewed with: Patient.  Patient discharged in stable condition accompanied by: self.  Departure Mode: Ambulatory.    Dannielle Tillman RN

## 2019-11-20 ENCOUNTER — ANTICOAGULATION THERAPY VISIT (OUTPATIENT)
Dept: ANTICOAGULATION | Facility: CLINIC | Age: 84
End: 2019-11-20
Payer: MEDICARE

## 2019-11-20 DIAGNOSIS — J45.20 MILD INTERMITTENT ASTHMA WITHOUT COMPLICATION: ICD-10-CM

## 2019-11-20 DIAGNOSIS — Z79.01 LONG TERM CURRENT USE OF ANTICOAGULANT THERAPY: ICD-10-CM

## 2019-11-20 DIAGNOSIS — I48.91 ATRIAL FIBRILLATION (H): ICD-10-CM

## 2019-11-20 DIAGNOSIS — I50.40 COMBINED SYSTOLIC AND DIASTOLIC CONGESTIVE HEART FAILURE, UNSPECIFIED HF CHRONICITY (H): ICD-10-CM

## 2019-11-20 LAB — INR POINT OF CARE: 2.1 (ref 0.9–1.1)

## 2019-11-20 PROCEDURE — 85610 PROTHROMBIN TIME: CPT | Mod: QW

## 2019-11-20 PROCEDURE — 36416 COLLJ CAPILLARY BLOOD SPEC: CPT

## 2019-11-20 PROCEDURE — 99207 ZZC NO CHARGE NURSE ONLY: CPT

## 2019-11-20 RX ORDER — WARFARIN SODIUM 2.5 MG/1
TABLET ORAL
Qty: 110 TABLET | Refills: 3 | Status: SHIPPED | OUTPATIENT
Start: 2019-11-20 | End: 2021-01-13

## 2019-11-20 RX ORDER — FLUTICASONE PROPIONATE 44 MCG
AEROSOL WITH ADAPTER (GRAM) INHALATION
Qty: 10.6 G | Refills: 0 | Status: SHIPPED | OUTPATIENT
Start: 2019-11-20 | End: 2020-01-15

## 2019-11-20 NOTE — TELEPHONE ENCOUNTER
"Requested Prescriptions   Pending Prescriptions Disp Refills     FLOVENT HFA 44 MCG/ACT inhaler [Pharmacy Med Name: FLOVENT HFA 44MCG/ACT AERO] 10.6 g 0     Sig: INHALE ONE PUFF BY MOUTH TWICE A DAY   Last Written Prescription Date:  9/27/19  Last Fill Quantity: 10.6 g,  # refills: 0   Last office visit: 5/21/2019 with prescribing provider:  5/21/19   Future Office Visit:        Inhaled Steroids Protocol Failed - 11/20/2019  8:40 AM        Failed - Asthma control assessment score within normal limits in last 6 months     Please review ACT score.   ACT Total Scores 5/2/2017 4/12/2018 5/21/2019   ACT TOTAL SCORE - - -   ASTHMA ER VISITS - - -   ASTHMA HOSPITALIZATIONS - - -   ACT TOTAL SCORE (Goal Greater than or Equal to 20) 5 25 25   In the past 12 months, how many times did you visit the emergency room for your asthma without being admitted to the hospital? - 0 0   In the past 12 months, how many times were you hospitalized overnight because of your asthma? 0 0 0           Passed - Patient is age 12 or older        Passed - Medication is active on med list        Passed - Recent (6 mo) or future (30 days) visit within the authorizing provider's specialty     Patient had office visit in the last 6 months or has a visit in the next 30 days with authorizing provider or within the authorizing provider's specialty.  See \"Patient Info\" tab in inbasket, or \"Choose Columns\" in Meds & Orders section of the refill encounter.            "

## 2019-11-20 NOTE — TELEPHONE ENCOUNTER
"Warfarin  Last Written Prescription Date:  12/19/2018  Last Fill Quantity: 110,  # refills: 3   Last office visit: 5/21/2019 with prescribing provider:  Christian   Future Office Visit:      Requested Prescriptions   Pending Prescriptions Disp Refills     warfarin ANTICOAGULANT (JANTOVEN ANTICOAGULANT) 2.5 MG tablet 110 tablet 3     Sig: Take 3.75 mg Tues, Sat and 2.5 mg all other days, or as directed by the coumadin clinic.       Vitamin K Antagonists Failed - 11/20/2019  3:03 PM        Failed - INR is within goal in the past 6 weeks     Confirm INR is within goal in the past 6 weeks.     Recent Labs   Lab Test 11/20/19   INR 2.1*                       Failed - Medication is active on med list        Passed - Recent (12 mo) or future (30 days) visit within the authorizing provider's specialty     Patient has had an office visit with the authorizing provider or a provider within the authorizing providers department within the previous 12 mos or has a future within next 30 days. See \"Patient Info\" tab in inbasket, or \"Choose Columns\" in Meds & Orders section of the refill encounter.              Passed - Patient is 18 years of age or older        Marlyn Weaver RN on 11/20/2019 at 3:06 PM    "

## 2019-11-20 NOTE — PROGRESS NOTES
ANTICOAGULATION FOLLOW-UP CLINIC VISIT    Patient Name:  Miguel Patten  Date:  2019  Contact Type:  Face to Face    SUBJECTIVE:  Patient Findings     Comments:   The patient was assessed for diet, medication, and activity level changes, missed or extra doses, bruising or bleeding, with no problem findings.  Ashly Jones RN          Clinical Outcomes     Negatives:   Major bleeding event, Thromboembolic event, Anticoagulation-related hospital admission, Anticoagulation-related ED visit, Anticoagulation-related fatality    Comments:   The patient was assessed for diet, medication, and activity level changes, missed or extra doses, bruising or bleeding, with no problem findings.  Ashly Jones RN             OBJECTIVE    INR Protime   Date Value Ref Range Status   2019 2.1 (A) 0.9 - 1.1 Final       ASSESSMENT / PLAN  INR assessment THER    Recheck INR In: 8 WEEKS    INR Location Clinic      Anticoagulation Summary  As of 2019    INR goal:   2.0-3.0   TTR:   69.6 % (3.6 y)   INR used for dosin.1 (2019)   Warfarin maintenance plan:   3.75 mg (2.5 mg x 1.5) every Tue, Sat; 2.5 mg (2.5 mg x 1) all other days   Full warfarin instructions:   3.75 mg every Tue, Sat; 2.5 mg all other days   Weekly warfarin total:   20 mg   No change documented:   Ashly Jones RN   Plan last modified:   Ashly Jones RN (2018)   Next INR check:   1/15/2020   Priority:   Maintenance   Target end date:       Indications    CHF (congestive heart failure) (H) [I50.9]  Long-term (current) use of anticoagulants [Z79.01] [Z79.01]             Anticoagulation Episode Summary     INR check location:       Preferred lab:       Send INR reminders to:   [facility] [place] [facility]    Comments:   2.5 mg tablets, book, takes in AM      Anticoagulation Care Providers     Provider Role Specialty Phone number    MalcomRober MD [facility] [phone]            See the Encounter Report to  view Anticoagulation Flowsheet and Dosing Calendar (Go to Encounters tab in chart review, and find the Anticoagulation Therapy Visit)    Dosage adjustment made based on physician directed care plan.      Ashly Jones RN

## 2019-11-20 NOTE — TELEPHONE ENCOUNTER
Routing refill request to provider for review/approval because:  ACT overdue    BASSAM LeeN, RN  [facility]

## 2019-12-06 DIAGNOSIS — C61 PROSTATE CANCER (H): ICD-10-CM

## 2019-12-06 DIAGNOSIS — R31.29 MICROSCOPIC HEMATURIA: Primary | ICD-10-CM

## 2019-12-06 LAB
ALBUMIN UR-MCNC: 30 MG/DL
APPEARANCE UR: ABNORMAL
BILIRUB UR QL STRIP: NEGATIVE
COLOR UR AUTO: ABNORMAL
GLUCOSE UR STRIP-MCNC: NEGATIVE MG/DL
HGB UR QL STRIP: ABNORMAL
HYALINE CASTS #/AREA URNS LPF: 1 /LPF (ref 0–2)
KETONES UR STRIP-MCNC: NEGATIVE MG/DL
LEUKOCYTE ESTERASE UR QL STRIP: NEGATIVE
MUCOUS THREADS #/AREA URNS LPF: PRESENT /LPF
NITRATE UR QL: NEGATIVE
PH UR STRIP: 6 PH (ref 5–7)
PSA SERPL-MCNC: 0.08 UG/L (ref 0–4)
RBC #/AREA URNS AUTO: 4 /HPF (ref 0–2)
SOURCE: ABNORMAL
SP GR UR STRIP: 1.02 (ref 1–1.03)
UROBILINOGEN UR STRIP-MCNC: 2 MG/DL (ref 0–2)
WBC #/AREA URNS AUTO: 1 /HPF (ref 0–5)

## 2019-12-06 PROCEDURE — 87086 URINE CULTURE/COLONY COUNT: CPT | Performed by: UROLOGY

## 2019-12-06 PROCEDURE — 81001 URINALYSIS AUTO W/SCOPE: CPT | Performed by: UROLOGY

## 2019-12-06 PROCEDURE — 36415 COLL VENOUS BLD VENIPUNCTURE: CPT | Performed by: UROLOGY

## 2019-12-06 PROCEDURE — 84153 ASSAY OF PSA TOTAL: CPT | Performed by: UROLOGY

## 2019-12-07 LAB
BACTERIA SPEC CULT: NO GROWTH
Lab: NORMAL
SPECIMEN SOURCE: NORMAL

## 2019-12-09 DIAGNOSIS — Z95.2 S/P AVR (AORTIC VALVE REPLACEMENT): ICD-10-CM

## 2019-12-10 RX ORDER — LOSARTAN POTASSIUM 50 MG/1
TABLET ORAL
Qty: 180 TABLET | Refills: 1 | Status: SHIPPED | OUTPATIENT
Start: 2019-12-10 | End: 2020-06-05

## 2019-12-10 NOTE — TELEPHONE ENCOUNTER
"Cozaar  Last Written Prescription Date:  06/03/2019  Last Fill Quantity: 180,  # refills: 1   Last office visit: 5/21/2019 with prescribing provider:  ben   Future Office Visit:  None  Routing refill request to provider for review/approval because:  Blood pressures elevated above goal.     Requested Prescriptions   Pending Prescriptions Disp Refills     losartan (COZAAR) 50 MG tablet [Pharmacy Med Name: LOSARTAN POTASSIUM 50MG TABS] 180 tablet 1     Sig: TAKE ONE TABLET BY MOUTH TWICE A DAY       Angiotensin-II Receptors Failed - 12/9/2019 11:28 AM        Failed - Last blood pressure under 140/90 in past 12 months     BP Readings from Last 3 Encounters:   10/22/19 (!) 150/81   06/20/19 128/64   05/21/19 124/72           Passed - Recent (12 mo) or future (30 days) visit within the authorizing provider's specialty     Patient has had an office visit with the authorizing provider or a provider within the authorizing providers department within the previous 12 mos or has a future within next 30 days. See \"Patient Info\" tab in inbasket, or \"Choose Columns\" in Meds & Orders section of the refill encounter.          Passed - Medication is active on med list        Passed - Patient is age 18 or older        Passed - Normal serum creatinine on file in past 12 months     Recent Labs   Lab Test 05/21/19  1501  06/02/17  1255   CR 0.79   < >  --    CREAT  --   --  0.7    < > = values in this interval not displayed.           Passed - Normal serum potassium on file in past 12 months     Recent Labs   Lab Test 05/21/19  1501   POTASSIUM 4.5         Kori Álvarez RN   "

## 2019-12-31 DIAGNOSIS — Z95.2 S/P AVR (AORTIC VALVE REPLACEMENT): ICD-10-CM

## 2019-12-31 RX ORDER — SIMVASTATIN 40 MG
TABLET ORAL
Qty: 90 TABLET | Refills: 2 | OUTPATIENT
Start: 2019-12-31

## 2019-12-31 NOTE — TELEPHONE ENCOUNTER
ZOCOR 50 mg  Prescription was sent 12/10/19 for #180 with 1 refills.  Pharmacy notified via E-Prescribe refusal.     KARI Marvin  [facility]

## 2020-01-03 RX ORDER — SIMVASTATIN 40 MG
TABLET ORAL
Qty: 90 TABLET | Refills: 1 | Status: SHIPPED | OUTPATIENT
Start: 2020-01-03 | End: 2020-06-30

## 2020-01-03 NOTE — TELEPHONE ENCOUNTER
"Simvastatin  Last Written Prescription Date:  03/26/2019  Last Fill Quantity: 90,  # refills: 3   Last office visit: 5/21/2019 with prescribing provider:     Future Office Visit:    Requested Prescriptions   Pending Prescriptions Disp Refills     simvastatin (ZOCOR) 40 MG tablet [Pharmacy Med Name: SIMVASTATIN 40MG TABS] 90 tablet 2     Sig: TAKE ONE TABLET BY MOUTH EVERY NIGHT AT BEDTIME       Statins Protocol Passed - 12/31/2019  5:18 PM        Passed - LDL on file in past 12 months     Recent Labs   Lab Test 05/21/19  1501   LDL 51             Passed - No abnormal creatine kinase in past 12 months     No lab results found.             Passed - Recent (12 mo) or future (30 days) visit within the authorizing provider's specialty     Patient has had an office visit with the authorizing provider or a provider within the authorizing providers department within the previous 12 mos or has a future within next 30 days. See \"Patient Info\" tab in inbasket, or \"Choose Columns\" in Meds & Orders section of the refill encounter.              Passed - Medication is active on med list        Passed - Patient is age 18 or older        Jenny Anne RN BSN    "

## 2020-01-07 ENCOUNTER — ANCILLARY PROCEDURE (OUTPATIENT)
Dept: CARDIOLOGY | Facility: CLINIC | Age: 85
End: 2020-01-07
Attending: INTERNAL MEDICINE
Payer: MEDICARE

## 2020-01-07 DIAGNOSIS — Z95.0 PACEMAKER: ICD-10-CM

## 2020-01-07 PROCEDURE — 93296 REM INTERROG EVL PM/IDS: CPT | Performed by: INTERNAL MEDICINE

## 2020-01-07 PROCEDURE — 93294 REM INTERROG EVL PM/LDLS PM: CPT | Performed by: INTERNAL MEDICINE

## 2020-01-15 ENCOUNTER — ANTICOAGULATION THERAPY VISIT (OUTPATIENT)
Dept: ANTICOAGULATION | Facility: CLINIC | Age: 85
End: 2020-01-15
Payer: MEDICARE

## 2020-01-15 DIAGNOSIS — J45.20 MILD INTERMITTENT ASTHMA WITHOUT COMPLICATION: ICD-10-CM

## 2020-01-15 DIAGNOSIS — I50.40 COMBINED SYSTOLIC AND DIASTOLIC CONGESTIVE HEART FAILURE, UNSPECIFIED HF CHRONICITY (H): ICD-10-CM

## 2020-01-15 DIAGNOSIS — Z79.01 LONG TERM CURRENT USE OF ANTICOAGULANT THERAPY: ICD-10-CM

## 2020-01-15 LAB — INR POINT OF CARE: 3.2 (ref 0.9–1.1)

## 2020-01-15 PROCEDURE — 99207 ZZC NO CHARGE NURSE ONLY: CPT

## 2020-01-15 PROCEDURE — 85610 PROTHROMBIN TIME: CPT | Mod: QW

## 2020-01-15 PROCEDURE — 36416 COLLJ CAPILLARY BLOOD SPEC: CPT

## 2020-01-15 RX ORDER — FLUTICASONE PROPIONATE 44 MCG
AEROSOL WITH ADAPTER (GRAM) INHALATION
Qty: 10.6 G | Refills: 0 | Status: SHIPPED | OUTPATIENT
Start: 2020-01-15 | End: 2020-03-16

## 2020-01-15 NOTE — TELEPHONE ENCOUNTER
"Requested Prescriptions   Pending Prescriptions Disp Refills     FLOVENT HFA 44 MCG/ACT inhaler [Pharmacy Med Name: FLOVENT HFA 44MCG/ACT AERO] 10.6 g 0     Sig: INHALE ONE PUFF BY MOUTH TWICE A DAY   Last Written Prescription Date:  11/20/19  Last Fill Quantity: 10.6 g,  # refills: 0   Last office visit: 5/21/2019 with prescribing provider:  5/21/19   Future Office Visit:        Inhaled Steroids Protocol Failed - 1/15/2020  9:04 AM        Failed - Asthma control assessment score within normal limits in last 6 months     Please review ACT score.   ACT Total Scores 5/2/2017 4/12/2018 5/21/2019   ACT TOTAL SCORE - - -   ASTHMA ER VISITS - - -   ASTHMA HOSPITALIZATIONS - - -   ACT TOTAL SCORE (Goal Greater than or Equal to 20) 5 25 25   In the past 12 months, how many times did you visit the emergency room for your asthma without being admitted to the hospital? - 0 0   In the past 12 months, how many times were you hospitalized overnight because of your asthma? 0 0 0             Failed - Recent (6 mo) or future (30 days) visit within the authorizing provider's specialty     Patient had office visit in the last 6 months or has a visit in the next 30 days with authorizing provider or within the authorizing provider's specialty.  See \"Patient Info\" tab in inbasket, or \"Choose Columns\" in Meds & Orders section of the refill encounter.            Passed - Patient is age 12 or older        Passed - Medication is active on med list        "

## 2020-01-15 NOTE — TELEPHONE ENCOUNTER
Routing refill request to provider for review/approval because:  ACT overdue    BASSAM LeeN, RN  [facility]

## 2020-01-16 LAB
MDC_IDC_EPISODE_DTM: NORMAL
MDC_IDC_EPISODE_DURATION: 15 S
MDC_IDC_EPISODE_DURATION: 16 S
MDC_IDC_EPISODE_DURATION: 8 S
MDC_IDC_EPISODE_ID: NORMAL
MDC_IDC_EPISODE_TYPE: NORMAL
MDC_IDC_LEAD_IMPLANT_DT: NORMAL
MDC_IDC_LEAD_IMPLANT_DT: NORMAL
MDC_IDC_LEAD_LOCATION: NORMAL
MDC_IDC_LEAD_LOCATION: NORMAL
MDC_IDC_LEAD_LOCATION_DETAIL_1: NORMAL
MDC_IDC_LEAD_LOCATION_DETAIL_1: NORMAL
MDC_IDC_LEAD_MFG: NORMAL
MDC_IDC_LEAD_MFG: NORMAL
MDC_IDC_LEAD_MODEL: NORMAL
MDC_IDC_LEAD_MODEL: NORMAL
MDC_IDC_LEAD_POLARITY_TYPE: NORMAL
MDC_IDC_LEAD_POLARITY_TYPE: NORMAL
MDC_IDC_LEAD_SERIAL: NORMAL
MDC_IDC_LEAD_SERIAL: NORMAL
MDC_IDC_MSMT_BATTERY_DTM: NORMAL
MDC_IDC_MSMT_BATTERY_REMAINING_LONGEVITY: 60 MO
MDC_IDC_MSMT_BATTERY_REMAINING_PERCENTAGE: 86 %
MDC_IDC_MSMT_BATTERY_STATUS: NORMAL
MDC_IDC_MSMT_LEADCHNL_LV_IMPEDANCE_VALUE: 540 OHM
MDC_IDC_MSMT_LEADCHNL_LV_PACING_THRESHOLD_AMPLITUDE: 1.7 V
MDC_IDC_MSMT_LEADCHNL_LV_PACING_THRESHOLD_PULSEWIDTH: 1 MS
MDC_IDC_MSMT_LEADCHNL_RV_IMPEDANCE_VALUE: 494 OHM
MDC_IDC_PG_IMPLANT_DTM: NORMAL
MDC_IDC_PG_MFG: NORMAL
MDC_IDC_PG_MODEL: NORMAL
MDC_IDC_PG_SERIAL: NORMAL
MDC_IDC_PG_TYPE: NORMAL
MDC_IDC_SESS_CLINIC_NAME: NORMAL
MDC_IDC_SESS_DTM: NORMAL
MDC_IDC_SESS_TYPE: NORMAL
MDC_IDC_SET_BRADY_AT_MODE_SWITCH_RATE: 170 {BEATS}/MIN
MDC_IDC_SET_BRADY_LOWRATE: 60 {BEATS}/MIN
MDC_IDC_SET_BRADY_MAX_SENSOR_RATE: 120 {BEATS}/MIN
MDC_IDC_SET_BRADY_MODE: NORMAL
MDC_IDC_SET_CRT_LVRV_DELAY: 0 MS
MDC_IDC_SET_CRT_PACED_CHAMBERS: NORMAL
MDC_IDC_SET_LEADCHNL_LV_PACING_AMPLITUDE: 2.6 V
MDC_IDC_SET_LEADCHNL_LV_PACING_ANODE_ELECTRODE_1: NORMAL
MDC_IDC_SET_LEADCHNL_LV_PACING_ANODE_LOCATION_1: NORMAL
MDC_IDC_SET_LEADCHNL_LV_PACING_CATHODE_ELECTRODE_1: NORMAL
MDC_IDC_SET_LEADCHNL_LV_PACING_CATHODE_LOCATION_1: NORMAL
MDC_IDC_SET_LEADCHNL_LV_PACING_PULSEWIDTH: 0.7 MS
MDC_IDC_SET_LEADCHNL_LV_SENSING_ADAPTATION_MODE: NORMAL
MDC_IDC_SET_LEADCHNL_LV_SENSING_ANODE_ELECTRODE_1: NORMAL
MDC_IDC_SET_LEADCHNL_LV_SENSING_ANODE_LOCATION_1: NORMAL
MDC_IDC_SET_LEADCHNL_LV_SENSING_CATHODE_ELECTRODE_1: NORMAL
MDC_IDC_SET_LEADCHNL_LV_SENSING_CATHODE_LOCATION_1: NORMAL
MDC_IDC_SET_LEADCHNL_LV_SENSING_SENSITIVITY: 2.5 MV
MDC_IDC_SET_LEADCHNL_RA_SENSING_ADAPTATION_MODE: NORMAL
MDC_IDC_SET_LEADCHNL_RA_SENSING_SENSITIVITY: 0.5 MV
MDC_IDC_SET_LEADCHNL_RV_PACING_AMPLITUDE: 2.4 V
MDC_IDC_SET_LEADCHNL_RV_PACING_CAPTURE_MODE: NORMAL
MDC_IDC_SET_LEADCHNL_RV_PACING_POLARITY: NORMAL
MDC_IDC_SET_LEADCHNL_RV_PACING_PULSEWIDTH: 0.5 MS
MDC_IDC_SET_LEADCHNL_RV_SENSING_ADAPTATION_MODE: NORMAL
MDC_IDC_SET_LEADCHNL_RV_SENSING_POLARITY: NORMAL
MDC_IDC_SET_LEADCHNL_RV_SENSING_SENSITIVITY: 2.5 MV
MDC_IDC_SET_ZONE_DETECTION_INTERVAL: 375 MS
MDC_IDC_SET_ZONE_TYPE: NORMAL
MDC_IDC_SET_ZONE_VENDOR_TYPE: NORMAL
MDC_IDC_STAT_BRADY_DTM_END: NORMAL
MDC_IDC_STAT_BRADY_DTM_START: NORMAL
MDC_IDC_STAT_BRADY_RA_PERCENT_PACED: 0 %
MDC_IDC_STAT_BRADY_RV_PERCENT_PACED: 99 %
MDC_IDC_STAT_CRT_DTM_END: NORMAL
MDC_IDC_STAT_CRT_DTM_START: NORMAL
MDC_IDC_STAT_CRT_LV_PERCENT_PACED: 99 %
MDC_IDC_STAT_EPISODE_RECENT_COUNT: 0
MDC_IDC_STAT_EPISODE_RECENT_COUNT: 11
MDC_IDC_STAT_EPISODE_RECENT_COUNT_DTM_END: NORMAL
MDC_IDC_STAT_EPISODE_RECENT_COUNT_DTM_START: NORMAL
MDC_IDC_STAT_EPISODE_TYPE: NORMAL
MDC_IDC_STAT_EPISODE_VENDOR_TYPE: NORMAL

## 2020-03-11 ENCOUNTER — ANTICOAGULATION THERAPY VISIT (OUTPATIENT)
Dept: ANTICOAGULATION | Facility: CLINIC | Age: 85
End: 2020-03-11
Payer: MEDICARE

## 2020-03-11 DIAGNOSIS — I50.40 COMBINED SYSTOLIC AND DIASTOLIC CONGESTIVE HEART FAILURE, UNSPECIFIED HF CHRONICITY (H): ICD-10-CM

## 2020-03-11 DIAGNOSIS — Z79.01 LONG TERM CURRENT USE OF ANTICOAGULANT THERAPY: ICD-10-CM

## 2020-03-11 LAB — INR POINT OF CARE: 2.7 (ref 0.9–1.1)

## 2020-03-11 PROCEDURE — 85610 PROTHROMBIN TIME: CPT | Mod: QW

## 2020-03-11 PROCEDURE — 99207 ZZC NO CHARGE NURSE ONLY: CPT

## 2020-03-11 PROCEDURE — 36416 COLLJ CAPILLARY BLOOD SPEC: CPT

## 2020-03-11 NOTE — PROGRESS NOTES
ANTICOAGULATION FOLLOW-UP CLINIC VISIT    Patient Name:  Miguel Patten  Date:  3/11/2020  Contact Type:  Face to Face    SUBJECTIVE:  Patient Findings     Comments:   The patient was assessed for diet, medication, and activity level changes, missed or extra doses, bruising or bleeding, with no problem findings    Ashly Jones RN            Clinical Outcomes     Negatives:   Major bleeding event, Thromboembolic event, Anticoagulation-related hospital admission, Anticoagulation-related ED visit, Anticoagulation-related fatality    Comments:   The patient was assessed for diet, medication, and activity level changes, missed or extra doses, bruising or bleeding, with no problem findings    Ashly Jones RN               OBJECTIVE    INR Protime   Date Value Ref Range Status   2020 2.7 (A) 0.9 - 1.1 Final       ASSESSMENT / PLAN  INR assessment THER    Recheck INR In: 8 WEEKS    INR Location Clinic      Anticoagulation Summary  As of 3/11/2020    INR goal:   2.0-3.0   TTR:   91.1 % (1 y)   INR used for dosin.7 (3/11/2020)   Warfarin maintenance plan:   3.75 mg (2.5 mg x 1.5) every Tue, Sat; 2.5 mg (2.5 mg x 1) all other days   Full warfarin instructions:   3.75 mg every Tue, Sat; 2.5 mg all other days   Weekly warfarin total:   20 mg   No change documented:   Ashly Jones RN   Plan last modified:   Ashly Jones RN (2018)   Next INR check:   2020   Priority:   Maintenance   Target end date:       Indications    CHF (congestive heart failure) (H) [I50.9]  Long-term (current) use of anticoagulants [Z79.01] [Z79.01]             Anticoagulation Episode Summary     INR check location:       Preferred lab:       Send INR reminders to:   [facility] [place]    Comments:   2.5 mg tablets, book, takes in AM      Anticoagulation Care Providers     Provider Role Specialty Phone number    Rober العراقي MD [facility] [phone]            See the Encounter Report to  view Anticoagulation Flowsheet and Dosing Calendar (Go to Encounters tab in chart review, and find the Anticoagulation Therapy Visit)    Dosage adjustment made based on physician directed care plan.    Ashly Jones RN

## 2020-03-13 DIAGNOSIS — J45.20 MILD INTERMITTENT ASTHMA WITHOUT COMPLICATION: ICD-10-CM

## 2020-03-16 RX ORDER — FLUTICASONE PROPIONATE 44 MCG
AEROSOL WITH ADAPTER (GRAM) INHALATION
Qty: 10.6 G | Refills: 0 | Status: SHIPPED | OUTPATIENT
Start: 2020-03-16 | End: 2020-05-15

## 2020-03-16 NOTE — TELEPHONE ENCOUNTER
"Requested Prescriptions   Pending Prescriptions Disp Refills     FLOVENT HFA 44 MCG/ACT inhaler [Pharmacy Med Name: FLOVENT HFA 44MCG/ACT AERO] 10.6 g 0     Sig: INHALE ONE PUFF BY MOUTH TWICE A DAY   Last Written Prescription Date:  01/15/2020  Last Fill Quantity: 10.6g,  # refills: 0   Last office visit: 5/21/2019 with prescribing provider:  05/21/2019   Future Office Visit:        Inhaled Steroids Protocol Failed - 3/13/2020 11:06 AM        Failed - Asthma control assessment score within normal limits in last 6 months     Please review ACT score.   ACT Total Scores 5/2/2017 4/12/2018 5/21/2019   ACT TOTAL SCORE - - -   ASTHMA ER VISITS - - -   ASTHMA HOSPITALIZATIONS - - -   ACT TOTAL SCORE (Goal Greater than or Equal to 20) 5 25 25   In the past 12 months, how many times did you visit the emergency room for your asthma without being admitted to the hospital? - 0 0   In the past 12 months, how many times were you hospitalized overnight because of your asthma? 0 0 0             Failed - Recent (6 mo) or future (30 days) visit within the authorizing provider's specialty     Patient had office visit in the last 6 months or has a visit in the next 30 days with authorizing provider or within the authorizing provider's specialty.  See \"Patient Info\" tab in inbasket, or \"Choose Columns\" in Meds & Orders section of the refill encounter.            Passed - Patient is age 12 or older        Passed - Medication is active on med list             "

## 2020-03-16 NOTE — TELEPHONE ENCOUNTER
Routing refill request to provider for review/approval because:  Labs not current:  act    Dominique Muller RN on 3/16/2020 at 2:07 PM

## 2020-04-09 ENCOUNTER — ANCILLARY PROCEDURE (OUTPATIENT)
Dept: CARDIOLOGY | Facility: CLINIC | Age: 85
End: 2020-04-09
Attending: INTERNAL MEDICINE
Payer: MEDICARE

## 2020-04-09 DIAGNOSIS — Z95.0 CARDIAC PACEMAKER IN SITU: ICD-10-CM

## 2020-04-09 LAB
MDC_IDC_EPISODE_DTM: NORMAL
MDC_IDC_EPISODE_DURATION: 7 S
MDC_IDC_EPISODE_DURATION: 7 S
MDC_IDC_EPISODE_DURATION: 8 S
MDC_IDC_EPISODE_DURATION: 8 S
MDC_IDC_EPISODE_DURATION: 9 S
MDC_IDC_EPISODE_ID: NORMAL
MDC_IDC_EPISODE_TYPE: NORMAL
MDC_IDC_LEAD_IMPLANT_DT: NORMAL
MDC_IDC_LEAD_IMPLANT_DT: NORMAL
MDC_IDC_LEAD_LOCATION: NORMAL
MDC_IDC_LEAD_LOCATION: NORMAL
MDC_IDC_LEAD_LOCATION_DETAIL_1: NORMAL
MDC_IDC_LEAD_LOCATION_DETAIL_1: NORMAL
MDC_IDC_LEAD_MFG: NORMAL
MDC_IDC_LEAD_MFG: NORMAL
MDC_IDC_LEAD_MODEL: NORMAL
MDC_IDC_LEAD_MODEL: NORMAL
MDC_IDC_LEAD_POLARITY_TYPE: NORMAL
MDC_IDC_LEAD_POLARITY_TYPE: NORMAL
MDC_IDC_LEAD_SERIAL: NORMAL
MDC_IDC_LEAD_SERIAL: NORMAL
MDC_IDC_MSMT_BATTERY_DTM: NORMAL
MDC_IDC_MSMT_BATTERY_REMAINING_LONGEVITY: 60 MO
MDC_IDC_MSMT_BATTERY_REMAINING_PERCENTAGE: 80 %
MDC_IDC_MSMT_BATTERY_STATUS: NORMAL
MDC_IDC_MSMT_LEADCHNL_LV_IMPEDANCE_VALUE: 597 OHM
MDC_IDC_MSMT_LEADCHNL_LV_PACING_THRESHOLD_AMPLITUDE: 1.7 V
MDC_IDC_MSMT_LEADCHNL_LV_PACING_THRESHOLD_PULSEWIDTH: 1 MS
MDC_IDC_MSMT_LEADCHNL_RV_IMPEDANCE_VALUE: 487 OHM
MDC_IDC_PG_IMPLANT_DTM: NORMAL
MDC_IDC_PG_MFG: NORMAL
MDC_IDC_PG_MODEL: NORMAL
MDC_IDC_PG_SERIAL: NORMAL
MDC_IDC_PG_TYPE: NORMAL
MDC_IDC_SESS_CLINIC_NAME: NORMAL
MDC_IDC_SESS_DTM: NORMAL
MDC_IDC_SESS_TYPE: NORMAL
MDC_IDC_SET_BRADY_AT_MODE_SWITCH_RATE: 170 {BEATS}/MIN
MDC_IDC_SET_BRADY_LOWRATE: 60 {BEATS}/MIN
MDC_IDC_SET_BRADY_MAX_SENSOR_RATE: 120 {BEATS}/MIN
MDC_IDC_SET_BRADY_MODE: NORMAL
MDC_IDC_SET_CRT_LVRV_DELAY: 0 MS
MDC_IDC_SET_CRT_PACED_CHAMBERS: NORMAL
MDC_IDC_SET_LEADCHNL_LV_PACING_AMPLITUDE: 2.6 V
MDC_IDC_SET_LEADCHNL_LV_PACING_ANODE_ELECTRODE_1: NORMAL
MDC_IDC_SET_LEADCHNL_LV_PACING_ANODE_LOCATION_1: NORMAL
MDC_IDC_SET_LEADCHNL_LV_PACING_CATHODE_ELECTRODE_1: NORMAL
MDC_IDC_SET_LEADCHNL_LV_PACING_CATHODE_LOCATION_1: NORMAL
MDC_IDC_SET_LEADCHNL_LV_PACING_PULSEWIDTH: 0.7 MS
MDC_IDC_SET_LEADCHNL_LV_SENSING_ADAPTATION_MODE: NORMAL
MDC_IDC_SET_LEADCHNL_LV_SENSING_ANODE_ELECTRODE_1: NORMAL
MDC_IDC_SET_LEADCHNL_LV_SENSING_ANODE_LOCATION_1: NORMAL
MDC_IDC_SET_LEADCHNL_LV_SENSING_CATHODE_ELECTRODE_1: NORMAL
MDC_IDC_SET_LEADCHNL_LV_SENSING_CATHODE_LOCATION_1: NORMAL
MDC_IDC_SET_LEADCHNL_LV_SENSING_SENSITIVITY: 2.5 MV
MDC_IDC_SET_LEADCHNL_RA_SENSING_ADAPTATION_MODE: NORMAL
MDC_IDC_SET_LEADCHNL_RA_SENSING_SENSITIVITY: 0.5 MV
MDC_IDC_SET_LEADCHNL_RV_PACING_AMPLITUDE: 2.4 V
MDC_IDC_SET_LEADCHNL_RV_PACING_CAPTURE_MODE: NORMAL
MDC_IDC_SET_LEADCHNL_RV_PACING_POLARITY: NORMAL
MDC_IDC_SET_LEADCHNL_RV_PACING_PULSEWIDTH: 0.5 MS
MDC_IDC_SET_LEADCHNL_RV_SENSING_ADAPTATION_MODE: NORMAL
MDC_IDC_SET_LEADCHNL_RV_SENSING_POLARITY: NORMAL
MDC_IDC_SET_LEADCHNL_RV_SENSING_SENSITIVITY: 2.5 MV
MDC_IDC_SET_ZONE_DETECTION_INTERVAL: 375 MS
MDC_IDC_SET_ZONE_TYPE: NORMAL
MDC_IDC_SET_ZONE_VENDOR_TYPE: NORMAL
MDC_IDC_STAT_BRADY_DTM_END: NORMAL
MDC_IDC_STAT_BRADY_DTM_START: NORMAL
MDC_IDC_STAT_BRADY_RA_PERCENT_PACED: 0 %
MDC_IDC_STAT_BRADY_RV_PERCENT_PACED: 99 %
MDC_IDC_STAT_CRT_DTM_END: NORMAL
MDC_IDC_STAT_CRT_DTM_START: NORMAL
MDC_IDC_STAT_CRT_LV_PERCENT_PACED: 99 %
MDC_IDC_STAT_EPISODE_RECENT_COUNT: 0
MDC_IDC_STAT_EPISODE_RECENT_COUNT: 16
MDC_IDC_STAT_EPISODE_RECENT_COUNT_DTM_END: NORMAL
MDC_IDC_STAT_EPISODE_RECENT_COUNT_DTM_START: NORMAL
MDC_IDC_STAT_EPISODE_TYPE: NORMAL
MDC_IDC_STAT_EPISODE_VENDOR_TYPE: NORMAL

## 2020-04-09 PROCEDURE — 93296 REM INTERROG EVL PM/IDS: CPT | Performed by: INTERNAL MEDICINE

## 2020-04-09 PROCEDURE — 93294 REM INTERROG EVL PM/LDLS PM: CPT | Performed by: INTERNAL MEDICINE

## 2020-04-22 ENCOUNTER — INFUSION THERAPY VISIT (OUTPATIENT)
Dept: INFUSION THERAPY | Facility: CLINIC | Age: 85
End: 2020-04-22
Attending: UROLOGY
Payer: MEDICARE

## 2020-04-22 VITALS
OXYGEN SATURATION: 99 % | SYSTOLIC BLOOD PRESSURE: 158 MMHG | RESPIRATION RATE: 18 BRPM | HEIGHT: 73 IN | WEIGHT: 192.5 LBS | BODY MASS INDEX: 25.51 KG/M2 | HEART RATE: 60 BPM | TEMPERATURE: 97.7 F | DIASTOLIC BLOOD PRESSURE: 78 MMHG

## 2020-04-22 DIAGNOSIS — C61 MALIGNANT NEOPLASM OF PROSTATE (H): Primary | ICD-10-CM

## 2020-04-22 PROCEDURE — 96402 CHEMO HORMON ANTINEOPL SQ/IM: CPT | Performed by: INTERNAL MEDICINE

## 2020-04-22 PROCEDURE — 99207 ZZC NO CHARGE LOS: CPT | Performed by: NURSE PRACTITIONER

## 2020-04-22 ASSESSMENT — MIFFLIN-ST. JEOR: SCORE: 1602.05

## 2020-04-22 ASSESSMENT — PAIN SCALES - GENERAL: PAINLEVEL: NO PAIN (0)
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
## 2020-04-22 NOTE — PATIENT INSTRUCTIONS
Pt to return on 10/23/20 for Lupron. Copies of medication list and upcoming appointments given prior to discharge.

## 2020-05-05 DIAGNOSIS — Z79.01 LONG TERM CURRENT USE OF ANTICOAGULANT THERAPY: Primary | ICD-10-CM

## 2020-05-05 DIAGNOSIS — I48.91 ATRIAL FIBRILLATION, UNSPECIFIED TYPE (H): ICD-10-CM

## 2020-05-06 ENCOUNTER — ANTICOAGULATION THERAPY VISIT (OUTPATIENT)
Dept: ANTICOAGULATION | Facility: OTHER | Age: 85
End: 2020-05-06

## 2020-05-06 DIAGNOSIS — Z79.01 LONG TERM CURRENT USE OF ANTICOAGULANT THERAPY: ICD-10-CM

## 2020-05-06 DIAGNOSIS — I50.40 COMBINED SYSTOLIC AND DIASTOLIC CONGESTIVE HEART FAILURE, UNSPECIFIED HF CHRONICITY (H): ICD-10-CM

## 2020-05-06 DIAGNOSIS — I48.91 ATRIAL FIBRILLATION, UNSPECIFIED TYPE (H): ICD-10-CM

## 2020-05-06 LAB
CAPILLARY BLOOD COLLECTION: NORMAL
INR BLD: 3.3 (ref 0.86–1.14)

## 2020-05-06 PROCEDURE — 99207 ZZC NO CHARGE NURSE ONLY: CPT | Performed by: INTERNAL MEDICINE

## 2020-05-06 PROCEDURE — 85610 PROTHROMBIN TIME: CPT | Mod: QW | Performed by: INTERNAL MEDICINE

## 2020-05-06 PROCEDURE — 36416 COLLJ CAPILLARY BLOOD SPEC: CPT | Performed by: INTERNAL MEDICINE

## 2020-05-06 NOTE — PROGRESS NOTES
ANTICOAGULATION FOLLOW-UP CLINIC VISIT    Patient Name:  Miguel Patten  Date:  5/6/2020  Contact Type:  Telephone    SUBJECTIVE:  Patient Findings     Comments:   Patient denies any identifiable changes that caused the supratherapeutic INR.   Pt already took dose this AM, so he will take 1.25 mg tomorrow, then resume  Ashly Jones RN            Clinical Outcomes     Comments:   Patient denies any identifiable changes that caused the supratherapeutic INR.   Pt already took dose this AM, so he will take 1.25 mg tomorrow, then resume  Ashly Jones RN               OBJECTIVE    INR Point of Care   Date Value Ref Range Status   05/06/2020 3.3 (H) 0.86 - 1.14 Final     Comment:     This test is intended for monitoring Coumadin therapy.  Results are not   accurate in patients with prolonged INR due to factor deficiency.         ASSESSMENT / PLAN  INR assessment SUPRA    Recheck INR In: 8 WEEKS    INR Location Outside lab      Anticoagulation Summary  As of 5/6/2020    INR goal:   2.0-3.0   TTR:   83.4 % (1 y)   INR used for dosing:   3.3! (5/6/2020)   Warfarin maintenance plan:   3.75 mg (2.5 mg x 1.5) every Tue, Sat; 2.5 mg (2.5 mg x 1) all other days   Full warfarin instructions:   5/7: 1.25 mg; Otherwise 3.75 mg every Tue, Sat; 2.5 mg all other days   Weekly warfarin total:   20 mg   Plan last modified:   Ashly Jones RN (12/5/2018)   Next INR check:   7/1/2020   Priority:   Maintenance   Target end date:       Indications    CHF (congestive heart failure) (H) [I50.9]  Long-term (current) use of anticoagulants [Z79.01] [Z79.01]             Anticoagulation Episode Summary     INR check location:       Preferred lab:       Send INR reminders to:   [facility] [place]    Comments:   2.5 mg tablets, book, takes in AM      Anticoagulation Care Providers     Provider Role Specialty Phone number    Rober العراقي MD [facility] [phone]            See the Encounter Report to view  Anticoagulation Flowsheet and Dosing Calendar (Go to Encounters tab in chart review, and find the Anticoagulation Therapy Visit)    Dosage adjustment made based on physician directed care plan.      Ashly Jones RN

## 2020-05-14 DIAGNOSIS — J45.20 MILD INTERMITTENT ASTHMA WITHOUT COMPLICATION: ICD-10-CM

## 2020-05-14 NOTE — TELEPHONE ENCOUNTER
FLOVENT INhaler  Last Written Prescription Date:  3/16/20  Last Fill Quantity: 10.6g,  # refills: 0   Last office visit: :  5/21/19 with Dr. Gonzales   Future Office Visit:  NONE  ACT Total Scores 5/2/2017 4/12/2018 5/21/2019   ACT TOTAL SCORE - - -   ASTHMA ER VISITS - - -   ASTHMA HOSPITALIZATIONS - - -   ACT TOTAL SCORE (Goal Greater than or Equal to 20) 5 25 25   In the past 12 months, how many times did you visit the emergency room for your asthma without being admitted to the hospital? - 0 0   In the past 12 months, how many times were you hospitalized overnight because of your asthma? 0 0 0   Forwarding FLOVENT refill request to Primary Care Provider as out of RN scope of practice  When provider wanted to see him back in office .   KARI Marvin.

## 2020-05-15 RX ORDER — FLUTICASONE PROPIONATE 44 MCG
AEROSOL WITH ADAPTER (GRAM) INHALATION
Qty: 10.6 G | Refills: 0 | Status: SHIPPED | OUTPATIENT
Start: 2020-05-15 | End: 2020-07-13

## 2020-06-02 DIAGNOSIS — Z95.2 S/P AVR (AORTIC VALVE REPLACEMENT): ICD-10-CM

## 2020-06-03 NOTE — TELEPHONE ENCOUNTER
Routing to team to set up phone appointment for patient for med check.  Please also ask patient how much medication she has left and schedule appropriately.  If patient needs a refill before their appointment, please route to provider for completion of refill.  Routing refill request to provider for review/approval because:  Labs not current:  Cr K  Labs out of range: BP  Last Written Prescription Date:  12/10/19  Last Fill Quantity: 180,  # refills: 1   Last office visit: Visit date not found with prescribing provider:  5/2019   Future Office Visit:      BASSAM DuvallN, RN

## 2020-06-04 NOTE — TELEPHONE ENCOUNTER
Spoke with patient and informed of message below. Patient understood. Telephone appointment made for 6/5/2020 with Dr. Gonzales will not have enough for weekend.     Bianca English MA

## 2020-06-05 ENCOUNTER — VIRTUAL VISIT (OUTPATIENT)
Dept: FAMILY MEDICINE | Facility: CLINIC | Age: 85
End: 2020-06-05
Payer: MEDICARE

## 2020-06-05 ENCOUNTER — TELEPHONE (OUTPATIENT)
Dept: ANTICOAGULATION | Facility: OTHER | Age: 85
End: 2020-06-05

## 2020-06-05 DIAGNOSIS — Z79.01 LONG TERM CURRENT USE OF ANTICOAGULANT THERAPY: ICD-10-CM

## 2020-06-05 DIAGNOSIS — I48.21 PERMANENT ATRIAL FIBRILLATION (H): Primary | ICD-10-CM

## 2020-06-05 DIAGNOSIS — C61 MALIGNANT NEOPLASM OF PROSTATE (H): Primary | ICD-10-CM

## 2020-06-05 DIAGNOSIS — C61 MALIGNANT NEOPLASM OF PROSTATE (H): ICD-10-CM

## 2020-06-05 DIAGNOSIS — J45.20 MILD INTERMITTENT ASTHMA, UNCOMPLICATED: ICD-10-CM

## 2020-06-05 DIAGNOSIS — E78.2 MIXED HYPERLIPIDEMIA: ICD-10-CM

## 2020-06-05 LAB
ALBUMIN UR-MCNC: 30 MG/DL
APPEARANCE UR: ABNORMAL
BILIRUB UR QL STRIP: NEGATIVE
COLOR UR AUTO: ABNORMAL
GLUCOSE UR STRIP-MCNC: NEGATIVE MG/DL
HGB UR QL STRIP: ABNORMAL
HYALINE CASTS #/AREA URNS LPF: 1 /LPF (ref 0–2)
INR PPP: 3.07 (ref 0.86–1.14)
KETONES UR STRIP-MCNC: NEGATIVE MG/DL
LEUKOCYTE ESTERASE UR QL STRIP: NEGATIVE
MUCOUS THREADS #/AREA URNS LPF: PRESENT /LPF
NITRATE UR QL: NEGATIVE
PH UR STRIP: 5 PH (ref 5–7)
PSA SERPL-MCNC: 0.11 UG/L (ref 0–4)
RBC #/AREA URNS AUTO: 6 /HPF (ref 0–2)
SOURCE: ABNORMAL
SP GR UR STRIP: 1.03 (ref 1–1.03)
UROBILINOGEN UR STRIP-MCNC: 2 MG/DL (ref 0–2)
WBC #/AREA URNS AUTO: 1 /HPF (ref 0–5)

## 2020-06-05 PROCEDURE — 84153 ASSAY OF PSA TOTAL: CPT | Performed by: UROLOGY

## 2020-06-05 PROCEDURE — 36415 COLL VENOUS BLD VENIPUNCTURE: CPT | Performed by: UROLOGY

## 2020-06-05 PROCEDURE — 85610 PROTHROMBIN TIME: CPT | Performed by: UROLOGY

## 2020-06-05 PROCEDURE — 81001 URINALYSIS AUTO W/SCOPE: CPT | Performed by: UROLOGY

## 2020-06-05 PROCEDURE — 99442 ZZC PHYSICIAN TELEPHONE EVALUATION 11-20 MIN: CPT | Performed by: INTERNAL MEDICINE

## 2020-06-05 RX ORDER — LOSARTAN POTASSIUM 50 MG/1
TABLET ORAL
Qty: 180 TABLET | Refills: 1 | Status: SHIPPED | OUTPATIENT
Start: 2020-06-05 | End: 2021-10-26

## 2020-06-05 NOTE — LETTER
June 9, 2020      Miguel Patten  [address] [facility] [address]      Dear Miguel, your recent test results are attached.   The PSA is up slightly from previous at 0.11.  This is not consistent with recurrence of prostate cancer but should be followed annually.       You will be contacted with any outstanding results as they are available.   Feel free to contact me via the office or My Chart if you have any questions regarding the above.       Resulted Orders   PSA, tumor marker   Result Value Ref Range    PSA 0.11 0 - 4 ug/L      Comment:      Assay Method:  Chemiluminescence using Siemens Vista analyzer       If you have any questions or concerns, please call the clinic at the number listed above.       Sincerely,        Dr Gonzales

## 2020-06-05 NOTE — PROGRESS NOTES
"Miguel Patten is a 87 year old male who is being evaluated via a billable telephone visit.      The patient has been notified of following:     \"This telephone visit will be conducted via a call between you and your physician/provider. We have found that certain health care needs can be provided without the need for a physical exam.  This service lets us provide the care you need with a short phone conversation.  If a prescription is necessary we can send it directly to your pharmacy.  If lab work is needed we can place an order for that and you can then stop by our lab to have the test done at a later time.    Telephone visits are billed at different rates depending on your insurance coverage. During this emergency period, for some insurers they may be billed the same as an in-person visit.  Please reach out to your insurance provider with any questions.    If during the course of the call the physician/provider feels a telephone visit is not appropriate, you will not be charged for this service.\"    Patient has given verbal consent for Telephone visit?  Yes    What phone number would you like to be contacted at? [phone]    How would you like to obtain your AVS? Mail a copy    Subjective     Miguel Patten is a 87 year old male who presents via phone visit today for the following health issues:    HPI  Hypertension Follow-up      Do you check your blood pressure regularly outside of the clinic? Yes     Are you following a low salt diet? No    Are your blood pressures ever more than 140 on the top number (systolic) OR more   than 90 on the bottom number (diastolic), for example 140/90? No                    Chief Complaint         The patient is a pleasant 87-year-old gentleman who appears to be substantially younger than his stated age.  He has a history of hypertension and hyperlipidemia.  He is currently on losartan and simvastatin for this.  Additionally, he has a history of chronic atrial fibrillation " and is on warfarin.  Denies any bruising or bleeding.  He is taking his medications compliantly without side effects.  Additionally, he notes that he continues the Casodex and Lupron injections for his prostate carcinoma.  He is scheduled to see his urologist in the near future.  He does need a PSA performed.  He denies any urinary symptoms at this time.  He has had no back or bone pain out of the ordinary for his age.                           PAST, FAMILY,SOCIAL HISTORY:     Medical  History:   has a past medical history of Arthritis, Ascending aortic aneurysm (H), Asthma, Complete AV block (H), Congestive heart failure (H), Coronary artery disease, H/O aortic valve replacement, Hypertension, Malignant neoplasm of prostate (H), and Permanent atrial fibrillation.     Surgical History:   has a past surgical history that includes NONSPECIFIC PROCEDURE; NONSPECIFIC PROCEDURE; orthopedic surgery; Abdomen surgery (2001); TOTAL HIP ARTHROPLASTY (1/21/13); Arthroplasty hip (1/21/2013); s/p avr; s/p aneurysm repair by Dr. Friedman; Replace valve aortic (7/17/2014); Repair aneurysm ascending aorta (7/17/2014); Phacoemulsification with standard intraocular lens implant (Right, 10/6/2016); and Phacoemulsification with standard intraocular lens implant (Left, 10/20/2016).     Social History:   reports that he has never smoked. He has never used smokeless tobacco. He reports current alcohol use. He reports that he does not use drugs.     Family History:  family history includes Asthma in his daughter, father, and paternal grandmother.            MEDICATIONS  Current Outpatient Medications   Medication Sig Dispense Refill     albuterol (PROAIR HFA/PROVENTIL HFA/VENTOLIN HFA) 108 (90 Base) MCG/ACT inhaler Inhale 2 puffs into the lungs every 6 hours as needed for shortness of breath / dyspnea or wheezing 18 g 3     bicalutamide (CASODEX) 50 MG tablet Take 1 tablet (50 mg) by mouth daily       FLOVENT HFA 44 MCG/ACT inhaler INHALE ONE  PUFF BY MOUTH TWICE A DAY 10.6 g 0     leuprolide (LUPRON DEPOT, 3-MONTH,) 22.5 MG kit Inject 22.5 MG, IM q 6 months per Dr. Zeyad Guevara, pt's Urologist in [place]. 10/17/2018 1 each 3     losartan (COZAAR) 50 MG tablet TAKE ONE TABLET BY MOUTH TWICE A  tablet 1     simvastatin (ZOCOR) 40 MG tablet TAKE ONE TABLET BY MOUTH EVERY NIGHT AT BEDTIME 90 tablet 1     warfarin ANTICOAGULANT (JANTOVEN ANTICOAGULANT) 2.5 MG tablet Take 3.75 mg Tues, Sat and 2.5 mg all other days, or as directed by the coumadin clinic. 110 tablet 3     acetaminophen (TYLENOL) 325 MG tablet Take 2 tablets (650 mg) by mouth every 4 hours as needed for mild pain or fever 100 tablet      BETA BLOCKER NOT PRESCRIBED, INTENTIONAL, Beta Blocker not prescribed intentionally due to Hypotension (SBP < 90) (Patient not taking: Reported on 6/5/2020)  0     ipratropium - albuterol 0.5 mg/2.5 mg/3 mL (DUONEB) 0.5-2.5 (3) MG/3ML nebulization Take 3 mLs by nebulization every 6 hours as needed for shortness of breath / dyspnea. (Patient not taking: Reported on 6/5/2020) 60 vial 1         --------------------------------------------------------------------------------------------------------------------                              Review of Systems       LUNGS: Pt denies: cough, excess sputum, hemoptysis, or shortness of breath.   HEART: Pt denies: chest pain, arrhythmia, syncope, tachy or bradyarrhythmia.   GI: Pt denies: nausea, vomiting, diarrhea, constipation, melena, or hematochezia.   NEURO: Pt denies: seizures, strokes, diplopia, weakness, paraesthesias, or paralysis.   SKIN: Pt denies: itching, rashes, discoloration, or specific lesions of concern. Denies recent hair loss.   PSYCH: The patient denies significant depression, anxiety, mood imbalance. Specifically denies any suicidal ideation.                                    No physical examination is performed as this is a virtual visit.  The patient's voice is strong, there is no  evidence of labored breathing or wheezing.  The patient is alert and appropriate and demonstrates no obvious behavioral irregularities.             Decision Making       1. Permanent atrial fibrillation  Continue anticoagulation and rate control.    2. Long term current use of anticoagulant therapy  Check INR and CBC for hemoglobin.  - CBC with Platelets  ; Future  - INR    3. Mixed hyperlipidemia  Check renal and hepatic function.  Continue statin therapy.  - BASIC METABOLIC PANEL; Future  - ALT; Future  - Lipid panel reflex to direct LDL Fasting; Future    4. Malignant neoplasm of prostate (H)  Check PSA and forward to urology  - PSA, tumor marker; Future    5. Mild intermittent asthma, uncomplicated  Currently stable.  Use Flovent and albuterol as directed                             FOLLOW UP   I have asked the patient to make an appointment for followup with me in 3 months for face-to-face            I have carefully explained the diagnosis and treatment options to the patient.  The patient has displayed an understanding of the above, and all subsequent questions were answered.      DO THAIS Correa    Portions of this note were produced using [employer]  Although every attempt at real-time proof reading has been made, occasional grammar/syntax errors may have been missed.    Telephone time: 14 minutes  Start time: 8 minutes

## 2020-06-05 NOTE — TELEPHONE ENCOUNTER
Attempted to contact pt regarding INR of 3.07 from 6/5.   Unable to leave VM on both home and cell as  mailbox is full.     Ashly Jones RN

## 2020-06-06 ASSESSMENT — ASTHMA QUESTIONNAIRES: ACT_TOTALSCORE: 25

## 2020-06-08 ENCOUNTER — TELEPHONE (OUTPATIENT)
Dept: ANTICOAGULATION | Facility: OTHER | Age: 85
End: 2020-06-08

## 2020-06-08 ENCOUNTER — ANTICOAGULATION THERAPY VISIT (OUTPATIENT)
Dept: ANTICOAGULATION | Facility: OTHER | Age: 85
End: 2020-06-08
Payer: MEDICARE

## 2020-06-08 DIAGNOSIS — Z79.01 LONG TERM CURRENT USE OF ANTICOAGULANT THERAPY: ICD-10-CM

## 2020-06-08 DIAGNOSIS — I50.40 COMBINED SYSTOLIC AND DIASTOLIC CONGESTIVE HEART FAILURE, UNSPECIFIED HF CHRONICITY (H): ICD-10-CM

## 2020-06-08 DIAGNOSIS — I48.21 PERMANENT ATRIAL FIBRILLATION (H): Primary | ICD-10-CM

## 2020-06-08 PROCEDURE — 99207 ZZC NO CHARGE NURSE ONLY: CPT | Performed by: INTERNAL MEDICINE

## 2020-06-08 NOTE — PROGRESS NOTES
ANTICOAGULATION FOLLOW-UP CLINIC VISIT    Patient Name:  Miguel Patten  Date:  6/8/2020  Contact Type:  Telephone    SUBJECTIVE:  Patient Findings     Comments:   Patient denies any identifiable changes that caused the supratherapeutic INR.   Ashly Jones RN          Clinical Outcomes     Comments:   Patient denies any identifiable changes that caused the supratherapeutic INR.   Ashly Jones RN             OBJECTIVE    Recent labs: (last 7 days)     06/05/20  1346   INR 3.07*       ASSESSMENT / PLAN  INR assessment THER    Recheck INR In: 3 WEEKS    INR Location Outside lab      Anticoagulation Summary  As of 6/8/2020    INR goal:   2.0-3.0   TTR:   74.9 % (1 y)   INR used for dosing:   3.07! (6/5/2020)   Warfarin maintenance plan:   3.75 mg (2.5 mg x 1.5) every Sat; 2.5 mg (2.5 mg x 1) all other days   Full warfarin instructions:   3.75 mg every Sat; 2.5 mg all other days   Weekly warfarin total:   18.75 mg   Plan last modified:   Ashly Jones RN (6/8/2020)   Next INR check:   7/1/2020   Priority:   Maintenance   Target end date:       Indications    CHF (congestive heart failure) (H) [I50.9]  Long-term (current) use of anticoagulants [Z79.01] [Z79.01]             Anticoagulation Episode Summary     INR check location:       Preferred lab:       Send INR reminders to:   [facility]    Comments:   2.5 mg tablets, book, takes in AM      Anticoagulation Care Providers     Provider Role Specialty Phone number    Rober العراقي MD [facility] [phone]            See the Encounter Report to view Anticoagulation Flowsheet and Dosing Calendar (Go to Encounters tab in chart review, and find the Anticoagulation Therapy Visit)    Dosage adjustment made based on physician directed care plan.      Ashly Jones RN

## 2020-06-09 PROBLEM — I48.21 PERMANENT ATRIAL FIBRILLATION (H): Status: ACTIVE | Noted: 2020-06-09

## 2020-06-09 NOTE — RESULT ENCOUNTER NOTE
Dear Miguel, your recent test results are attached.  The PSA is up slightly from previous at 0.11.  This is not consistent with recurrence of prostate cancer but should be followed annually.      You will be contacted with any outstanding results as they are available.  Feel free to contact me via the office or My Chart if you have any questions regarding the above.

## 2020-06-25 DIAGNOSIS — Z95.0 CARDIAC PACEMAKER IN SITU: Primary | ICD-10-CM

## 2020-06-30 DIAGNOSIS — Z95.2 S/P AVR (AORTIC VALVE REPLACEMENT): ICD-10-CM

## 2020-06-30 RX ORDER — SIMVASTATIN 40 MG
TABLET ORAL
Qty: 90 TABLET | Refills: 1 | Status: SHIPPED | OUTPATIENT
Start: 2020-06-30 | End: 2021-10-26

## 2020-06-30 NOTE — TELEPHONE ENCOUNTER
Prescription approved per [facility] Refill Protocol.  Zulma Cuevas RN

## 2020-07-01 ENCOUNTER — DOCUMENTATION ONLY (OUTPATIENT)
Dept: FAMILY MEDICINE | Facility: OTHER | Age: 85
End: 2020-07-01

## 2020-07-01 ENCOUNTER — TELEPHONE (OUTPATIENT)
Dept: ANTICOAGULATION | Facility: OTHER | Age: 85
End: 2020-07-01

## 2020-07-01 ENCOUNTER — ANTICOAGULATION THERAPY VISIT (OUTPATIENT)
Dept: ANTICOAGULATION | Facility: OTHER | Age: 85
End: 2020-07-01

## 2020-07-01 DIAGNOSIS — E78.2 MIXED HYPERLIPIDEMIA: ICD-10-CM

## 2020-07-01 DIAGNOSIS — I48.91 ATRIAL FIBRILLATION, UNSPECIFIED TYPE (H): ICD-10-CM

## 2020-07-01 DIAGNOSIS — I50.40 COMBINED SYSTOLIC AND DIASTOLIC CONGESTIVE HEART FAILURE, UNSPECIFIED HF CHRONICITY (H): ICD-10-CM

## 2020-07-01 DIAGNOSIS — I48.21 PERMANENT ATRIAL FIBRILLATION (H): ICD-10-CM

## 2020-07-01 DIAGNOSIS — Z79.01 LONG TERM CURRENT USE OF ANTICOAGULANT THERAPY: ICD-10-CM

## 2020-07-01 LAB
ALT SERPL W P-5'-P-CCNC: 20 U/L (ref 0–70)
ANION GAP SERPL CALCULATED.3IONS-SCNC: 5 MMOL/L (ref 3–14)
BUN SERPL-MCNC: 17 MG/DL (ref 7–30)
CALCIUM SERPL-MCNC: 9.1 MG/DL (ref 8.5–10.1)
CHLORIDE SERPL-SCNC: 107 MMOL/L (ref 94–109)
CHOLEST SERPL-MCNC: 151 MG/DL
CO2 SERPL-SCNC: 27 MMOL/L (ref 20–32)
CREAT SERPL-MCNC: 0.8 MG/DL (ref 0.66–1.25)
ERYTHROCYTE [DISTWIDTH] IN BLOOD BY AUTOMATED COUNT: 14.6 % (ref 10–15)
GFR SERPL CREATININE-BSD FRML MDRD: 80 ML/MIN/{1.73_M2}
GLUCOSE SERPL-MCNC: 103 MG/DL (ref 70–99)
HCT VFR BLD AUTO: 40.2 % (ref 40–53)
HDLC SERPL-MCNC: 98 MG/DL
HGB BLD-MCNC: 13.5 G/DL (ref 13.3–17.7)
INR BLD: 2.7 (ref 0.86–1.14)
LDLC SERPL CALC-MCNC: 39 MG/DL
MCH RBC QN AUTO: 32.9 PG (ref 26.5–33)
MCHC RBC AUTO-ENTMCNC: 33.6 G/DL (ref 31.5–36.5)
MCV RBC AUTO: 98 FL (ref 78–100)
NONHDLC SERPL-MCNC: 53 MG/DL
PLATELET # BLD AUTO: 144 10E9/L (ref 150–450)
POTASSIUM SERPL-SCNC: 4.1 MMOL/L (ref 3.4–5.3)
RBC # BLD AUTO: 4.1 10E12/L (ref 4.4–5.9)
SODIUM SERPL-SCNC: 139 MMOL/L (ref 133–144)
TRIGL SERPL-MCNC: 72 MG/DL
WBC # BLD AUTO: 7.4 10E9/L (ref 4–11)

## 2020-07-01 PROCEDURE — 85027 COMPLETE CBC AUTOMATED: CPT | Performed by: INTERNAL MEDICINE

## 2020-07-01 PROCEDURE — 80048 BASIC METABOLIC PNL TOTAL CA: CPT | Performed by: INTERNAL MEDICINE

## 2020-07-01 PROCEDURE — 84460 ALANINE AMINO (ALT) (SGPT): CPT | Performed by: INTERNAL MEDICINE

## 2020-07-01 PROCEDURE — 99207 ZZC NO CHARGE NURSE ONLY: CPT | Performed by: INTERNAL MEDICINE

## 2020-07-01 PROCEDURE — 80061 LIPID PANEL: CPT | Performed by: INTERNAL MEDICINE

## 2020-07-01 PROCEDURE — 85610 PROTHROMBIN TIME: CPT | Mod: QW | Performed by: INTERNAL MEDICINE

## 2020-07-01 PROCEDURE — 36415 COLL VENOUS BLD VENIPUNCTURE: CPT | Performed by: INTERNAL MEDICINE

## 2020-07-01 NOTE — TELEPHONE ENCOUNTER
Attempted to contact pt regarding INR of 2.7 from 7/4.   Pt not available at this time. Will need to try again later.     Ashly Jones RN

## 2020-07-01 NOTE — LETTER
July 15, 2020      Miguel Patten  [address] [address] [address]        Dear ,    We are writing to inform you of your test results.    Dear Miguel, your recent test results are attached.   The cholesterol is well controlled with an LDL of 39.   Blood sugars well controlled at 103 with normal kidney function.   Liver tests are normal.   Blood cell count demonstrates no evidence of anemia or leukemia.   You will be contacted with any outstanding results as they are available.   Feel free to contact me via the office or My Chart if you have any questions regarding the above.     Resulted Orders   CBC with Platelets     Result Value Ref Range    WBC 7.4 4.0 - 11.0 10e9/L    RBC Count 4.10 (L) 4.4 - 5.9 10e12/L    Hemoglobin 13.5 13.3 - 17.7 g/dL    Hematocrit 40.2 40.0 - 53.0 %    MCV 98 78 - 100 fl    MCH 32.9 26.5 - 33.0 pg    MCHC 33.6 31.5 - 36.5 g/dL    RDW 14.6 10.0 - 15.0 %    Platelet Count 144 (L) 150 - 450 10e9/L   Lipid panel reflex to direct LDL Fasting   Result Value Ref Range    Cholesterol 151 <200 mg/dL    Triglycerides 72 <150 mg/dL    HDL Cholesterol 98 >39 mg/dL    LDL Cholesterol Calculated 39 <100 mg/dL      Comment:      Desirable:       <100 mg/dl    Non HDL Cholesterol 53 <130 mg/dL   ALT   Result Value Ref Range    ALT 20 0 - 70 U/L   BASIC METABOLIC PANEL   Result Value Ref Range    Sodium 139 133 - 144 mmol/L    Potassium 4.1 3.4 - 5.3 mmol/L    Chloride 107 94 - 109 mmol/L    Carbon Dioxide 27 20 - 32 mmol/L    Anion Gap 5 3 - 14 mmol/L    Glucose 103 (H) 70 - 99 mg/dL    Urea Nitrogen 17 7 - 30 mg/dL    Creatinine 0.80 0.66 - 1.25 mg/dL    GFR Estimate 80 >60 mL/min/[1.73_m2]      Comment:      Non  GFR Calc  Starting 12/18/2018, serum creatinine based estimated GFR (eGFR) will be   calculated using the Chronic Kidney Disease Epidemiology Collaboration   (CKD-EPI) equation.      GFR Estimate If Black >90 >60 mL/min/[1.73_m2]      Comment:        American GFR Calc  Starting 12/18/2018, serum creatinine based estimated GFR (eGFR) will be   calculated using the Chronic Kidney Disease Epidemiology Collaboration   (CKD-EPI) equation.      Calcium 9.1 8.5 - 10.1 mg/dL       If you have any questions or concerns, please call the clinic at the number listed above.       Sincerely,        Vladimir Gonzales DO

## 2020-07-01 NOTE — TELEPHONE ENCOUNTER
Attempted to contact pt regarding INR of 27 from 7/1.     I have called both home and cell- no answer at either. Unable to leave a VM. Will need to try again tomorrow.     Ashly Jones RN

## 2020-07-01 NOTE — PROGRESS NOTES
Hi,  We saw Miguel in lab today and did all his labs for Dr. Gonzales and he requested if it was at all possible, for him to connect with Dr. Guevara. I believe both facilities will be getting lab results, so I just thought I'd pass this along!  Thanks!  Mirlande CHOPRA- zac

## 2020-07-02 NOTE — PROGRESS NOTES
ANTICOAGULATION FOLLOW-UP CLINIC VISIT    Patient Name:  Miguel Patten  Date:  2020  Contact Type:  Telephone    SUBJECTIVE:  Patient Findings     Comments:   The patient was assessed for diet, medication, and activity level changes, missed or extra doses, bruising or bleeding, with no problem findings.          Clinical Outcomes     Negatives:   Major bleeding event, Thromboembolic event, Anticoagulation-related hospital admission, Anticoagulation-related ED visit, Anticoagulation-related fatality    Comments:   The patient was assessed for diet, medication, and activity level changes, missed or extra doses, bruising or bleeding, with no problem findings.             OBJECTIVE    Recent labs: (last 7 days)     20  0852   INR 2.7*       ASSESSMENT / PLAN  INR assessment THER    Recheck INR In: 4 WEEKS    INR Location Outside lab      Anticoagulation Summary  As of 2020    INR goal:   2.0-3.0   TTR:   73.7 % (1 y)   Prior goal:   2.0-3.0   INR used for dosin.7 (2020)   Warfarin maintenance plan:   3.75 mg (2.5 mg x 1.5) every Sat; 2.5 mg (2.5 mg x 1) all other days   Full warfarin instructions:   3.75 mg every Sat; 2.5 mg all other days   Weekly warfarin total:   18.75 mg   No change documented:   Kerwin Faustin, RN   Plan last modified:   Ashly Jones, RN (2020)   Next INR check:   2020   Priority:   Maintenance   Target end date:   Indefinite    Indications    CHF (congestive heart failure) (H) [I50.9]  Long-term (current) use of anticoagulants [Z79.01] [Z79.01]  Permanent atrial fibrillation (H) [I48.21]             Anticoagulation Episode Summary     INR check location:       Preferred lab:       Send INR reminders to:   [facility]    Comments:   2.5 mg tablets, book, takes in AM      Anticoagulation Care Providers     Provider Role Specialty Phone number    Vladimir Gonzales DO Referring Internal Medicine [phone]    Rober العراقي MD Responsible  [facility] [phone]            See the Encounter Report to view Anticoagulation Flowsheet and Dosing Calendar (Go to Encounters tab in chart review, and find the Anticoagulation Therapy Visit)    Dosage adjustment made based on physician directed care plan.    Kerwin Faustin RN

## 2020-07-09 ENCOUNTER — ANCILLARY PROCEDURE (OUTPATIENT)
Dept: CARDIOLOGY | Facility: CLINIC | Age: 85
End: 2020-07-09
Attending: INTERNAL MEDICINE
Payer: MEDICARE

## 2020-07-09 DIAGNOSIS — Z95.0 CARDIAC PACEMAKER IN SITU: ICD-10-CM

## 2020-07-09 PROCEDURE — 93296 REM INTERROG EVL PM/IDS: CPT | Performed by: INTERNAL MEDICINE

## 2020-07-09 PROCEDURE — 93294 REM INTERROG EVL PM/LDLS PM: CPT | Performed by: INTERNAL MEDICINE

## 2020-07-13 DIAGNOSIS — J45.20 MILD INTERMITTENT ASTHMA WITHOUT COMPLICATION: ICD-10-CM

## 2020-07-13 RX ORDER — FLUTICASONE PROPIONATE 44 MCG
AEROSOL WITH ADAPTER (GRAM) INHALATION
Qty: 10.6 G | Refills: 1 | Status: SHIPPED | OUTPATIENT
Start: 2020-07-13 | End: 2020-10-27

## 2020-07-13 NOTE — TELEPHONE ENCOUNTER
Prescription approved per [facility] Refill Protocol.    BASSAM LeeN, RN  [facility]

## 2020-07-14 LAB
MDC_IDC_EPISODE_DTM: NORMAL
MDC_IDC_EPISODE_DURATION: 7 S
MDC_IDC_EPISODE_DURATION: 8 S
MDC_IDC_EPISODE_ID: NORMAL
MDC_IDC_EPISODE_TYPE: NORMAL
MDC_IDC_LEAD_IMPLANT_DT: NORMAL
MDC_IDC_LEAD_IMPLANT_DT: NORMAL
MDC_IDC_LEAD_LOCATION: NORMAL
MDC_IDC_LEAD_LOCATION: NORMAL
MDC_IDC_LEAD_LOCATION_DETAIL_1: NORMAL
MDC_IDC_LEAD_LOCATION_DETAIL_1: NORMAL
MDC_IDC_LEAD_MFG: NORMAL
MDC_IDC_LEAD_MFG: NORMAL
MDC_IDC_LEAD_MODEL: NORMAL
MDC_IDC_LEAD_MODEL: NORMAL
MDC_IDC_LEAD_POLARITY_TYPE: NORMAL
MDC_IDC_LEAD_POLARITY_TYPE: NORMAL
MDC_IDC_LEAD_SERIAL: NORMAL
MDC_IDC_LEAD_SERIAL: NORMAL
MDC_IDC_MSMT_BATTERY_DTM: NORMAL
MDC_IDC_MSMT_BATTERY_REMAINING_LONGEVITY: 54 MO
MDC_IDC_MSMT_BATTERY_REMAINING_PERCENTAGE: 77 %
MDC_IDC_MSMT_BATTERY_STATUS: NORMAL
MDC_IDC_MSMT_LEADCHNL_LV_IMPEDANCE_VALUE: 614 OHM
MDC_IDC_MSMT_LEADCHNL_LV_PACING_THRESHOLD_AMPLITUDE: 1.7 V
MDC_IDC_MSMT_LEADCHNL_LV_PACING_THRESHOLD_PULSEWIDTH: 1 MS
MDC_IDC_MSMT_LEADCHNL_RV_IMPEDANCE_VALUE: 497 OHM
MDC_IDC_PG_IMPLANT_DTM: NORMAL
MDC_IDC_PG_MFG: NORMAL
MDC_IDC_PG_MODEL: NORMAL
MDC_IDC_PG_SERIAL: NORMAL
MDC_IDC_PG_TYPE: NORMAL
MDC_IDC_SESS_CLINIC_NAME: NORMAL
MDC_IDC_SESS_DTM: NORMAL
MDC_IDC_SESS_TYPE: NORMAL
MDC_IDC_SET_BRADY_AT_MODE_SWITCH_RATE: 170 {BEATS}/MIN
MDC_IDC_SET_BRADY_LOWRATE: 60 {BEATS}/MIN
MDC_IDC_SET_BRADY_MAX_SENSOR_RATE: 120 {BEATS}/MIN
MDC_IDC_SET_BRADY_MODE: NORMAL
MDC_IDC_SET_CRT_LVRV_DELAY: 0 MS
MDC_IDC_SET_CRT_PACED_CHAMBERS: NORMAL
MDC_IDC_SET_LEADCHNL_LV_PACING_AMPLITUDE: 2.6 V
MDC_IDC_SET_LEADCHNL_LV_PACING_PULSEWIDTH: 0.7 MS
MDC_IDC_SET_LEADCHNL_LV_SENSING_ADAPTATION_MODE: NORMAL
MDC_IDC_SET_LEADCHNL_LV_SENSING_SENSITIVITY: 2.5 MV
MDC_IDC_SET_LEADCHNL_RA_SENSING_ADAPTATION_MODE: NORMAL
MDC_IDC_SET_LEADCHNL_RA_SENSING_SENSITIVITY: 0.5 MV
MDC_IDC_SET_LEADCHNL_RV_PACING_AMPLITUDE: 2.4 V
MDC_IDC_SET_LEADCHNL_RV_PACING_CAPTURE_MODE: NORMAL
MDC_IDC_SET_LEADCHNL_RV_PACING_POLARITY: NORMAL
MDC_IDC_SET_LEADCHNL_RV_PACING_PULSEWIDTH: 0.5 MS
MDC_IDC_SET_LEADCHNL_RV_SENSING_ADAPTATION_MODE: NORMAL
MDC_IDC_SET_LEADCHNL_RV_SENSING_POLARITY: NORMAL
MDC_IDC_SET_LEADCHNL_RV_SENSING_SENSITIVITY: 2.5 MV
MDC_IDC_SET_ZONE_DETECTION_INTERVAL: 375 MS
MDC_IDC_SET_ZONE_TYPE: NORMAL
MDC_IDC_SET_ZONE_VENDOR_TYPE: NORMAL
MDC_IDC_STAT_BRADY_DTM_END: NORMAL
MDC_IDC_STAT_BRADY_DTM_START: NORMAL
MDC_IDC_STAT_BRADY_RA_PERCENT_PACED: 0 %
MDC_IDC_STAT_BRADY_RV_PERCENT_PACED: 100 %
MDC_IDC_STAT_CRT_DTM_END: NORMAL
MDC_IDC_STAT_CRT_DTM_START: NORMAL
MDC_IDC_STAT_CRT_LV_PERCENT_PACED: 100 %
MDC_IDC_STAT_EPISODE_RECENT_COUNT: 0
MDC_IDC_STAT_EPISODE_RECENT_COUNT: 20
MDC_IDC_STAT_EPISODE_RECENT_COUNT_DTM_END: NORMAL
MDC_IDC_STAT_EPISODE_RECENT_COUNT_DTM_START: NORMAL
MDC_IDC_STAT_EPISODE_TYPE: NORMAL
MDC_IDC_STAT_EPISODE_VENDOR_TYPE: NORMAL

## 2020-07-15 NOTE — RESULT ENCOUNTER NOTE
Dear Miguel, your recent test results are attached.  The cholesterol is well controlled with an LDL of 39.  Blood sugars well controlled at 103 with normal kidney function.  Liver tests are normal.  Blood cell count demonstrates no evidence of anemia or leukemia.  You will be contacted with any outstanding results as they are available.  Feel free to contact me via the office or My Chart if you have any questions regarding the above.

## 2020-07-29 ENCOUNTER — ANTICOAGULATION THERAPY VISIT (OUTPATIENT)
Dept: ANTICOAGULATION | Facility: CLINIC | Age: 85
End: 2020-07-29

## 2020-07-29 DIAGNOSIS — Z79.01 LONG TERM CURRENT USE OF ANTICOAGULANT THERAPY: ICD-10-CM

## 2020-07-29 DIAGNOSIS — I48.91 ATRIAL FIBRILLATION, UNSPECIFIED TYPE (H): ICD-10-CM

## 2020-07-29 LAB — INR BLD: 3.3 (ref 0.86–1.14)

## 2020-07-29 PROCEDURE — 85610 PROTHROMBIN TIME: CPT | Mod: QW | Performed by: INTERNAL MEDICINE

## 2020-07-29 PROCEDURE — 36416 COLLJ CAPILLARY BLOOD SPEC: CPT | Performed by: INTERNAL MEDICINE

## 2020-07-29 NOTE — PROGRESS NOTES
ANTICOAGULATION FOLLOW-UP CLINIC VISIT    Patient Name:  Miguel Patten  Date:  7/29/2020  Contact Type:  Telephone    SUBJECTIVE:  Patient Findings     Positives:   Extra doses (pt has been taking 3.75 mg on Tues and Sat and 2.5 mg ROW. )             OBJECTIVE    Recent labs: (last 7 days)     07/29/20  0928   INR 3.3*       ASSESSMENT / PLAN  INR assessment SUPRA    Recheck INR In: 3 WEEKS    INR Location Outside lab      Anticoagulation Summary  As of 7/29/2020    INR goal:   2.0-3.0   TTR:   69.9 % (1 y)   INR used for dosing:   3.3! (7/29/2020)   Warfarin maintenance plan:   3.75 mg (2.5 mg x 1.5) every Sat; 2.5 mg (2.5 mg x 1) all other days   Full warfarin instructions:   3.75 mg every Sat; 2.5 mg all other days   Weekly warfarin total:   18.75 mg   Plan last modified:   Ashly Jones RN (7/29/2020)   Next INR check:   8/19/2020   Priority:   Maintenance   Target end date:   Indefinite    Indications    CHF (congestive heart failure) (H) [I50.9]  Long-term (current) use of anticoagulants [Z79.01] [Z79.01]  Permanent atrial fibrillation (H) [I48.21]             Anticoagulation Episode Summary     INR check location:       Preferred lab:       Send INR reminders to:   [facility]    Comments:   2.5 mg tablets, book, takes in AM      Anticoagulation Care Providers     Provider Role Specialty Phone number    JoelcristianeCesar smithVladimirraquel Sprague DO Referring Internal Medicine [phone]    Rober العراقي MD [facility] [phone]            See the Encounter Report to view Anticoagulation Flowsheet and Dosing Calendar (Go to Encounters tab in chart review, and find the Anticoagulation Therapy Visit)    Dosage adjustment made based on physician directed care plan.      Ashly Jones, RN

## 2020-08-19 ENCOUNTER — ANTICOAGULATION THERAPY VISIT (OUTPATIENT)
Dept: ANTICOAGULATION | Facility: OTHER | Age: 85
End: 2020-08-19

## 2020-08-19 DIAGNOSIS — Z79.01 LONG TERM CURRENT USE OF ANTICOAGULANT THERAPY: ICD-10-CM

## 2020-08-19 DIAGNOSIS — I48.91 ATRIAL FIBRILLATION, UNSPECIFIED TYPE (H): ICD-10-CM

## 2020-08-19 DIAGNOSIS — I48.21 PERMANENT ATRIAL FIBRILLATION (H): ICD-10-CM

## 2020-08-19 DIAGNOSIS — I50.40 COMBINED SYSTOLIC AND DIASTOLIC CONGESTIVE HEART FAILURE, UNSPECIFIED HF CHRONICITY (H): ICD-10-CM

## 2020-08-19 LAB
CAPILLARY BLOOD COLLECTION: NORMAL
INR BLD: 3 (ref 0.86–1.14)

## 2020-08-19 PROCEDURE — 36416 COLLJ CAPILLARY BLOOD SPEC: CPT | Performed by: INTERNAL MEDICINE

## 2020-08-19 PROCEDURE — 85610 PROTHROMBIN TIME: CPT | Mod: QW | Performed by: INTERNAL MEDICINE

## 2020-08-19 PROCEDURE — 99207 ZZC NO CHARGE NURSE ONLY: CPT | Performed by: INTERNAL MEDICINE

## 2020-08-19 NOTE — PROGRESS NOTES
ANTICOAGULATION FOLLOW-UP CLINIC VISIT    Patient Name:  Miguel Patten  Date:  8/19/2020  Contact Type:  Telephone    SUBJECTIVE:  Patient Findings     Comments:   The patient was assessed for diet, medication, and activity level changes, missed or extra doses, bruising or bleeding, with no problem findings.  Ashly Jones RN          Clinical Outcomes     Negatives:   Major bleeding event, Thromboembolic event, Anticoagulation-related hospital admission, Anticoagulation-related ED visit, Anticoagulation-related fatality    Comments:   The patient was assessed for diet, medication, and activity level changes, missed or extra doses, bruising or bleeding, with no problem findings.  Ashly Jones RN             OBJECTIVE    Recent labs: (last 7 days)     08/19/20  0849   INR 3.0*       ASSESSMENT / PLAN  INR assessment THER    Recheck INR In: 8 WEEKS    INR Location Outside lab      Anticoagulation Summary  As of 8/19/2020    INR goal:   2.0-3.0   TTR:   64.2 % (1 y)   INR used for dosing:   3.0 (8/19/2020)   Warfarin maintenance plan:   3.75 mg (2.5 mg x 1.5) every Sat; 2.5 mg (2.5 mg x 1) all other days   Full warfarin instructions:   3.75 mg every Sat; 2.5 mg all other days   Weekly warfarin total:   18.75 mg   No change documented:   Ashly Jones RN   Plan last modified:   Ashly Jones RN (7/29/2020)   Next INR check:   10/23/2020   Priority:   Maintenance   Target end date:   Indefinite    Indications    CHF (congestive heart failure) (H) [I50.9]  Long-term (current) use of anticoagulants [Z79.01] [Z79.01]  Permanent atrial fibrillation (H) [I48.21]             Anticoagulation Episode Summary     INR check location:       Preferred lab:       Send INR reminders to:   [facility]    Comments:   2.5 mg tablets, book, takes in AM      Anticoagulation Care Providers     Provider Role Specialty Phone number    Vladimir Gonzales DO Referring Internal Medicine [phone]    Malcom,  Rober Adler MD [facility] [phone]            See the Encounter Report to view Anticoagulation Flowsheet and Dosing Calendar (Go to Encounters tab in chart review, and find the Anticoagulation Therapy Visit)    Dosage adjustment made based on physician directed care plan.      Ashly Jones RN

## 2020-10-20 ENCOUNTER — TELEPHONE (OUTPATIENT)
Dept: FAMILY MEDICINE | Facility: OTHER | Age: 85
End: 2020-10-20

## 2020-10-20 DIAGNOSIS — J45.20 MILD INTERMITTENT ASTHMA, UNCOMPLICATED: Primary | ICD-10-CM

## 2020-10-20 DIAGNOSIS — C61 MALIGNANT NEOPLASM OF PROSTATE (H): ICD-10-CM

## 2020-10-20 NOTE — TELEPHONE ENCOUNTER
Catrina from inpatient pharmacy called and she is looking for approval to change from Lupron to Eligard. She states she sent this earlier through a [facility] care plan and states she needs it by Friday. Call her with any questions.  Gracia Edwards, [facility]

## 2020-10-21 NOTE — TELEPHONE ENCOUNTER
Attempted to reach ian from message below. Inpatient pharmacy said to call [phone] Ian is out of Infusion. Unable to reach her. Please relay message below that orders have been approved.     Bianca English MA

## 2020-10-23 ENCOUNTER — TELEPHONE (OUTPATIENT)
Dept: ANTICOAGULATION | Facility: OTHER | Age: 85
End: 2020-10-23

## 2020-10-23 ENCOUNTER — INFUSION THERAPY VISIT (OUTPATIENT)
Dept: INFUSION THERAPY | Facility: CLINIC | Age: 85
End: 2020-10-23
Attending: INTERNAL MEDICINE
Payer: MEDICARE

## 2020-10-23 ENCOUNTER — ANTICOAGULATION THERAPY VISIT (OUTPATIENT)
Dept: ANTICOAGULATION | Facility: OTHER | Age: 85
End: 2020-10-23

## 2020-10-23 VITALS
OXYGEN SATURATION: 96 % | BODY MASS INDEX: 25.45 KG/M2 | WEIGHT: 192 LBS | HEART RATE: 62 BPM | SYSTOLIC BLOOD PRESSURE: 134 MMHG | DIASTOLIC BLOOD PRESSURE: 65 MMHG | RESPIRATION RATE: 16 BRPM | TEMPERATURE: 98 F | HEIGHT: 73 IN

## 2020-10-23 DIAGNOSIS — Z79.01 LONG TERM CURRENT USE OF ANTICOAGULANT THERAPY: ICD-10-CM

## 2020-10-23 DIAGNOSIS — I50.40 COMBINED SYSTOLIC AND DIASTOLIC CONGESTIVE HEART FAILURE, UNSPECIFIED HF CHRONICITY (H): ICD-10-CM

## 2020-10-23 DIAGNOSIS — C61 MALIGNANT NEOPLASM OF PROSTATE (H): Primary | ICD-10-CM

## 2020-10-23 DIAGNOSIS — I48.91 ATRIAL FIBRILLATION, UNSPECIFIED TYPE (H): ICD-10-CM

## 2020-10-23 DIAGNOSIS — I48.21 PERMANENT ATRIAL FIBRILLATION (H): ICD-10-CM

## 2020-10-23 LAB
CAPILLARY BLOOD COLLECTION: NORMAL
INR BLD: 2.6 (ref 0.86–1.14)

## 2020-10-23 PROCEDURE — 85610 PROTHROMBIN TIME: CPT | Performed by: INTERNAL MEDICINE

## 2020-10-23 PROCEDURE — 99207 PR NO CHARGE NURSE ONLY: CPT | Performed by: INTERNAL MEDICINE

## 2020-10-23 PROCEDURE — 250N000011 HC RX IP 250 OP 636: Performed by: INTERNAL MEDICINE

## 2020-10-23 PROCEDURE — 96402 CHEMO HORMON ANTINEOPL SQ/IM: CPT

## 2020-10-23 PROCEDURE — 36416 COLLJ CAPILLARY BLOOD SPEC: CPT | Performed by: INTERNAL MEDICINE

## 2020-10-23 RX ORDER — HEPARIN SODIUM,PORCINE 10 UNIT/ML
5 VIAL (ML) INTRAVENOUS
Status: CANCELLED | OUTPATIENT
Start: 2020-10-23

## 2020-10-23 RX ORDER — HEPARIN SODIUM (PORCINE) LOCK FLUSH IV SOLN 100 UNIT/ML 100 UNIT/ML
5 SOLUTION INTRAVENOUS
Status: CANCELLED | OUTPATIENT
Start: 2020-10-23

## 2020-10-23 RX ADMIN — LEUPROLIDE ACETATE 22.5 MG: KIT SUBCUTANEOUS at 09:26

## 2020-10-23 ASSESSMENT — PAIN SCALES - GENERAL: PAINLEVEL: NO PAIN (0)

## 2020-10-23 ASSESSMENT — MIFFLIN-ST. JEOR: SCORE: 1599.79

## 2020-10-23 NOTE — TELEPHONE ENCOUNTER
Attempted to contact pt regarding INR of 2.6 from 10/23.  Pt not home at this time - will need to try again later.     Ashly Jones RN

## 2020-10-23 NOTE — PROGRESS NOTES
ANTICOAGULATION FOLLOW-UP CLINIC VISIT    Patient Name:  Miguel Patten  Date:  10/23/2020  Contact Type:  Telephone    SUBJECTIVE:  Patient Findings     Comments:  The patient was assessed for diet, medication, and activity level changes, missed or extra doses, bruising or bleeding, with no problem findings.  Ashly Jones RN          Clinical Outcomes     Negatives:  Major bleeding event, Thromboembolic event, Anticoagulation-related hospital admission, Anticoagulation-related ED visit, Anticoagulation-related fatality    Comments:  The patient was assessed for diet, medication, and activity level changes, missed or extra doses, bruising or bleeding, with no problem findings.  Ashly Jones RN             OBJECTIVE    Recent labs: (last 7 days)     10/23/20  0834   INR 2.6*       ASSESSMENT / PLAN  INR assessment THER    Recheck INR In: 8 WEEKS    INR Location Outside lab      Anticoagulation Summary  As of 10/23/2020    INR goal:  2.0-3.0   TTR:  64.2 % (1 y)   INR used for dosing:     Warfarin maintenance plan:  3.75 mg (2.5 mg x 1.5) every Sat; 2.5 mg (2.5 mg x 1) all other days   Full warfarin instructions:  3.75 mg every Sat; 2.5 mg all other days   Weekly warfarin total:  18.75 mg   No change documented:  Ashly Jones RN   Plan last modified:  Ashly Jones RN (7/29/2020)   Next INR check:  12/18/2020   Priority:  Maintenance   Target end date:  Indefinite    Indications    CHF (congestive heart failure) (H) [I50.9]  Long-term (current) use of anticoagulants [Z79.01] [Z79.01]  Permanent atrial fibrillation (H) [I48.21]             Anticoagulation Episode Summary     INR check location:      Preferred lab:      Send INR reminders to:  [facility] [facility]    Comments:  2.5 mg tablets, book, takes in AM      Anticoagulation Care Providers     Provider Role Specialty Phone number    Vladimir Gonzales DO Referring Internal Medicine [phone]    Rober العراقي MD Responsible  [facility] [phone]            See the Encounter Report to view Anticoagulation Flowsheet and Dosing Calendar (Go to Encounters tab in chart review, and find the Anticoagulation Therapy Visit)    Dosage adjustment made based on physician directed care plan.    Ashly Jones RN

## 2020-10-23 NOTE — PATIENT INSTRUCTIONS
Pt to return on 04/23/20 for Lopez. Copies of medication list and upcoming appointments given prior to discharge.

## 2020-10-23 NOTE — PROGRESS NOTES
Infusion Nursing Note:  Miguel Patten presents today for Eligard.    Patient seen by provider today: No   present during visit today: Not Applicable.    Note: Explained regarding Eligard and Lupron and the subcutaneous injection. Patient verbalized understanding and tolerated well.    Intravenous Access:  No Intravenous access/labs at this visit.    Treatment Conditions:  Not Applicable.      Post Infusion Assessment:  Patient tolerated injection without incident.  Patient observed for 15 minutes post injection per protocol.  Site patent and intact, free from redness, edema or discomfort.       Discharge Plan:   Discharge instructions reviewed with: Patient.  Patient and/or family verbalized understanding of discharge instructions and all questions answered.  Patient discharged in stable condition accompanied by: self.  Departure Mode: Ambulatory.    Maddie Lux RN

## 2020-11-12 ENCOUNTER — APPOINTMENT (OUTPATIENT)
Dept: GENERAL RADIOLOGY | Facility: CLINIC | Age: 85
End: 2020-11-12
Attending: FAMILY MEDICINE
Payer: MEDICARE

## 2020-11-12 ENCOUNTER — HOSPITAL ENCOUNTER (EMERGENCY)
Facility: CLINIC | Age: 85
Discharge: ANOTHER HOSPITAL | End: 2020-11-13
Attending: FAMILY MEDICINE | Admitting: FAMILY MEDICINE
Payer: MEDICARE

## 2020-11-12 ENCOUNTER — APPOINTMENT (OUTPATIENT)
Dept: CT IMAGING | Facility: CLINIC | Age: 85
End: 2020-11-12
Attending: FAMILY MEDICINE
Payer: MEDICARE

## 2020-11-12 DIAGNOSIS — I48.20 CHRONIC A-FIB (H): ICD-10-CM

## 2020-11-12 DIAGNOSIS — R79.1 SUPRATHERAPEUTIC INR: ICD-10-CM

## 2020-11-12 DIAGNOSIS — J12.82 PNEUMONIA DUE TO 2019 NOVEL CORONAVIRUS: ICD-10-CM

## 2020-11-12 DIAGNOSIS — Z20.822 SUSPECTED COVID-19 VIRUS INFECTION: ICD-10-CM

## 2020-11-12 DIAGNOSIS — I42.9 CARDIOMYOPATHY, UNSPECIFIED TYPE (H): ICD-10-CM

## 2020-11-12 DIAGNOSIS — M62.82 NON-TRAUMATIC RHABDOMYOLYSIS: ICD-10-CM

## 2020-11-12 DIAGNOSIS — R09.02 HYPOXIA: ICD-10-CM

## 2020-11-12 DIAGNOSIS — U07.1 PNEUMONIA DUE TO 2019 NOVEL CORONAVIRUS: ICD-10-CM

## 2020-11-12 LAB
ALBUMIN SERPL-MCNC: 3.3 G/DL (ref 3.4–5)
ALBUMIN UR-MCNC: >499 MG/DL
ALP SERPL-CCNC: 39 U/L (ref 40–150)
ALT SERPL W P-5'-P-CCNC: 38 U/L (ref 0–70)
ANION GAP SERPL CALCULATED.3IONS-SCNC: 12 MMOL/L (ref 3–14)
APPEARANCE UR: ABNORMAL
AST SERPL W P-5'-P-CCNC: 68 U/L (ref 0–45)
BACTERIA #/AREA URNS HPF: ABNORMAL /HPF
BASOPHILS # BLD AUTO: 0 10E9/L (ref 0–0.2)
BASOPHILS NFR BLD AUTO: 0.1 %
BILIRUB SERPL-MCNC: 1.3 MG/DL (ref 0.2–1.3)
BILIRUB UR QL STRIP: NEGATIVE
BUN SERPL-MCNC: 31 MG/DL (ref 7–30)
CALCIUM SERPL-MCNC: 8.6 MG/DL (ref 8.5–10.1)
CHLORIDE SERPL-SCNC: 105 MMOL/L (ref 94–109)
CK SERPL-CCNC: 1099 U/L (ref 30–300)
CO2 SERPL-SCNC: 23 MMOL/L (ref 20–32)
COLOR UR AUTO: ABNORMAL
CREAT SERPL-MCNC: 0.96 MG/DL (ref 0.66–1.25)
DIFFERENTIAL METHOD BLD: ABNORMAL
EOSINOPHIL NFR BLD AUTO: 0 %
ERYTHROCYTE [DISTWIDTH] IN BLOOD BY AUTOMATED COUNT: 14.8 % (ref 10–15)
GFR SERPL CREATININE-BSD FRML MDRD: 70 ML/MIN/{1.73_M2}
GLUCOSE SERPL-MCNC: 107 MG/DL (ref 70–99)
GLUCOSE UR STRIP-MCNC: NEGATIVE MG/DL
HCT VFR BLD AUTO: 37.7 % (ref 40–53)
HGB BLD-MCNC: 12.7 G/DL (ref 13.3–17.7)
HGB UR QL STRIP: ABNORMAL
HYALINE CASTS #/AREA URNS LPF: 11 /LPF (ref 0–2)
IMM GRANULOCYTES # BLD: 0.1 10E9/L (ref 0–0.4)
IMM GRANULOCYTES NFR BLD: 1.5 %
INR PPP: >10 (ref 0.86–1.14)
KETONES UR STRIP-MCNC: 20 MG/DL
LACTATE BLD-SCNC: 1.7 MMOL/L (ref 0.7–2)
LEUKOCYTE ESTERASE UR QL STRIP: NEGATIVE
LYMPHOCYTES # BLD AUTO: 0.5 10E9/L (ref 0.8–5.3)
LYMPHOCYTES NFR BLD AUTO: 5.4 %
MCH RBC QN AUTO: 31.8 PG (ref 26.5–33)
MCHC RBC AUTO-ENTMCNC: 33.7 G/DL (ref 31.5–36.5)
MCV RBC AUTO: 94 FL (ref 78–100)
MONOCYTES # BLD AUTO: 0.4 10E9/L (ref 0–1.3)
MONOCYTES NFR BLD AUTO: 4.1 %
MUCOUS THREADS #/AREA URNS LPF: PRESENT /LPF
NEUTROPHILS # BLD AUTO: 8 10E9/L (ref 1.6–8.3)
NEUTROPHILS NFR BLD AUTO: 88.9 %
NITRATE UR QL: NEGATIVE
NRBC # BLD AUTO: 0 10*3/UL
NRBC BLD AUTO-RTO: 0 /100
PH UR STRIP: 6 PH (ref 5–7)
PLATELET # BLD AUTO: 236 10E9/L (ref 150–450)
POTASSIUM SERPL-SCNC: 3.7 MMOL/L (ref 3.4–5.3)
PROT SERPL-MCNC: 6.7 G/DL (ref 6.8–8.8)
RBC # BLD AUTO: 4 10E12/L (ref 4.4–5.9)
RBC #/AREA URNS AUTO: <1 /HPF (ref 0–2)
SODIUM SERPL-SCNC: 140 MMOL/L (ref 133–144)
SOURCE: ABNORMAL
SP GR UR STRIP: 1.02 (ref 1–1.03)
TROPONIN I SERPL-MCNC: 0.17 UG/L (ref 0–0.04)
TROPONIN I SERPL-MCNC: 0.17 UG/L (ref 0–0.04)
UROBILINOGEN UR STRIP-MCNC: 2 MG/DL (ref 0–2)
WBC # BLD AUTO: 8.9 10E9/L (ref 4–11)
WBC #/AREA URNS AUTO: 2 /HPF (ref 0–5)

## 2020-11-12 PROCEDURE — 80053 COMPREHEN METABOLIC PANEL: CPT | Performed by: FAMILY MEDICINE

## 2020-11-12 PROCEDURE — 99285 EMERGENCY DEPT VISIT HI MDM: CPT | Mod: 25 | Performed by: FAMILY MEDICINE

## 2020-11-12 PROCEDURE — 84484 ASSAY OF TROPONIN QUANT: CPT | Performed by: FAMILY MEDICINE

## 2020-11-12 PROCEDURE — 96374 THER/PROPH/DIAG INJ IV PUSH: CPT | Performed by: FAMILY MEDICINE

## 2020-11-12 PROCEDURE — 85610 PROTHROMBIN TIME: CPT | Performed by: FAMILY MEDICINE

## 2020-11-12 PROCEDURE — 82550 ASSAY OF CK (CPK): CPT | Performed by: FAMILY MEDICINE

## 2020-11-12 PROCEDURE — 250N000013 HC RX MED GY IP 250 OP 250 PS 637: Mod: GY | Performed by: FAMILY MEDICINE

## 2020-11-12 PROCEDURE — 85025 COMPLETE CBC W/AUTO DIFF WBC: CPT | Performed by: FAMILY MEDICINE

## 2020-11-12 PROCEDURE — 71045 X-RAY EXAM CHEST 1 VIEW: CPT

## 2020-11-12 PROCEDURE — 83605 ASSAY OF LACTIC ACID: CPT | Performed by: FAMILY MEDICINE

## 2020-11-12 PROCEDURE — 96361 HYDRATE IV INFUSION ADD-ON: CPT | Performed by: FAMILY MEDICINE

## 2020-11-12 PROCEDURE — C9803 HOPD COVID-19 SPEC COLLECT: HCPCS | Performed by: FAMILY MEDICINE

## 2020-11-12 PROCEDURE — 70450 CT HEAD/BRAIN W/O DYE: CPT

## 2020-11-12 PROCEDURE — 81001 URINALYSIS AUTO W/SCOPE: CPT | Performed by: FAMILY MEDICINE

## 2020-11-12 PROCEDURE — U0003 INFECTIOUS AGENT DETECTION BY NUCLEIC ACID (DNA OR RNA); SEVERE ACUTE RESPIRATORY SYNDROME CORONAVIRUS 2 (SARS-COV-2) (CORONAVIRUS DISEASE [COVID-19]), AMPLIFIED PROBE TECHNIQUE, MAKING USE OF HIGH THROUGHPUT TECHNOLOGIES AS DESCRIBED BY CMS-2020-01-R: HCPCS | Performed by: FAMILY MEDICINE

## 2020-11-12 PROCEDURE — 99285 EMERGENCY DEPT VISIT HI MDM: CPT | Performed by: FAMILY MEDICINE

## 2020-11-12 PROCEDURE — 258N000003 HC RX IP 258 OP 636: Performed by: FAMILY MEDICINE

## 2020-11-12 RX ORDER — SODIUM CHLORIDE 9 MG/ML
INJECTION, SOLUTION INTRAVENOUS CONTINUOUS
Status: DISCONTINUED | OUTPATIENT
Start: 2020-11-12 | End: 2020-11-13 | Stop reason: HOSPADM

## 2020-11-12 RX ADMIN — Medication 5 MG: at 21:53

## 2020-11-12 RX ADMIN — SODIUM CHLORIDE 1000 ML: 9 INJECTION, SOLUTION INTRAVENOUS at 20:51

## 2020-11-13 ENCOUNTER — APPOINTMENT (OUTPATIENT)
Dept: CT IMAGING | Facility: CLINIC | Age: 85
End: 2020-11-13
Attending: FAMILY MEDICINE
Payer: MEDICARE

## 2020-11-13 ENCOUNTER — TELEPHONE (OUTPATIENT)
Dept: ANTICOAGULATION | Facility: CLINIC | Age: 85
End: 2020-11-13

## 2020-11-13 ENCOUNTER — TRANSFERRED RECORDS (OUTPATIENT)
Dept: HEALTH INFORMATION MANAGEMENT | Facility: CLINIC | Age: 85
End: 2020-11-13

## 2020-11-13 ENCOUNTER — APPOINTMENT (OUTPATIENT)
Dept: GENERAL RADIOLOGY | Facility: CLINIC | Age: 85
End: 2020-11-13
Attending: FAMILY MEDICINE
Payer: MEDICARE

## 2020-11-13 VITALS
TEMPERATURE: 99.2 F | WEIGHT: 192 LBS | RESPIRATION RATE: 20 BRPM | BODY MASS INDEX: 25.33 KG/M2 | DIASTOLIC BLOOD PRESSURE: 61 MMHG | SYSTOLIC BLOOD PRESSURE: 109 MMHG | OXYGEN SATURATION: 93 % | HEART RATE: 60 BPM

## 2020-11-13 LAB
ALBUMIN UR-MCNC: >499 MG/DL
ANION GAP SERPL CALCULATED.3IONS-SCNC: 12 MMOL/L (ref 3–14)
APPEARANCE UR: ABNORMAL
BILIRUB UR QL STRIP: NEGATIVE
BUN SERPL-MCNC: 26 MG/DL (ref 7–30)
CALCIUM SERPL-MCNC: 8.4 MG/DL (ref 8.5–10.1)
CHLORIDE SERPL-SCNC: 107 MMOL/L (ref 94–109)
CK SERPL-CCNC: 988 U/L (ref 30–300)
CO2 SERPL-SCNC: 22 MMOL/L (ref 20–32)
COLOR UR AUTO: ABNORMAL
CREAT SERPL-MCNC: 0.85 MG/DL (ref 0.66–1.25)
GFR SERPL CREATININE-BSD FRML MDRD: 78 ML/MIN/{1.73_M2}
GLUCOSE SERPL-MCNC: 91 MG/DL (ref 70–99)
GLUCOSE UR STRIP-MCNC: NEGATIVE MG/DL
HGB UR QL STRIP: ABNORMAL
HYALINE CASTS #/AREA URNS LPF: 6 /LPF (ref 0–2)
INR PPP: 4.75 (ref 0.86–1.14)
KETONES UR STRIP-MCNC: 20 MG/DL
LABORATORY COMMENT REPORT: ABNORMAL
LEUKOCYTE ESTERASE UR QL STRIP: NEGATIVE
MUCOUS THREADS #/AREA URNS LPF: PRESENT /LPF
NITRATE UR QL: NEGATIVE
PH UR STRIP: 6 PH (ref 5–7)
POTASSIUM SERPL-SCNC: 3.8 MMOL/L (ref 3.4–5.3)
RBC #/AREA URNS AUTO: <1 /HPF (ref 0–2)
SARS-COV-2 RNA SPEC QL NAA+PROBE: NORMAL
SARS-COV-2 RNA SPEC QL NAA+PROBE: POSITIVE
SODIUM SERPL-SCNC: 141 MMOL/L (ref 133–144)
SOURCE: ABNORMAL
SP GR UR STRIP: 1.02 (ref 1–1.03)
SPECIMEN SOURCE: ABNORMAL
SPECIMEN SOURCE: NORMAL
UROBILINOGEN UR STRIP-MCNC: 0 MG/DL (ref 0–2)
WBC #/AREA URNS AUTO: 3 /HPF (ref 0–5)

## 2020-11-13 PROCEDURE — 80048 BASIC METABOLIC PNL TOTAL CA: CPT | Performed by: FAMILY MEDICINE

## 2020-11-13 PROCEDURE — 250N000011 HC RX IP 250 OP 636: Performed by: EMERGENCY MEDICINE

## 2020-11-13 PROCEDURE — 81001 URINALYSIS AUTO W/SCOPE: CPT | Performed by: FAMILY MEDICINE

## 2020-11-13 PROCEDURE — 85610 PROTHROMBIN TIME: CPT | Performed by: FAMILY MEDICINE

## 2020-11-13 PROCEDURE — 71250 CT THORAX DX C-: CPT

## 2020-11-13 PROCEDURE — 258N000003 HC RX IP 258 OP 636: Performed by: FAMILY MEDICINE

## 2020-11-13 PROCEDURE — 71045 X-RAY EXAM CHEST 1 VIEW: CPT

## 2020-11-13 PROCEDURE — 82550 ASSAY OF CK (CPK): CPT | Performed by: FAMILY MEDICINE

## 2020-11-13 PROCEDURE — 96374 THER/PROPH/DIAG INJ IV PUSH: CPT | Performed by: FAMILY MEDICINE

## 2020-11-13 RX ORDER — DEXAMETHASONE SODIUM PHOSPHATE 10 MG/ML
6 INJECTION, SOLUTION INTRAMUSCULAR; INTRAVENOUS ONCE
Status: COMPLETED | OUTPATIENT
Start: 2020-11-13 | End: 2020-11-13

## 2020-11-13 RX ORDER — SODIUM CHLORIDE, SODIUM LACTATE, POTASSIUM CHLORIDE, CALCIUM CHLORIDE 600; 310; 30; 20 MG/100ML; MG/100ML; MG/100ML; MG/100ML
INJECTION, SOLUTION INTRAVENOUS CONTINUOUS
Status: DISCONTINUED | OUTPATIENT
Start: 2020-11-13 | End: 2020-11-13 | Stop reason: HOSPADM

## 2020-11-13 RX ADMIN — DEXAMETHASONE SODIUM PHOSPHATE 6 MG: 10 INJECTION, SOLUTION INTRAMUSCULAR; INTRAVENOUS at 10:36

## 2020-11-13 RX ADMIN — SODIUM CHLORIDE, POTASSIUM CHLORIDE, SODIUM LACTATE AND CALCIUM CHLORIDE: 600; 310; 30; 20 INJECTION, SOLUTION INTRAVENOUS at 01:22

## 2020-11-13 NOTE — ED TRIAGE NOTES
He was found on the floor by his girlfriends son who had brought him some soup.  He is confused and the son says he has been sick for 3 days.

## 2020-11-13 NOTE — ED NOTES
Brought pt up to standing at bedside with A1 and walker, pt was provider  socks. Pt felt well standing at bedside, denies pain, dizziness, lightheadedness. Stood at pt side to ambulate with walking in room and pt tolerated well. Denies any dizzy/lightheaded. Denies pain. Pt states he is typically independent with a cane or walker at home. Pt ambulated well. Repositioned in bed and provided warm blanket. Brief remains dry. VS monitoring.

## 2020-11-13 NOTE — ED PROVIDER NOTES
SIGN OUT NOTE    Patient signed out to me by Dr. Lilly at change of shift.  This is an 87-year-old male who presented after a fall with weakness.  He had a supra therapeutic INR and was given vitamin K orally.  His head CT did not show any acute abnormalities.  He had some elevation in CK and was given fluids for concern of rhabdomyolysis.  He also had intermittent hypoxia.  Concern for Covid.  Swab was sent.    When I arrived, patient was noted to continue to have the intermittent hypoxia.  We elected to do a CT scan of the chest to evaluate for possible pneumonia, specifically COVID-19.    CT scan showed patchy infiltrates in the right mid and lower lung zone concerning for pneumonia.    Unable to find a bed availability in this facility, so he was sent to [facility] in [place], [place] as this was the closest open facility.  I did speak at length with the patient regarding this.  He has remained stable in the ED.  He received Decadron.    His Covid swab returned positive just prior to his transfer so he was not started on remdesivir.  He was transferred via EMS.    Final diagnosis:  (Z20.828) Suspected COVID-19 virus infection  (R09.02) Hypoxia  R79.1) Supratherapeutic INR  (I48.20) Chronic a-fib (H)  (I42.9) Cardiomyopathy, unspecified type (H)  (M62.82) Non-traumatic rhabdomyolysis  (U07.1,  J12.89) Pneumonia due to 2019 novel coronavirus           Marleni Grullon MD  11/13/20 1914

## 2020-11-13 NOTE — TELEPHONE ENCOUNTER
ANTICOAGULATION  MANAGEMENT: Discharge Review    Miguel Patten chart reviewed for anticoagulation continuity of care    Emergency room visit on 11/11 for COVID .    Discharge disposition: [facility] - [facility] per ER staff   Results:    Recent labs: (last 7 days)     11/12/20 2051 11/13/20  0545   INR >10.00* 4.75*     Anticoagulation inpatient management:     Radha K 5 mg given on 11/11 due to INR of >10    Anticoagulation discharge instructions:     Warfarin dosing: pt was discharged to [facility]   Bridging: No   INR goal change: No      Medication changes affecting anticoagulation: Yes: remdesivir and decadron per discharge instructions     Additional factors affecting anticoagulation: Yes: COVID    Plan     Agree with dosing adjustment on discharge    Patient not contacted    No adjustment to Anticoagulation Calendar was required    Ashly Jones RN

## 2020-11-13 NOTE — ED NOTES
Pt on room air.  O2 sats 90-94%, occasional desat to 86%.  Pt denies shortness of breath, states that he feels good.  Pt's lungs sound coarse.

## 2020-11-13 NOTE — CONSULTS
Inpatient pharmacy was consulted to dose phytonadione due to elevated INR without bleeding. Patient is chronically on warfarin for atrial fibrillation.     INR   Date Value Ref Range Status   11/12/2020 >10.00 (HH) 0.86 - 1.14 Final     Comment:     Critical Value called to and read back by  [facility]OLAND IN ER,[id number]        Based on [facility] Warfarin Reversal Guidelines, ordered phytonadione 5 mg oral once.     Ria Saldivar RPH on 11/12/2020 at 9:50 PM

## 2020-11-13 NOTE — ED NOTES
Pt noted that he has not been taking his meds for the last couple days, 'probably about like 3 days or so'. Attempted to complete med rec with pt and states did not take any today or yesterday. Provider informed.

## 2020-11-13 NOTE — ED PROVIDER NOTES
History     Chief Complaint   Patient presents with     Generalized Weakness     HPI  Miguel Patten is a 87 year old male who presents to the ED tonight after falling at home the night before last.  He was not able to get back up and was on the floor all day today. States he was on the floor for a day and half. He did manage to crawl to the refrigerator and get some Pepsi so he could take his pills.  Otherwise he has not had anything to eat or drink.      States that he lives with an older lady, Ciera, who is in her 90s.  They have had this living arrangement for last 10 years after both of their loved ones passed away.  They can help keep an eye on each other.  She sleeps a lot and is in bed basically all day.  She did get up for a bit but they were not sure who to call for help.    He does have a cell phone but did not think to call 911.  Was finally found by Letty's son, Gloria who was bringing them some soup.  Gloria brought him to the ED.    He tells me that he was doing okay felt a little bit ill a few days ago.  Has a slight cough but he does not think it is anything unusual.  Denies any fevers chills or sweats.  No nausea or vomiting.  No chest pain or abdominal pain.    He knows that it is November 2020.  Thinks it is around the 10th because his credit card bill is due on the 10th.  He was not sure of the day of the week.  Is hard of hearing and left his hearing aids at home but is able to hear adequately for the exam and interview.    He was previously walking about a half a mile a day but after the snow came he decided would be safer not to be outside walking.  Typically gets around the house by himself.    Allergies:  Allergies   Allergen Reactions     No Known Drug Allergies        Problem List:    Patient Active Problem List    Diagnosis Date Noted     Health Care Home 06/10/2011     Priority: High     DX V65.8 REPLACED WITH 84614 HEALTH CARE HOME (04/08/2013)       Permanent atrial fibrillation (H)  06/09/2020     Priority: Medium     Mixed hyperlipidemia 04/25/2017     Priority: Medium     Chronic systolic congestive heart failure (H) 04/21/2017     Priority: Medium     Appendicitis with perforation 04/19/2017     Priority: Medium     Mild intermittent asthma without complication 09/27/2016     Priority: Medium     Long-term (current) use of anticoagulants [Z79.01] 04/05/2016     Priority: Medium     S/P AVR (aortic valve replacement) 07/25/2014     Priority: Medium     S/P ascending aortic replacement 07/25/2014     Priority: Medium     Atrial fibrillation (H) 07/25/2014     Priority: Medium     Aortic regurgitation 07/17/2014     Priority: Medium     Hypertension goal BP (blood pressure) < 140/90 04/16/2014     Priority: Medium     CHF (congestive heart failure) (H) 03/24/2014     Priority: Medium     Atrial flutter (H) 01/14/2013     Priority: Medium     History of total hip arthroplasty - bilateral 11/19/2012     Priority: Medium     Osteoarthritis of hip - right 11/19/2012     Priority: Medium     Gout 04/30/2012     Priority: Medium     Advanced directives, counseling/discussion 04/27/2012     Priority: Medium     Discussed advance care planning with patient; information given to patient to review. 4/27/2012 Nicole Cuevas LPN           Malignant neoplasm of prostate (H) 03/05/2004     Priority: Medium        Past Medical History:    Past Medical History:   Diagnosis Date     Arthritis      Ascending aortic aneurysm (H)      Asthma      Complete AV block (H)      Congestive heart failure (H)      Coronary artery disease      H/O aortic valve replacement      Hypertension      Malignant neoplasm of prostate (H)      Permanent atrial fibrillation (H)        Past Surgical History:    Past Surgical History:   Procedure Laterality Date     ARTHROPLASTY HIP  1/21/2013    Procedure: ARTHROPLASTY HIP;  right total hip arthroplasty;  Surgeon: Rober Alves MD;  Location: [facility] OR     C TOTAL HIP ARTHROPLASTY   1/21/13    Right     GENITOURINARY SURGERY  2001    Prostatectomy       ORTHOPEDIC SURGERY      Left SONALI     PHACOEMULSIFICATION WITH STANDARD INTRAOCULAR LENS IMPLANT Right 10/6/2016    Procedure: PHACOEMULSIFICATION WITH STANDARD INTRAOCULAR LENS IMPLANT;  Surgeon: Elder Rueda MD;  Location: [facility] OR     PHACOEMULSIFICATION WITH STANDARD INTRAOCULAR LENS IMPLANT Left 10/20/2016    Procedure: PHACOEMULSIFICATION WITH STANDARD INTRAOCULAR LENS IMPLANT;  Surgeon: Elder Rueda MD;  Location: [facility] OR     REPAIR ANEURYSM ASCENDING AORTA  7/17/2014    2. Aortic aneurysm repair with 32 mm Hemashield graft.      REPLACE VALVE AORTIC  7/17/2014    1. Aortic valve replacement with 23 mm [employer] Trifecta tissue valve.      s/p aneurysm repair by Dr. Friedman       s/p avr       [facility] NONSPECIFIC PROCEDURE      Hernia repair     [facility] NONSPECIFIC PROCEDURE      Prostatectomy/cancer       Family History:    Family History   Problem Relation Age of Onset     Asthma Father      Asthma Paternal Grandmother      Asthma Daughter        Social History:  Marital Status:   [5]  Social History     Tobacco Use     Smoking status: Never Smoker     Smokeless tobacco: Never Used   Substance Use Topics     Alcohol use: Yes     Alcohol/week: 0.0 standard drinks     Comment: rarely, once a week or less     Drug use: No        Medications:         acetaminophen (TYLENOL) 325 MG tablet       albuterol (PROAIR HFA/PROVENTIL HFA/VENTOLIN HFA) 108 (90 Base) MCG/ACT inhaler       bicalutamide (CASODEX) 50 MG tablet       FLOVENT HFA 44 MCG/ACT inhaler       losartan (COZAAR) 50 MG tablet       simvastatin (ZOCOR) 40 MG tablet       warfarin ANTICOAGULANT (JANTOVEN ANTICOAGULANT) 2.5 MG tablet       BETA BLOCKER NOT PRESCRIBED, INTENTIONAL,       ipratropium - albuterol 0.5 mg/2.5 mg/3 mL (DUONEB) 0.5-2.5 (3) MG/3ML nebulization       leuprolide (LUPRON DEPOT, 3-MONTH,) 22.5 MG kit          Review of Systems   All other systems  reviewed and are negative.      Physical Exam   BP: (!) 143/72  Pulse: 69  Temp: 98.3  F (36.8  C)  Resp: 20  Weight: 87.1 kg (192 lb)  SpO2: 91 %      Physical Exam  Constitutional:       General: He is not in acute distress.     Appearance: Normal appearance.   HENT:      Head: Normocephalic and atraumatic.      Mouth/Throat:      Pharynx: Oropharynx is clear.      Comments: Slightly tacky    Eyes:      Extraocular Movements: Extraocular movements intact.      Pupils: Pupils are equal, round, and reactive to light.   Cardiovascular:      Rate and Rhythm: Normal rate. Rhythm irregular.   Pulmonary:      Breath sounds: Rales (both bases) present.   Abdominal:      Palpations: Abdomen is soft.      Tenderness: There is no abdominal tenderness.   Musculoskeletal:         General: No swelling or tenderness.   Skin:     General: Skin is warm and dry.   Neurological:      General: No focal deficit present.      Mental Status: He is alert.      GCS: GCS eye subscore is 4. GCS verbal subscore is 4. GCS motor subscore is 6.      Cranial Nerves: Cranial nerves are intact.      Sensory: Sensation is intact.      Motor: Motor function is intact. No weakness (nothing focal, just generalized).   Psychiatric:         Mood and Affect: Mood normal.         ED Course        Procedures               Critical Care time:  none               Results for orders placed or performed during the hospital encounter of 11/12/20 (from the past 24 hour(s))   XR Chest Port 1 View    Narrative    CHEST ONE VIEW  11/12/2020 8:34 PM     HISTORY: Cough, crackles in both bases.    COMPARISON: March 19, 2015      Impression    IMPRESSION: Mixed interstitial and airspace infiltrates in the right  mid and lower lung. Question similar infiltrates on the left.    CARMEN VARGAS MD   CBC with platelets differential   Result Value Ref Range    WBC 8.9 4.0 - 11.0 10e9/L    RBC Count 4.00 (L) 4.4 - 5.9 10e12/L    Hemoglobin 12.7 (L) 13.3 - 17.7 g/dL     Hematocrit 37.7 (L) 40.0 - 53.0 %    MCV 94 78 - 100 fl    MCH 31.8 26.5 - 33.0 pg    MCHC 33.7 31.5 - 36.5 g/dL    RDW 14.8 10.0 - 15.0 %    Platelet Count 236 150 - 450 10e9/L    Diff Method Automated Method     % Neutrophils 88.9 %    % Lymphocytes 5.4 %    % Monocytes 4.1 %    % Eosinophils 0.0 %    % Basophils 0.1 %    % Immature Granulocytes 1.5 %    Nucleated RBCs 0 0 /100    Absolute Neutrophil 8.0 1.6 - 8.3 10e9/L    Absolute Lymphocytes 0.5 (L) 0.8 - 5.3 10e9/L    Absolute Monocytes 0.4 0.0 - 1.3 10e9/L    Absolute Basophils 0.0 0.0 - 0.2 10e9/L    Abs Immature Granulocytes 0.1 0 - 0.4 10e9/L    Absolute Nucleated RBC 0.0    INR   Result Value Ref Range    INR >10.00 () 0.86 - 1.14   Comprehensive metabolic panel   Result Value Ref Range    Sodium 140 133 - 144 mmol/L    Potassium 3.7 3.4 - 5.3 mmol/L    Chloride 105 94 - 109 mmol/L    Carbon Dioxide 23 20 - 32 mmol/L    Anion Gap 12 3 - 14 mmol/L    Glucose 107 (H) 70 - 99 mg/dL    Urea Nitrogen 31 (H) 7 - 30 mg/dL    Creatinine 0.96 0.66 - 1.25 mg/dL    GFR Estimate 70 >60 mL/min/[1.73_m2]    GFR Estimate If Black 81 >60 mL/min/[1.73_m2]    Calcium 8.6 8.5 - 10.1 mg/dL    Bilirubin Total 1.3 0.2 - 1.3 mg/dL    Albumin 3.3 (L) 3.4 - 5.0 g/dL    Protein Total 6.7 (L) 6.8 - 8.8 g/dL    Alkaline Phosphatase 39 (L) 40 - 150 U/L    ALT 38 0 - 70 U/L    AST 68 (H) 0 - 45 U/L   CK total   Result Value Ref Range    CK Total 1,099 (HH) 30 - 300 U/L   Troponin I   Result Value Ref Range    Troponin I ES 0.174 (HH) 0.000 - 0.045 ug/L   Lactic acid whole blood   Result Value Ref Range    Lactic Acid 1.7 0.7 - 2.0 mmol/L   UA with Microscopic   Result Value Ref Range    Color Urine Sana     Appearance Urine Slightly Cloudy     Glucose Urine Negative NEG^Negative mg/dL    Bilirubin Urine Negative NEG^Negative    Ketones Urine 20 (A) NEG^Negative mg/dL    Specific Gravity Urine 1.024 1.003 - 1.035    Blood Urine Moderate (A) NEG^Negative    pH Urine 6.0 5.0 - 7.0 pH     Protein Albumin Urine >499 (A) NEG^Negative mg/dL    Urobilinogen mg/dL 2.0 0.0 - 2.0 mg/dL    Nitrite Urine Negative NEG^Negative    Leukocyte Esterase Urine Negative NEG^Negative    Source Midstream Urine     WBC Urine 2 0 - 5 /HPF    RBC Urine <1 0 - 2 /HPF    Bacteria Urine Few (A) NEG^Negative /HPF    Mucous Urine Present (A) NEG^Negative /LPF    Hyaline Casts 11 (H) 0 - 2 /LPF   Pharmacy to dose phytonadione     Narrative    Ria Saldivar RPH     11/12/2020  9:50 PM  Inpatient pharmacy was consulted to dose phytonadione due to   elevated INR without bleeding. Patient is chronically on warfarin   for atrial fibrillation.     INR   Date Value Ref Range Status   11/12/2020 >10.00 (HH) 0.86 - 1.14 Final     Comment:     Critical Value called to and read back by  CHINA BOLAND IN ER,MP,[phone]        Based on [facility] Warfarin Reversal Guidelines,   ordered phytonadione 5 mg oral once.     Ria Saldivar RPH on 11/12/2020 at 9:50 PM     Head CT w/o contrast    Narrative    CT OF THE HEAD WITHOUT CONTRAST 11/12/2020 9:45 PM     COMPARISON: None.    HISTORY: Fall, unable to get up, INR>10, rule out intracranial bleed.    TECHNIQUE: 5 mm thick axial CT images of the head were acquired  without IV contrast material.    FINDINGS: There is moderate diffuse cerebral volume loss. There are  subtle patchy areas of decreased density in the cerebral white matter  bilaterally that are consistent with sequela of chronic small vessel  ischemic disease.    The ventricles and basal cisterns are within normal limits in  configuration given the degree of cerebral volume loss.  There is no  midline shift. There are no extra-axial fluid collections.    No intracranial hemorrhage, mass or recent infarct.    The visualized paranasal sinuses are well-aerated. There is no  mastoiditis. There are no fractures of the visualized bones.      Impression    IMPRESSION: Diffuse cerebral volume loss and cerebral  white matter  changes consistent with chronic small vessel ischemic disease. No  evidence for acute intracranial pathology.      Radiation dose for this scan was reduced using automated exposure  control, adjustment of the mA and/or kV according to patient size, or  iterative reconstruction technique    MARQUEZ PETERSON MD   Troponin I   Result Value Ref Range    Troponin I ES 0.172 (HH) 0.000 - 0.045 ug/L   Symptomatic COVID-19 Virus (Coronavirus) by PCR    Specimen: Nasopharyngeal   Result Value Ref Range    COVID-19 Virus PCR to [facility] - Source Nasopharyngeal     COVID-19 Virus PCR to [facility] - Result       Test received-See reflex to IDDL test SARS CoV2 (COVID-19) Virus RT-PCR   UA with Microscopic   Result Value Ref Range    Color Urine Saan     Appearance Urine Slightly Cloudy     Glucose Urine Negative NEG^Negative mg/dL    Bilirubin Urine Negative NEG^Negative    Ketones Urine 20 (A) NEG^Negative mg/dL    Specific Gravity Urine 1.024 1.003 - 1.035    Blood Urine Moderate (A) NEG^Negative    pH Urine 6.0 5.0 - 7.0 pH    Protein Albumin Urine >499 (A) NEG^Negative mg/dL    Urobilinogen mg/dL 0.0 0.0 - 2.0 mg/dL    Nitrite Urine Negative NEG^Negative    Leukocyte Esterase Urine Negative NEG^Negative    Source Midstream Urine     WBC Urine 3 0 - 5 /HPF    RBC Urine <1 0 - 2 /HPF    Mucous Urine Present (A) NEG^Negative /LPF    Hyaline Casts 6 (H) 0 - 2 /LPF   CK total   Result Value Ref Range    CK Total 988 (H) 30 - 300 U/L   Basic metabolic panel   Result Value Ref Range    Sodium 141 133 - 144 mmol/L    Potassium 3.8 3.4 - 5.3 mmol/L    Chloride 107 94 - 109 mmol/L    Carbon Dioxide 22 20 - 32 mmol/L    Anion Gap 12 3 - 14 mmol/L    Glucose 91 70 - 99 mg/dL    Urea Nitrogen 26 7 - 30 mg/dL    Creatinine 0.85 0.66 - 1.25 mg/dL    GFR Estimate 78 >60 mL/min/[1.73_m2]    GFR Estimate If Black >90 >60 mL/min/[1.73_m2]    Calcium 8.4 (L) 8.5 - 10.1 mg/dL   INR   Result Value Ref Range    INR 4.75 (H) 0.86 - 1.14    XR Chest Port 1 View    Narrative    EXAM: XR CHEST PORT 1 VW  LOCATION: [facility]  DATE/TIME: 11/13/2020 6:28 AM    INDICATION: Increasing crackles, intermittent hypoxia.  COMPARISON: None.      Impression    IMPRESSION: Cardiac silhouette is enlarged. Left-sided cardiac pacer. Prior sternotomy. Mild prominence of the central interstitium with patchy infiltrates in the right mid and lower lung zone concerning for superimposed pneumonia. No visible   pneumothorax or pleural effusion.       Medications   0.9% sodium chloride BOLUS (0 mLs Intravenous Stopped 11/12/20 7882)     Followed by   sodium chloride 0.9% infusion (has no administration in time range)   lactated ringers infusion ( Intravenous Restarted 11/13/20 2749)   phytonadione (K1-1000) CAPS 5 mg (5 mg Oral Given 11/12/20 2153)       Assessments & Plan (with Medical Decision Making)  87-year-old found on the floor by his roommate's son.  He fell last night or the night before and has been on the floor ever since.  He was able to crawl around and get to refrigerator get some Pepsi to take his medications but otherwise has not anything to eat or drink.  Has a cell phone but did not think to call 911.  His 2 elderly roommates apparently were present but did not think to call for help either.  Suspect he is a bit more confused than he comes across initially.  The roommate son says he has been sick for 3 days and he does say he did not feel real well a few days ago leading up to his fall.  Denies any pain or injury from the fall.  Does have an occasional wet sounding cough and has crackles in both bases.  Chronic A. fib on Coumadin.  No obvious skin breakdown.  White count and lactic acid are normal.  His INR is supratherapeutic at 10.0.  I asked pharmacy to dose his vitamin K to help bring that down.  Head CT was done to make sure he does not have any intracranial bleed and that was negative.  His renal function is normal.  His troponin is up  slightly at 0.174.  He denies any chest pain or pressure during this entire time.  He is EKG shows a paced rhythm.   CK is up a bit at 1099.  He was given a liter normal saline here in the ED.  Chest x-ray shows mixed interstitial and airspace infiltrates in the right mid and lower lung question similar infiltrates on the left.  His white count is normal he has had no fevers and has absolutely no complaints of shortness of breath.  This certainly may be viral.  Covid-19 was sent.  Second Troponin is stable at 0.172.  He actually was able to get up and ambulate in the ED without difficulty.  We will start a second liter of LR and plan on rechecking his labs later this morning.  There are no available beds in the hospital.    I spoke with his daughter, Paulina at [phone].  States he is the caregiver in the relationship with Ciera.  She has been very fatigued and does sleep a lot so his story is consistent.  She is certain that he would want to go back to his own home once he is able to be discharged.  Repeat labs are actually trending in the right direction.  His INR is now measurable at 4.75.  CK has drifted down to 988.  Renal function remains stable and normal.  He intermittently will drop his sats into the mid 80s but he talks throughout and denies any shortness of breath.  He sounds a little more junky in both bases so will repeat his chest x-ray.  Had ordered a second liter of IV fluids earlier this morning to help flush out the CK but nursing did not have him on a pump, it was just running to gravity and he had his arm kinked so he really did not get any of the second liter until that was discovered by charge nurse a short while ago.  She placed him on a pump.  0631: I spoke with his daughter Paulina once again.  I updated her on his condition and his lab results.  I am a bit hesitant to send him home.  He has been coughing a little bit more he sounds more wet on exam and he drops his sats intermittently  even though he complains of no shortness of breath.  I would like to keep him on some more IV fluids but need to be very careful about not fluid overloading him and I think that is best accomplished by keeping him in the hospital.  He does have underlying CHF and cardiomyopathy so we were going to be walking a fine line.  We will repeat his chest x-ray also to see if he is worsening in that regard.  CXR appears similar with mild prominent central interstitial and patchy infiltrates in the right mid and lower lung zone concerning for superimposed pneumonia.  Again his white count is normal and has had no fevers.  Suspicious for Covid.  Swab is still pending.  Dr. Grullon assumed his care at change of shift and suggested chest CT to further define.  If he does have Covid, we can probably hold off on antibiotics at this time.  Currently maintaining his sats at 94% on room air.  At times he does briefly drop down into the mid to upper 80s.  Now that the dayshift has arrived, it has been determined that we will not be able to keep him here due to staffing shortages.  Family would like to try at [facility] or [facility] first.    If his CT is consistent with COVID, will start Remdesivir and Decadron as he has infilatrates on his CXR and sat </= 94%.    Dx:    Pneumonia, possible COVID, CT pending  Intermittent hypoxia  Rhabdomyolysis  supratherapeutic INR  Chronic Afib  Cardiomyopathy    See Dr Grullon's note for final diagnosis and disposition       I have reviewed the nursing notes.    I have reviewed the findings, diagnosis, plan and need for follow up with the patient.       New Prescriptions    No medications on file           11/12/2020   [facility] EMERGENCY DEPT     Harley Jonas MD  11/13/20 0804

## 2020-11-13 NOTE — ED NOTES
Brought pt up to restroom with neal spivey, tolerated well. Pt was brought into room with appropriate call system, call light placed within reach. Side rails x2. VS monitoring. Pt is feeling a lot better. Has been responding appropriately to questions, holding conversation and has been alert and oriented. Pt urinated while in restroom. Provided warm blankets. Dimmed lights to rest.

## 2020-11-14 ENCOUNTER — COMMUNICATION - HEALTHEAST (OUTPATIENT)
Dept: SCHEDULING | Facility: CLINIC | Age: 85
End: 2020-11-14

## 2020-11-17 ENCOUNTER — TELEPHONE (OUTPATIENT)
Dept: CARDIOLOGY | Facility: CLINIC | Age: 85
End: 2020-11-17

## 2020-11-17 DIAGNOSIS — Z95.0 CARDIAC PACEMAKER IN SITU: Primary | ICD-10-CM

## 2020-11-17 DIAGNOSIS — I42.9 CARDIOMYOPATHY (H): ICD-10-CM

## 2020-11-17 NOTE — TELEPHONE ENCOUNTER
Attempted to call to do wellness screen for PPM check on 11/19 in [place], no answer on home or cell, and both mail boxes were full.     Last remote was 7/9/2020, can offer to switch to remote check if pt prefers.     ADDENDUM 11/18: 2nd attempt to call for wellness screen, and to offer to switch to remote PPM check if pt prefers, no answer on home or cell and both mailboxes were still full.     ADDENDUM 11/19: pt did not show for device check today. Chart review shows he had a positive covid19 test on 11/12. Will schedule pt for automatic remote in 2 weeks. Will send a letter with the plan since unable to get a hold of pt by phone.

## 2020-11-20 ENCOUNTER — PATIENT OUTREACH (OUTPATIENT)
Dept: CARE COORDINATION | Facility: CLINIC | Age: 85
End: 2020-11-20

## 2020-11-20 DIAGNOSIS — U07.1 2019 NOVEL CORONAVIRUS DISEASE (COVID-19): Primary | ICD-10-CM

## 2020-11-20 NOTE — PROGRESS NOTES
Clinic Care Coordination Contact    Patient in TCU ([facility] )- [facility] COLETTE to call TCU. CHW scheduled TCU follow up with NAVEEN ALVARENGA on 11/23/20 @ 12:30.

## 2020-11-23 ENCOUNTER — PATIENT OUTREACH (OUTPATIENT)
Dept: FAMILY MEDICINE | Facility: CLINIC | Age: 85
End: 2020-11-23
Payer: MEDICARE

## 2020-11-23 NOTE — PROGRESS NOTES
Clinic Care Coordination Contact  Care Coordination Transition Communication         Clinical Data: Patient was hospitalized at [facility] from 11/13 to 11/19/20 with diagnosis of  Fall (on) (from) other stairs and steps, initial encounter  Active Problems:  2019 novel coronavirus disease (COVID-19)  Traumatic rhabdomyolysis, initial encounter (H)    Transition to Facility:              Facility Name: [facility]              Contact name and phone number/fax: Carmen  [phone]    Left detailed vm for Carmen requesting return call to confirm pt admission there and request notification of discharge.     Plan: RN/SW Care Coordinator will await notification from facility staff informing RN/SW Care Coordinator of patient's discharge plans/needs. RN/SW Care Coordinator will review chart and outreach to facility staff every 4 weeks and as needed.     ALICIA Lin   Primary Care Clinic- Social Work Care Coordinator  [facility]  11/23/2020 1:02 PM  [phone]

## 2020-12-15 ENCOUNTER — PATIENT OUTREACH (OUTPATIENT)
Dept: CARE COORDINATION | Facility: CLINIC | Age: 85
End: 2020-12-15

## 2020-12-15 NOTE — PROGRESS NOTES
Clinic Care Coordination Contact  [facility]/Voicemail       Clinical Data: Care Coordinator Outreach  Outreach attempted x 2.  Left message on Carmen Social workers  voicemail with call back information and requested return call.  Plan: Care Coordinator will await return call from Carmen and will follow up with patient upon TCU discharge.    ALICIA Lin   Primary Care Clinic- Social Work Care Coordinator  [facility]  12/15/2020 1:45 PM  [phone]

## 2020-12-15 NOTE — PROGRESS NOTES
Clinic Care Coordination Contact  Care Team Conversations    COLETTE SMITH spoke with Briseida from [facility]. Patient is there and they are working on discharge plan of him going to [facility]. Patient dgtr had found him an apartment there it sounds like and Briseida said they're just waiting to hear on acceptance but he may be going there on 12/17/20.     Plan: Briseida will let COLETTE SMITH know if patient does not discharge Thursday as planned. COLETTE SMITH will follow up with pt after discharge.     ALICIA Lin   Primary Care Clinic- Social Work Care Coordinator  [facility]  12/15/2020 4:17 PM  [phone]

## 2020-12-18 ENCOUNTER — MEDICAL CORRESPONDENCE (OUTPATIENT)
Dept: HEALTH INFORMATION MANAGEMENT | Facility: CLINIC | Age: 85
End: 2020-12-18

## 2020-12-18 ENCOUNTER — TELEPHONE (OUTPATIENT)
Dept: INTERNAL MEDICINE | Facility: CLINIC | Age: 85
End: 2020-12-18

## 2020-12-18 ENCOUNTER — PATIENT OUTREACH (OUTPATIENT)
Dept: CARE COORDINATION | Facility: CLINIC | Age: 85
End: 2020-12-18

## 2020-12-18 NOTE — TELEPHONE ENCOUNTER
Michael from assisted living is calling to get what the rate on the patients pace maker is at?  Please call [phone] ok to leave message if need be.

## 2020-12-18 NOTE — PROGRESS NOTES
Clinic Care Coordination Contact  [facility]/Arsenio SMITH was told earlier this week plan was for patient to discharge [facility] [place] [facility]. Appears from doing chart review that patient did discharge from TCU. Attempting to reach pt.      Clinical Data: Care Coordinator Outreach  Outreach attempted x 1.  Mailbox is full unable to leave message.   Plan:  Care Coordinator will try to reach patient again in 1-2 business days.      ALICIA Lin   Primary Care Clinic- Social Work Care Coordinator  [facility]  12/18/2020 2:20 PM  [phone]

## 2020-12-18 NOTE — TELEPHONE ENCOUNTER
Attempted to return call, left voicemail for call back. Please inform Michael of provider's note below if they call back. Edyta Wolf MA     12/18/2020

## 2020-12-18 NOTE — LETTER
[place] CARE COORDINATION  [facility]  [address] [place] [address]    December 21, 2020    Miguel Patten  [address] [facility] [address]      Dear Miguel,    I am a clinic care coordinator who works with Vladimir Gonzales DO at [facility]. I recently tried to call and was unable to reach you. Below is a description of clinic care coordination and how I can further assist you.      The clinic care coordination team is made up of a registered nurse,  and community health worker who understand the health care system. The goal of clinic care coordination is to help you manage your health and improve access to the health care system in the most efficient manner. The team can assist you in meeting your health care goals by providing education, coordinating services, strengthening the communication among your providers and supporting you with any resource needs.    Please feel free to contact the Community Health Worker Alexia Lee, at [phone] with any questions or concerns. We are focused on providing you with the highest-quality healthcare experience possible and that all starts with you.     Sincerely,     ALICIA Lin   Primary Care Clinic- Social Work Care Coordinator  [facility]   [phone]

## 2020-12-21 ENCOUNTER — HOSPITAL LABORATORY (OUTPATIENT)
Facility: OTHER | Age: 85
End: 2020-12-21

## 2020-12-21 ENCOUNTER — TELEPHONE (OUTPATIENT)
Dept: ANTICOAGULATION | Facility: CLINIC | Age: 85
End: 2020-12-21

## 2020-12-21 ENCOUNTER — ANTICOAGULATION THERAPY VISIT (OUTPATIENT)
Dept: ANTICOAGULATION | Facility: CLINIC | Age: 85
End: 2020-12-21

## 2020-12-21 DIAGNOSIS — Z79.01 LONG TERM CURRENT USE OF ANTICOAGULANT THERAPY: ICD-10-CM

## 2020-12-21 DIAGNOSIS — I50.40 COMBINED SYSTOLIC AND DIASTOLIC CONGESTIVE HEART FAILURE, UNSPECIFIED HF CHRONICITY (H): ICD-10-CM

## 2020-12-21 DIAGNOSIS — I48.21 PERMANENT ATRIAL FIBRILLATION (H): ICD-10-CM

## 2020-12-21 LAB — INR PPP: 2.85 (ref 0.86–1.14)

## 2020-12-21 NOTE — TELEPHONE ENCOUNTER
I have attempted to contact Michael by phone with the following results: LM- ok to leave detailed.  Relayed message and will close.  Gracia Edwards, [employer]

## 2020-12-21 NOTE — TELEPHONE ENCOUNTER
I have attempted to contact this patient by phone, unable to leave message- cell phone vm full, home phone was disconnected. Will try again later.  Appears pt was in TCU for some time, unable to see when he was discharged.     Sameera Musa, BASSAMN, RN- Coumadin Clinic RN

## 2020-12-21 NOTE — PROGRESS NOTES
ANTICOAGULATION MANAGEMENT     Patient Name:  Miguel Patten  Date:  2020    ASSESSMENT /SUBJECTIVE:    Today's INR result of 2.85 is therapeutic. Goal INR of 2.0-3.0      Warfarin dose taken: While hospitalized on Nov for [facility]id, [facility], now at [facility]. : home regimen continued.  Discharged on: home regimen continued Ericka was only able to see dose last 6 days (2.5 mg daily)    Diet: No new diet changes affecting INR    Medication changes/ interactions: No new medications/supplements affecting INR    Previous INR: Supratherapeutic     S/S of bleeding or thromboembolism: No    New injury or illness: No    Upcoming surgery, procedure or cardioversion: No    Additional findings: None      PLAN:    Telephone call with Ericka nurse at [facility] facility regarding INR result and instructed:     Warfarin Dosing Instructions: Take 2.5 mg daily    Instructed patient to follow up no later than: 2 days  Orders given to  Homecare nurse/facility to recheck    Education provided: None required      Ericka verbalizes understanding and agrees to warfarin dosing plan.    Instructed to call the Anticoagulation Clinic for any changes, questions or concerns. (#[phone])        Sameera Musa RN      OBJECTIVE:  Recent labs: (last 7 days)     20  0700   INR 2.85*         No question data found.  Anticoagulation Summary  As of 2020    INR goal:  2.0-3.0   TTR:  49.9 % (1 y)   INR used for dosin.85 (2020)   Warfarin maintenance plan:  2.5 mg (2.5 mg x 1) every day   Full warfarin instructions:  2.5 mg every day   Weekly warfarin total:  17.5 mg   Plan last modified:  Sameera Musa RN (2020)   Next INR check:  2020   Priority:  Maintenance   Target end date:  Indefinite    Indications    CHF (congestive heart failure) (H) [I50.9]  Long-term (current) use of anticoagulants [Z79.01] [Z79.01]  Permanent atrial fibrillation (H) [I48.21]             Anticoagulation Episode Summary      INR check location:      Preferred lab:      Send INR reminders to:  [facility] [place]    Comments:  Courtney [place] [phone] (verbal order/no fax or rx) 2.5 mg tabs      Anticoagulation Care Providers     Provider Role Specialty Phone number    Valdimir Gonzales DO Referring Internal Medicine [phone]    Rober العراقي MD Responsible Family Medicine [phone]

## 2020-12-21 NOTE — PROGRESS NOTES
Clinic Care Coordination Contact  [facility]       Clinical Data: Care Coordinator Outreach  Outreach attempted x 2. Unable to leave message.   Plan: Care Coordinator will send care coordination introduction letter with care coordinator contact information and explanation of care coordination services via mail. Care Coordinator will do no further outreaches at this time.      ALICIA Lin   Primary Care Clinic- Social Work Care Coordinator  [facility]  12/21/2020 4:10 PM  [phone]

## 2020-12-21 NOTE — TELEPHONE ENCOUNTER
See Anticoagulation visit encounter. At [facility].       BASSAM HeN, RN- Coumadin Clinic RN

## 2020-12-22 ENCOUNTER — TELEPHONE (OUTPATIENT)
Dept: INTERNAL MEDICINE | Facility: CLINIC | Age: 85
End: 2020-12-22

## 2020-12-22 DIAGNOSIS — L08.9 LOCAL INFECTION OF SKIN AND SUBCUTANEOUS TISSUE: Primary | ICD-10-CM

## 2020-12-22 RX ORDER — CEPHALEXIN 500 MG/1
500 CAPSULE ORAL 4 TIMES DAILY
Qty: 28 CAPSULE | Refills: 0 | Status: SHIPPED | OUTPATIENT
Start: 2020-12-22 | End: 2021-01-13

## 2020-12-22 NOTE — TELEPHONE ENCOUNTER
Reason for call:  Patients assisted living facility has called in regards to a infection that is starting on the pt's right elbow. Patient picked off a scab on the elbow yesterday and now it is begin to become infected.    Symptom or request: The Nurse line at the [facility] would like a call back at [phone] to indicate if a antibiotic can be sent ove to the [facility] on file.    Duration (how long have symptoms been present): 2 days starting 12/21/2020    Have you been treated for this before? No    Additional comments: N/A      Phone Number patient can be reached at:  Nurse at [facility] can be reached at [phone]    Best Time:  any    Can we leave a detailed message on this number:  Not Applicable    Call taken on 12/22/2020 at 4:26 PM by Kady Lima

## 2020-12-22 NOTE — TELEPHONE ENCOUNTER
"Actually, I cannot seem to find reference to the \"[facility]\" on file.    I have printed a paper prescription, signed, and set it on Katiuska's desk.    If someone would be so kind as to figure out where this is supposed to go and fax it, he should be able to have his antibiotic tomorrow.    Thank you.  "

## 2020-12-22 NOTE — TELEPHONE ENCOUNTER
called [facility] left message I did find the pharmacy after message was left. will fax to [facility] [place] [facility]

## 2020-12-23 ENCOUNTER — HOSPITAL LABORATORY (OUTPATIENT)
Facility: OTHER | Age: 85
End: 2020-12-23

## 2020-12-23 ENCOUNTER — ANTICOAGULATION THERAPY VISIT (OUTPATIENT)
Dept: ANTICOAGULATION | Facility: CLINIC | Age: 85
End: 2020-12-23
Payer: MEDICARE

## 2020-12-23 DIAGNOSIS — I50.40 COMBINED SYSTOLIC AND DIASTOLIC CONGESTIVE HEART FAILURE, UNSPECIFIED HF CHRONICITY (H): ICD-10-CM

## 2020-12-23 DIAGNOSIS — Z79.01 LONG TERM CURRENT USE OF ANTICOAGULANT THERAPY: ICD-10-CM

## 2020-12-23 DIAGNOSIS — I48.21 PERMANENT ATRIAL FIBRILLATION (H): ICD-10-CM

## 2020-12-23 LAB — INR PPP: 2.82 (ref 0.86–1.14)

## 2020-12-23 PROCEDURE — 99207 PR NO CHARGE NURSE ONLY: CPT | Performed by: INTERNAL MEDICINE

## 2020-12-23 NOTE — PROGRESS NOTES
ANTICOAGULATION FOLLOW-UP CLINIC VISIT    Patient Name:  Miguel Patten  Date:  2020  Contact Type:  Telephone    SUBJECTIVE:  Patient Findings     Positives:  Signs/symptoms of bleeding (pt had some extensive bleeding from his elbow that required EMS to be called to apply pressure bandage ), Change in medications (keflex -), Other complaints (infection right elbow )    Comments:  Orders given to Serina Grayson - [phone]  Ashly Jones RN          Clinical Outcomes     Comments:  Orders given to Serina Grayson  [phone]  Ashly Jones RN             OBJECTIVE    Recent labs: (last 7 days)     20  0600   INR 2.82*       ASSESSMENT / PLAN  INR assessment THER    Recheck INR In: 5 DAYS    INR Location [facility]      Anticoagulation Summary  As of 2020    INR goal:  2.0-3.0   TTR:  49.9 % (1 y)   INR used for dosin.82 (2020)   Warfarin maintenance plan:  2.5 mg (2.5 mg x 1) every day   Full warfarin instructions:  : 1.25 mg; : 1.25 mg; Otherwise 2.5 mg every day   Weekly warfarin total:  17.5 mg   Plan last modified:  Ashly Jones, RN (2020)   Next INR check:  2020   Priority:  Maintenance   Target end date:  Indefinite    Indications    CHF (congestive heart failure) (H) [I50.9]  Long-term (current) use of anticoagulants [Z79.01] [Z79.01]  Permanent atrial fibrillation (H) [I48.21]             Anticoagulation Episode Summary     INR check location:      Preferred lab:      Send INR reminders to:  [facility]    Comments:  Courtney Pa[facility] [phone] (verbal order/no fax or rx) 2.5 mg tabs      Anticoagulation Care Providers     Provider Role Specialty Phone number    Vladimir Gonzales DO Referring Internal Medicine [phone]    Rober العراقي MD Responsible Family Medicine [phone]            See the Encounter Report to view Anticoagulation Flowsheet and Dosing Calendar (Go to Encounters tab in  chart review, and find the Anticoagulation Therapy Visit)    Dosage adjustment made based on physician directed care plan.      Ashly Jones RN

## 2020-12-28 ENCOUNTER — ANTICOAGULATION THERAPY VISIT (OUTPATIENT)
Dept: ANTICOAGULATION | Facility: CLINIC | Age: 85
End: 2020-12-28
Payer: MEDICARE

## 2020-12-28 ENCOUNTER — HOSPITAL LABORATORY (OUTPATIENT)
Facility: OTHER | Age: 85
End: 2020-12-28

## 2020-12-28 DIAGNOSIS — Z79.01 LONG TERM CURRENT USE OF ANTICOAGULANT THERAPY: ICD-10-CM

## 2020-12-28 DIAGNOSIS — I48.21 PERMANENT ATRIAL FIBRILLATION (H): ICD-10-CM

## 2020-12-28 DIAGNOSIS — I50.40 COMBINED SYSTOLIC AND DIASTOLIC CONGESTIVE HEART FAILURE, UNSPECIFIED HF CHRONICITY (H): ICD-10-CM

## 2020-12-28 LAB — INR PPP: 2.2 (ref 0.86–1.14)

## 2020-12-28 PROCEDURE — 99207 PR NO CHARGE NURSE ONLY: CPT | Performed by: NURSE PRACTITIONER

## 2020-12-28 NOTE — PROGRESS NOTES
ANTICOAGULATION FOLLOW-UP CLINIC VISIT    Patient Name:  Miguel Patten  Date:  2020  Contact Type:  Telephone    SUBJECTIVE:  Patient Findings     Positives:  Change in medications (keflex -), Other complaints (per Serina Oliva, elbow infection is improving )    Comments:  Orders given to Serina Oliva, nurse at [facility] [phone]  Ashly Jones RN          Clinical Outcomes     Comments:  Orders given to Serina Oliva nurse at [facility] [phone]  Ashly Jones RN             OBJECTIVE    Recent labs: (last 7 days)     20  0654   INR 2.20*       ASSESSMENT / PLAN  INR assessment THER    Recheck INR In: 1 WEEK    INR Location [facility]      Anticoagulation Summary  As of 2020    INR goal:  2.0-3.0   TTR:  49.9 % (1 y)   INR used for dosin.20 (2020)   Warfarin maintenance plan:  2.5 mg (2.5 mg x 1) every day   Full warfarin instructions:  2.5 mg every day   Weekly warfarin total:  17.5 mg   No change documented:  Ashly Jones RN   Plan last modified:  Ashly Jones RN (2020)   Next INR check:  2021   Priority:  Maintenance   Target end date:  Indefinite    Indications    CHF (congestive heart failure) (H) [I50.9]  Long-term (current) use of anticoagulants [Z79.01] [Z79.01]  Permanent atrial fibrillation (H) [I48.21]             Anticoagulation Episode Summary     INR check location:      Preferred lab:      Send INR reminders to:  [facility] [place]    Comments:  [facility] [phone] (verbal order/no fax or rx) 2.5 mg tabs      Anticoagulation Care Providers     Provider Role Specialty Phone number    Vladimir Gonzales DO Referring Internal Medicine [phone]    Rober العراقي MD Responsible Family Medicine [phone]            See the Encounter Report to view Anticoagulation Flowsheet and Dosing Calendar (Go to Encounters tab in chart review, and find the Anticoagulation Therapy Visit)    Dosage adjustment made based on physician directed  care plan.    Ashly Jones RN

## 2020-12-29 ENCOUNTER — DOCUMENTATION ONLY (OUTPATIENT)
Dept: OTHER | Facility: CLINIC | Age: 85
End: 2020-12-29

## 2020-12-29 ENCOUNTER — TELEPHONE (OUTPATIENT)
Dept: INTERNAL MEDICINE | Facility: CLINIC | Age: 85
End: 2020-12-29

## 2020-12-29 DIAGNOSIS — R26.81 UNSTEADY GAIT: Primary | ICD-10-CM

## 2020-12-29 NOTE — TELEPHONE ENCOUNTER
[facility] is calling to get a signed faxed order for:    PT/OT Evaluation and treatment for unsteady gait.    Please fax to Attn: Nursing at [phone].  Routing to PCP for further advice.    Lillie Bryant RN

## 2020-12-31 ENCOUNTER — TELEPHONE (OUTPATIENT)
Dept: GERIATRICS | Facility: CLINIC | Age: 85
End: 2020-12-31

## 2020-12-31 ENCOUNTER — TELEPHONE (OUTPATIENT)
Dept: INTERNAL MEDICINE | Facility: CLINIC | Age: 85
End: 2020-12-31

## 2020-12-31 ENCOUNTER — TELEPHONE (OUTPATIENT)
Dept: CARDIOLOGY | Facility: CLINIC | Age: 85
End: 2020-12-31

## 2020-12-31 NOTE — TELEPHONE ENCOUNTER
Spoke with cardiology dez, he gave me the number to [facility] to get this patient re set up for a device check the number [phone]  alejandra sanchez will reach out to them for all the other information.   Jayleen CHOI

## 2020-12-31 NOTE — TELEPHONE ENCOUNTER
Reason for Call:  Home Health Care      Orders are needed for this patient. verbal    PT: yes, 1 time a week for 1 week, 2 times a week for 1 week then 1 time a week for 4 weeks and 1 time a month for 1 month    OT: georgie    Skilled Nursing: georgie    Pt Provider: Christian    Phone Number Homecare Nurse can be reached at: [phone]    Can we leave a detailed message on this number? YES    Phone number patient can be reached at: georgie    Best Time: any    Call taken on 12/31/2020 at 3:37 PM by Mari Padron

## 2020-12-31 NOTE — TELEPHONE ENCOUNTER
Reason for Call:  [facility] is calling to get some information on pt.     Detailed comments: Information needed is    Pacemaker Rate    And     Cardiologist on file    Phone Number Patient can be reached at: Irmajamshid Paige phone number:     Susan: [phone]    Best Time: any    Can we leave a detailed message on this number? Not Applicable    Call taken on 12/31/2020 at 9:24 AM by Kady Lima

## 2020-12-31 NOTE — TELEPHONE ENCOUNTER
Received a VM from Sabi at pt's assisted living facility ([facility]?), phone number [phone]. She is trying to figure out how pt should follow up for his PPM.     Called Sabi back. I told her pt should have a Latitude monitor that is plugged in at his bedside. Sabi has not seen it, and pt just got to their facility about 2 weeks ago after being hospitalized for covid and then in TCU for a while. She will look for the remote monitor, and she will call pt's daughter if they cannot find it. She will have pt's daughter contact us if they cannot find the monitor at pt's home either. We will schedule a remote check once the monitor is found.

## 2021-01-04 ENCOUNTER — HOSPITAL LABORATORY (OUTPATIENT)
Facility: OTHER | Age: 86
End: 2021-01-04

## 2021-01-04 ENCOUNTER — ANTICOAGULATION THERAPY VISIT (OUTPATIENT)
Dept: ANTICOAGULATION | Facility: CLINIC | Age: 86
End: 2021-01-04

## 2021-01-04 DIAGNOSIS — I50.40 COMBINED SYSTOLIC AND DIASTOLIC CONGESTIVE HEART FAILURE, UNSPECIFIED HF CHRONICITY (H): ICD-10-CM

## 2021-01-04 DIAGNOSIS — Z79.01 LONG TERM CURRENT USE OF ANTICOAGULANT THERAPY: ICD-10-CM

## 2021-01-04 DIAGNOSIS — I48.21 PERMANENT ATRIAL FIBRILLATION (H): ICD-10-CM

## 2021-01-04 LAB — INR PPP: 2.03 (ref 0.86–1.14)

## 2021-01-04 NOTE — PROGRESS NOTES
ANTICOAGULATION MANAGEMENT     Patient Name:  Miguel Patten  Date:  2021    ASSESSMENT /SUBJECTIVE:    Today's INR result of 2.03 is therapeutic. Goal INR of 2.0-3.0      Warfarin dose taken: Warfarin taken as instructed    Diet: No new diet changes affecting INR    Medication changes/ interactions: No new medications/supplements affecting INR    Previous INR: Therapeutic     S/S of bleeding or thromboembolism: No    New injury or illness: No    Upcoming surgery, procedure or cardioversion: No    Additional findings: None      PLAN:    Detailed message left for Charge nurse at [facility] regarding INR result and instructed:     Warfarin Dosing Instructions: Continue your current warfarin dose 2.5 mg daily    Instructed patient to follow up no later than: 1 week  Orders given to  Homecare nurse/facility to recheck    Education provided: None required      I left a detailed voicemail with the orders below. I have also requested a call back if there have been any missed doses, concerns, illness, fever, or if there have been any changes in medications, activity level, or diet      Instructed to call the Anticoagulation Clinic for any changes, questions or concerns. (#[phone])        Kerwin Faustin RN      OBJECTIVE:  Recent labs: (last 7 days)     21  0824   INR 2.03*         No question data found.  Anticoagulation Summary  As of 2021    INR goal:  2.0-3.0   TTR:  50.0 % (1 y)   INR used for dosin.03 (2021)   Warfarin maintenance plan:  2.5 mg (2.5 mg x 1) every day   Full warfarin instructions:  2.5 mg every day   Weekly warfarin total:  17.5 mg   No change documented:  Kerwin Faustin RN   Plan last modified:  Ashly Jones RN (2020)   Next INR check:  2021   Priority:  Maintenance   Target end date:  Indefinite    Indications    CHF (congestive heart failure) (H) [I50.9]  Long-term (current) use of anticoagulants [Z79.01] [Z79.01]  Permanent atrial  fibrillation (H) [I48.21]             Anticoagulation Episode Summary     INR check location:      Preferred lab:      Send INR reminders to:  [facility] [place]    [facility] [phone] (verbal order/no fax or rx) 2.5 mg tabs      Anticoagulation Care Providers     Provider Role Specialty Phone number    Vladimir Gonzales DO Referring Internal Medicine [phone]    Rober العراقي MD Responsible Family Medicine [phone]

## 2021-01-05 ENCOUNTER — ANCILLARY PROCEDURE (OUTPATIENT)
Dept: CARDIOLOGY | Facility: CLINIC | Age: 86
End: 2021-01-05
Attending: INTERNAL MEDICINE
Payer: MEDICARE

## 2021-01-05 DIAGNOSIS — Z95.0 CARDIAC PACEMAKER IN SITU: ICD-10-CM

## 2021-01-05 DIAGNOSIS — I42.9 CARDIOMYOPATHY (H): ICD-10-CM

## 2021-01-05 PROCEDURE — 93296 REM INTERROG EVL PM/IDS: CPT | Performed by: INTERNAL MEDICINE

## 2021-01-05 PROCEDURE — 93294 REM INTERROG EVL PM/LDLS PM: CPT | Performed by: INTERNAL MEDICINE

## 2021-01-06 ENCOUNTER — TELEPHONE (OUTPATIENT)
Dept: FAMILY MEDICINE | Facility: CLINIC | Age: 86
End: 2021-01-06

## 2021-01-06 NOTE — TELEPHONE ENCOUNTER
Home care calling to request verbal orders for OT 1week5 3apttx4.  Please call ok to leave message.

## 2021-01-07 ENCOUNTER — TELEPHONE (OUTPATIENT)
Dept: GERIATRICS | Facility: CLINIC | Age: 86
End: 2021-01-07

## 2021-01-07 NOTE — TELEPHONE ENCOUNTER
Medication reconciliation completed by RN. Form and chart forwarded to PCP for signatures.    Michelle Martin RN

## 2021-01-07 NOTE — TELEPHONE ENCOUNTER
Reason for Call:  Form, our goal is to have forms completed with 72 hours, however, some forms may require a visit or additional information.    Type of letter, form or note:  Home Health Certification    Who is the form from?: Home care    Where did the form come from: form was faxed in    [facility]    Where the form was placed: Given to MA/RN    What number is listed as a contact on the form?:        Additional comments:     Call taken on 1/7/2021 at 3:56 PM by Ceci Rahman

## 2021-01-08 ENCOUNTER — DOCUMENTATION ONLY (OUTPATIENT)
Dept: CARDIOLOGY | Facility: CLINIC | Age: 86
End: 2021-01-08

## 2021-01-08 NOTE — PROGRESS NOTES
Patient missed Latitude transmission on 12/7/20. Sent letter 12/8, no response. Sent 1 week letter today.

## 2021-01-11 ENCOUNTER — HOSPITAL LABORATORY (OUTPATIENT)
Facility: OTHER | Age: 86
End: 2021-01-11

## 2021-01-11 ENCOUNTER — ANTICOAGULATION THERAPY VISIT (OUTPATIENT)
Dept: ANTICOAGULATION | Facility: CLINIC | Age: 86
End: 2021-01-11

## 2021-01-11 DIAGNOSIS — I50.40 COMBINED SYSTOLIC AND DIASTOLIC CONGESTIVE HEART FAILURE, UNSPECIFIED HF CHRONICITY (H): ICD-10-CM

## 2021-01-11 DIAGNOSIS — I48.21 PERMANENT ATRIAL FIBRILLATION (H): ICD-10-CM

## 2021-01-11 DIAGNOSIS — Z79.01 LONG TERM CURRENT USE OF ANTICOAGULANT THERAPY: ICD-10-CM

## 2021-01-11 LAB — INR PPP: 2.05 (ref 0.86–1.14)

## 2021-01-11 NOTE — TELEPHONE ENCOUNTER
Left message for Maritza with [facility] to return call to [phone]. Please relay providers message below when Maritza returns call.   Tanya Kelly, CMA

## 2021-01-11 NOTE — PROGRESS NOTES
ANTICOAGULATION MANAGEMENT     Patient Name:  Miguel Patten  Date:  2021    ASSESSMENT /SUBJECTIVE:    Today's INR result of 2.05 is therapeutic. Goal INR of 2.0-3.0      Warfarin dose taken: Warfarin taken as instructed    Diet: No new diet changes affecting INR    Medication changes/ interactions: No new medications/supplements affecting INR    Previous INR: Therapeutic     S/S of bleeding or thromboembolism: No    New injury or illness: No    Upcoming surgery, procedure or cardioversion: No    Additional findings: None      PLAN:    Telephone call with Serina fisher at [facility] regarding INR result and instructed:     Warfarin Dosing Instructions: Continue your current warfarin dose 2.5 mg daily    Instructed patient to follow up no later than: 2 weeks  Orders given to  Homecare nurse/facility to recheck    Education provided: None required      Instructed to call the Anticoagulation Clinic for any changes, questions or concerns. (#[phone])        Sameera Musa RN      OBJECTIVE:  Recent labs: (last 7 days)     21  0650   INR 2.05*         INR assessment THER    Recheck INR In: 2 WEEKS    INR Location [facility]      Anticoagulation Summary  As of 2021    INR goal:  2.0-3.0   TTR:  51.9 % (1 y)   INR used for dosin.05 (2021)   Warfarin maintenance plan:  2.5 mg (2.5 mg x 1) every day   Full warfarin instructions:  2.5 mg every day   Weekly warfarin total:  17.5 mg   No change documented:  Sameera Musa, RN   Plan last modified:  Ashly Jones RN (2020)   Next INR check:  2021   Priority:  Maintenance   Target end date:  Indefinite    Indications    CHF (congestive heart failure) (H) [I50.9]  Long-term (current) use of anticoagulants [Z79.01] [Z79.01]  Permanent atrial fibrillation (H) [I48.21]             Anticoagulation Episode Summary     INR check location:      Preferred lab:      Send INR reminders to:  [facility] [place]    Comments:  [facility]  [phone] (verbal order/no fax or rx) 2.5 mg tabs      Anticoagulation Care Providers     Provider Role Specialty Phone number    Vladimir Gonzales DO Referring Internal Medicine [phone]    Rober العراقي MD Responsible Family Medicine [phone]

## 2021-01-12 DIAGNOSIS — Z53.9 DIAGNOSIS NOT YET DEFINED: Primary | ICD-10-CM

## 2021-01-12 PROCEDURE — G0180 MD CERTIFICATION HHA PATIENT: HCPCS | Performed by: INTERNAL MEDICINE

## 2021-01-13 ENCOUNTER — ASSISTED LIVING VISIT (OUTPATIENT)
Dept: GERIATRICS | Facility: CLINIC | Age: 86
End: 2021-01-13
Payer: MEDICARE

## 2021-01-13 VITALS
RESPIRATION RATE: 18 BRPM | HEIGHT: 73 IN | DIASTOLIC BLOOD PRESSURE: 84 MMHG | SYSTOLIC BLOOD PRESSURE: 136 MMHG | HEART RATE: 60 BPM | BODY MASS INDEX: 23.99 KG/M2 | OXYGEN SATURATION: 98 % | TEMPERATURE: 98.4 F | WEIGHT: 181 LBS

## 2021-01-13 DIAGNOSIS — U07.1 CLINICAL DIAGNOSIS OF COVID-19: ICD-10-CM

## 2021-01-13 DIAGNOSIS — Z95.2 S/P AVR (AORTIC VALVE REPLACEMENT): ICD-10-CM

## 2021-01-13 DIAGNOSIS — I10 ESSENTIAL HYPERTENSION: ICD-10-CM

## 2021-01-13 DIAGNOSIS — I48.91 ATRIAL FIBRILLATION (H): ICD-10-CM

## 2021-01-13 DIAGNOSIS — I48.20 CHRONIC ATRIAL FIBRILLATION (H): Primary | ICD-10-CM

## 2021-01-13 RX ORDER — WARFARIN SODIUM 2.5 MG/1
TABLET ORAL
Qty: 110 TABLET | Refills: 3
Start: 2021-01-13 | End: 2021-09-10

## 2021-01-13 RX ORDER — WARFARIN SODIUM 2.5 MG/1
TABLET ORAL
Qty: 110 TABLET | Refills: 3 | Status: CANCELLED | OUTPATIENT
Start: 2021-01-13

## 2021-01-13 RX ORDER — DIMETHICONE 50 MG/G
CREAM TOPICAL DAILY PRN
COMMUNITY
End: 2022-06-23

## 2021-01-13 ASSESSMENT — MIFFLIN-ST. JEOR: SCORE: 1544.89

## 2021-01-13 NOTE — PROGRESS NOTES
[facility]  PRIMARY CARE PROVIDER AND CLINIC:  Virginia Cardona, PHANI CNP, [address] / [place] [address]  Chief Complaint   Patient presents with     Establish Care     [place] Medical Record Number:  [id number]  [facility][id number]    HPI:  HPI information obtained from: facility chart records, facility staff, patient report and [facility] chart review.   Miguel Patten  is a 88 year old  (12/16/1932), who has been living at [facility] since 12/18/20 and requested to be followed by [facility] given difficulty getting to the clinic for provider visits.   Noted most recent hosp stay 11/13-19/20 [facility] after a fall at home with w/u + mild rhabdo, supratheraputic INR, found to be + for COVID and was started on Dexamethasone and remdesivir, O2. He rehabbed at [facility]    Notable PMH of atrial fibrillation on chronic anticoagulation treatment, prostate cancer on hormone suppressive treatment, hypertension, dyslipidemia, status post aortic valve replacement in 2014, status post aortic aneurysm repair in 2014, status post bilateral knee.     Notable recent medical events of   12/22: R elbow cellulitis - treated with keflex 500 QID x 7 days  12/29: PT/OT referral - noted latitude monitor for PPM now set up      Today he is doing great and has no concerns, He states he plans on moving back to his home sometime given his recovery has gone so well. C/o dry nose, nothing other.     CODE STATUS/ADVANCE DIRECTIVES DISCUSSION:   DNR / DNI  Patient's living condition: lives in an assisted living facility  ALLERGIES: No known drug allergies  PAST MEDICAL HISTORY:  has a past medical history of Arthritis, Ascending aortic aneurysm (H), Asthma, Complete AV block (H), Congestive heart failure (H), Coronary artery disease, H/O aortic valve replacement, Hypertension, Malignant neoplasm of prostate (H), and Permanent atrial  fibrillation (H). He also has no past medical history of COPD (chronic obstructive pulmonary disease) (H), CVA (cerebral infarction), Diabetes (H), or Thyroid disease.  PAST SURGICAL HISTORY:   has a past surgical history that includes NONSPECIFIC PROCEDURE; NONSPECIFIC PROCEDURE; orthopedic surgery; Abdomen surgery (2001); TOTAL HIP ARTHROPLASTY (1/21/13); Arthroplasty hip (1/21/2013); s/p avr; s/p aneurysm repair by Dr. Friedman; Replace valve aortic (7/17/2014); Repair aneurysm ascending aorta (7/17/2014); Phacoemulsification with standard intraocular lens implant (Right, 10/6/2016); and Phacoemulsification with standard intraocular lens implant (Left, 10/20/2016).  FAMILY HISTORY: family history includes Asthma in his daughter, father, and paternal grandmother.  SOCIAL HISTORY:   reports that he has never smoked. He has never used smokeless tobacco. He reports current alcohol use. He reports that he does not use drugs.    Post Discharge Medication Reconciliation Status: discharge medications reconciled, continue medications without change    Current Outpatient Medications   Medication Sig Dispense Refill     acetaminophen (TYLENOL) 325 MG tablet Take 2 tablets (650 mg) by mouth every 4 hours as needed for mild pain or fever 100 tablet      bicalutamide (CASODEX) 50 MG tablet Take 1 tablet (50 mg) by mouth daily       Dimethicone (SECURA DIMETHICONE PROTECTANT) 5 % CREA Externally apply topically daily as needed       FLOVENT HFA 44 MCG/ACT inhaler INHALE ONE PUFF BY MOUTH TWICE A DAY 10.6 g 1     losartan (COZAAR) 50 MG tablet TAKE ONE TABLET BY MOUTH TWICE A  tablet 1     simvastatin (ZOCOR) 40 MG tablet TAKE ONE TABLET BY MOUTH EVERY NIGHT AT BEDTIME 90 tablet 1     sodium chloride (OCEAN) 0.65 % nasal spray Spray 1 spray into both nostrils 3 times daily as needed for congestion       warfarin ANTICOAGULANT (JANTOVEN ANTICOAGULANT) 2.5 MG tablet 2.5 daily 110 tablet 3     albuterol (PROAIR HFA/PROVENTIL  "HFA/VENTOLIN HFA) 108 (90 Base) MCG/ACT inhaler Inhale 2 puffs into the lungs every 6 hours as needed for shortness of breath / dyspnea or wheezing 18 g 3     leuprolide (LUPRON DEPOT, 3-MONTH,) 22.5 MG kit Inject 22.5 MG, IM q 6 months per Dr. Zeyad Guevara, pt's Urologist in [place]. 10/17/2018 1 each 3     ROS:  4 point ROS including Respiratory, CV, GI and , other than that noted in the HPI,  is negative    Vitals:  /84   Pulse 60   Temp 98.4  F (36.9  C)   Resp 18   Ht 1.854 m (6' 1\")   Wt 82.1 kg (181 lb)   SpO2 98%   BMI 23.88 kg/m    Exam:  GENERAL APPEARANCE:  Alert, in no distress  RESP:  respiratory effort and palpation of chest normal, auscultation of lungs clear , no respiratory distress  CV:  Palpation and auscultation of heart done , rate and rhythm reg, no murmur, no peripheral edema  ABDOMEN:  normal bowel sounds, soft, nontender, no hepatosplenomegaly or other masses  M/S:   Gait and station up indep with 4 WW or cane, Digits and nails intact  SKIN:  Inspection and Palpation of skin and subcutaneous tissue intact  NEURO: 2-12 in normal limits and at patient's baseline  PSYCH:  insight and judgement, memory intact , affect and mood Pleasant     Lab/Diagnostic data:  Recent labs in [facility] reviewed by me today.  and   Most Recent 3 CBC's:  Recent Labs   Lab Test 11/12/20 2051 07/01/20  0852 05/21/19  1501   WBC 8.9 7.4 5.2   HGB 12.7* 13.5 13.7   MCV 94 98 96    144* 133*     Most Recent 3 BMP's:  Recent Labs   Lab Test 11/13/20  0545 11/12/20 2051 07/01/20  0852    140 139   POTASSIUM 3.8 3.7 4.1   CHLORIDE 107 105 107   CO2 22 23 27   BUN 26 31* 17   CR 0.85 0.96 0.80   ANIONGAP 12 12 5   LIDIA 8.4* 8.6 9.1   GLC 91 107* 103*     Most Recent 2 LFT's:  Recent Labs   Lab Test 11/12/20 2051 07/01/20  0852 05/21/19  1501   AST 68*  --  22   ALT 38 20 22   ALKPHOS 39*  --  57   BILITOTAL 1.3  --  1.9*     Most Recent 3 INR's:  Recent Labs   Lab Test 01/11/21  0650 " 01/04/21  0824 12/28/20  0654   INR 2.05* 2.03* 2.20*     Most Recent Cholesterol Panel:  Recent Labs   Lab Test 07/01/20  0852   CHOL 151   LDL 39   HDL 98   TRIG 72     Recent Labs   Lab Test 01/14/13  0836   TSH Test canceled by physician CORRECTED ON 01/14 AT 1013: PREVIOUSLY REPORTED AS 3.57   T4 Test canceled by physician CORRECTED ON 01/14 AT 1011: PREVIOUSLY REPORTED AS 0.94       ASSESSMENT/PLAN:  Chronic atrial fibrillation (H) and S/P AVR (aortic valve replacement)  Rate controlled, coumadin clinic managing coumadin - stable.   - warfarin ANTICOAGULANT (JANTOVEN ANTICOAGULANT) 2.5 MG tablet; 2.5 daily    PPM  Now hooked up appropriately and can be checked again    Essential hypertension  Well controlled on ARB    Clinical diagnosis of COVID-19  Recovered well.     No new orders  F/u only as needed,     Electronically signed by:  PHANI Quintero CNP

## 2021-01-18 DIAGNOSIS — Z95.0 CARDIAC PACEMAKER IN SITU: ICD-10-CM

## 2021-01-18 DIAGNOSIS — I49.5 SICK SINUS SYNDROME (H): Primary | ICD-10-CM

## 2021-01-21 LAB
MDC_IDC_EPISODE_DTM: NORMAL
MDC_IDC_EPISODE_DURATION: 15 S
MDC_IDC_EPISODE_DURATION: 8 S
MDC_IDC_EPISODE_DURATION: 9 S
MDC_IDC_EPISODE_ID: NORMAL
MDC_IDC_EPISODE_TYPE: NORMAL
MDC_IDC_LEAD_IMPLANT_DT: NORMAL
MDC_IDC_LEAD_IMPLANT_DT: NORMAL
MDC_IDC_LEAD_LOCATION: NORMAL
MDC_IDC_LEAD_LOCATION: NORMAL
MDC_IDC_LEAD_LOCATION_DETAIL_1: NORMAL
MDC_IDC_LEAD_LOCATION_DETAIL_1: NORMAL
MDC_IDC_LEAD_MFG: NORMAL
MDC_IDC_LEAD_MFG: NORMAL
MDC_IDC_LEAD_MODEL: NORMAL
MDC_IDC_LEAD_MODEL: NORMAL
MDC_IDC_LEAD_POLARITY_TYPE: NORMAL
MDC_IDC_LEAD_POLARITY_TYPE: NORMAL
MDC_IDC_LEAD_SERIAL: NORMAL
MDC_IDC_LEAD_SERIAL: NORMAL
MDC_IDC_MSMT_BATTERY_DTM: NORMAL
MDC_IDC_MSMT_BATTERY_REMAINING_LONGEVITY: 54 MO
MDC_IDC_MSMT_BATTERY_REMAINING_PERCENTAGE: 71 %
MDC_IDC_MSMT_BATTERY_STATUS: NORMAL
MDC_IDC_MSMT_LEADCHNL_LV_IMPEDANCE_VALUE: 587 OHM
MDC_IDC_MSMT_LEADCHNL_LV_PACING_THRESHOLD_AMPLITUDE: 1.7 V
MDC_IDC_MSMT_LEADCHNL_LV_PACING_THRESHOLD_PULSEWIDTH: 1 MS
MDC_IDC_MSMT_LEADCHNL_RV_IMPEDANCE_VALUE: 490 OHM
MDC_IDC_PG_IMPLANT_DTM: NORMAL
MDC_IDC_PG_MFG: NORMAL
MDC_IDC_PG_MODEL: NORMAL
MDC_IDC_PG_SERIAL: NORMAL
MDC_IDC_PG_TYPE: NORMAL
MDC_IDC_SESS_CLINIC_NAME: NORMAL
MDC_IDC_SESS_DTM: NORMAL
MDC_IDC_SESS_TYPE: NORMAL
MDC_IDC_SET_BRADY_AT_MODE_SWITCH_RATE: 170 {BEATS}/MIN
MDC_IDC_SET_BRADY_LOWRATE: 60 {BEATS}/MIN
MDC_IDC_SET_BRADY_MAX_SENSOR_RATE: 120 {BEATS}/MIN
MDC_IDC_SET_BRADY_MODE: NORMAL
MDC_IDC_SET_CRT_LVRV_DELAY: 0 MS
MDC_IDC_SET_CRT_PACED_CHAMBERS: NORMAL
MDC_IDC_SET_LEADCHNL_LV_PACING_AMPLITUDE: 2.6 V
MDC_IDC_SET_LEADCHNL_LV_PACING_PULSEWIDTH: 0.7 MS
MDC_IDC_SET_LEADCHNL_LV_SENSING_ADAPTATION_MODE: NORMAL
MDC_IDC_SET_LEADCHNL_LV_SENSING_SENSITIVITY: 2.5 MV
MDC_IDC_SET_LEADCHNL_RA_SENSING_ADAPTATION_MODE: NORMAL
MDC_IDC_SET_LEADCHNL_RA_SENSING_SENSITIVITY: 0.5 MV
MDC_IDC_SET_LEADCHNL_RV_PACING_AMPLITUDE: 2.4 V
MDC_IDC_SET_LEADCHNL_RV_PACING_CAPTURE_MODE: NORMAL
MDC_IDC_SET_LEADCHNL_RV_PACING_POLARITY: NORMAL
MDC_IDC_SET_LEADCHNL_RV_PACING_PULSEWIDTH: 0.5 MS
MDC_IDC_SET_LEADCHNL_RV_SENSING_ADAPTATION_MODE: NORMAL
MDC_IDC_SET_LEADCHNL_RV_SENSING_POLARITY: NORMAL
MDC_IDC_SET_LEADCHNL_RV_SENSING_SENSITIVITY: 2.5 MV
MDC_IDC_SET_ZONE_DETECTION_INTERVAL: 375 MS
MDC_IDC_SET_ZONE_TYPE: NORMAL
MDC_IDC_SET_ZONE_VENDOR_TYPE: NORMAL
MDC_IDC_STAT_BRADY_DTM_END: NORMAL
MDC_IDC_STAT_BRADY_DTM_START: NORMAL
MDC_IDC_STAT_BRADY_RA_PERCENT_PACED: 0 %
MDC_IDC_STAT_BRADY_RV_PERCENT_PACED: 99 %
MDC_IDC_STAT_CRT_DTM_END: NORMAL
MDC_IDC_STAT_CRT_DTM_START: NORMAL
MDC_IDC_STAT_CRT_LV_PERCENT_PACED: 99 %
MDC_IDC_STAT_EPISODE_RECENT_COUNT: 0
MDC_IDC_STAT_EPISODE_RECENT_COUNT: 36
MDC_IDC_STAT_EPISODE_RECENT_COUNT_DTM_END: NORMAL
MDC_IDC_STAT_EPISODE_RECENT_COUNT_DTM_START: NORMAL
MDC_IDC_STAT_EPISODE_TYPE: NORMAL
MDC_IDC_STAT_EPISODE_VENDOR_TYPE: NORMAL

## 2021-01-25 ENCOUNTER — DOCUMENTATION ONLY (OUTPATIENT)
Dept: FAMILY MEDICINE | Facility: CLINIC | Age: 86
End: 2021-01-25

## 2021-01-25 ENCOUNTER — TELEPHONE (OUTPATIENT)
Dept: FAMILY MEDICINE | Facility: CLINIC | Age: 86
End: 2021-01-25

## 2021-01-25 ENCOUNTER — MEDICAL CORRESPONDENCE (OUTPATIENT)
Dept: HEALTH INFORMATION MANAGEMENT | Facility: CLINIC | Age: 86
End: 2021-01-25

## 2021-01-25 ENCOUNTER — HOSPITAL LABORATORY (OUTPATIENT)
Facility: OTHER | Age: 86
End: 2021-01-25

## 2021-01-25 ENCOUNTER — TELEPHONE (OUTPATIENT)
Dept: ANTICOAGULATION | Facility: CLINIC | Age: 86
End: 2021-01-25

## 2021-01-25 ENCOUNTER — ANTICOAGULATION THERAPY VISIT (OUTPATIENT)
Dept: ANTICOAGULATION | Facility: CLINIC | Age: 86
End: 2021-01-25

## 2021-01-25 DIAGNOSIS — I48.21 PERMANENT ATRIAL FIBRILLATION (H): ICD-10-CM

## 2021-01-25 DIAGNOSIS — Z79.01 LONG TERM CURRENT USE OF ANTICOAGULANT THERAPY: ICD-10-CM

## 2021-01-25 DIAGNOSIS — I50.40 COMBINED SYSTOLIC AND DIASTOLIC CONGESTIVE HEART FAILURE, UNSPECIFIED HF CHRONICITY (H): ICD-10-CM

## 2021-01-25 DIAGNOSIS — I48.92 ATRIAL FLUTTER, UNSPECIFIED TYPE (H): Primary | ICD-10-CM

## 2021-01-25 LAB — INR PPP: 2.02 (ref 0.86–1.14)

## 2021-01-25 NOTE — TELEPHONE ENCOUNTER
ANTICOAGULATION MANAGEMENT      Miguel Patten due for annual renewal of referral to anticoagulation monitoring. Order pended for your review and signature.      ANTICOAGULATION SUMMARY      Warfarin indication(s)     Atrial fibrillation  CHF    Heart valve present?  NO       Current goal range   INR: 2.0-3.0     Goal appropriate for indication? Yes, INR 2-3 appropriate for hx of DVT, PE, hypercoagulable state, Afib, LVAD, or bileaflet AVR without risk factors     Current duration of therapy Indefinite/long term therapy   Time in Therapeutic Range (TTR)  (Goal > 60%) 55.7 %       Office visit with referring provider's group within last year yes on 1/13/21       Ashly Jones RN

## 2021-01-25 NOTE — PROGRESS NOTES
ANTICOAGULATION MANAGEMENT     Patient Name:  Miguel Patten  Date:  2021    ASSESSMENT /SUBJECTIVE:    Today's INR result of 2.02 is therapeutic. Goal INR of 2.0-3.0      Warfarin dose taken: Warfarin taken as instructed    Diet: No new diet changes affecting INR    Medication changes/ interactions: No new medications/supplements affecting INR    Previous INR: Therapeutic     S/S of bleeding or thromboembolism: No    New injury or illness: No    Upcoming surgery, procedure or cardioversion: No    Additional findings: None      PLAN:    Telephone call with Ericka nurse at [facility] [phone] [facility] regarding INR result and instructed:     Warfarin Dosing Instructions: 2.5 mg daily    Instructed patient to follow up no later than: 2 weeks  Orders given to  Homecare nurse/facility to recheck    Education provided: None required      Ericka verbalizes understanding and agrees to warfarin dosing plan.    Instructed to call the Anticoagulation Clinic for any changes, questions or concerns. (#[phone])        Ashly Jones RN      OBJECTIVE:  Recent labs: (last 7 days)     21  0730   INR 2.02*         INR assessment THER    Recheck INR In: 2 WEEKS    INR Location [facility]      Anticoagulation Summary  As of 2021    INR goal:  2.0-3.0   TTR:  55.7 % (1 y)   INR used for dosin.02 (2021)   Warfarin maintenance plan:  2.5 mg (2.5 mg x 1) every day   Full warfarin instructions:  2.5 mg every day   Weekly warfarin total:  17.5 mg   No change documented:  Ashly Jones RN   Plan last modified:  Ashly Jones RN (2020)   Next INR check:  2021   Priority:  Maintenance   Target end date:  Indefinite    Indications    CHF (congestive heart failure) (H) [I50.9]  Long-term (current) use of anticoagulants [Z79.01] [Z79.01]  Permanent atrial fibrillation (H) [I48.21]             Anticoagulation Episode Summary     INR check location:      Preferred lab:      Send INR reminders to:   BUBBA JULIANICHO [place]    Comments:  [facility] [phone] (verbal order/no fax or rx) 2.5 mg tabs      Anticoagulation Care Providers     Provider Role Specialty Phone number    Vladimir Gonzales DO Referring Internal Medicine [phone]    Rober العراقي MD Responsible Family Medicine [phone]

## 2021-01-25 NOTE — TELEPHONE ENCOUNTER
As a former pt of mine, I have received correspondence from [employer] re his use of coumadin and simvastain.  Chart reviewed, he has been closely monitored regarding this matter.  george

## 2021-02-03 ENCOUNTER — TELEPHONE (OUTPATIENT)
Dept: INTERNAL MEDICINE | Facility: CLINIC | Age: 86
End: 2021-02-03

## 2021-02-03 DIAGNOSIS — Z53.9 DIAGNOSIS NOT YET DEFINED: Primary | ICD-10-CM

## 2021-02-03 PROCEDURE — G0180 MD CERTIFICATION HHA PATIENT: HCPCS | Performed by: INTERNAL MEDICINE

## 2021-02-03 NOTE — TELEPHONE ENCOUNTER
Reason for Call:  Form, our goal is to have forms completed with 72 hours, however, some forms may require a visit or additional information.    Type of letter, form or note:  Home Health Certification    Who is the form from?: Home care    Where did the form come from: form was faxed in    What clinic location was the form placed at?: [facility]    Where the form was placed: Given to MA/RN    What number is listed as a contact on the form?: [phone]       Additional comments:     Call taken on 2/3/2021 at 2:00 PM by Gifty Bush

## 2021-02-08 ENCOUNTER — HOSPITAL LABORATORY (OUTPATIENT)
Facility: OTHER | Age: 86
End: 2021-02-08

## 2021-02-08 ENCOUNTER — ANTICOAGULATION THERAPY VISIT (OUTPATIENT)
Dept: ANTICOAGULATION | Facility: CLINIC | Age: 86
End: 2021-02-08

## 2021-02-08 DIAGNOSIS — I48.92 ATRIAL FLUTTER, UNSPECIFIED TYPE (H): ICD-10-CM

## 2021-02-08 DIAGNOSIS — Z79.01 LONG TERM CURRENT USE OF ANTICOAGULANT THERAPY: ICD-10-CM

## 2021-02-08 DIAGNOSIS — I50.40 COMBINED SYSTOLIC AND DIASTOLIC CONGESTIVE HEART FAILURE, UNSPECIFIED HF CHRONICITY (H): ICD-10-CM

## 2021-02-08 DIAGNOSIS — I48.21 PERMANENT ATRIAL FIBRILLATION (H): ICD-10-CM

## 2021-02-08 LAB — INR PPP: 2.53 (ref 0.86–1.14)

## 2021-02-08 NOTE — PROGRESS NOTES
ANTICOAGULATION MANAGEMENT     Patient Name:  Miguel Patten  Date:  2021    ASSESSMENT /SUBJECTIVE:    Today's INR result of 2.53 is therapeutic. Goal INR of 2.0-3.0      Warfarin dose taken: VM left for [facility] nurse     Diet: VM left for [facility] nurse     Medication changes/ interactions: VM left for [facility] nurse     Previous INR: Therapeutic     S/S of bleeding or thromboembolism: VM left for [facility] nurse     New injury or illness: VM left for [facility] nurse     Upcoming surgery, procedure or cardioversion: VM left for [facility] nurse     Additional findings: VM left for [facility] nurse       PLAN:    Detailed message left for Courtney Kingfisher nurse at [facility] [phone] facility regarding INR result and instructed:     Warfarin Dosing Instructions: Continue your current warfarin dose 2.5 mg daily     Instructed patient to follow up no later than: 3 weeks  Orders given to  Homecare nurse/facility to recheck    Education provided: Please call back if any changes to your diet, medications or how you've been taking warfarin    I left a detailed voicemail with the orders reflected in flowsheet. I have also requested a call back if there have been any missed doses, concerns, illness, fever, or if there have been any changes in medications, activity level, or diet      Instructed to call the Anticoagulation Clinic for any changes, questions or concerns. (#[phone])        Ashly Jones RN      OBJECTIVE:  Recent labs: (last 7 days)     21  0650   INR 2.53*         INR assessment THER    Recheck INR In: 3 WEEKS    INR Location [facility]      Anticoagulation Summary  As of 2021    INR goal:  2.0-3.0   TTR:  58.9 % (1 y)   INR used for dosin.53 (2021)   Warfarin maintenance plan:  2.5 mg (2.5 mg x 1) every day   Full warfarin instructions:  2.5 mg every day   Weekly warfarin total:  17.5 mg   No change documented:  Ashly Jones RN   Plan last modified:  Robert  Ashly TORRE, RN (12/23/2020)   Next INR check:  3/1/2021   Priority:  Maintenance   Target end date:  Indefinite    Indications    CHF (congestive heart failure) (H) [I50.9]  Long-term (current) use of anticoagulants [Z79.01] [Z79.01]  Permanent atrial fibrillation (H) [I48.21]  Atrial flutter  unspecified type (H) [I48.92]             Anticoagulation Episode Summary     INR check location:      Preferred lab:      Send INR reminders to:  BUBBA MILLIGAN [place]AN[place]    Comments:  Courtney Waverly [phone] (verbal order/no fax or rx) 2.5 mg tabs      Anticoagulation Care Providers     Provider Role Specialty Phone number    Vladimir Gonzales DO Referring Internal Medicine [phone]    Virginia Cardona APRN CNP Referring Nurse Practitioner - Gerontology [phone]    Rober العراقي MD [facility] Family Medicine [phone]

## 2021-02-10 ENCOUNTER — TELEPHONE (OUTPATIENT)
Dept: GERIATRICS | Facility: CLINIC | Age: 86
End: 2021-02-10

## 2021-02-10 DIAGNOSIS — M62.81 GENERALIZED MUSCLE WEAKNESS: Primary | ICD-10-CM

## 2021-02-10 NOTE — TELEPHONE ENCOUNTER
Please send a copy of the wheeled walker prescription to the [facility] at [phone]      Thank you.    Christian

## 2021-02-10 NOTE — TELEPHONE ENCOUNTER
[facility] asking for orders for walker for patient.   Contact at home: [place] [phone]      4 wheel walker w/seat. Pended order for review and signature.     Send order to [facility]  Fax: [phone]

## 2021-03-01 ENCOUNTER — ANTICOAGULATION THERAPY VISIT (OUTPATIENT)
Dept: ANTICOAGULATION | Facility: CLINIC | Age: 86
End: 2021-03-01

## 2021-03-01 ENCOUNTER — HOSPITAL LABORATORY (OUTPATIENT)
Facility: OTHER | Age: 86
End: 2021-03-01

## 2021-03-01 DIAGNOSIS — I48.92 ATRIAL FLUTTER, UNSPECIFIED TYPE (H): ICD-10-CM

## 2021-03-01 DIAGNOSIS — Z79.01 LONG TERM CURRENT USE OF ANTICOAGULANT THERAPY: ICD-10-CM

## 2021-03-01 DIAGNOSIS — I50.40 COMBINED SYSTOLIC AND DIASTOLIC CONGESTIVE HEART FAILURE, UNSPECIFIED HF CHRONICITY (H): ICD-10-CM

## 2021-03-01 DIAGNOSIS — I48.21 PERMANENT ATRIAL FIBRILLATION (H): ICD-10-CM

## 2021-03-01 LAB — INR PPP: 2.48 (ref 0.86–1.14)

## 2021-03-01 NOTE — PROGRESS NOTES
ANTICOAGULATION MANAGEMENT     Patient Name:  Miguel Patten  Date:  3/1/2021    ASSESSMENT /SUBJECTIVE:    Today's INR result of 2.48 is therapeutic. Goal INR of 2.0-3.0      Warfarin dose taken: VM left    Diet: VM left    Medication changes/ interactions: Vm left    Previous INR: Therapeutic     S/S of bleeding or thromboembolism: VM left     New injury or illness: VM left    Upcoming surgery, procedure or cardioversion: VM left     Additional findings: VM left      PLAN:    Detailed message left for [facility] nurse [phone] nurse at [facility] regarding INR result and instructed:     Warfarin Dosing Instructions: Continue your current warfarin dose 2.5 mg daily    Instructed patient to follow up no later than: 4 weeks  Orders given to  Homecare nurse/facility to recheck    Education provided: Please call back if any changes to your diet, medications or how you've been taking warfarin    I left a detailed voicemail with the orders reflected in flowsheet. I have also requested a call back if there have been any missed doses, concerns, illness, fever, or if there have been any changes in medications, activity level, or diet      Instructed to call the Anticoagulation Clinic for any changes, questions or concerns. (#[phone])        Ashly Jones RN      OBJECTIVE:  Recent labs: (last 7 days)     21  0714   INR 2.48*         INR assessment THER    Recheck INR In: 4 WEEKS    INR Location [facility]      Anticoagulation Summary  As of 3/1/2021    INR goal:  2.0-3.0   TTR:  58.9 % (1 y)   INR used for dosin.48 (3/1/2021)   Warfarin maintenance plan:  2.5 mg (2.5 mg x 1) every day   Full warfarin instructions:  2.5 mg every day   Weekly warfarin total:  17.5 mg   No change documented:  Ashly Jones RN   Plan last modified:  Ashly Jones RN (2020)   Next INR check:  3/29/2021   Priority:  Maintenance   Target end date:  Indefinite    Indications    CHF (congestive heart  failure) (H) [I50.9]  Long-term (current) use of anticoagulants [Z79.01] [Z79.01]  Permanent atrial fibrillation (H) [I48.21]  Atrial flutter  unspecified type (H) [I48.92]             Anticoagulation Episode Summary     INR check location:      Preferred lab:      Send INR reminders to:  BUBBA MILLIGAN SHANNA    Comments:  Courtney Howells [phone] (verbal order/no fax or rx) 2.5 mg tabs      Anticoagulation Care Providers     Provider Role Specialty Phone number    Vladimir Gonzales DO Referring Internal Medicine [phone]    Virginia Cardona APRN CNP Referring Nurse Practitioner - Gerontology [phone]    Rober العراقي MD [facility] Family Medicine [phone]

## 2021-03-29 ENCOUNTER — TELEPHONE (OUTPATIENT)
Dept: GERIATRICS | Facility: CLINIC | Age: 86
End: 2021-03-29

## 2021-03-29 ENCOUNTER — HOSPITAL LABORATORY (OUTPATIENT)
Facility: OTHER | Age: 86
End: 2021-03-29

## 2021-03-29 ENCOUNTER — ANTICOAGULATION THERAPY VISIT (OUTPATIENT)
Dept: ANTICOAGULATION | Facility: CLINIC | Age: 86
End: 2021-03-29

## 2021-03-29 DIAGNOSIS — I48.21 PERMANENT ATRIAL FIBRILLATION (H): ICD-10-CM

## 2021-03-29 DIAGNOSIS — I50.40 COMBINED SYSTOLIC AND DIASTOLIC CONGESTIVE HEART FAILURE, UNSPECIFIED HF CHRONICITY (H): ICD-10-CM

## 2021-03-29 DIAGNOSIS — Z79.01 LONG TERM CURRENT USE OF ANTICOAGULANT THERAPY: ICD-10-CM

## 2021-03-29 DIAGNOSIS — I48.92 ATRIAL FLUTTER, UNSPECIFIED TYPE (H): ICD-10-CM

## 2021-03-29 DIAGNOSIS — H10.403 CHRONIC BACTERIAL CONJUNCTIVITIS OF BOTH EYES: Primary | ICD-10-CM

## 2021-03-29 LAB — INR PPP: 2.83 (ref 0.86–1.14)

## 2021-03-29 RX ORDER — CIPROFLOXACIN HYDROCHLORIDE 3.5 MG/ML
2 SOLUTION/ DROPS TOPICAL 4 TIMES DAILY
Qty: 2.8 ML | Refills: 0 | Status: SHIPPED | OUTPATIENT
Start: 2021-03-29 | End: 2021-04-05

## 2021-03-29 NOTE — PROGRESS NOTES
ANTICOAGULATION MANAGEMENT     Patient Name:  Miguel Patten  Date:  3/29/2021    ASSESSMENT /SUBJECTIVE:    Today's INR result of 2.83 is therapeutic. Goal INR of 2.0-3.0      Warfarin dose taken: Warfarin taken as instructed    Diet: No new diet changes affecting INR    Medication changes/ interactions: No new medications/supplements affecting INR    Previous INR: Therapeutic     S/S of bleeding or thromboembolism: No    New injury or illness: No    Upcoming surgery, procedure or cardioversion: No    Additional findings: None      PLAN:    Telephone call with a nurse at [facility] regarding INR result and instructed:     Warfarin Dosing Instructions: Continue your current warfarin dose 2.5 mg daily    Instructed patient to follow up no later than: 4 weeks  Orders given to  Homecare nurse/facility to recheck    Education provided: None required        Instructed to call the Anticoagulation Clinic for any changes, questions or concerns. (#[phone])        Sameera Musa RN      OBJECTIVE:  Recent labs: (last 7 days)     21  0640   INR 2.83*         INR assessment THER    Recheck INR In: 4 WEEKS    INR Location [facility]      Anticoagulation Summary  As of 3/29/2021    INR goal:  2.0-3.0   TTR:  58.9 % (1 y)   INR used for dosin.83 (3/29/2021)   Warfarin maintenance plan:  2.5 mg (2.5 mg x 1) every day   Full warfarin instructions:  2.5 mg every day   Weekly warfarin total:  17.5 mg   No change documented:  Sameera Musa, RN   Plan last modified:  Ashly Jones RN (2020)   Next INR check:  2021   Priority:  Maintenance   Target end date:  Indefinite    Indications    CHF (congestive heart failure) (H) [I50.9]  Long-term (current) use of anticoagulants [Z79.01] [Z79.01]  Permanent atrial fibrillation (H) [I48.21]  Atrial flutter  unspecified type (H) [I48.92]             Anticoagulation Episode Summary     INR check location:      Preferred lab:      [facility]:   [facility] [place]    Comments:  [facility] [phone] (verbal order/no fax or rx) 2.5 mg tabs      Anticoagulation Care Providers     Provider Role Specialty Phone number    Vladimir Gonzales DO Referring Internal Medicine [phone]    Virginia Cardona APRN CNP Referring Nurse Practitioner - Gerontology [phone]    Rober العراقي MD [facility] Family Medicine [phone]

## 2021-03-29 NOTE — TELEPHONE ENCOUNTER
"[facility] BRIDGE COMMUNICATION DOCUMENTATION ENCOUNTER    Miguel Patten is a 88 year old  (12/16/1932), Nurse messaged [facility] via the \"Bridge\" today to report:   03/29/2021 8:26:29 AM - By: Serina Cabral       Good Morning:  Resident R eye sclera is red, greenish drainage. Looks like pink eye. Can I get some drop orders? Do you normally treat both eyes? Let me know what you want to do. Thanks, Serina Field  ----------------------------------------------------------------    ASSESSMENT/PLAN  Chronic bacterial conjunctivitis of both eyes    - ciprofloxacin (CILOXAN) 0.3 % ophthalmic solution; Place 2 drops into R eye BID x 7 days.   Start L eye if/when L eye has symptoms of conjunctivitis  4 times daily for 7 days    Electronically signed by:   PHANI Quintero CNP    "

## 2021-04-21 ENCOUNTER — ANCILLARY PROCEDURE (OUTPATIENT)
Dept: CARDIOLOGY | Facility: CLINIC | Age: 86
End: 2021-04-21
Attending: INTERNAL MEDICINE
Payer: MEDICARE

## 2021-04-21 DIAGNOSIS — I49.5 SICK SINUS SYNDROME (H): ICD-10-CM

## 2021-04-21 DIAGNOSIS — Z95.0 CARDIAC PACEMAKER IN SITU: Primary | ICD-10-CM

## 2021-04-21 LAB
MDC_IDC_EPISODE_DTM: NORMAL
MDC_IDC_EPISODE_DURATION: 6 S
MDC_IDC_EPISODE_DURATION: 7 S
MDC_IDC_EPISODE_ID: NORMAL
MDC_IDC_EPISODE_TYPE: NORMAL
MDC_IDC_LEAD_IMPLANT_DT: NORMAL
MDC_IDC_LEAD_IMPLANT_DT: NORMAL
MDC_IDC_LEAD_LOCATION: NORMAL
MDC_IDC_LEAD_LOCATION: NORMAL
MDC_IDC_LEAD_LOCATION_DETAIL_1: NORMAL
MDC_IDC_LEAD_LOCATION_DETAIL_1: NORMAL
MDC_IDC_LEAD_MFG: NORMAL
MDC_IDC_LEAD_MFG: NORMAL
MDC_IDC_LEAD_MODEL: NORMAL
MDC_IDC_LEAD_MODEL: NORMAL
MDC_IDC_LEAD_POLARITY_TYPE: NORMAL
MDC_IDC_LEAD_POLARITY_TYPE: NORMAL
MDC_IDC_LEAD_SERIAL: NORMAL
MDC_IDC_LEAD_SERIAL: NORMAL
MDC_IDC_MSMT_BATTERY_DTM: NORMAL
MDC_IDC_MSMT_BATTERY_REMAINING_LONGEVITY: 48 MO
MDC_IDC_MSMT_BATTERY_REMAINING_PERCENTAGE: 70 %
MDC_IDC_MSMT_BATTERY_STATUS: NORMAL
MDC_IDC_MSMT_LEADCHNL_LV_IMPEDANCE_VALUE: 582 OHM
MDC_IDC_MSMT_LEADCHNL_LV_PACING_THRESHOLD_AMPLITUDE: 1.7 V
MDC_IDC_MSMT_LEADCHNL_LV_PACING_THRESHOLD_PULSEWIDTH: 1 MS
MDC_IDC_MSMT_LEADCHNL_RV_IMPEDANCE_VALUE: 494 OHM
MDC_IDC_PG_IMPLANT_DTM: NORMAL
MDC_IDC_PG_MFG: NORMAL
MDC_IDC_PG_MODEL: NORMAL
MDC_IDC_PG_SERIAL: NORMAL
MDC_IDC_PG_TYPE: NORMAL
MDC_IDC_SESS_CLINIC_NAME: NORMAL
MDC_IDC_SESS_DTM: NORMAL
MDC_IDC_SESS_TYPE: NORMAL
MDC_IDC_SET_BRADY_AT_MODE_SWITCH_RATE: 170 {BEATS}/MIN
MDC_IDC_SET_BRADY_LOWRATE: 60 {BEATS}/MIN
MDC_IDC_SET_BRADY_MAX_SENSOR_RATE: 120 {BEATS}/MIN
MDC_IDC_SET_BRADY_MODE: NORMAL
MDC_IDC_SET_CRT_LVRV_DELAY: 0 MS
MDC_IDC_SET_CRT_PACED_CHAMBERS: NORMAL
MDC_IDC_SET_LEADCHNL_LV_PACING_AMPLITUDE: 2.6 V
MDC_IDC_SET_LEADCHNL_LV_PACING_ANODE_ELECTRODE_1: NORMAL
MDC_IDC_SET_LEADCHNL_LV_PACING_ANODE_LOCATION_1: NORMAL
MDC_IDC_SET_LEADCHNL_LV_PACING_CATHODE_ELECTRODE_1: NORMAL
MDC_IDC_SET_LEADCHNL_LV_PACING_CATHODE_LOCATION_1: NORMAL
MDC_IDC_SET_LEADCHNL_LV_PACING_PULSEWIDTH: 0.7 MS
MDC_IDC_SET_LEADCHNL_LV_SENSING_ADAPTATION_MODE: NORMAL
MDC_IDC_SET_LEADCHNL_LV_SENSING_ANODE_ELECTRODE_1: NORMAL
MDC_IDC_SET_LEADCHNL_LV_SENSING_ANODE_LOCATION_1: NORMAL
MDC_IDC_SET_LEADCHNL_LV_SENSING_CATHODE_ELECTRODE_1: NORMAL
MDC_IDC_SET_LEADCHNL_LV_SENSING_CATHODE_LOCATION_1: NORMAL
MDC_IDC_SET_LEADCHNL_LV_SENSING_SENSITIVITY: 2.5 MV
MDC_IDC_SET_LEADCHNL_RA_SENSING_ADAPTATION_MODE: NORMAL
MDC_IDC_SET_LEADCHNL_RA_SENSING_SENSITIVITY: 0.5 MV
MDC_IDC_SET_LEADCHNL_RV_PACING_AMPLITUDE: 2.4 V
MDC_IDC_SET_LEADCHNL_RV_PACING_CAPTURE_MODE: NORMAL
MDC_IDC_SET_LEADCHNL_RV_PACING_POLARITY: NORMAL
MDC_IDC_SET_LEADCHNL_RV_PACING_PULSEWIDTH: 0.5 MS
MDC_IDC_SET_LEADCHNL_RV_SENSING_ADAPTATION_MODE: NORMAL
MDC_IDC_SET_LEADCHNL_RV_SENSING_POLARITY: NORMAL
MDC_IDC_SET_LEADCHNL_RV_SENSING_SENSITIVITY: 2.5 MV
MDC_IDC_SET_ZONE_DETECTION_INTERVAL: 375 MS
MDC_IDC_SET_ZONE_TYPE: NORMAL
MDC_IDC_SET_ZONE_VENDOR_TYPE: NORMAL
MDC_IDC_STAT_BRADY_DTM_END: NORMAL
MDC_IDC_STAT_BRADY_DTM_START: NORMAL
MDC_IDC_STAT_BRADY_RA_PERCENT_PACED: 0 %
MDC_IDC_STAT_BRADY_RV_PERCENT_PACED: 99 %
MDC_IDC_STAT_CRT_DTM_END: NORMAL
MDC_IDC_STAT_CRT_DTM_START: NORMAL
MDC_IDC_STAT_CRT_LV_PERCENT_PACED: 99 %
MDC_IDC_STAT_EPISODE_RECENT_COUNT: 0
MDC_IDC_STAT_EPISODE_RECENT_COUNT: 38
MDC_IDC_STAT_EPISODE_RECENT_COUNT_DTM_END: NORMAL
MDC_IDC_STAT_EPISODE_RECENT_COUNT_DTM_START: NORMAL
MDC_IDC_STAT_EPISODE_TYPE: NORMAL
MDC_IDC_STAT_EPISODE_VENDOR_TYPE: NORMAL

## 2021-04-21 PROCEDURE — 93296 REM INTERROG EVL PM/IDS: CPT | Performed by: INTERNAL MEDICINE

## 2021-04-21 PROCEDURE — 93294 REM INTERROG EVL PM/LDLS PM: CPT | Performed by: INTERNAL MEDICINE

## 2021-04-23 ENCOUNTER — INFUSION THERAPY VISIT (OUTPATIENT)
Dept: INFUSION THERAPY | Facility: CLINIC | Age: 86
End: 2021-04-23
Attending: INTERNAL MEDICINE
Payer: MEDICARE

## 2021-04-23 VITALS
WEIGHT: 179.2 LBS | SYSTOLIC BLOOD PRESSURE: 118 MMHG | HEART RATE: 60 BPM | RESPIRATION RATE: 18 BRPM | BODY MASS INDEX: 23.64 KG/M2 | TEMPERATURE: 97.8 F | OXYGEN SATURATION: 100 % | DIASTOLIC BLOOD PRESSURE: 73 MMHG

## 2021-04-23 DIAGNOSIS — C61 MALIGNANT NEOPLASM OF PROSTATE (H): Primary | ICD-10-CM

## 2021-04-23 DIAGNOSIS — C61 MALIGNANT NEOPLASM OF PROSTATE (H): ICD-10-CM

## 2021-04-23 PROCEDURE — 96402 CHEMO HORMON ANTINEOPL SQ/IM: CPT

## 2021-04-23 PROCEDURE — 250N000011 HC RX IP 250 OP 636: Performed by: INTERNAL MEDICINE

## 2021-04-23 RX ORDER — HEPARIN SODIUM (PORCINE) LOCK FLUSH IV SOLN 100 UNIT/ML 100 UNIT/ML
5 SOLUTION INTRAVENOUS
Status: CANCELLED | OUTPATIENT
Start: 2021-04-23

## 2021-04-23 RX ORDER — HEPARIN SODIUM,PORCINE 10 UNIT/ML
5 VIAL (ML) INTRAVENOUS
Status: CANCELLED | OUTPATIENT
Start: 2021-04-23

## 2021-04-23 RX ADMIN — LEUPROLIDE ACETATE 22.5 MG: KIT SUBCUTANEOUS at 09:19

## 2021-04-23 ASSESSMENT — PAIN SCALES - GENERAL: PAINLEVEL: NO PAIN (0)

## 2021-04-23 NOTE — PROGRESS NOTES
Infusion Nursing Note:  Miguel Patten presents today for Eligard.   Patient seen by provider today: No   present during visit today: Not Applicable.    Note: Patient arrives from [facility], ambulatory with cane. Denies pain or new,concerning symptoms. Hx Asthma but states it is well controlled, uses inhaler prn. SOA w/ moderate exertion. Hx Aflutter, pacemaker. Doing well. VSS, afebrile.   Patient did meet criteria for an asymptomatic covid-19 PCR test in infusion today. Patient declined the covid-19 test.    Intravenous Access:  No Intravenous access/labs at this visit.    Treatment Conditions:  Not Applicable.      Post Infusion Assessment:  Patient tolerated injection without incident.  No bleeding or swelling noted. Patient denies pain.        Discharge Plan:   Copy of AVS reviewed with patient and/or family.  Patient will return 10/26 for next appointment.  Patient discharged in stable condition accompanied by: self.  Departure Mode: Ambulatory w/cane.    Stacia Gibson RN

## 2021-04-26 ENCOUNTER — ANTICOAGULATION THERAPY VISIT (OUTPATIENT)
Dept: ANTICOAGULATION | Facility: CLINIC | Age: 86
End: 2021-04-26

## 2021-04-26 ENCOUNTER — HOSPITAL LABORATORY (OUTPATIENT)
Facility: OTHER | Age: 86
End: 2021-04-26

## 2021-04-26 DIAGNOSIS — I48.92 ATRIAL FLUTTER, UNSPECIFIED TYPE (H): ICD-10-CM

## 2021-04-26 DIAGNOSIS — I50.40 COMBINED SYSTOLIC AND DIASTOLIC CONGESTIVE HEART FAILURE, UNSPECIFIED HF CHRONICITY (H): ICD-10-CM

## 2021-04-26 DIAGNOSIS — Z79.01 LONG TERM CURRENT USE OF ANTICOAGULANT THERAPY: ICD-10-CM

## 2021-04-26 DIAGNOSIS — I48.21 PERMANENT ATRIAL FIBRILLATION (H): ICD-10-CM

## 2021-04-26 LAB — INR PPP: 2.97 (ref 0.86–1.14)

## 2021-04-26 RX ORDER — LEUPROLIDE ACETATE 22.5 MG
KIT INTRAMUSCULAR
Qty: 1 EACH | Refills: 3 | Status: SHIPPED | OUTPATIENT
Start: 2021-04-26 | End: 2021-10-26

## 2021-04-26 NOTE — PROGRESS NOTES
ANTICOAGULATION MANAGEMENT     Patient Name:  Miguel Patten  Date:  2021    ASSESSMENT /SUBJECTIVE:    Today's INR result of 2.97 is therapeutic. Goal INR of 2.0-3.0      Warfarin dose taken: Warfarin taken as instructed    Diet: No new diet changes affecting INR    Medication changes/ interactions: No new medications/supplements affecting INR    Previous INR: Therapeutic     S/S of bleeding or thromboembolism: No    New injury or illness: No    Upcoming surgery, procedure or cardioversion: No    Additional findings: None      PLAN:    Telephone call with Blue nurse at [facility] regarding INR result and instructed:     Warfarin Dosing Instructions: Continue your current warfarin dose 2.5 mg daily    Instructed patient to follow up no later than: 4 weeks  Orders given to  Homecare nurse/facility to recheck    Education provided: None required        Instructed to call the Anticoagulation Clinic for any changes, questions or concerns. (#[phone])        Sameera Musa RN      OBJECTIVE:  Recent labs: (last 7 days)     21  0650   INR 2.97*         INR assessment THER    Recheck INR In: 4 WEEKS    INR Location [facility]      Anticoagulation Summary  As of 2021    INR goal:  2.0-3.0   TTR:  63.7 % (1 y)   INR used for dosin.97 (2021)   Warfarin maintenance plan:  2.5 mg (2.5 mg x 1) every day   Full warfarin instructions:  2.5 mg every day   Weekly warfarin total:  17.5 mg   No change documented:  Sameera Musa, RN   Plan last modified:  Ashly Jones RN (2020)   Next INR check:  2021   Priority:  Maintenance   Target end date:  Indefinite    Indications    CHF (congestive heart failure) (H) [I50.9]  Long-term (current) use of anticoagulants [Z79.01] [Z79.01]  Permanent atrial fibrillation (H) [I48.21]  Atrial flutter  unspecified type (H) [I48.92]             Anticoagulation Episode Summary     INR check location:      Preferred lab:      [facility] [place]    Comments:  [facility] [phone] (verbal order/no fax or rx) 2.5 mg tabs      Anticoagulation Care Providers     Provider Role Specialty Phone number    Vladimir Gonzales DO Referring Internal Medicine [phone]    Virginia Cardona APRN CNP Referring Nurse Practitioner - Gerontology [phone]    Rober العراقي MD [facility] Family Medicine [phone]

## 2021-04-30 ENCOUNTER — ASSISTED LIVING VISIT (OUTPATIENT)
Dept: GERIATRICS | Facility: CLINIC | Age: 86
End: 2021-04-30
Payer: MEDICARE

## 2021-04-30 VITALS
HEART RATE: 64 BPM | DIASTOLIC BLOOD PRESSURE: 72 MMHG | TEMPERATURE: 98.3 F | BODY MASS INDEX: 23.14 KG/M2 | SYSTOLIC BLOOD PRESSURE: 129 MMHG | OXYGEN SATURATION: 97 % | WEIGHT: 175.4 LBS

## 2021-04-30 DIAGNOSIS — C61 MALIGNANT NEOPLASM OF PROSTATE (H): ICD-10-CM

## 2021-04-30 DIAGNOSIS — I10 ESSENTIAL HYPERTENSION: ICD-10-CM

## 2021-04-30 DIAGNOSIS — Z95.2 S/P AVR (AORTIC VALVE REPLACEMENT): ICD-10-CM

## 2021-04-30 DIAGNOSIS — I48.20 CHRONIC ATRIAL FIBRILLATION (H): Primary | ICD-10-CM

## 2021-04-30 NOTE — PROGRESS NOTES
[facility]    Chief Complaint   Patient presents with     Atrial Fib     HPI:    Miguel Patten is a 88 year old  (12/16/1932), who is being seen today for an episodic care visit at: [facility] ([facility]) [[id number]] He has [facility] living at the [facility] since 12/2020 and needed onsite provider care given the difficulty getting to the facility. He is on coumadin for A fib and AVR managed by the coumadin clinic.    Noted most recent medical events:  11/13-19/20 [facility] after a fall at home with w/u + mild rhabdo, supratheraputic INR, found to be + for COVID and was started on Dexamethasone and remdesivir, O2. He rehabbed at [facility]  12/22: R elbow cellulitis - treated with keflex 500 QID x 7 days  12/29: PT/OT referral - noted latitude monitor for PPM now set up  3/29: R eye conjunctivitis - treated with cipro gtts.     F.u today for a fib - rate/bp. Today he notes he is doing quite well and has no complaints.   He is planning on getting his COVID vaccine soon, trying to separate that with lupron injections.     PMH/PSH reviewed in [employer] today.  REVIEW OF SYSTEMS:  4 point ROS including Respiratory, CV, GI and , other than that noted in the HPI,  is negative    EXAM:  /72   Pulse 64   Temp 98.3  F (36.8  C)   Wt 79.6 kg (175 lb 6.4 oz)   SpO2 97%   BMI 23.14 kg/m    GENERAL APPEARANCE:  Alert, in no distress  RESP:  respiratory effort and palpation of chest normal, auscultation of lungs clear , no respiratory distress  CV:  Palpation and auscultation of heart done , rate and rhythm reg, no murmur, no peripheral edema  ABDOMEN:  normal bowel sounds, soft, nontender, no hepatosplenomegaly or other masses  M/S:   Gait and station indep with 4 WW, Digits and nails intact  SKIN:  Inspection and Palpation of skin and subcutaneous tissue intact  NEURO: 2-12 grossly normal  PSYCH:  insight and judgement, memory intact , affect and mood Pleasant     Recent labs in [facility] reviewed by me today.  and    Most Recent 3 CBC's:  Recent Labs   Lab Test 11/12/20 2051 07/01/20  0852 05/21/19  1501   WBC 8.9 7.4 5.2   HGB 12.7* 13.5 13.7   MCV 94 98 96    144* 133*     Most Recent 3 BMP's:  Recent Labs   Lab Test 11/13/20  0545 11/12/20 2051 07/01/20  0852    140 139   POTASSIUM 3.8 3.7 4.1   CHLORIDE 107 105 107   CO2 22 23 27   BUN 26 31* 17   CR 0.85 0.96 0.80   ANIONGAP 12 12 5   LIDIA 8.4* 8.6 9.1   GLC 91 107* 103*     Liver Function Studies -   Recent Labs   Lab Test 11/12/20 2051   PROTTOTAL 6.7*   ALBUMIN 3.3*   BILITOTAL 1.3   ALKPHOS 39*   AST 68*   ALT 38       Noted no recent TSH - but asymptomatic  Noted no recent A1C, but all gluc on BMP stable.     Assessment/Plan:  Malignant neoplasm of prostate (H)  Cont lupron injections -     Chronic atrial fibrillation (H) and S/P AVR (aortic valve replacement)  INR well managed by the coumadin clinic    Essential hypertension  Well controlled with ARB- noted sBP today 132 -   Could decrease ARB if desired as questionable benefit of keeping it at 120's, but today - won't make changes - f/u next visit.     Plan on recheck in labs in Oct/Nov 2021 of HGB, BMP and LFTs given coumadin/ARB, statin respectively.     No new orders today.     Electronically signed by:  Virginia Cardona, PHANI CNP

## 2021-05-24 ENCOUNTER — HOSPITAL LABORATORY (OUTPATIENT)
Facility: OTHER | Age: 86
End: 2021-05-24

## 2021-05-24 ENCOUNTER — ANTICOAGULATION THERAPY VISIT (OUTPATIENT)
Dept: ANTICOAGULATION | Facility: CLINIC | Age: 86
End: 2021-05-24

## 2021-05-24 DIAGNOSIS — I48.21 PERMANENT ATRIAL FIBRILLATION (H): ICD-10-CM

## 2021-05-24 DIAGNOSIS — Z79.01 LONG TERM CURRENT USE OF ANTICOAGULANT THERAPY: ICD-10-CM

## 2021-05-24 DIAGNOSIS — I48.92 ATRIAL FLUTTER, UNSPECIFIED TYPE (H): ICD-10-CM

## 2021-05-24 DIAGNOSIS — I50.40 COMBINED SYSTOLIC AND DIASTOLIC CONGESTIVE HEART FAILURE, UNSPECIFIED HF CHRONICITY (H): ICD-10-CM

## 2021-05-24 LAB — INR PPP: 2.36 (ref 0.86–1.14)

## 2021-05-24 NOTE — PROGRESS NOTES
ANTICOAGULATION MANAGEMENT     Patient Name:  Miguel Patten  Date:  2021    ASSESSMENT /SUBJECTIVE:    Today's INR result of 2.36 is therapeutic. Goal INR of 2.0-3.0      Warfarin dose taken: Warfarin taken as instructed    Diet: No new diet changes affecting INR    Medication changes/ interactions: No new medications/supplements affecting INR    Previous INR: Therapeutic     S/S of bleeding or thromboembolism: No    New injury or illness: No    Upcoming surgery, procedure or cardioversion: No    Additional findings: None      PLAN:    Telephone call with Blue fisher at [facility] regarding INR result and instructed:     Warfarin Dosing Instructions: Continue your current warfarin dose 2.5 mg daily    Instructed patient to follow up no later than: 4 weeks  Orders given to  Homecare nurse/facility to recheck    Education provided: Please call back if any changes to your diet, medications or how you've been taking warfarin      Serina Oliva ([facility]) verbalizes understanding and agrees to warfarin dosing plan.    Instructed to call the Anticoagulation Clinic for any changes, questions or concerns. (#[phone])        Kerwin Faustin RN      OBJECTIVE:  Recent labs: (last 7 days)     21  0650   INR 2.36*         No question data found.  Anticoagulation Summary  As of 2021    INR goal:  2.0-3.0   TTR:  71.4 % (1 y)   INR used for dosin.36 (2021)   Warfarin maintenance plan:  2.5 mg (2.5 mg x 1) every day   Full warfarin instructions:  2.5 mg every day   Weekly warfarin total:  17.5 mg   No change documented:  Kerwin Faustin RN   Plan last modified:  Ashly Jones RN (2020)   Next INR check:  2021   Priority:  Maintenance   Target end date:  Indefinite    Indications    CHF (congestive heart failure) (H) [I50.9]  Long-term (current) use of anticoagulants [Z79.01] [Z79.01]  Permanent atrial fibrillation (H) [I48.21]  Atrial flutter  unspecified type (H)  [I48.92]             Anticoagulation Episode Summary     INR check location:      Preferred lab:      Send INR reminders to:  [facility] [place]    Comments:  [facility] [place] [phone] (verbal order/no fax or rx) 2.5 mg tabs      Anticoagulation Care Providers     Provider Role Specialty Phone number    Vladimir Gonzales DO Referring Internal Medicine [phone]    Virginia Cardona APRN CNP Referring Nurse Practitioner - Gerontology [phone]    Rober العراقي MD [facility] Family Medicine [phone]

## 2021-06-21 ENCOUNTER — HOSPITAL LABORATORY (OUTPATIENT)
Facility: OTHER | Age: 86
End: 2021-06-21

## 2021-06-21 ENCOUNTER — ANTICOAGULATION THERAPY VISIT (OUTPATIENT)
Dept: ANTICOAGULATION | Facility: CLINIC | Age: 86
End: 2021-06-21

## 2021-06-21 DIAGNOSIS — I50.40 COMBINED SYSTOLIC AND DIASTOLIC CONGESTIVE HEART FAILURE, UNSPECIFIED HF CHRONICITY (H): ICD-10-CM

## 2021-06-21 DIAGNOSIS — I48.92 ATRIAL FLUTTER, UNSPECIFIED TYPE (H): ICD-10-CM

## 2021-06-21 DIAGNOSIS — Z79.01 LONG TERM CURRENT USE OF ANTICOAGULANT THERAPY: ICD-10-CM

## 2021-06-21 DIAGNOSIS — I48.21 PERMANENT ATRIAL FIBRILLATION (H): ICD-10-CM

## 2021-06-21 LAB — INR PPP: 2.2 (ref 0.86–1.14)

## 2021-06-21 NOTE — PROGRESS NOTES
ANTICOAGULATION MANAGEMENT     Patient Name:  Miguel Patten  Date:  2021    ASSESSMENT /SUBJECTIVE:    Today's INR result of 2.20 is therapeutic. Goal INR of 2.0-3.0      Warfarin dose taken: VM left    Diet: VM left    Medication changes/ interactions: VM left    Previous INR: Therapeutic     S/S of bleeding or thromboembolism: VM left    New injury or illness: VM left    Upcoming surgery, procedure or cardioversion: VM left    Additional findings: VM left      Orders faxed, per Tank DIAZ at [facility], to [phone]      PLAN:    Warfarin Dosing Instructions: Continue your current warfarin dose 2.5 mg daily    Instructed patient to follow up no later than: 5 weeks  Orders given to  Homecare nurse/facility to recheck    Education provided: Please call back if any changes to your diet, medications or how you've been taking warfarin    Detailed voice message left for [facility]  nurse at [facility] [phone] facility with dosing instructions and follow up date.        Instructed to call the Anticoagulation Clinic for any changes, questions or concerns. (#[phone])        Ashly Jones RN      OBJECTIVE:  Recent labs: (last 7 days)     21  0645   INR 2.20*         INR assessment THER    Recheck INR In: 5 WEEKS    INR Location [facility]      Anticoagulation Summary  As of 2021    INR goal:  2.0-3.0   TTR:  76.2 % (1 y)   INR used for dosin.20 (2021)   Warfarin maintenance plan:  2.5 mg (2.5 mg x 1) every day   Full warfarin instructions:  2.5 mg every day   Weekly warfarin total:  17.5 mg   No change documented:  Ashly Jones RN   Plan last modified:  Ashly Jones RN (2020)   Next INR check:  2021   Priority:  Maintenance   Target end date:  Indefinite    Indications    CHF (congestive heart failure) (H) [I50.9]  Long-term (current) use of anticoagulants [Z79.01] [Z79.01]  Permanent atrial fibrillation (H) [I48.21]  Atrial flutter  unspecified type (H)  [I48.92]             Anticoagulation Episode Summary     INR check location:      Preferred lab:      Send INR reminders to:  [facility]    Comments:  Courtney [place] [phone] (verbal order/no fax or rx) 2.5 mg tabs      Anticoagulation Care Providers     Provider Role Specialty Phone number    Vladimir Gonzales DO Referring Internal Medicine [phone]    Virginia Cardona APRN CNP Referring Nurse Practitioner - Gerontology [phone]    Rober العراقي MD [facility] Family Medicine [phone]

## 2021-07-22 ENCOUNTER — ANCILLARY PROCEDURE (OUTPATIENT)
Dept: CARDIOLOGY | Facility: CLINIC | Age: 86
End: 2021-07-22
Attending: INTERNAL MEDICINE
Payer: MEDICARE

## 2021-07-22 DIAGNOSIS — I49.5 SICK SINUS SYNDROME (H): ICD-10-CM

## 2021-07-22 DIAGNOSIS — Z95.0 CARDIAC PACEMAKER IN SITU: ICD-10-CM

## 2021-07-22 PROCEDURE — 93281 PM DEVICE PROGR EVAL MULTI: CPT | Performed by: INTERNAL MEDICINE

## 2021-07-25 ENCOUNTER — LAB REQUISITION (OUTPATIENT)
Dept: LAB | Facility: CLINIC | Age: 86
End: 2021-07-25
Payer: MEDICARE

## 2021-07-25 DIAGNOSIS — Z79.899 OTHER LONG TERM (CURRENT) DRUG THERAPY: ICD-10-CM

## 2021-07-26 ENCOUNTER — ANTICOAGULATION THERAPY VISIT (OUTPATIENT)
Dept: GERIATRICS | Facility: CLINIC | Age: 86
End: 2021-07-26

## 2021-07-26 DIAGNOSIS — I48.92 ATRIAL FLUTTER, UNSPECIFIED TYPE (H): ICD-10-CM

## 2021-07-26 DIAGNOSIS — I48.21 PERMANENT ATRIAL FIBRILLATION (H): ICD-10-CM

## 2021-07-26 DIAGNOSIS — Z79.01 LONG TERM CURRENT USE OF ANTICOAGULANT THERAPY: Primary | ICD-10-CM

## 2021-07-26 LAB — INR PPP: 1.85 (ref 0.85–1.15)

## 2021-07-26 PROCEDURE — 36415 COLL VENOUS BLD VENIPUNCTURE: CPT | Mod: ORL | Performed by: NURSE PRACTITIONER

## 2021-07-26 PROCEDURE — 85610 PROTHROMBIN TIME: CPT | Mod: ORL | Performed by: NURSE PRACTITIONER

## 2021-07-26 PROCEDURE — P9604 ONE-WAY ALLOW PRORATED TRIP: HCPCS | Mod: ORL | Performed by: NURSE PRACTITIONER

## 2021-07-26 NOTE — PROGRESS NOTES
ANTICOAGULATION MANAGEMENT     Miguel Patten 88 year old male is on warfarin with subtherapeutic INR result. (Goal INR 2.0-3.0)    Recent labs: (last 7 days)     07/26/21  0700   INR 1.85*       ASSESSMENT     Source(s): Chart Review and Home Care/Facility Nurse       Warfarin doses taken: Warfarin taken as instructed    Diet: No new diet changes identified    New illness, injury, or hospitalization: No    Medication/supplement changes: None noted    Signs or symptoms of bleeding or clotting: No    Previous INR: Therapeutic last 2(+) visits    Additional findings: None     PLAN     Recommended plan for no diet, medication or health factor changes affecting INR     Dosing Instructions: Continue your current warfarin dose with next INR in 2 weeks       Summary  As of 7/26/2021    Full warfarin instructions:  2.5 mg every day   Next INR check:  8/9/2021             Detailed voice message left for Vanessa nurse at [facility] facility with dosing instructions and follow up date.   Also faxed AVS to Ellie [place]    Orders given to  Homecare nurse/facility to recheck    Education provided: Please call back if any changes to your diet, medications or how you've been taking warfarin    Plan made per [facility] anticoagulation protocol    Luz Carey, RN  Anticoagulation Clinic  7/26/2021    _______________________________________________________________________     Anticoagulation Episode Summary     Current INR goal:  2.0-3.0   TTR:  79.0 % (1 y)   Target end date:  Indefinite   Send INR reminders to:  [facility] [place]    Indications    CHF (congestive heart failure) (H) [I50.9]  Long-term (current) use of anticoagulants [Z79.01] [Z79.01]  Permanent atrial fibrillation (H) [I48.21]  Atrial flutter  unspecified type (H) [I48.92]           Comments:  Courtney [facility] [phone], fax orders to [phone], 2.5 mg tabs         Anticoagulation Care Providers     Provider Role Specialty Phone number    Vladimir Gonzales DO  Referring Internal Medicine [phone]    Virginia Cardona APRN CNP Referring Nurse Practitioner - Gerontology [phone]    Rober العراقي MD [facility] Family Medicine [phone]

## 2021-08-03 LAB
MDC_IDC_LEAD_IMPLANT_DT: NORMAL
MDC_IDC_LEAD_IMPLANT_DT: NORMAL
MDC_IDC_LEAD_LOCATION: NORMAL
MDC_IDC_LEAD_LOCATION: NORMAL
MDC_IDC_LEAD_LOCATION_DETAIL_1: NORMAL
MDC_IDC_LEAD_LOCATION_DETAIL_1: NORMAL
MDC_IDC_LEAD_MFG: NORMAL
MDC_IDC_LEAD_MFG: NORMAL
MDC_IDC_LEAD_MODEL: NORMAL
MDC_IDC_LEAD_MODEL: NORMAL
MDC_IDC_LEAD_POLARITY_TYPE: NORMAL
MDC_IDC_LEAD_POLARITY_TYPE: NORMAL
MDC_IDC_LEAD_SERIAL: NORMAL
MDC_IDC_LEAD_SERIAL: NORMAL
MDC_IDC_MSMT_BATTERY_STATUS: NORMAL
MDC_IDC_MSMT_LEADCHNL_LV_IMPEDANCE_VALUE: 597 OHM
MDC_IDC_MSMT_LEADCHNL_LV_PACING_THRESHOLD_AMPLITUDE: 2.1 V
MDC_IDC_MSMT_LEADCHNL_LV_PACING_THRESHOLD_PULSEWIDTH: 1 MS
MDC_IDC_MSMT_LEADCHNL_LV_SENSING_INTR_AMPL: 19 MV
MDC_IDC_MSMT_LEADCHNL_RA_IMPEDANCE_VALUE: 2500 OHM
MDC_IDC_MSMT_LEADCHNL_RV_IMPEDANCE_VALUE: 514 OHM
MDC_IDC_MSMT_LEADCHNL_RV_PACING_THRESHOLD_AMPLITUDE: 0.5 V
MDC_IDC_MSMT_LEADCHNL_RV_PACING_THRESHOLD_PULSEWIDTH: 0.5 MS
MDC_IDC_MSMT_LEADCHNL_RV_SENSING_INTR_AMPL: 2.4 MV
MDC_IDC_PG_IMPLANT_DTM: NORMAL
MDC_IDC_PG_MFG: NORMAL
MDC_IDC_PG_MODEL: NORMAL
MDC_IDC_PG_SERIAL: NORMAL
MDC_IDC_PG_TYPE: NORMAL
MDC_IDC_SESS_CLINIC_NAME: NORMAL
MDC_IDC_SESS_DTM: NORMAL
MDC_IDC_SESS_TYPE: NORMAL
MDC_IDC_SET_BRADY_AT_MODE_SWITCH_MODE: NORMAL
MDC_IDC_SET_BRADY_LOWRATE: 60 {BEATS}/MIN
MDC_IDC_SET_BRADY_MAX_SENSOR_RATE: 120 {BEATS}/MIN
MDC_IDC_SET_BRADY_MODE: NORMAL
MDC_IDC_SET_BRADY_PAV_DELAY_HIGH: 180 MS
MDC_IDC_SET_BRADY_PAV_DELAY_LOW: 180 MS
MDC_IDC_SET_BRADY_SAV_DELAY_LOW: 120 MS
MDC_IDC_SET_CRT_LVRV_DELAY: 0 MS
MDC_IDC_SET_CRT_PACED_CHAMBERS: NORMAL
MDC_IDC_SET_LEADCHNL_LV_PACING_AMPLITUDE: 3 V
MDC_IDC_SET_LEADCHNL_LV_PACING_ANODE_ELECTRODE_1: NORMAL
MDC_IDC_SET_LEADCHNL_LV_PACING_ANODE_LOCATION_1: NORMAL
MDC_IDC_SET_LEADCHNL_LV_PACING_CAPTURE_MODE: NORMAL
MDC_IDC_SET_LEADCHNL_LV_PACING_CATHODE_ELECTRODE_1: NORMAL
MDC_IDC_SET_LEADCHNL_LV_PACING_CATHODE_LOCATION_1: NORMAL
MDC_IDC_SET_LEADCHNL_LV_PACING_POLARITY: NORMAL
MDC_IDC_SET_LEADCHNL_LV_PACING_PULSEWIDTH: 1 MS
MDC_IDC_SET_LEADCHNL_LV_SENSING_ADAPTATION_MODE: NORMAL
MDC_IDC_SET_LEADCHNL_LV_SENSING_ANODE_ELECTRODE_1: NORMAL
MDC_IDC_SET_LEADCHNL_LV_SENSING_ANODE_LOCATION_1: NORMAL
MDC_IDC_SET_LEADCHNL_LV_SENSING_CATHODE_ELECTRODE_1: NORMAL
MDC_IDC_SET_LEADCHNL_LV_SENSING_CATHODE_LOCATION_1: NORMAL
MDC_IDC_SET_LEADCHNL_LV_SENSING_POLARITY: NORMAL
MDC_IDC_SET_LEADCHNL_LV_SENSING_SENSITIVITY: 2.5 MV
MDC_IDC_SET_LEADCHNL_RA_PACING_CAPTURE_MODE: NORMAL
MDC_IDC_SET_LEADCHNL_RA_PACING_POLARITY: NORMAL
MDC_IDC_SET_LEADCHNL_RA_SENSING_ADAPTATION_MODE: NORMAL
MDC_IDC_SET_LEADCHNL_RA_SENSING_POLARITY: NORMAL
MDC_IDC_SET_LEADCHNL_RA_SENSING_SENSITIVITY: 0.5 MV
MDC_IDC_SET_LEADCHNL_RV_PACING_AMPLITUDE: 2.4 V
MDC_IDC_SET_LEADCHNL_RV_PACING_CAPTURE_MODE: NORMAL
MDC_IDC_SET_LEADCHNL_RV_PACING_POLARITY: NORMAL
MDC_IDC_SET_LEADCHNL_RV_PACING_PULSEWIDTH: 0.5 MS
MDC_IDC_SET_LEADCHNL_RV_SENSING_ADAPTATION_MODE: NORMAL
MDC_IDC_SET_LEADCHNL_RV_SENSING_POLARITY: NORMAL
MDC_IDC_SET_LEADCHNL_RV_SENSING_SENSITIVITY: 2.5 MV
MDC_IDC_SET_ZONE_DETECTION_INTERVAL: 375 MS
MDC_IDC_SET_ZONE_TYPE: NORMAL
MDC_IDC_SET_ZONE_VENDOR_TYPE: NORMAL
MDC_IDC_STAT_EPISODE_RECENT_COUNT: 42
MDC_IDC_STAT_EPISODE_RECENT_COUNT_DTM_END: NORMAL
MDC_IDC_STAT_EPISODE_RECENT_COUNT_DTM_START: NORMAL
MDC_IDC_STAT_EPISODE_TOTAL_COUNT: 128
MDC_IDC_STAT_EPISODE_TOTAL_COUNT_DTM_END: NORMAL
MDC_IDC_STAT_EPISODE_TYPE: NORMAL
MDC_IDC_STAT_EPISODE_TYPE: NORMAL
MDC_IDC_STAT_EPISODE_VENDOR_TYPE: NORMAL
MDC_IDC_STAT_EPISODE_VENDOR_TYPE: NORMAL

## 2021-08-06 ENCOUNTER — LAB REQUISITION (OUTPATIENT)
Dept: LAB | Facility: CLINIC | Age: 86
End: 2021-08-06
Payer: MEDICARE

## 2021-08-06 DIAGNOSIS — R79.1 ABNORMAL COAGULATION PROFILE: ICD-10-CM

## 2021-08-09 ENCOUNTER — ANTICOAGULATION THERAPY VISIT (OUTPATIENT)
Dept: GERIATRICS | Facility: CLINIC | Age: 86
End: 2021-08-09

## 2021-08-09 DIAGNOSIS — Z79.01 LONG TERM CURRENT USE OF ANTICOAGULANT THERAPY: ICD-10-CM

## 2021-08-09 DIAGNOSIS — I48.92 ATRIAL FLUTTER, UNSPECIFIED TYPE (H): ICD-10-CM

## 2021-08-09 DIAGNOSIS — I50.9 CHF (CONGESTIVE HEART FAILURE) (H): Primary | ICD-10-CM

## 2021-08-09 DIAGNOSIS — I48.21 PERMANENT ATRIAL FIBRILLATION (H): ICD-10-CM

## 2021-08-09 LAB — INR PPP: 1.75 (ref 0.85–1.15)

## 2021-08-09 PROCEDURE — 36415 COLL VENOUS BLD VENIPUNCTURE: CPT | Mod: ORL | Performed by: NURSE PRACTITIONER

## 2021-08-09 PROCEDURE — 85610 PROTHROMBIN TIME: CPT | Mod: ORL | Performed by: NURSE PRACTITIONER

## 2021-08-09 PROCEDURE — P9604 ONE-WAY ALLOW PRORATED TRIP: HCPCS | Mod: ORL | Performed by: NURSE PRACTITIONER

## 2021-08-09 NOTE — CONFIDENTIAL NOTE
ANTICOAGULATION MANAGEMENT     Miguel Patten 88 year old male is on warfarin with subtherapeutic INR result. (Goal INR 2.0-3.0)    Recent labs: (last 7 days)     08/09/21  0747   INR 1.75*       ASSESSMENT     Source(s): Home Care/Facility Nurse       Warfarin doses taken: Warfarin taken as instructed    Diet: No new diet changes identified    New illness, injury, or hospitalization: No    Medication/supplement changes: None noted    Signs or symptoms of bleeding or clotting: No    Previous INR: Subtherapeutic    Additional findings: Has been walking more recently.      PLAN     Recommended plan for ongoing change(s) affecting INR     Dosing Instructions:  Increase your warfarin dose (7% change) with next INR in 2 weeks       Summary  As of 8/9/2021    Full warfarin instructions:  3.75 mg every Mon; 2.5 mg all other days   Next INR check:  8/23/2021             Telephone call with Mona nurse at [facility] who verbalizes understanding and agrees to plan    Orders given to  Homecare nurse/facility to recheck    Education provided: Target INR goal and significance of current INR result    Plan made per [facility] anticoagulation protocol    Alexandru Russ RN  Anticoagulation Clinic  8/9/2021    _______________________________________________________________________     Anticoagulation Episode Summary     Current INR goal:  2.0-3.0   TTR:  79.0 % (1 y)   Target end date:  Indefinite   Send INR reminders to:  [facility]    Indications    CHF (congestive heart failure) (H) [I50.9]  Long-term (current) use of anticoagulants [Z79.01] [Z79.01]  Permanent atrial fibrillation (H) [I48.21]  Atrial flutter  unspecified type (H) [I48.92]           Comments:  [facility] [phone], fax orders to [phone], 2.5 mg tabs         Anticoagulation Care Providers     Provider Role Specialty Phone number    Vladimir Gonzales DO Referring Internal Medicine [phone]    Virginia Cardona APRN CNP  Referring Nurse Practitioner - Gerontology [phone]    Rober العراقي MD [facility] Medicine [phone]

## 2021-08-20 ENCOUNTER — LAB REQUISITION (OUTPATIENT)
Dept: LAB | Facility: CLINIC | Age: 86
End: 2021-08-20
Payer: MEDICARE

## 2021-08-20 DIAGNOSIS — R79.1 ABNORMAL COAGULATION PROFILE: ICD-10-CM

## 2021-08-23 ENCOUNTER — ANTICOAGULATION THERAPY VISIT (OUTPATIENT)
Dept: ANTICOAGULATION | Facility: CLINIC | Age: 86
End: 2021-08-23

## 2021-08-23 DIAGNOSIS — I48.92 ATRIAL FLUTTER, UNSPECIFIED TYPE (H): ICD-10-CM

## 2021-08-23 DIAGNOSIS — I50.9 CHF (CONGESTIVE HEART FAILURE) (H): Primary | ICD-10-CM

## 2021-08-23 DIAGNOSIS — Z79.01 LONG TERM CURRENT USE OF ANTICOAGULANT THERAPY: ICD-10-CM

## 2021-08-23 DIAGNOSIS — I48.21 PERMANENT ATRIAL FIBRILLATION (H): ICD-10-CM

## 2021-08-23 LAB
INR (EXTERNAL): 2.05 (ref 0.9–1.1)
INR PPP: 2.06 (ref 0.85–1.15)

## 2021-08-23 PROCEDURE — 36415 COLL VENOUS BLD VENIPUNCTURE: CPT | Mod: ORL | Performed by: NURSE PRACTITIONER

## 2021-08-23 PROCEDURE — P9604 ONE-WAY ALLOW PRORATED TRIP: HCPCS | Mod: ORL | Performed by: NURSE PRACTITIONER

## 2021-08-23 PROCEDURE — 85610 PROTHROMBIN TIME: CPT | Mod: ORL | Performed by: NURSE PRACTITIONER

## 2021-08-23 NOTE — PROGRESS NOTES
ANTICOAGULATION MANAGEMENT     Miguel Patten 88 year old male is on warfarin with therapeutic INR result. (Goal INR 2.0-3.0)    Recent labs: (last 7 days)     08/23/21  0730   INR 2.06*       ASSESSMENT     Source(s): Chart Review and Home Care/Facility Nurse       Warfarin doses taken: Warfarin taken as instructed    Diet: No new diet changes identified    New illness, injury, or hospitalization: No    Medication/supplement changes: None noted    Signs or symptoms of bleeding or clotting: No    Previous INR: Subtherapeutic    Additional findings: None     PLAN     Recommended plan for no diet, medication or health factor changes affecting INR     Dosing Instructions: Continue your current warfarin dose with next INR in 2 weeks       Summary  As of 8/23/2021    Full warfarin instructions:  3.75 mg every Mon; 2.5 mg all other days   Next INR check:               Sent via Fax to Ellie Paige    Orders given to  Homecare nurse/facility to recheck    Education provided: Please call back if any changes to your diet, medications or how you've been taking warfarin    Plan made per [facility] anticoagulation protocol    Luz Carey, RN  Anticoagulation Clinic  8/23/2021    _______________________________________________________________________     Anticoagulation Episode Summary     Current INR goal:  2.0-3.0   TTR:  78.7 % (1 y)   Target end date:  Indefinite   Send INR reminders to:  [facility]    Indications    CHF (congestive heart failure) (H) [I50.9]  Long-term (current) use of anticoagulants [Z79.01] [Z79.01]  Permanent atrial fibrillation (H) [I48.21]  Atrial flutter  unspecified type (H) [I48.92]           Comments:  Courtney [facility] [phone], fax orders to [phone], 2.5 mg tabs         Anticoagulation Care Providers     Provider Role Specialty Phone number    Vladimir Gonzales DO Referring Internal Medicine [phone]    Virginia Cardona APRN CNP Referring Nurse Practitioner -  Gerontology [phone]    Rober العراقي MD [facility] [phone]

## 2021-08-24 ENCOUNTER — ASSISTED LIVING VISIT (OUTPATIENT)
Dept: GERIATRICS | Facility: CLINIC | Age: 86
End: 2021-08-24
Payer: MEDICARE

## 2021-08-24 VITALS
SYSTOLIC BLOOD PRESSURE: 112 MMHG | HEART RATE: 64 BPM | DIASTOLIC BLOOD PRESSURE: 63 MMHG | TEMPERATURE: 97.3 F | OXYGEN SATURATION: 99 % | RESPIRATION RATE: 18 BRPM

## 2021-08-24 DIAGNOSIS — I10 HYPERTENSION GOAL BP (BLOOD PRESSURE) < 140/90: ICD-10-CM

## 2021-08-24 DIAGNOSIS — I48.21 PERMANENT ATRIAL FIBRILLATION (H): ICD-10-CM

## 2021-08-24 DIAGNOSIS — H10.32 ACUTE BACTERIAL CONJUNCTIVITIS OF LEFT EYE: Primary | ICD-10-CM

## 2021-08-24 DIAGNOSIS — Z79.01 LONG TERM CURRENT USE OF ANTICOAGULANT THERAPY: ICD-10-CM

## 2021-08-24 DIAGNOSIS — E78.2 MIXED HYPERLIPIDEMIA: ICD-10-CM

## 2021-08-24 DIAGNOSIS — I50.40 COMBINED SYSTOLIC AND DIASTOLIC CONGESTIVE HEART FAILURE, UNSPECIFIED HF CHRONICITY (H): ICD-10-CM
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
## 2021-08-24 PROBLEM — T79.6XXA TRAUMATIC RHABDOMYOLYSIS, INITIAL ENCOUNTER (H): Status: ACTIVE | Noted: 2021-08-24

## 2021-08-24 PROBLEM — U07.1 2019 NOVEL CORONAVIRUS DISEASE (COVID-19): Status: ACTIVE | Noted: 2021-08-24

## 2021-08-24 PROBLEM — J45.909 ASTHMA WITHOUT STATUS ASTHMATICUS: Status: ACTIVE | Noted: 2021-08-24

## 2021-08-24 RX ORDER — CIPROFLOXACIN HYDROCHLORIDE 3.5 MG/ML
2 SOLUTION/ DROPS TOPICAL 2 TIMES DAILY
Qty: 1.4 ML | Refills: 0 | Status: SHIPPED | OUTPATIENT
Start: 2021-08-24 | End: 2021-08-31

## 2021-08-24 NOTE — LETTER
8/24/2021        RE: Miguel Patten  [facility]  [address]  [facility] [address]        [facility]    Chief Complaint   Patient presents with     RECHECK     HPI:  Miguel Patten is a 88 year old  (12/16/1932), who is being seen today for an episodic care visit at: [facility] ([facility] [[id number]]. Today's concern is:     Diagnoses       Codes Comments    Acute bacterial conjunctivitis of left eye    -  Primary H10.32     Permanent atrial fibrillation (H)     I48.21     Long term current use of anticoagulant therapy     Z79.01     Combined systolic and diastolic congestive heart failure, unspecified HF chronicity (H)     I50.40     Hypertension goal BP (blood pressure) < 140/90     I10         Came to see Miguel today as asked by nursing to do irritation of his left eye.    Found Miguel out putting a puzzle together.  Slightly [place] even with his hearing aids.  Alert and pleasant.  No complaints.  He is thinking in the future he will want to do his own medications to help save on expense.  This NP had asked him how many times he gets medications.  Sounds like twice and so want to make sure only ordering the eye drops twice a day.    Miguel admits to having crusting on his eyelashes.  Can clearly see his sclera is pink/red compared to the left eye.  Otherwise he is feeling well.  INRs done regularly and managed at the clinic.  Typically this NP has to sign off on the orders given.    Allergies, and PMH/PSH reviewed in [facility] today.  Current Outpatient Medications   Medication Sig Dispense Refill     ciprofloxacin (CILOXAN) 0.3 % ophthalmic solution Place 2 drops into both eyes 2 times daily for 7 days 1.4 mL 0     acetaminophen (TYLENOL) 325 MG tablet Take 2 tablets (650 mg) by mouth every 4 hours as needed for mild pain or fever 100 tablet      bicalutamide (CASODEX) 50 MG tablet Take 1 tablet (50 mg) by mouth daily       Dimethicone (SECURA DIMETHICONE PROTECTANT) 5 % CREA Externally  apply topically daily as needed       FLOVENT HFA 44 MCG/ACT inhaler INHALE ONE PUFF BY MOUTH TWICE A DAY 10.6 g 1     leuprolide (LUPRON DEPOT, 3-MONTH,) 22.5 MG kit Inject 22.5 MG, IM q 6 months per Dr. Zeyad Guevara, pt's Urologist in [place]. 10/17/2018 1 each 3     losartan (COZAAR) 50 MG tablet TAKE ONE TABLET BY MOUTH TWICE A  tablet 1     simvastatin (ZOCOR) 40 MG tablet TAKE ONE TABLET BY MOUTH EVERY NIGHT AT BEDTIME 90 tablet 1     sodium chloride (OCEAN) 0.65 % nasal spray Spray 1 spray into both nostrils 3 times daily as needed for congestion       warfarin ANTICOAGULANT (JANTOVEN ANTICOAGULANT) 2.5 MG tablet 2.5 daily 110 tablet 3       REVIEW OF SYSTEMS:  4 point ROS including Respiratory, CV, GI and , other than that noted in the HPI,  is negative    Objective:   /63   Pulse 64   Temp 97.3  F (36.3  C)   Resp 18   SpO2 99%   GENERAL APPEARANCE:  Alert, in no distress, cooperative  EYES:  right sclera is pink/red and conjunctiva is red, left sclera is clear.  dried crust on right eyelashes  RESP:  respiratory effort and palpation of chest normal, lungs clear to auscultation , no respiratory distress  CV:  Palpation and auscultation of heart done , no edema, heart rate regular/irregular and strong  ABDOMEN:  normal bowel sounds, soft, nontender, no hepatosplenomegaly or other masses, no guarding or rebound  M/S:   Gait and station abnormal has a 4 wheeled walker with a seat that he uses for his mode of transportation  SKIN:  pink, warm and dry  PSYCH:  oriented X 3, affect and mood normal    Blood pressures range from 110-40-60's  discrepancies in his weight   Either in the 140's or 180's      Most Recent 3 CBC's:Recent Labs   Lab Test 11/18/20  0633 11/17/20  0745 11/16/20  0837   WBC 10.7 7.4 6.8   HGB 12.5* 12.7* 12.8*   MCV 93 94 94    363 389     Most Recent 3 BMP's:Recent Labs   Lab Test 11/19/20  1140 11/19/20  0617 11/18/20  2104 11/18/20  0633 11/17/20  0745  11/16/20  0837   NA  --   --   --  136 137 138   POTASSIUM  --   --   --  4.1 4.3 4.0   CHLORIDE  --   --   --  99 100 102   CO2  --   --   --  31 31 29   BUN  --   --   --  18 20 19   CR  --   --   --  0.63* 0.69* 0.65*   ANIONGAP  --   --   --  6 6 7   LIDIA  --   --   --  8.0* 8.3* 8.1*    116 271* 109 136* 154*     Most Recent Cholesterol Panel:Recent Labs   Lab Test 07/01/20  0852   CHOL 151   LDL 39   HDL 98   TRIG 72         Assessment/Plan:  (H10.32) Acute bacterial conjunctivitis of left eye  (primary encounter diagnosis)  Comment: attempted to order Sodium Sulamyd but on back order til November.  Will order Cipro eye drops and staff said they just destroyed a bottle he had.  Will order for both eyes as he could easily spread this to the other eye.  Plan: ciprofloxacin (CILOXAN) 0.3 % ophthalmic         solution        2 gtts each eye twice a day x7 days - has twice a day med stops    (I48.21) Permanent atrial fibrillation (H)  (Z79.01) Long term current use of anticoagulant therapy  Comment: managed with clinic and recently had an INR done.  Lab Results   Component Value Date    INR 2.05 08/23/2021    INR 1.75 08/09/2021    INR 2.20 06/21/2021    INR 2.36 05/24/2021     Plan: continue with 3.75mg every Monday and 2.5mg the other days.  No acute issues  1.  TSH and T4 level on Friday since no record of one done.    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  No acute distress.  Comes out to participate in activities.  Vitals appear to be monthly.  Plan: no changes with Cozaar 50mg twice a day.     CBC and BMP on friday    (E78.2) Mixed hyperlipidemia  Comment: is due to have his cholesterol panel checked.  Is on Zocor 40mg daily.  Has a history of valve replacement.  Will keep him on this dose unless his numbers have gone down since last check  Plan: -  lipid panel on Friday.    Orders:  Ciprofloxacin 0.3% drops 2 drops each eye twice a day for 7 days - acute conjunctivitis   CBC, BMP, T4 and TSH, and lipid panel on Friday for Atrial fib, HTN, and hyperlipidemia    Electronically signed by: PHANI Smith CNP           Sincerely,        PHANI Smith CNP

## 2021-08-24 NOTE — PROGRESS NOTES
[facility] GERIATRIC SERVICES    Chief Complaint   Patient presents with     RECHECK     HPI:  Miguel Patten is a 88 year old  (12/16/1932), who is being seen today for an episodic care visit at: [facility] ([facility] [[id number]]. Today's concern is:     Diagnoses       Codes Comments    Acute bacterial conjunctivitis of left eye    -  Primary H10.32     Permanent atrial fibrillation (H)     I48.21     Long term current use of anticoagulant therapy     Z79.01     Combined systolic and diastolic congestive heart failure, unspecified HF chronicity (H)     I50.40     Hypertension goal BP (blood pressure) < 140/90     I10         Came to see Miguel today as asked by nursing to do irritation of his left eye.    Found Miguel out putting a puzzle together.  Slightly [place] even with his hearing aids.  Alert and pleasant.  No complaints.  He is thinking in the future he will want to do his own medications to help save on expense.  This NP had asked him how many times he gets medications.  Sounds like twice and so want to make sure only ordering the eye drops twice a day.    Miguel admits to having crusting on his eyelashes.  Can clearly see his sclera is pink/red compared to the left eye.  Otherwise he is feeling well.  INRs done regularly and managed at the clinic.  Typically this NP has to sign off on the orders given.    Allergies, and PMH/PSH reviewed in [facility] today.  Current Outpatient Medications   Medication Sig Dispense Refill     ciprofloxacin (CILOXAN) 0.3 % ophthalmic solution Place 2 drops into both eyes 2 times daily for 7 days 1.4 mL 0     acetaminophen (TYLENOL) 325 MG tablet Take 2 tablets (650 mg) by mouth every 4 hours as needed for mild pain or fever 100 tablet      bicalutamide (CASODEX) 50 MG tablet Take 1 tablet (50 mg) by mouth daily       Dimethicone (SECURA DIMETHICONE PROTECTANT) 5 % CREA Externally apply topically daily as needed       FLOVENT HFA 44 MCG/ACT inhaler INHALE ONE PUFF BY MOUTH TWICE A DAY 10.6  g 1     leuprolide (LUPRON DEPOT, 3-MONTH,) 22.5 MG kit Inject 22.5 MG, IM q 6 months per Dr. Zeyad Guevara, pt's Urologist in [place]. 10/17/2018 1 each 3     losartan (COZAAR) 50 MG tablet TAKE ONE TABLET BY MOUTH TWICE A  tablet 1     simvastatin (ZOCOR) 40 MG tablet TAKE ONE TABLET BY MOUTH EVERY NIGHT AT BEDTIME 90 tablet 1     sodium chloride (OCEAN) 0.65 % nasal spray Spray 1 spray into both nostrils 3 times daily as needed for congestion       warfarin ANTICOAGULANT (JANTOVEN ANTICOAGULANT) 2.5 MG tablet 2.5 daily 110 tablet 3       REVIEW OF SYSTEMS:  4 point ROS including Respiratory, CV, GI and , other than that noted in the HPI,  is negative    Objective:   /63   Pulse 64   Temp 97.3  F (36.3  C)   Resp 18   SpO2 99%   GENERAL APPEARANCE:  Alert, in no distress, cooperative  EYES:  right sclera is pink/red and conjunctiva is red, left sclera is clear.  dried crust on right eyelashes  RESP:  respiratory effort and palpation of chest normal, lungs clear to auscultation , no respiratory distress  CV:  Palpation and auscultation of heart done , no edema, heart rate regular/irregular and strong  ABDOMEN:  normal bowel sounds, soft, nontender, no hepatosplenomegaly or other masses, no guarding or rebound  M/S:   Gait and station abnormal has a 4 wheeled walker with a seat that he uses for his mode of transportation  SKIN:  pink, warm and dry  PSYCH:  oriented X 3, affect and mood normal    Blood pressures range from 110-40-60's  discrepancies in his weight   Either in the 140's or 180's      Most Recent 3 CBC's:Recent Labs   Lab Test 11/18/20  0633 11/17/20  0745 11/16/20  0837   WBC 10.7 7.4 6.8   HGB 12.5* 12.7* 12.8*   MCV 93 94 94    363 389     Most Recent 3 BMP's:Recent Labs   Lab Test 11/19/20  1140 11/19/20  0617 11/18/20  2104 11/18/20  0633 11/17/20  0745 11/16/20  0837   NA  --   --   --  136 137 138   POTASSIUM  --   --   --  4.1 4.3 4.0   CHLORIDE  --   --   --  99  100 102   CO2  --   --   --  31 31 29   BUN  --   --   --  18 20 19   CR  --   --   --  0.63* 0.69* 0.65*   ANIONGAP  --   --   --  6 6 7   LIDIA  --   --   --  8.0* 8.3* 8.1*    116 271* 109 136* 154*     Most Recent Cholesterol Panel:Recent Labs   Lab Test 07/01/20  0852   CHOL 151   LDL 39   HDL 98   TRIG 72         Assessment/Plan:  (H10.32) Acute bacterial conjunctivitis of left eye  (primary encounter diagnosis)  Comment: attempted to order Sodium Sulamyd but on back order til November.  Will order Cipro eye drops and staff said they just destroyed a bottle he had.  Will order for both eyes as he could easily spread this to the other eye.  Plan: ciprofloxacin (CILOXAN) 0.3 % ophthalmic         solution        2 gtts each eye twice a day x7 days - has twice a day med stops    (I48.21) Permanent atrial fibrillation (H)  (Z79.01) Long term current use of anticoagulant therapy  Comment: managed with clinic and recently had an INR done.  Lab Results   Component Value Date    INR 2.05 08/23/2021    INR 1.75 08/09/2021    INR 2.20 06/21/2021    INR 2.36 05/24/2021     Plan: continue with 3.75mg every Monday and 2.5mg the other days.  No acute issues  1.  TSH and T4 level on Friday since no record of one done.    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  No acute distress.  Comes out to participate in activities.  Vitals appear to be monthly.  Plan: no changes with Cozaar 50mg twice a day.     CBC and BMP on friday    (E78.2) Mixed hyperlipidemia  Comment: is due to have his cholesterol panel checked.  Is on Zocor 40mg daily.  Has a history of valve replacement.  Will keep him on this dose unless his numbers have gone down since last check  Plan: - lipid panel on Friday.    Orders:  Ciprofloxacin 0.3% drops 2 drops each eye twice a day for 7 days - acute  conjunctivitis   CBC, BMP, T4 and TSH, and lipid panel on Friday for Atrial fib, HTN, and hyperlipidemia    Electronically signed by: PHANI Smith CNP

## 2021-08-26 ENCOUNTER — LAB REQUISITION (OUTPATIENT)
Dept: LAB | Facility: CLINIC | Age: 86
End: 2021-08-26
Payer: MEDICARE

## 2021-08-26 DIAGNOSIS — I15.1 HYPERTENSION SECONDARY TO OTHER RENAL DISORDERS (CODE): ICD-10-CM

## 2021-08-27 LAB
ANION GAP SERPL CALCULATED.3IONS-SCNC: 5 MMOL/L (ref 3–14)
BUN SERPL-MCNC: 21 MG/DL (ref 7–30)
CALCIUM SERPL-MCNC: 8.5 MG/DL (ref 8.5–10.1)
CHLORIDE BLD-SCNC: 111 MMOL/L (ref 94–109)
CHOLEST SERPL-MCNC: 118 MG/DL
CO2 SERPL-SCNC: 24 MMOL/L (ref 20–32)
CREAT SERPL-MCNC: 0.62 MG/DL (ref 0.66–1.25)
ERYTHROCYTE [DISTWIDTH] IN BLOOD BY AUTOMATED COUNT: 15.5 % (ref 10–15)
FASTING STATUS PATIENT QL REPORTED: NORMAL
GFR SERPL CREATININE-BSD FRML MDRD: 89 ML/MIN/1.73M2
GLUCOSE BLD-MCNC: 104 MG/DL (ref 70–99)
HCT VFR BLD AUTO: 36.9 % (ref 40–53)
HDLC SERPL-MCNC: 80 MG/DL
HGB BLD-MCNC: 12.3 G/DL (ref 13.3–17.7)
LDLC SERPL CALC-MCNC: 28 MG/DL
MCH RBC QN AUTO: 31.4 PG (ref 26.5–33)
MCHC RBC AUTO-ENTMCNC: 33.3 G/DL (ref 31.5–36.5)
MCV RBC AUTO: 94 FL (ref 78–100)
NONHDLC SERPL-MCNC: 38 MG/DL
PLATELET # BLD AUTO: 79 10E3/UL (ref 150–450)
POTASSIUM BLD-SCNC: 4.3 MMOL/L (ref 3.4–5.3)
RBC # BLD AUTO: 3.92 10E6/UL (ref 4.4–5.9)
SODIUM SERPL-SCNC: 140 MMOL/L (ref 133–144)
TRIGL SERPL-MCNC: 49 MG/DL
TSH SERPL DL<=0.005 MIU/L-ACNC: 3.79 MU/L (ref 0.4–4)
WBC # BLD AUTO: 7.7 10E3/UL (ref 4–11)

## 2021-08-27 PROCEDURE — 80061 LIPID PANEL: CPT | Mod: ORL | Performed by: NURSE PRACTITIONER

## 2021-08-27 PROCEDURE — 85027 COMPLETE CBC AUTOMATED: CPT | Mod: ORL | Performed by: NURSE PRACTITIONER

## 2021-08-27 PROCEDURE — 36415 COLL VENOUS BLD VENIPUNCTURE: CPT | Mod: ORL | Performed by: NURSE PRACTITIONER

## 2021-08-27 PROCEDURE — 80048 BASIC METABOLIC PNL TOTAL CA: CPT | Mod: ORL | Performed by: NURSE PRACTITIONER

## 2021-08-27 PROCEDURE — P9604 ONE-WAY ALLOW PRORATED TRIP: HCPCS | Mod: ORL | Performed by: NURSE PRACTITIONER

## 2021-08-27 PROCEDURE — 84443 ASSAY THYROID STIM HORMONE: CPT | Mod: ORL | Performed by: NURSE PRACTITIONER

## 2021-09-09 ENCOUNTER — LAB REQUISITION (OUTPATIENT)
Dept: LAB | Facility: CLINIC | Age: 86
End: 2021-09-09
Payer: MEDICARE

## 2021-09-09 DIAGNOSIS — I48.91 UNSPECIFIED ATRIAL FIBRILLATION (H): ICD-10-CM

## 2021-09-10 ENCOUNTER — ANTICOAGULATION THERAPY VISIT (OUTPATIENT)
Dept: GERIATRICS | Facility: CLINIC | Age: 86
End: 2021-09-10

## 2021-09-10 DIAGNOSIS — I50.9 CHF (CONGESTIVE HEART FAILURE) (H): Primary | ICD-10-CM

## 2021-09-10 DIAGNOSIS — Z79.01 LONG TERM CURRENT USE OF ANTICOAGULANT THERAPY: ICD-10-CM

## 2021-09-10 DIAGNOSIS — I48.92 ATRIAL FLUTTER, UNSPECIFIED TYPE (H): ICD-10-CM

## 2021-09-10 DIAGNOSIS — I48.21 PERMANENT ATRIAL FIBRILLATION (H): ICD-10-CM

## 2021-09-10 DIAGNOSIS — I48.20 CHRONIC ATRIAL FIBRILLATION (H): ICD-10-CM

## 2021-09-10 LAB — INR PPP: 2.04 (ref 0.85–1.15)

## 2021-09-10 PROCEDURE — 85610 PROTHROMBIN TIME: CPT | Mod: ORL | Performed by: NURSE PRACTITIONER

## 2021-09-10 PROCEDURE — P9604 ONE-WAY ALLOW PRORATED TRIP: HCPCS | Mod: ORL | Performed by: NURSE PRACTITIONER

## 2021-09-10 PROCEDURE — 36415 COLL VENOUS BLD VENIPUNCTURE: CPT | Mod: ORL | Performed by: NURSE PRACTITIONER

## 2021-09-10 RX ORDER — WARFARIN SODIUM 2.5 MG/1
TABLET ORAL
Qty: 96 TABLET | Refills: 1 | Status: SHIPPED | OUTPATIENT
Start: 2021-09-10 | End: 2022-03-25

## 2021-09-10 NOTE — PROGRESS NOTES
ANTICOAGULATION MANAGEMENT     Miguel Patten 88 year old male is on warfarin with therapeutic INR result. (Goal INR 2.0-3.0)    Recent labs: (last 7 days)     09/10/21  0719   INR 2.04*       ASSESSMENT     Source(s): Chart Review and Home Care/Facility Nurse       Warfarin doses taken: Warfarin taken as instructed    Diet: No new diet changes identified    New illness, injury, or hospitalization: No    Medication/supplement changes: None noted    Signs or symptoms of bleeding or clotting: No    Previous INR: Therapeutic last 2(+) visits    Additional findings: None     PLAN     Recommended plan for no diet, medication or health factor changes affecting INR     Dosing Instructions: Continue your current warfarin dose with next INR in 3 weeks       Summary  As of 9/10/2021    Full warfarin instructions:  3.75 mg every Mon; 2.5 mg all other days   Next INR check:               Telephone call with home care nurse Kaykay who verbalizes understanding and agrees to plan    Orders given to  Homecare nurse/facility to recheck    Education provided: Please call back if any changes to your diet, medications or how you've been taking warfarin    Plan made per [facility] anticoagulation protocol    Otilia Mac, RN  Anticoagulation Clinic  9/10/2021    _______________________________________________________________________     Anticoagulation Episode Summary     Current INR goal:  2.0-3.0   TTR:  78.7 % (1 y)   Target end date:  Indefinite   Send INR reminders to:  BUBBA SAMUEL    Indications    CHF (congestive heart failure) (H) [I50.9]  Long-term (current) use of anticoagulants [Z79.01] [Z79.01]  Permanent atrial fibrillation (H) [I48.21]  Atrial flutter  unspecified type (H) [I48.92]           Comments:  Courtney Paige [phone], fax orders to [phone], 2.5 mg tabs         Anticoagulation Care Providers     Provider Role Specialty Phone number    Vladimir Gonzales DO [facility] Internal Medicine [phone]     Virginia Cardona APRN CNP Referring Nurse Practitioner - Gerontology [phone]    Rober العراقي MD [facility] [phone]

## 2021-09-27 ENCOUNTER — ANTICOAGULATION THERAPY VISIT (OUTPATIENT)
Dept: GERIATRICS | Facility: CLINIC | Age: 86
End: 2021-09-27

## 2021-09-27 ENCOUNTER — LAB (OUTPATIENT)
Dept: LAB | Facility: CLINIC | Age: 86
End: 2021-09-27
Payer: MEDICARE

## 2021-09-27 DIAGNOSIS — Z79.01 LONG TERM CURRENT USE OF ANTICOAGULANT THERAPY: ICD-10-CM

## 2021-09-27 DIAGNOSIS — I48.21 PERMANENT ATRIAL FIBRILLATION (H): ICD-10-CM

## 2021-09-27 DIAGNOSIS — I48.92 ATRIAL FLUTTER, UNSPECIFIED TYPE (H): ICD-10-CM

## 2021-09-27 DIAGNOSIS — I50.9 CHF (CONGESTIVE HEART FAILURE) (H): Primary | ICD-10-CM

## 2021-09-27 LAB — INR BLD: 2.1 (ref 0.9–1.1)

## 2021-09-27 PROCEDURE — 36416 COLLJ CAPILLARY BLOOD SPEC: CPT

## 2021-09-27 PROCEDURE — 85610 PROTHROMBIN TIME: CPT

## 2021-09-27 NOTE — PROGRESS NOTES
Vanessa states that Miguel is taking over his own med management and will now be going to [facility] for lab draws.

## 2021-09-27 NOTE — PROGRESS NOTES
Anticoagulation Management    Unable to reach Angelica today.    Today's INR result of 2.1 is therapeutic (goal INR of 2.0-3.0).  Result received from: Assisted Living/Long Term Care Facility    Follow up required to confirm warfarin dose taken and assess for changes    [facility]      Anticoagulation clinic to follow up    Alexandru Russ RN

## 2021-09-27 NOTE — PROGRESS NOTES
ANTICOAGULATION MANAGEMENT     Miguel Patten 88 year old male is on warfarin with therapeutic INR result. (Goal INR 2.0-3.0)    Recent labs: (last 7 days)     09/27/21  0832   INR 2.1*       ASSESSMENT     Source(s): Chart Review and Patient/Caregiver Call       Warfarin doses taken: Warfarin taken as instructed    Diet: No new diet changes identified    New illness, injury, or hospitalization: No    Medication/supplement changes: None noted    Signs or symptoms of bleeding or clotting: No    Previous INR: Therapeutic last 2(+) visits    Additional findings: None     PLAN     Recommended plan for no diet, medication or health factor changes affecting INR     Dosing Instructions: Continue your current warfarin dose with next INR in 4 weeks       Summary  As of 9/27/2021    Full warfarin instructions:  3.75 mg every Mon; 2.5 mg all other days   Next INR check:  10/25/2021             Telephone call with Miguel who verbalizes understanding and agrees to plan    Lab visit scheduled    Education provided: Please call back if any changes to your diet, medications or how you've been taking warfarin    Plan made per [facility] anticoagulation protocol    Otilia Mac RN  Anticoagulation Clinic  9/27/2021    _______________________________________________________________________     Anticoagulation Episode Summary     Current INR goal:  2.0-3.0   TTR:  78.7 % (1 y)   Target end date:  Indefinite   Send INR reminders to:  BUBBA REID    Indications    CHF (congestive heart failure) (H) [I50.9]  Long-term (current) use of anticoagulants [Z79.01] [Z79.01]  Permanent atrial fibrillation (H) [I48.21]  Atrial flutter  unspecified type (H) [I48.92]           Comments:           Anticoagulation Care Providers     Provider Role Specialty Phone number    Vladimir Gonzales DO Referring Internal Medicine [phone]    Virginia Cardona APRN CNP Referring Nurse Practitioner - Gerontology [phone]    Malcom  Rober Adler MD [facility] [phone]

## 2021-09-27 NOTE — PROGRESS NOTES
Spoke with Savi who states that Miguel will be managing his own medications and can set up his own appointments. He can be reached at [phone].    Left VM for Miguel to call back [facility].

## 2021-09-27 NOTE — PROGRESS NOTES
Anticoagulation Management    Unable to reach Vanessa today.    Today's INR result of 2.1 is therapeutic (goal INR of 2.0-3.0).  Result received from: Assisted Living/Long Term Care Facility    Follow up required to confirm warfarin dose taken and assess for changes      LMT[facility]     Also need to ask about pt P.S.      Anticoagulation clinic to follow up    Alexandru Russ RN

## 2021-10-13 ENCOUNTER — ASSISTED LIVING VISIT (OUTPATIENT)
Dept: GERIATRICS | Facility: CLINIC | Age: 86
End: 2021-10-13
Payer: MEDICARE

## 2021-10-13 VITALS
RESPIRATION RATE: 16 BRPM | HEIGHT: 73 IN | HEART RATE: 88 BPM | SYSTOLIC BLOOD PRESSURE: 147 MMHG | TEMPERATURE: 97.6 F | WEIGHT: 189.2 LBS | BODY MASS INDEX: 25.08 KG/M2 | DIASTOLIC BLOOD PRESSURE: 87 MMHG | OXYGEN SATURATION: 97 %

## 2021-10-13 DIAGNOSIS — H10.31 ACUTE BACTERIAL CONJUNCTIVITIS OF RIGHT EYE: Primary | ICD-10-CM

## 2021-10-13 DIAGNOSIS — I48.20 CHRONIC ATRIAL FIBRILLATION (H): ICD-10-CM

## 2021-10-13 DIAGNOSIS — Z79.01 LONG TERM CURRENT USE OF ANTICOAGULANT THERAPY: ICD-10-CM

## 2021-10-13 ASSESSMENT — MIFFLIN-ST. JEOR: SCORE: 1582.09

## 2021-10-13 NOTE — PROGRESS NOTES
[facility] GERIATRIC SERVICES    Chief Complaint   Patient presents with     RECHECK     HPI:  Miguel Patten is a 88 year old  (12/16/1932), who is being seen today for an episodic care visit at: [facility] ([facility] [[id number]]. Today's concern is:     Diagnoses       Codes Comments    Acute bacterial conjunctivitis of right eye    -  Primary H10.31     Chronic atrial fibrillation (H)     I48.20     Long term current use of anticoagulant therapy     Z79.01         Came to see Miguel today due to staff saying that his right eye looked different as it most likely had an infection present.  Also asked for visit to be seen today about his eye.    Found Miguel coming out of his apartment.  He was walking with his four-wheel walker so he stopped in spoke with this nurse practitioner.    Is not on very many medications.  He is on warfarin for atrial fibrillation which is managed by the Coumadin clinic.  He continues to drive a vehicle that is parked here at the AL facility.      Allergies, and PMH/PSH reviewed in [facility] today.    Current Outpatient Medications   Medication Sig Dispense Refill     acetaminophen (TYLENOL) 325 MG tablet Take 2 tablets (650 mg) by mouth every 4 hours as needed for mild pain or fever 100 tablet      bicalutamide (CASODEX) 50 MG tablet TAKE ONE TABLET BY MOUTH ONCE DAILY 30 tablet 0     Dimethicone (SECURA DIMETHICONE PROTECTANT) 5 % CREA Externally apply topically daily as needed       FLOVENT HFA 44 MCG/ACT inhaler INAHLE 1 PUFF BY MOUTH TWO TIMES A DAY 10.6 g 0     leuprolide (LUPRON DEPOT, 3-MONTH,) 22.5 MG kit Inject 22.5 MG, IM q 6 months per Dr. Zeyad Guevara, pt's Urologist in [place]. 10/17/2018 1 each 3     losartan (COZAAR) 50 MG tablet TAKE ONE TABLET BY MOUTH TWICE A DAY 60 tablet 1     simvastatin (ZOCOR) 40 MG tablet TAKE ONE TABLET BY MOUTH EVERY NIGHT AT BEDTIME 30 tablet 1     sodium chloride (OCEAN) 0.65 % nasal spray Spray 1 spray into both nostrils 3 times daily as needed for  "congestion       warfarin ANTICOAGULANT (JANTOVEN ANTICOAGULANT) 2.5 MG tablet 3.75 mg every Mon; 2.5 mg all other days or as directed by INR clinic. 96 tablet 1       REVIEW OF SYSTEMS:  4 point ROS including Respiratory, CV, GI and , other than that noted in the HPI,  is negative    Objective:   BP (!) 147/87   Pulse 88   Temp 97.6  F (36.4  C)   Resp 16   Ht 1.854 m (6' 1\")   Wt 85.8 kg (189 lb 3.2 oz)   SpO2 97%   BMI 24.96 kg/m    Miguel is alert and oriented x3.   His right eye shows that his sclera is pink, extra tearing seen, dried drainage on eyelashes.  Edges of eyelids are deep pink.  Conjunctiva is red with left conjunctiva pink.    Heart rate is regular irregular and strong.  No edema in ankles.  No use of oxygen and no evidence of respiratory distress.  Ambulates with his walker that does have a seat and not uncommon he will use that seat while he is attending activities or doing puzzle activities in the [employer] [employer] area.      Component      Latest Ref Rng & Units 9/10/2021 9/27/2021   INR Point of Care      0.9 - 1.1  2.1 (H)   INR      0.85 - 1.15 2.04 (H)        Assessment/Plan:  (H10.31) Acute bacterial conjunctivitis of right eye  (primary encounter diagnosis)  Comment: will order Cipro Opth drops 1 drop both eyes 3x day for 7 days due to conjuctivitis    (I48.20) Chronic atrial fibrillation (H)  (Z79.01) Long term current use of anticoagulant therapy  Comment: antibiotic eye drops should not interfere with his INRs as thinking if he will need an extra INR to be drawn.  Next one drawn is scheduled for 10/25/22.  No changes with his coumadin long term.      Orders:  Cipro Ophthmalic eye drops 0.3% 1 gtt each eye TID for 7 days.      Electronically signed by Edie Boateng RN, CNP    "

## 2021-10-13 NOTE — LETTER
10/13/2021        RE: Miguel Patten  [facility]  [address]  [facility] [address]        [facility] GERIATRIC SERVICES    Chief Complaint   Patient presents with     RECHECK     HPI:  Miguel Patten is a 88 year old  (12/16/1932), who is being seen today for an episodic care visit at: [facility] ([facility]) [[id number]]. Today's concern is:     Diagnoses       Codes Comments    Acute bacterial conjunctivitis of right eye    -  Primary H10.31     Chronic atrial fibrillation (H)     I48.20     Long term current use of anticoagulant therapy     Z79.01         Came to see Miguel today due to staff saying that his right eye looked different as it most likely had an infection present.  Also asked for visit to be seen today about his eye.    Found Miguel coming out of his apartment.  He was walking with his four-wheel walker so he stopped in spoke with this nurse practitioner.    Is not on very many medications.  He is on warfarin for atrial fibrillation which is managed by the Coumadin clinic.  He continues to drive a vehicle that is parked here at the AL facility.      Allergies, and PMH/PSH reviewed in [facility] today.    Current Outpatient Medications   Medication Sig Dispense Refill     acetaminophen (TYLENOL) 325 MG tablet Take 2 tablets (650 mg) by mouth every 4 hours as needed for mild pain or fever 100 tablet      bicalutamide (CASODEX) 50 MG tablet TAKE ONE TABLET BY MOUTH ONCE DAILY 30 tablet 0     Dimethicone (SECURA DIMETHICONE PROTECTANT) 5 % CREA Externally apply topically daily as needed       FLOVENT HFA 44 MCG/ACT inhaler INAHLE 1 PUFF BY MOUTH TWO TIMES A DAY 10.6 g 0     leuprolide (LUPRON DEPOT, 3-MONTH,) 22.5 MG kit Inject 22.5 MG, IM q 6 months per Dr. Zeyad Guevara, pt's Urologist in [place]. 10/17/2018 1 each 3     losartan (COZAAR) 50 MG tablet TAKE ONE TABLET BY MOUTH TWICE A DAY 60 tablet 1     simvastatin (ZOCOR) 40 MG tablet TAKE ONE TABLET BY MOUTH EVERY NIGHT AT BEDTIME 30 tablet 1  "    sodium chloride (OCEAN) 0.65 % nasal spray Spray 1 spray into both nostrils 3 times daily as needed for congestion       warfarin ANTICOAGULANT (JANTOVEN ANTICOAGULANT) 2.5 MG tablet 3.75 mg every Mon; 2.5 mg all other days or as directed by INR clinic. 96 tablet 1       REVIEW OF SYSTEMS:  4 point ROS including Respiratory, CV, GI and , other than that noted in the HPI,  is negative    Objective:   BP (!) 147/87   Pulse 88   Temp 97.6  F (36.4  C)   Resp 16   Ht 1.854 m (6' 1\")   Wt 85.8 kg (189 lb 3.2 oz)   SpO2 97%   BMI 24.96 kg/m    Miguel is alert and oriented x3.   His right eye shows that his sclera is pink, extra tearing seen, dried drainage on eyelashes.  Edges of eyelids are deep pink.  Conjunctiva is red with left conjunctiva pink.    Heart rate is regular irregular and strong.  No edema in ankles.  No use of oxygen and no evidence of respiratory distress.  Ambulates with his walker that does have a seat and not uncommon he will use that seat while he is attending activities or doing puzzle activities in the [email or web address] area.      Component      Latest Ref Rng & Units 9/10/2021 9/27/2021   INR Point of Care      0.9 - 1.1  2.1 (H)   INR      0.85 - 1.15 2.04 (H)        Assessment/Plan:  (H10.31) Acute bacterial conjunctivitis of right eye  (primary encounter diagnosis)  Comment: will order Cipro Opth drops 1 drop both eyes 3x day for 7 days due to conjuctivitis    (I48.20) Chronic atrial fibrillation (H)  (Z79.01) Long term current use of anticoagulant therapy  Comment: antibiotic eye drops should not interfere with his INRs as thinking if he will need an extra INR to be drawn.  Next one drawn is scheduled for 10/25/22.  No changes with his coumadin long term.      Orders:  Cipro Ophthmalic eye drops 0.3% 1 gtt each eye TID for 7 days.      Electronically signed by Edie Boateng RN, CNP          Sincerely,        Edie Boateng, PHANI CNP    "

## 2021-10-21 ENCOUNTER — ANCILLARY PROCEDURE (OUTPATIENT)
Dept: CARDIOLOGY | Facility: CLINIC | Age: 86
End: 2021-10-21
Attending: INTERNAL MEDICINE
Payer: MEDICARE

## 2021-10-21 DIAGNOSIS — Z95.0 CARDIAC PACEMAKER IN SITU: ICD-10-CM

## 2021-10-21 PROCEDURE — 93296 REM INTERROG EVL PM/IDS: CPT | Performed by: INTERNAL MEDICINE

## 2021-10-21 PROCEDURE — 93294 REM INTERROG EVL PM/LDLS PM: CPT | Performed by: INTERNAL MEDICINE

## 2021-10-23 LAB
MDC_IDC_EPISODE_DTM: NORMAL
MDC_IDC_EPISODE_DURATION: 14 S
MDC_IDC_EPISODE_DURATION: 8 S
MDC_IDC_EPISODE_DURATION: 8 S
MDC_IDC_EPISODE_ID: NORMAL
MDC_IDC_EPISODE_TYPE: NORMAL
MDC_IDC_LEAD_IMPLANT_DT: NORMAL
MDC_IDC_LEAD_IMPLANT_DT: NORMAL
MDC_IDC_LEAD_LOCATION: NORMAL
MDC_IDC_LEAD_LOCATION: NORMAL
MDC_IDC_LEAD_LOCATION_DETAIL_1: NORMAL
MDC_IDC_LEAD_LOCATION_DETAIL_1: NORMAL
MDC_IDC_LEAD_MFG: NORMAL
MDC_IDC_LEAD_MFG: NORMAL
MDC_IDC_LEAD_MODEL: NORMAL
MDC_IDC_LEAD_MODEL: NORMAL
MDC_IDC_LEAD_POLARITY_TYPE: NORMAL
MDC_IDC_LEAD_POLARITY_TYPE: NORMAL
MDC_IDC_LEAD_SERIAL: NORMAL
MDC_IDC_LEAD_SERIAL: NORMAL
MDC_IDC_MSMT_BATTERY_DTM: NORMAL
MDC_IDC_MSMT_BATTERY_REMAINING_LONGEVITY: 36 MO
MDC_IDC_MSMT_BATTERY_REMAINING_PERCENTAGE: 52 %
MDC_IDC_MSMT_BATTERY_STATUS: NORMAL
MDC_IDC_MSMT_LEADCHNL_LV_IMPEDANCE_VALUE: 590 OHM
MDC_IDC_MSMT_LEADCHNL_LV_PACING_THRESHOLD_AMPLITUDE: 2.1 V
MDC_IDC_MSMT_LEADCHNL_LV_PACING_THRESHOLD_PULSEWIDTH: 1 MS
MDC_IDC_MSMT_LEADCHNL_RV_IMPEDANCE_VALUE: 500 OHM
MDC_IDC_PG_IMPLANT_DTM: NORMAL
MDC_IDC_PG_MFG: NORMAL
MDC_IDC_PG_MODEL: NORMAL
MDC_IDC_PG_SERIAL: NORMAL
MDC_IDC_PG_TYPE: NORMAL
MDC_IDC_SESS_CLINIC_NAME: NORMAL
MDC_IDC_SESS_DTM: NORMAL
MDC_IDC_SESS_TYPE: NORMAL
MDC_IDC_SET_BRADY_AT_MODE_SWITCH_RATE: 170 {BEATS}/MIN
MDC_IDC_SET_BRADY_LOWRATE: 60 {BEATS}/MIN
MDC_IDC_SET_BRADY_MAX_SENSOR_RATE: 120 {BEATS}/MIN
MDC_IDC_SET_BRADY_MODE: NORMAL
MDC_IDC_SET_CRT_LVRV_DELAY: 0 MS
MDC_IDC_SET_CRT_PACED_CHAMBERS: NORMAL
MDC_IDC_SET_LEADCHNL_LV_PACING_AMPLITUDE: 3 V
MDC_IDC_SET_LEADCHNL_LV_PACING_ANODE_ELECTRODE_1: NORMAL
MDC_IDC_SET_LEADCHNL_LV_PACING_ANODE_LOCATION_1: NORMAL
MDC_IDC_SET_LEADCHNL_LV_PACING_CATHODE_ELECTRODE_1: NORMAL
MDC_IDC_SET_LEADCHNL_LV_PACING_CATHODE_LOCATION_1: NORMAL
MDC_IDC_SET_LEADCHNL_LV_PACING_PULSEWIDTH: 1 MS
MDC_IDC_SET_LEADCHNL_LV_SENSING_ADAPTATION_MODE: NORMAL
MDC_IDC_SET_LEADCHNL_LV_SENSING_ANODE_ELECTRODE_1: NORMAL
MDC_IDC_SET_LEADCHNL_LV_SENSING_ANODE_LOCATION_1: NORMAL
MDC_IDC_SET_LEADCHNL_LV_SENSING_CATHODE_ELECTRODE_1: NORMAL
MDC_IDC_SET_LEADCHNL_LV_SENSING_CATHODE_LOCATION_1: NORMAL
MDC_IDC_SET_LEADCHNL_LV_SENSING_SENSITIVITY: 2.5 MV
MDC_IDC_SET_LEADCHNL_RA_SENSING_ADAPTATION_MODE: NORMAL
MDC_IDC_SET_LEADCHNL_RA_SENSING_SENSITIVITY: 0.5 MV
MDC_IDC_SET_LEADCHNL_RV_PACING_AMPLITUDE: 2.4 V
MDC_IDC_SET_LEADCHNL_RV_PACING_CAPTURE_MODE: NORMAL
MDC_IDC_SET_LEADCHNL_RV_PACING_POLARITY: NORMAL
MDC_IDC_SET_LEADCHNL_RV_PACING_PULSEWIDTH: 0.5 MS
MDC_IDC_SET_LEADCHNL_RV_SENSING_ADAPTATION_MODE: NORMAL
MDC_IDC_SET_LEADCHNL_RV_SENSING_POLARITY: NORMAL
MDC_IDC_SET_LEADCHNL_RV_SENSING_SENSITIVITY: 2.5 MV
MDC_IDC_SET_ZONE_DETECTION_INTERVAL: 375 MS
MDC_IDC_SET_ZONE_TYPE: NORMAL
MDC_IDC_SET_ZONE_VENDOR_TYPE: NORMAL
MDC_IDC_STAT_BRADY_DTM_END: NORMAL
MDC_IDC_STAT_BRADY_DTM_START: NORMAL
MDC_IDC_STAT_BRADY_RA_PERCENT_PACED: 0 %
MDC_IDC_STAT_BRADY_RV_PERCENT_PACED: 97 %
MDC_IDC_STAT_CRT_DTM_END: NORMAL
MDC_IDC_STAT_CRT_DTM_START: NORMAL
MDC_IDC_STAT_CRT_LV_PERCENT_PACED: 97 %
MDC_IDC_STAT_EPISODE_RECENT_COUNT: 0
MDC_IDC_STAT_EPISODE_RECENT_COUNT: 3
MDC_IDC_STAT_EPISODE_RECENT_COUNT_DTM_END: NORMAL
MDC_IDC_STAT_EPISODE_RECENT_COUNT_DTM_START: NORMAL
MDC_IDC_STAT_EPISODE_TYPE: NORMAL
MDC_IDC_STAT_EPISODE_VENDOR_TYPE: NORMAL

## 2021-10-25 ENCOUNTER — ANTICOAGULATION THERAPY VISIT (OUTPATIENT)
Dept: ANTICOAGULATION | Facility: CLINIC | Age: 86
End: 2021-10-25

## 2021-10-25 ENCOUNTER — LAB (OUTPATIENT)
Dept: LAB | Facility: CLINIC | Age: 86
End: 2021-10-25
Payer: MEDICARE

## 2021-10-25 DIAGNOSIS — I48.92 ATRIAL FLUTTER, UNSPECIFIED TYPE (H): ICD-10-CM

## 2021-10-25 DIAGNOSIS — Z79.01 LONG TERM CURRENT USE OF ANTICOAGULANT THERAPY: ICD-10-CM

## 2021-10-25 DIAGNOSIS — I50.9 CHF (CONGESTIVE HEART FAILURE) (H): Primary | ICD-10-CM

## 2021-10-25 DIAGNOSIS — Z95.2 S/P AVR (AORTIC VALVE REPLACEMENT): ICD-10-CM

## 2021-10-25 DIAGNOSIS — I48.21 PERMANENT ATRIAL FIBRILLATION (H): ICD-10-CM

## 2021-10-25 DIAGNOSIS — C61 MALIGNANT NEOPLASM OF PROSTATE (H): Primary | ICD-10-CM

## 2021-10-25 LAB — INR BLD: 2.4 (ref 0.9–1.1)

## 2021-10-25 PROCEDURE — 85610 PROTHROMBIN TIME: CPT

## 2021-10-25 PROCEDURE — 36416 COLLJ CAPILLARY BLOOD SPEC: CPT

## 2021-10-25 NOTE — PROGRESS NOTES
ANTICOAGULATION MANAGEMENT     Miguel Patten 88 year old male is on warfarin with therapeutic INR result. (Goal INR 2.0-3.0)    Recent labs: (last 7 days)     10/25/21  0836   INR 2.4*       ASSESSMENT     Source(s): Chart Review and Patient/Caregiver Call       Warfarin doses taken: Warfarin taken as instructed    Diet: No new diet changes identified    New illness, injury, or hospitalization: No    Medication/supplement changes: None noted    Signs or symptoms of bleeding or clotting: No    Previous INR: Therapeutic last 2(+) visits    Additional findings: None     PLAN     Recommended plan for no diet, medication or health factor changes affecting INR     Dosing Instructions: Continue your current warfarin dose with next INR in 4 weeks       Summary  As of 10/25/2021    Full warfarin instructions:  3.75 mg every Mon; 2.5 mg all other days   Next INR check:  11/22/2021             Telephone call with Miguel who verbalizes understanding and agrees to plan and who agrees to plan and repeated back plan correctly    Lab visit scheduled    Education provided: Please call back if any changes to your diet, medications or how you've been taking warfarin    Plan made per [facility] anticoagulation protocol    Kerwin Faustin RN  Anticoagulation Clinic  10/25/2021    _______________________________________________________________________     Anticoagulation Episode Summary     Current INR goal:  2.0-3.0   TTR:  78.7 % (1 y)   Target end date:  Indefinite   [facility]    Indications    CHF (congestive heart failure) (H) [I50.9]  Long-term (current) use of anticoagulants [Z79.01] [Z79.01]  Permanent atrial fibrillation (H) [I48.21]  Atrial flutter  unspecified type (H) [I48.92]           Comments:           Anticoagulation Care Providers     Provider Role Specialty Phone number    Vladimir Gonzales DO Referring Internal Medicine [phone]    Virginia Cardona, PHANI HAWKINS Referring  Nurse Practitioner - Gerontology [phone]    Rober العراقي MD [facility] [phone]

## 2021-10-25 NOTE — PROGRESS NOTES
Anticoagulation Management    Unable to reach pt today.    Today's INR result of 2.4 is therapeutic (goal INR of 2.0-3.0).  Result received from: Clinic Lab    Follow up required to confirm warfarin dose taken and assess for changes    VM left for pt to call [facility] back. Will try again later.       Anticoagulation clinic to follow up    Ashly Jones RN

## 2021-10-26 ENCOUNTER — INFUSION THERAPY VISIT (OUTPATIENT)
Dept: INFUSION THERAPY | Facility: CLINIC | Age: 86
End: 2021-10-26
Attending: PHYSICAL MEDICINE & REHABILITATION
Payer: MEDICARE

## 2021-10-26 VITALS
TEMPERATURE: 98 F | SYSTOLIC BLOOD PRESSURE: 121 MMHG | OXYGEN SATURATION: 97 % | BODY MASS INDEX: 25.2 KG/M2 | DIASTOLIC BLOOD PRESSURE: 62 MMHG | HEART RATE: 60 BPM | WEIGHT: 191 LBS | RESPIRATION RATE: 18 BRPM

## 2021-10-26 DIAGNOSIS — C61 MALIGNANT NEOPLASM OF PROSTATE (H): Primary | ICD-10-CM

## 2021-10-26 DIAGNOSIS — C61 MALIGNANT NEOPLASM OF PROSTATE (H): ICD-10-CM

## 2021-10-26 PROCEDURE — 250N000011 HC RX IP 250 OP 636: Performed by: INTERNAL MEDICINE

## 2021-10-26 PROCEDURE — 96402 CHEMO HORMON ANTINEOPL SQ/IM: CPT

## 2021-10-26 RX ORDER — LOSARTAN POTASSIUM 50 MG/1
TABLET ORAL
Qty: 60 TABLET | Refills: 0 | Status: SHIPPED | OUTPATIENT
Start: 2021-10-26 | End: 2021-11-18

## 2021-10-26 RX ORDER — HEPARIN SODIUM,PORCINE 10 UNIT/ML
5 VIAL (ML) INTRAVENOUS
Status: CANCELLED | OUTPATIENT
Start: 2021-10-26

## 2021-10-26 RX ORDER — HEPARIN SODIUM (PORCINE) LOCK FLUSH IV SOLN 100 UNIT/ML 100 UNIT/ML
5 SOLUTION INTRAVENOUS
Status: CANCELLED | OUTPATIENT
Start: 2021-10-26

## 2021-10-26 RX ORDER — LEUPROLIDE ACETATE 22.5 MG
KIT INTRAMUSCULAR
Qty: 1 EACH | Refills: 3 | Status: SHIPPED | OUTPATIENT
Start: 2021-10-26 | End: 2023-01-01

## 2021-10-26 RX ORDER — BICALUTAMIDE 50 MG/1
TABLET, FILM COATED ORAL
Qty: 30 TABLET | Refills: 0 | Status: SHIPPED | OUTPATIENT
Start: 2021-10-26 | End: 2021-11-18

## 2021-10-26 RX ORDER — SIMVASTATIN 40 MG
TABLET ORAL
Qty: 30 TABLET | Refills: 0 | Status: SHIPPED | OUTPATIENT
Start: 2021-10-26 | End: 2021-11-18

## 2021-10-26 RX ADMIN — LEUPROLIDE ACETATE 22.5 MG: KIT SUBCUTANEOUS at 09:53

## 2021-10-26 ASSESSMENT — PAIN SCALES - GENERAL: PAINLEVEL: NO PAIN (0)

## 2021-10-26 NOTE — PROGRESS NOTES
Infusion Nursing Note:  Miguel ANAND Patten presents today for [place]lig[place].    Patient seen by provider today: No   present during visit today: Not Applicable.    Note: Patient arrives ambulatory with cane, somewhat unsteady. Denies falls and dizziness. Pleasant, alert, oriented, [place], has hearing aids. Generalized muscle weakness. Denies pain SOA and other new or worsening symptoms. VSS, afebrile.       Intravenous Access:  No Intravenous access/labs at this visit.    Treatment Conditions:  Not Applicable.      Post Infusion Assessment:  Patient tolerated injection in L lower abdomen without incident.  No bleeding, pain or swelling at site.       Discharge Plan:   Copy of AVS reviewed with patient. Patient will return 4/26/22 for next appointment.  Patient discharged in stable condition accompanied by: self.  Departure Mode: Ambulatory w/cane.      Stacia Gibson RN

## 2021-10-26 NOTE — TELEPHONE ENCOUNTER
Zocor  Routing refill request to provider for review/approval because:  Labs out of range:  CKT  Patient needs to be seen because it has been more than 1 year since last office visit.      Cozaar  Routing refill request to provider for review/approval because:  Labs out of range:  CKT  Patient needs to be seen because it has been more than 1 year since last office visit.  BP failed RN refill protocol    Casodex  Routing refill request to provider for review/approval because:  Drug not on the [facility] refill protocol     Sending to scheduling for yearly office visit due    Michelle Martin, RN

## 2021-11-18 DIAGNOSIS — C61 MALIGNANT NEOPLASM OF PROSTATE (H): ICD-10-CM

## 2021-11-18 DIAGNOSIS — Z95.2 S/P AVR (AORTIC VALVE REPLACEMENT): ICD-10-CM

## 2021-11-18 RX ORDER — LOSARTAN POTASSIUM 50 MG/1
TABLET ORAL
Qty: 60 TABLET | Refills: 0 | Status: SHIPPED | OUTPATIENT
Start: 2021-11-18 | End: 2021-12-22

## 2021-11-18 RX ORDER — SIMVASTATIN 40 MG
TABLET ORAL
Qty: 30 TABLET | Refills: 0 | Status: SHIPPED | OUTPATIENT
Start: 2021-11-18 | End: 2021-12-22

## 2021-11-18 RX ORDER — BICALUTAMIDE 50 MG/1
TABLET, FILM COATED ORAL
Qty: 30 TABLET | Refills: 0 | Status: SHIPPED | OUTPATIENT
Start: 2021-11-18 | End: 2022-02-23

## 2021-11-18 NOTE — TELEPHONE ENCOUNTER
Pending Prescriptions:                       Disp   Refills    simvastatin (ZOCOR) 40 MG tablet [Pharmacy*30 tab*0        Sig: TAKE ONE TABLET BY MOUTH EVERY NIGHT AT BEDTIME    losartan (COZAAR) 50 MG tablet [Pharmacy M*60 tab*0        Sig: TAKE ONE TABLET BY MOUTH TWICE A DAY    bicalutamide (CASODEX) 50 MG tablet [Pharm*30 tab*0        Sig: TAKE ONE TABLET BY MOUTH ONCE DAILY      Routing refill request to provider for review/approval because:  Lou given x1 and patient did not follow up, please advise  Drug not on the [facility] refill protocol     Dominique Muller RN on 11/18/2021 at 4:02 PM

## 2021-11-22 ENCOUNTER — LAB (OUTPATIENT)
Dept: LAB | Facility: CLINIC | Age: 86
End: 2021-11-22
Payer: MEDICARE

## 2021-11-22 ENCOUNTER — ANTICOAGULATION THERAPY VISIT (OUTPATIENT)
Dept: ANTICOAGULATION | Facility: CLINIC | Age: 86
End: 2021-11-22

## 2021-11-22 DIAGNOSIS — I48.92 ATRIAL FLUTTER, UNSPECIFIED TYPE (H): ICD-10-CM

## 2021-11-22 DIAGNOSIS — I50.9 CHF (CONGESTIVE HEART FAILURE) (H): Primary | ICD-10-CM

## 2021-11-22 DIAGNOSIS — Z79.01 LONG TERM CURRENT USE OF ANTICOAGULANT THERAPY: ICD-10-CM

## 2021-11-22 DIAGNOSIS — I48.21 PERMANENT ATRIAL FIBRILLATION (H): ICD-10-CM

## 2021-11-22 LAB — INR BLD: 2.2 (ref 0.9–1.1)

## 2021-11-22 PROCEDURE — 36416 COLLJ CAPILLARY BLOOD SPEC: CPT

## 2021-11-22 PROCEDURE — 85610 PROTHROMBIN TIME: CPT

## 2021-11-22 NOTE — PROGRESS NOTES
Anticoagulation Management    Unable to reach Libby today.    Today's INR result of 2.2 is therapeutic (goal INR of 2.0-3.0).  Result received from: Clinic Lab    Follow up required to confirm warfarin dose taken and assess for changes    Left message to continue current dose of warfarin 3.75 mg tonight.      Anticoagulation clinic to follow up    Maritza Lock RN

## 2021-11-23 NOTE — PROGRESS NOTES
Anticoagulation Management    Unable to reach pt today.    INR on Monday 11/22 INR was 2.2 which is therapeutic (goal INR of 2.0-3.0).  Result received from: Clinic Lab    Follow up required to confirm warfarin dose taken and assess for changes    Left message to continue current dose of warfarin 2..5 mg tonight.      Anticoagulation clinic to follow up    Ashly Jones RN

## 2021-11-23 NOTE — PROGRESS NOTES
Anticoagulation Management    Unable to reach pt today.    INR from Monday 11/22 was 2.2, which is therapeutic (goal INR of 2.0-3.0).  Result received from: Clinic Lab    Follow up required to confirm warfarin dose taken and assess for changes    VM left to call [facility] back. Will try again later       Anticoagulation clinic to follow up    Ashly Jones RN

## 2021-11-24 NOTE — PROGRESS NOTES
ANTICOAGULATION MANAGEMENT     Miguel Patten 88 year old male is on warfarin with therapeutic INR result. (Goal INR 2.0-3.0)    Recent labs: (last 7 days)     11/22/21  0841   INR 2.2*       ASSESSMENT     Source(s): Chart Review I left a detailed voicemail with the orders below. I have also requested a call back if there have been any missed doses, concerns, illness, fever, or if there have been any changes in medications, activity level, or diet        Warfarin doses taken: Warfarin taken as instructed    Diet: No new diet changes identified    New illness, injury, or hospitalization: No    Medication/supplement changes: None noted    Signs or symptoms of bleeding or clotting: No    Previous INR: Therapeutic last 2(+) visits    Additional findings: None     PLAN     Recommended plan for no diet, medication or health factor changes affecting INR     Dosing Instructions: Continue your current warfarin dose with next INR in 4 weeks       Summary  As of 11/22/2021    Full warfarin instructions:  3.75 mg every Mon; 2.5 mg all other days   Next INR check:  12/20/2021             Detailed voice message left for Miguel with dosing instructions and follow up date.     Contact [phone]  to schedule and with any changes, questions or concerns.     Education provided: Please call back if any changes to your diet, medications or how you've been taking warfarin    Plan made per [facility] anticoagulation protocol    Kerwin Faustin RN  Anticoagulation Clinic  11/24/2021    _______________________________________________________________________     Anticoagulation Episode Summary     Current INR goal:  2.0-3.0   TTR:  85.8 % (1 y)   Target end date:  Indefinite   Send INR reminders to:  [facility]    Indications    CHF (congestive heart failure) (H) [I50.9]  Long-term (current) use of anticoagulants [Z79.01] [Z79.01]  Permanent atrial fibrillation (H) [I48.21]  Atrial flutter  unspecified type (H) [I48.92]            Comments:           Anticoagulation Care Providers     Provider Role Specialty Phone number    Vladimir Gonzales DO Referring Internal Medicine [phone]    Virginia Cardona APRN CNP Referring Nurse Practitioner - Gerontology [phone]    Rober العراقي MD [facility] [phone]

## 2021-12-20 DIAGNOSIS — Z95.2 S/P AVR (AORTIC VALVE REPLACEMENT): ICD-10-CM

## 2021-12-20 DIAGNOSIS — J45.20 MILD INTERMITTENT ASTHMA WITHOUT COMPLICATION: ICD-10-CM

## 2021-12-22 ENCOUNTER — ANTICOAGULATION THERAPY VISIT (OUTPATIENT)
Dept: ANTICOAGULATION | Facility: CLINIC | Age: 86
End: 2021-12-22

## 2021-12-22 ENCOUNTER — LAB (OUTPATIENT)
Dept: LAB | Facility: CLINIC | Age: 86
End: 2021-12-22
Payer: MEDICARE

## 2021-12-22 DIAGNOSIS — I48.21 PERMANENT ATRIAL FIBRILLATION (H): ICD-10-CM

## 2021-12-22 DIAGNOSIS — I48.92 ATRIAL FLUTTER, UNSPECIFIED TYPE (H): ICD-10-CM

## 2021-12-22 DIAGNOSIS — Z79.01 LONG TERM CURRENT USE OF ANTICOAGULANT THERAPY: ICD-10-CM

## 2021-12-22 DIAGNOSIS — I50.9 CHF (CONGESTIVE HEART FAILURE) (H): Primary | ICD-10-CM

## 2021-12-22 LAB — INR BLD: 1.8 (ref 0.9–1.1)

## 2021-12-22 PROCEDURE — 36416 COLLJ CAPILLARY BLOOD SPEC: CPT

## 2021-12-22 PROCEDURE — 85610 PROTHROMBIN TIME: CPT

## 2021-12-22 RX ORDER — FLUTICASONE PROPIONATE 44 MCG
AEROSOL WITH ADAPTER (GRAM) INHALATION
Qty: 10.6 G | Refills: 0 | Status: SHIPPED | OUTPATIENT
Start: 2021-12-22 | End: 2022-02-23

## 2021-12-22 RX ORDER — LOSARTAN POTASSIUM 50 MG/1
TABLET ORAL
Qty: 60 TABLET | Refills: 1 | Status: SHIPPED | OUTPATIENT
Start: 2021-12-22 | End: 2022-03-26

## 2021-12-22 RX ORDER — SIMVASTATIN 40 MG
TABLET ORAL
Qty: 30 TABLET | Refills: 1 | Status: SHIPPED | OUTPATIENT
Start: 2021-12-22 | End: 2022-02-23

## 2021-12-22 NOTE — PROGRESS NOTES
ANTICOAGULATION MANAGEMENT     Miguel Patten 89 year old male is on warfarin with subtherapeutic INR result. (Goal INR 2.0-3.0)    Recent labs: (last 7 days)     12/22/21  0838   INR 1.8*       ASSESSMENT     Source(s): Chart Review and Patient/Caregiver Call       Warfarin doses taken: Warfarin taken as instructed    Diet: No new diet changes identified    New illness, injury, or hospitalization: No    Medication/supplement changes: None noted    Signs or symptoms of bleeding or clotting: No    Previous INR: Therapeutic last 2(+) visits    Additional findings: None     PLAN     Recommended plan for no diet, medication or health factor changes affecting INR     Dosing Instructions: Booster dose then continue your current warfarin dose with next INR in 2 weeks       Summary  As of 12/22/2021    Full warfarin instructions:  12/22: 3.75 mg; Otherwise 3.75 mg every Mon; 2.5 mg all other days   Next INR check:  1/6/2022             Telephone call with Miguel who verbalizes understanding and agrees to plan    Lab visit scheduled    Education provided: Please call back if any changes to your diet, medications or how you've been taking warfarin and Monitoring for clotting signs and symptoms    Plan made per [facility] anticoagulation protocol       Ashly Jones RN  Anticoagulation Clinic  12/22/2021    _______________________________________________________________________     Anticoagulation Episode Summary     Current INR goal:  2.0-3.0   TTR:  84.9 % (1 y)   Target end date:  Indefinite   Send INR reminders to:  [facility]    Indications    CHF (congestive heart failure) (H) [I50.9]  Long-term (current) use of anticoagulants [Z79.01] [Z79.01]  Permanent atrial fibrillation (H) [I48.21]  Atrial flutter  unspecified type (H) [I48.92]           Comments:           Anticoagulation Care Providers     Provider Role Specialty Phone number    Vladimir Gonzales DO [facility] Internal Medicine [phone]     Virginia Cardona APRN CNP Referring Nurse Practitioner - Gerontology [phone]    Rober العراقي MD [facility] [phone]

## 2021-12-22 NOTE — TELEPHONE ENCOUNTER
Flovent  Routing refill request to provider for review/approval because:  [facility] failed RN refill protocol    Michelle Martin RN

## 2021-12-22 NOTE — TELEPHONE ENCOUNTER
Cozaar  Prescription approved per [facility] Refill Protocol.    Zocor  Prescription approved per [facility] Refill Protocol.    Michlele Martin RN

## 2021-12-22 NOTE — PROGRESS NOTES
Anticoagulation Management    Unable to reach pt today.    Today's INR result of 1.8 is subtherapeutic (goal INR of 2.0-3.0).  Result received from: Clinic Lab    Follow up required to confirm warfarin dose taken and assess for changes    VM left to call [facility] back. Will try again later          Anticoagulation clinic to follow up    Ashly Jones RN

## 2021-12-29 ENCOUNTER — TELEPHONE (OUTPATIENT)
Dept: CARDIOLOGY | Facility: CLINIC | Age: 86
End: 2021-12-29
Payer: MEDICARE

## 2021-12-29 DIAGNOSIS — I49.5 SICK SINUS SYNDROME (H): ICD-10-CM

## 2021-12-29 DIAGNOSIS — Z95.0 CARDIAC PACEMAKER IN SITU: Primary | ICD-10-CM

## 2021-12-29 DIAGNOSIS — I42.9 CARDIOMYOPATHY (H): ICD-10-CM

## 2021-12-29 NOTE — TELEPHONE ENCOUNTER
----- Message from PHANI Mcdonnell CNP sent at 12/29/2021  9:36 AM CST -----  Regarding: RE: are you okay seeing this patient?  Joseph Garcia!  Yes, that's fine. I would only do an echo though if pt is willing and isn't demented. I am guessing he was not in a nursing home when  saw him in 2019.  Thank you!  ----- Message -----  From: Radha Wang RN  Sent: 12/27/2021   2:38 PM CST  To: PHANI Mcdonnell CNP  Subject: are you okay seeing this patient?                Joseph Moreira,   This patient is on your schedule for 1/6/22. He has not been seen by cards since 2019. He lives in a nursing home. His last device check was on 10/21/21. His last visit with Dr. Caputo in 2019 his note states-   ASSESSMENT AND RECOMMENDATIONS:  Mr. Patten is doing well symptomatically.  His weight is stable, and blood pressure is normal.  I have ordered a repeat echocardiography for assessment of the aortic valve.  Otherwise, he will continue the current medications and return for followup in 1 year.  If he is stable, he can continue to see Ms. Caban annually and see me again only as needed.     Let me know your thoughts.  Thanks  Radha

## 2022-01-06 ENCOUNTER — OFFICE VISIT (OUTPATIENT)
Dept: CARDIOLOGY | Facility: CLINIC | Age: 87
End: 2022-01-06
Payer: MEDICARE

## 2022-01-06 ENCOUNTER — LAB (OUTPATIENT)
Dept: LAB | Facility: CLINIC | Age: 87
End: 2022-01-06
Payer: MEDICARE

## 2022-01-06 ENCOUNTER — ANTICOAGULATION THERAPY VISIT (OUTPATIENT)
Dept: ANTICOAGULATION | Facility: CLINIC | Age: 87
End: 2022-01-06

## 2022-01-06 VITALS
HEIGHT: 72 IN | WEIGHT: 195.8 LBS | DIASTOLIC BLOOD PRESSURE: 74 MMHG | BODY MASS INDEX: 26.52 KG/M2 | SYSTOLIC BLOOD PRESSURE: 138 MMHG | HEART RATE: 60 BPM | OXYGEN SATURATION: 96 %

## 2022-01-06 DIAGNOSIS — Z79.01 LONG TERM CURRENT USE OF ANTICOAGULANT THERAPY: ICD-10-CM

## 2022-01-06 DIAGNOSIS — I50.9 CHF (CONGESTIVE HEART FAILURE) (H): Primary | ICD-10-CM

## 2022-01-06 DIAGNOSIS — I48.92 ATRIAL FLUTTER, UNSPECIFIED TYPE (H): ICD-10-CM

## 2022-01-06 DIAGNOSIS — I48.21 PERMANENT ATRIAL FIBRILLATION (H): ICD-10-CM

## 2022-01-06 DIAGNOSIS — I48.21 PERMANENT ATRIAL FIBRILLATION (H): Primary | ICD-10-CM

## 2022-01-06 DIAGNOSIS — I49.5 SICK SINUS SYNDROME (H): ICD-10-CM

## 2022-01-06 DIAGNOSIS — Z95.2 S/P AVR (AORTIC VALVE REPLACEMENT): ICD-10-CM

## 2022-01-06 LAB — INR BLD: 2.6 (ref 0.9–1.1)

## 2022-01-06 PROCEDURE — 99214 OFFICE O/P EST MOD 30 MIN: CPT | Performed by: NURSE PRACTITIONER

## 2022-01-06 PROCEDURE — 36416 COLLJ CAPILLARY BLOOD SPEC: CPT

## 2022-01-06 PROCEDURE — 85610 PROTHROMBIN TIME: CPT

## 2022-01-06 ASSESSMENT — MIFFLIN-ST. JEOR: SCORE: 1591.14

## 2022-01-06 NOTE — PATIENT INSTRUCTIONS
Call my nurse with any questions or concerns:  [phone]  *If you have concerns after hours, please call [phone], option 2 to speak with on call Cardiologist.    Please have the mole checked on the left side of your neck  See me in 6 months with an echo  Call with any concerns

## 2022-01-06 NOTE — PROGRESS NOTES
HPI and Plan: #[id number]  See dictation    Today's clinic visit entailed:  Review of external notes as documented elsewhere in note  No LOS data to display   Time spent doing chart review, history and exam, documentation and further activities per the note  Provider  Link to MDM Help Grid     The level of medical decision making during this visit was of moderate complexity.      Orders Placed This Encounter   Procedures     Follow-Up with Cardiac Advanced Practice Provider     Echocardiogram Complete       No orders of the defined types were placed in this encounter.      There are no discontinued medications.      Encounter Diagnoses   Name Primary?     Sick sinus syndrome (H)      Cardiac pacemaker in situ      S/P AVR (aortic valve replacement) Yes       CURRENT MEDICATIONS:  Current Outpatient Medications   Medication Sig Dispense Refill     acetaminophen (TYLENOL) 325 MG tablet Take 2 tablets (650 mg) by mouth every 4 hours as needed for mild pain or fever 100 tablet      bicalutamide (CASODEX) 50 MG tablet TAKE ONE TABLET BY MOUTH ONCE DAILY 30 tablet 0     Dimethicone (SECURA DIMETHICONE PROTECTANT) 5 % CREA Externally apply topically daily as needed       FLOVENT HFA 44 MCG/ACT inhaler INAHLE 1 PUFF BY MOUTH TWO TIMES A DAY 10.6 g 0     leuprolide (LUPRON DEPOT, 3-MONTH,) 22.5 MG kit Inject 22.5 MG, IM q 6 months per Dr. Zeyad Guevara, pt's Urologist in [place]. 10/17/2018 1 each 3     losartan (COZAAR) 50 MG tablet TAKE ONE TABLET BY MOUTH TWICE A DAY 60 tablet 1     simvastatin (ZOCOR) 40 MG tablet TAKE ONE TABLET BY MOUTH EVERY NIGHT AT BEDTIME 30 tablet 1     sodium chloride (OCEAN) 0.65 % nasal spray Spray 1 spray into both nostrils 3 times daily as needed for congestion       warfarin ANTICOAGULANT (JANTOVEN ANTICOAGULANT) 2.5 MG tablet 3.75 mg every Mon; 2.5 mg all other days or as directed by INR clinic. 96 tablet 1       ALLERGIES     Allergies   Allergen Reactions     No Known Drug Allergies         PAST MEDICAL HISTORY:  Past Medical History:   Diagnosis Date     Arthritis      Ascending aortic aneurysm (H)     repaired with Hemashield graft 7/2014     Asthma      Complete AV block (H)     sp CRTP implant     Congestive heart failure (H)      Coronary artery disease     mild-moderate stenosis by angiography     H/O aortic valve replacement     bioprosthetic, 7/2014     Hypertension      Malignant neoplasm of prostate (H)     Prostate cancer     Permanent atrial fibrillation (H)     chronic       PAST SURGICAL HISTORY:  Past Surgical History:   Procedure Laterality Date     ARTHROPLASTY HIP  1/21/2013    Procedure: ARTHROPLASTY HIP;  right total hip arthroplasty;  Surgeon: Rober Alves MD;  [facility]     GENITOURINARY SURGERY  2001    Prostatectomy       ORTHOPEDIC SURGERY      Left SONALI     PHACOEMULSIFICATION WITH STANDARD INTRAOCULAR LENS IMPLANT Right 10/6/2016    Procedure: PHACOEMULSIFICATION WITH STANDARD INTRAOCULAR LENS IMPLANT;  Surgeon: Elder Rueda MD;  [facility]     PHACOEMULSIFICATION WITH STANDARD INTRAOCULAR LENS IMPLANT Left 10/20/2016    Procedure: PHACOEMULSIFICATION WITH STANDARD INTRAOCULAR LENS IMPLANT;  Surgeon: Elder Rueda MD;  [facility]     REPAIR ANEURYSM ASCENDING AORTA  7/17/2014    2. Aortic aneurysm repair with 32 mm Hemashield graft.      REPLACE VALVE AORTIC  7/17/2014    1. Aortic valve replacement with 23 mm [employer] Miguel Trifecta tissue valve.      s/p aneurysm repair by Dr. Friedman       s/p avr       [facility] NONSPECIFIC PROCEDURE      Hernia repair     [facility] NONSPECIFIC PROCEDURE      Prostatectomy/cancer     [facility] TOTAL HIP ARTHROPLASTY  1/21/13    Right       FAMILY HISTORY:  Family History   Problem Relation Age of Onset     Asthma Father      Asthma Paternal Grandmother      Asthma Daughter        SOCIAL HISTORY:  Social History     Socioeconomic History     Marital status:      Spouse name: Not on file     Number of children:  Not on file     Years of education: Not on file     Highest education level: Not on file   Occupational History     Not on file   Tobacco Use     Smoking status: Never Smoker     Smokeless tobacco: Never Used   Substance and Sexual Activity     Alcohol use: Yes     Alcohol/week: 0.0 standard drinks     Comment: rarely, once a week or less     Drug use: No     Sexual activity: Not Currently     Partners: Female     Comment:  - live with friend - 3 children.   Other Topics Concern     Parent/sibling w/ CABG, MI or angioplasty before 65F 55M? No      Service Yes     Blood Transfusions No     Caffeine Concern No     Occupational Exposure No     Hobby Hazards No     Sleep Concern No     Stress Concern No     Weight Concern No     Special Diet No     Back Care No     Exercise Yes     Bike Helmet No     Comment: n/a     Seat Belt Yes     Self-Exams Yes   Social History Narrative     Not on file     Social Determinants of Health     Financial Resource Strain: Not on file   Food Insecurity: Not on file   Transportation Needs: Not on file   Physical Activity: Not on file   Stress: Not on file   Social Connections: Not on file   Intimate Partner Violence: Not on file   Housing Stability: Not on file       Review of Systems:  Skin:  Negative       Eyes:  Positive for glasses    ENT:  Positive for hearing loss    Respiratory:  Positive for dyspnea on exertion     Cardiovascular:  Negative for;chest pain;palpitations;edema;lightheadedness;dizziness      Gastroenterology: Negative      Genitourinary:  Negative      Musculoskeletal:  Negative      Neurologic:  Negative      Psychiatric:  Negative      Heme/Lymph/Imm:  Negative      Endocrine:  Negative        Physical Exam:  Vitals: /74 (BP Location: Right arm, Patient Position: Sitting, Cuff Size: Adult Regular)   Pulse 60   Ht 1.829 m (6')   Wt 88.8 kg (195 lb 12.8 oz)   SpO2 96%   BMI 26.56 kg/m      Constitutional:  cooperative, alert and oriented,  well developed, well nourished, in no acute distress        Skin:  warm and dry to the touch, no apparent skin lesions or masses noted   pacemaker incision in the left infraclavicular area was well-healed      Head:  normocephalic, no masses or lesions        Eyes:  pupils equal and round;conjunctivae and lids unremarkable        Lymph:      ENT:  no pallor or cyanosis, dentition good hearing aide(s) present      Neck:  carotid pulses are full and equal bilaterally, JVP normal, no carotid bruit JVP 8-10      Respiratory:  normal breath sounds, clear to auscultation, normal A-P diameter, normal symmetry, normal respiratory excursion, no use of accessory muscles         Cardiac: regular rhythm;normal S1 and S2     crisp prosthetic valve sounds systolic murmur        not assessed this visit                                        GI:  abdomen soft;BS normoactive        Extremities and Muscular Skeletal:  no deformities, clubbing, cyanosis, erythema observed;no edema              Neurological:  no gross motor deficits;affect appropriate        Psych:  Alert and Oriented x 3        CC  No referring provider defined for this encounter.

## 2022-01-06 NOTE — PROGRESS NOTES
Anticoagulation Management    Unable to reach pt today.    Today's INR result of 2.4 is therapeutic (goal INR of 2.0-3.0).  Result received from: Clinic Lab    Follow up required to confirm warfarin dose taken and assess for changes    VM left to call [facility] back. Will try again later       Anticoagulation clinic to follow up    Ashly Jones RN

## 2022-01-06 NOTE — PROGRESS NOTES
Anticoagulation Management     Unable to reach pt today.     Today's INR result of 2.4 is therapeutic (goal INR of 2.0-3.0).  Result received from: Clinic Lab     Follow up required to confirm warfarin dose taken and assess for changes     No answer, unable to leave a VM. Will try again later.         Anticoagulation clinic to follow up     Ashly Jones RN

## 2022-01-06 NOTE — LETTER
1/6/2022    Vladimir Gonzales, DO  [address] [place] [address]    RE: Miguel Patten       Dear Colleague,     I had the pleasure of seeing Miguel Patten in the [facility].  HPI and Plan: #[id number]  See dictation    Today's clinic visit entailed:  Review of external notes as documented elsewhere in note  No LOS data to display   Time spent doing chart review, history and exam, documentation and further activities per the note  Provider  Link to MDM Help Grid     The level of medical decision making during this visit was of moderate complexity.      Orders Placed This Encounter   Procedures     Follow-Up with Cardiac Advanced Practice Provider     Echocardiogram Complete       No orders of the defined types were placed in this encounter.      There are no discontinued medications.      Encounter Diagnoses   Name Primary?     Sick sinus syndrome (H)      Cardiac pacemaker in situ      S/P AVR (aortic valve replacement) Yes       CURRENT MEDICATIONS:  Current Outpatient Medications   Medication Sig Dispense Refill     acetaminophen (TYLENOL) 325 MG tablet Take 2 tablets (650 mg) by mouth every 4 hours as needed for mild pain or fever 100 tablet      bicalutamide (CASODEX) 50 MG tablet TAKE ONE TABLET BY MOUTH ONCE DAILY 30 tablet 0     Dimethicone (SECURA DIMETHICONE PROTECTANT) 5 % CREA Externally apply topically daily as needed       FLOVENT HFA 44 MCG/ACT inhaler INAHLE 1 PUFF BY MOUTH TWO TIMES A DAY 10.6 g 0     leuprolide (LUPRON DEPOT, 3-MONTH,) 22.5 MG kit Inject 22.5 MG, IM q 6 months per Dr. Zeyad Guevara, pt's Urologist in [place]. 10/17/2018 1 each 3     losartan (COZAAR) 50 MG tablet TAKE ONE TABLET BY MOUTH TWICE A DAY 60 tablet 1     simvastatin (ZOCOR) 40 MG tablet TAKE ONE TABLET BY MOUTH EVERY NIGHT AT BEDTIME 30 tablet 1     sodium chloride (OCEAN) 0.65 % nasal spray Spray 1 spray into both nostrils 3 times daily as needed for congestion       warfarin  ANTICOAGULANT (JANTOVEN ANTICOAGULANT) 2.5 MG tablet 3.75 mg every Mon; 2.5 mg all other days or as directed by INR clinic. 96 tablet 1       ALLERGIES     Allergies   Allergen Reactions     No Known Drug Allergies        PAST MEDICAL HISTORY:  Past Medical History:   Diagnosis Date     Arthritis      Ascending aortic aneurysm (H)     repaired with Hemashield graft 7/2014     Asthma      Complete AV block (H)     sp CRTP implant     Congestive heart failure (H)      Coronary artery disease     mild-moderate stenosis by angiography     H/O aortic valve replacement     bioprosthetic, 7/2014     Hypertension      Malignant neoplasm of prostate (H)     Prostate cancer     Permanent atrial fibrillation (H)     chronic       PAST SURGICAL HISTORY:  Past Surgical History:   Procedure Laterality Date     ARTHROPLASTY HIP  1/21/2013    Procedure: ARTHROPLASTY HIP;  right total hip arthroplasty;  Surgeon: Rober Alves MD;  [facility]     GENITOURINARY SURGERY  2001    Prostatectomy       ORTHOPEDIC SURGERY      Left SONALI     PHACOEMULSIFICATION WITH STANDARD INTRAOCULAR LENS IMPLANT Right 10/6/2016    Procedure: PHACOEMULSIFICATION WITH STANDARD INTRAOCULAR LENS IMPLANT;  Surgeon: Elder Rueda MD;  [facility]     PHACOEMULSIFICATION WITH STANDARD INTRAOCULAR LENS IMPLANT Left 10/20/2016    Procedure: PHACOEMULSIFICATION WITH STANDARD INTRAOCULAR LENS IMPLANT;  Surgeon: Elder Rueda MD;  [facility]     REPAIR ANEURYSM ASCENDING AORTA  7/17/2014    2. Aortic aneurysm repair with 32 mm Hemashield graft.      REPLACE VALVE AORTIC  7/17/2014    1. Aortic valve replacement with 23 mm [employer] Miguel Trifecta tissue valve.      s/p aneurysm repair by Dr. Friedman       s/p avr       [facility] NONSPECIFIC PROCEDURE      Hernia repair     [facility] NONSPECIFIC PROCEDURE      Prostatectomy/cancer     [facility] TOTAL HIP ARTHROPLASTY  1/21/13    Right       FAMILY HISTORY:  Family History   Problem Relation Age of Onset      Asthma Father      Asthma Paternal Grandmother      Asthma Daughter        SOCIAL HISTORY:  Social History     Socioeconomic History     Marital status:      Spouse name: Not on file     Number of children: Not on file     Years of education: Not on file     Highest education level: Not on file   Occupational History     Not on file   Tobacco Use     Smoking status: Never Smoker     Smokeless tobacco: Never Used   Substance and Sexual Activity     Alcohol use: Yes     Alcohol/week: 0.0 standard drinks     Comment: rarely, once a week or less     Drug use: No     Sexual activity: Not Currently     Partners: Female     Comment:  - live with friend - 3 children.   Other Topics Concern     Parent/sibling w/ CABG, MI or angioplasty before 65F 55M? No      Service Yes     Blood Transfusions No     Caffeine Concern No     Occupational Exposure No     Hobby Hazards No     Sleep Concern No     Stress Concern No     Weight Concern No     Special Diet No     Back Care No     Exercise Yes     Bike Helmet No     Comment: n/a     Seat Belt Yes     Self-Exams Yes   Social History Narrative     Not on file     Social Determinants of Health     Financial Resource Strain: Not on file   Food Insecurity: Not on file   Transportation Needs: Not on file   Physical Activity: Not on file   Stress: Not on file   Social Connections: Not on file   Intimate Partner Violence: Not on file   Housing Stability: Not on file       Review of Systems:  Skin:  Negative       Eyes:  Positive for glasses    ENT:  Positive for hearing loss    Respiratory:  Positive for dyspnea on exertion     Cardiovascular:  Negative for;chest pain;palpitations;edema;lightheadedness;dizziness      Gastroenterology: Negative      Genitourinary:  Negative      Musculoskeletal:  Negative      Neurologic:  Negative      Psychiatric:  Negative      Heme/Lymph/Imm:  Negative      Endocrine:  Negative        Physical Exam:  Vitals: /74 (BP  Location: Right arm, Patient Position: Sitting, Cuff Size: Adult Regular)   Pulse 60   Ht 1.829 m (6')   Wt 88.8 kg (195 lb 12.8 oz)   SpO2 96%   BMI 26.56 kg/m      Constitutional:  cooperative, alert and oriented, well developed, well nourished, in no acute distress        Skin:  warm and dry to the touch, no apparent skin lesions or masses noted   pacemaker incision in the left infraclavicular area was well-healed      Head:  normocephalic, no masses or lesions        Eyes:  pupils equal and round;conjunctivae and lids unremarkable        Lymph:      ENT:  no pallor or cyanosis, dentition good hearing aide(s) present      Neck:  carotid pulses are full and equal bilaterally, JVP normal, no carotid bruit JVP 8-10      Respiratory:  normal breath sounds, clear to auscultation, normal A-P diameter, normal symmetry, normal respiratory excursion, no use of accessory muscles         Cardiac: regular rhythm;normal S1 and S2     crisp prosthetic valve sounds systolic murmur        not assessed this visit                                        GI:  abdomen soft;BS normoactive        Extremities and Muscular Skeletal:  no deformities, clubbing, cyanosis, erythema observed;no edema              Neurological:  no gross motor deficits;affect appropriate        Psych:  Alert and Oriented x 3        CC  No referring provider defined for this encounter.  Thank you for allowing me to participate in the care of your patient.      Sincerely,     Lillie Caban NP, APRN CNP     [facility]  cc:   No referring provider defined for this encounter.

## 2022-01-06 NOTE — PROGRESS NOTES
Anticoagulation Management     Unable to reach pt today.     Today's INR result of 2.4 is therapeutic (goal INR of 2.0-3.0).  Result received from: Clinic Lab     Follow up required to confirm warfarin dose taken and assess for changes     No answer, unable to leave a VM. Will try again tomorrow.      Anticoagulation clinic to follow up     Ashly Jones RN

## 2022-01-07 NOTE — PROGRESS NOTES
Anticoagulation Management    Unable to reach patient today multiple attempts    No instructions provided. Unable to leave voicemail.      Anticoagulation clinic to follow up    Yennifer Cooper RN

## 2022-01-07 NOTE — PROGRESS NOTES
Service Date: 01/06/2022    HISTORY OF PRESENT ILLNESS:  Mr. Patten is a delightful 89-year-old gentleman that I am having the pleasure of seeing today in followup.  He is an established patient of Dr. Galdamez.  As you recall, he has a history that includes:  1.  Aortic valve disease with bioprosthetic aortic valve replacement and graft repair of the ascending aorta.  2.  Atrial fibrillation with complete AV block.  The patient has a Bi-V pacemaker that was implanted in 03/2015.  3.  Chronic atrial fibrillation, on warfarin therapy.  4.  History of hypertension with moderate to severe concentric left ventricular hypertrophy noted on echocardiogram.    At today's visit, Miguel is doing well.  He got COVID a year ago.  He states that he was very sick.  He is now residing in an assisted living facility.  He continues to drive and drove himself to his appointment today.    His last echocardiogram in 11/2020 showed moderate LVH.  His EF was 56%.  He had normal right ventricular size and function.  His bioprosthetic valve showed mild paravalvular insufficiency with a mean aortic gradient of 24 mmHg.    His last device interrogation was completed in 10/2021.  This showed 97% BiV paced.  He has 3 years battery longevity noted.  The patient is a DNR/DNI, which was determined in 11/2020.  He has had some nonsustained VT documented with rates as fast as 200 beats per minute.  The patient's left pectoral pocket is well healed.    He also has a history of hypertension, which is stable today and hypercholesterolemia, on simvastatin 40 mg daily.  His last LDL was noted to be 28.  All other review of systems and past medical history are noted below.    ASSESSMENT AND PLAN:  Mr. Patten is a delightful 89-year-old gentleman here today for his annual followup.  He is doing well from a cardiac standpoint.  He states since COVID, it did slow him down, but he is doing well in his senior assisted living facility.  He denies any chest pain at  this time.    I did notice on exam that Miguel has any regular, dark mole on the left side of his neck.  He is aware that it is there.  I encouraged him to see  to have it evaluated since it appears suspicious.    Dr. Caputo wanted an echocardiogram for this visit; however, it was not completed.  I put an order for an echo in 6 months.  I am going to try to move Miguel's annual visits for the summer, so he does not have to worry about driving in the wintertime.    He continues to try to be active.  He does ambulate with a cane.  No medication changes were warranted at today's visit.  He is on warfarin therapy for his chronic atrial fibrillation.  The patient states that he has not had any difficulty with his warfarin.    Thank you for including me in his care.    Lillie Caban NP        D: 2022   T: 2022   MTEmily pearl    Name:     MIGUEL HUDSON  MRN:      [id number]        Account:      [id number]   :      1932           Service Date: 2022       Document: [id number]

## 2022-01-10 NOTE — PROGRESS NOTES
ANTICOAGULATION MANAGEMENT     Miguel Patten 89 year old male is on warfarin with therapeutic INR result. (Goal INR 2.0-3.0)    Recent labs: (last 7 days)     01/06/22  1241   INR 2.6*       ASSESSMENT     Source(s): Chart Review I left a detailed voicemail with the orders below. I have also requested a call back if there have been any missed doses, concerns, illness, fever, or if there have been any changes in medications, activity level, or diet        Warfarin doses taken: Warfarin taken as instructed    Diet: No new diet changes identified    New illness, injury, or hospitalization: No    Medication/supplement changes: None noted    Signs or symptoms of bleeding or clotting: No    Previous INR: Subtherapeutic    Additional findings: None     PLAN     Recommended plan for no diet, medication or health factor changes affecting INR     Dosing Instructions: Continue your current warfarin dose with next INR in 4 weeks       Summary  As of 1/6/2022    Full warfarin instructions:  3.75 mg every Mon; 2.5 mg all other days   Next INR check:  2/3/2022             Detailed voice message left for  Daughter Savi with dosing instructions and follow up date.     Contact [phone]  to schedule and with any changes, questions or concerns.     Education provided: Please call back if any changes to your diet, medications or how you've been taking warfarin    Plan made per [facility] anticoagulation protocol    Kerwin Faustin RN  Anticoagulation Clinic  1/10/2022    _______________________________________________________________________     Anticoagulation Episode Summary     Current INR goal:  2.0-3.0   TTR:  83.7 % (1 y)   Target end date:  Indefinite   Send INR reminders to:  BUBBA REID    Indications    CHF (congestive heart failure) (H) [I50.9]  Long-term (current) use of anticoagulants [Z79.01] [Z79.01]  Permanent atrial fibrillation (H) [I48.21]  Atrial flutter  unspecified type (H) [I48.92]           Comments:            Anticoagulation Care Providers     Provider Role Specialty Phone number    Vladimir Gonzales DO Referring Internal Medicine [phone]    Virginia Cardona APRN CNP Referring Nurse Practitioner - Gerontology [phone]    Rober العراقي MD [facility] Family Medicine [phone]

## 2022-01-10 NOTE — PROGRESS NOTES
Anticoagulation Management    Unable to reach Miguel/Savi today.    Today's INR result of 2.6 is therapeutic (goal INR of 2.0-3.0).  Result received from: Clinic Lab    Follow up required to confirm warfarin dose taken and assess for changes    Left message to call [facility] back to discuss.    Anticoagulation clinic to follow up    Kerwin Faustin RN

## 2022-01-25 ENCOUNTER — ANCILLARY PROCEDURE (OUTPATIENT)
Dept: CARDIOLOGY | Facility: CLINIC | Age: 87
End: 2022-01-25
Attending: INTERNAL MEDICINE
Payer: MEDICARE

## 2022-01-25 DIAGNOSIS — Z95.0 CARDIAC PACEMAKER IN SITU: ICD-10-CM

## 2022-01-25 PROCEDURE — 93294 REM INTERROG EVL PM/LDLS PM: CPT | Performed by: INTERNAL MEDICINE

## 2022-01-25 PROCEDURE — 93296 REM INTERROG EVL PM/IDS: CPT | Performed by: INTERNAL MEDICINE

## 2022-01-26 ENCOUNTER — TELEPHONE (OUTPATIENT)
Dept: ANTICOAGULATION | Facility: CLINIC | Age: 87
End: 2022-01-26
Payer: MEDICARE

## 2022-01-26 DIAGNOSIS — I48.92 ATRIAL FLUTTER, UNSPECIFIED TYPE (H): ICD-10-CM

## 2022-01-26 DIAGNOSIS — Z79.01 LONG TERM CURRENT USE OF ANTICOAGULANT THERAPY: Primary | ICD-10-CM

## 2022-01-26 NOTE — TELEPHONE ENCOUNTER
ANTICOAGULATION MANAGEMENT      Miguel Patten due for annual renewal of referral to anticoagulation monitoring. Order pended for your review and signature.      ANTICOAGULATION SUMMARY      Warfarin indication(s)     Atrial fibrillation  CHF     Heart valve present?  NO       Current goal range   INR: 2.0-3.0     Goal appropriate for indication? Yes, INR 2-3 appropriate for hx of DVT, PE, hypercoagulable state, Afib, LVAD, or bileaflet AVR without risk factors     Current duration of therapy Indefinite/long term therapy   Time in Therapeutic Range (TTR)  (Goal > 60%) 83.7%       Office visit with referring provider's group within last year no        Ashly Jones, RN

## 2022-01-31 LAB
MDC_IDC_EPISODE_DTM: NORMAL
MDC_IDC_EPISODE_DURATION: 6 S
MDC_IDC_EPISODE_DURATION: 7 S
MDC_IDC_EPISODE_DURATION: 8 S
MDC_IDC_EPISODE_DURATION: 8 S
MDC_IDC_EPISODE_ID: NORMAL
MDC_IDC_EPISODE_TYPE: NORMAL
MDC_IDC_LEAD_IMPLANT_DT: NORMAL
MDC_IDC_LEAD_IMPLANT_DT: NORMAL
MDC_IDC_LEAD_LOCATION: NORMAL
MDC_IDC_LEAD_LOCATION: NORMAL
MDC_IDC_LEAD_LOCATION_DETAIL_1: NORMAL
MDC_IDC_LEAD_LOCATION_DETAIL_1: NORMAL
MDC_IDC_LEAD_MFG: NORMAL
MDC_IDC_LEAD_MFG: NORMAL
MDC_IDC_LEAD_MODEL: NORMAL
MDC_IDC_LEAD_MODEL: NORMAL
MDC_IDC_LEAD_POLARITY_TYPE: NORMAL
MDC_IDC_LEAD_POLARITY_TYPE: NORMAL
MDC_IDC_LEAD_SERIAL: NORMAL
MDC_IDC_LEAD_SERIAL: NORMAL
MDC_IDC_MSMT_BATTERY_DTM: NORMAL
MDC_IDC_MSMT_BATTERY_REMAINING_LONGEVITY: 36 MO
MDC_IDC_MSMT_BATTERY_REMAINING_PERCENTAGE: 48 %
MDC_IDC_MSMT_BATTERY_STATUS: NORMAL
MDC_IDC_MSMT_LEADCHNL_LV_IMPEDANCE_VALUE: 559 OHM
MDC_IDC_MSMT_LEADCHNL_LV_PACING_THRESHOLD_AMPLITUDE: 2.1 V
MDC_IDC_MSMT_LEADCHNL_LV_PACING_THRESHOLD_PULSEWIDTH: 1 MS
MDC_IDC_MSMT_LEADCHNL_RV_IMPEDANCE_VALUE: 483 OHM
MDC_IDC_PG_IMPLANT_DTM: NORMAL
MDC_IDC_PG_MFG: NORMAL
MDC_IDC_PG_MODEL: NORMAL
MDC_IDC_PG_SERIAL: NORMAL
MDC_IDC_PG_TYPE: NORMAL
MDC_IDC_SESS_CLINIC_NAME: NORMAL
MDC_IDC_SESS_DTM: NORMAL
MDC_IDC_SESS_TYPE: NORMAL
MDC_IDC_SET_BRADY_AT_MODE_SWITCH_RATE: 170 {BEATS}/MIN
MDC_IDC_SET_BRADY_LOWRATE: 60 {BEATS}/MIN
MDC_IDC_SET_BRADY_MAX_SENSOR_RATE: 120 {BEATS}/MIN
MDC_IDC_SET_BRADY_MODE: NORMAL
MDC_IDC_SET_CRT_LVRV_DELAY: 0 MS
MDC_IDC_SET_CRT_PACED_CHAMBERS: NORMAL
MDC_IDC_SET_LEADCHNL_LV_PACING_AMPLITUDE: 3 V
MDC_IDC_SET_LEADCHNL_LV_PACING_ANODE_ELECTRODE_1: NORMAL
MDC_IDC_SET_LEADCHNL_LV_PACING_ANODE_LOCATION_1: NORMAL
MDC_IDC_SET_LEADCHNL_LV_PACING_CATHODE_ELECTRODE_1: NORMAL
MDC_IDC_SET_LEADCHNL_LV_PACING_CATHODE_LOCATION_1: NORMAL
MDC_IDC_SET_LEADCHNL_LV_PACING_PULSEWIDTH: 1 MS
MDC_IDC_SET_LEADCHNL_LV_SENSING_ADAPTATION_MODE: NORMAL
MDC_IDC_SET_LEADCHNL_LV_SENSING_ANODE_ELECTRODE_1: NORMAL
MDC_IDC_SET_LEADCHNL_LV_SENSING_ANODE_LOCATION_1: NORMAL
MDC_IDC_SET_LEADCHNL_LV_SENSING_CATHODE_ELECTRODE_1: NORMAL
MDC_IDC_SET_LEADCHNL_LV_SENSING_CATHODE_LOCATION_1: NORMAL
MDC_IDC_SET_LEADCHNL_LV_SENSING_SENSITIVITY: 2.5 MV
MDC_IDC_SET_LEADCHNL_RA_SENSING_ADAPTATION_MODE: NORMAL
MDC_IDC_SET_LEADCHNL_RA_SENSING_SENSITIVITY: 0.5 MV
MDC_IDC_SET_LEADCHNL_RV_PACING_AMPLITUDE: 2.4 V
MDC_IDC_SET_LEADCHNL_RV_PACING_CAPTURE_MODE: NORMAL
MDC_IDC_SET_LEADCHNL_RV_PACING_POLARITY: NORMAL
MDC_IDC_SET_LEADCHNL_RV_PACING_PULSEWIDTH: 0.5 MS
MDC_IDC_SET_LEADCHNL_RV_SENSING_ADAPTATION_MODE: NORMAL
MDC_IDC_SET_LEADCHNL_RV_SENSING_POLARITY: NORMAL
MDC_IDC_SET_LEADCHNL_RV_SENSING_SENSITIVITY: 2.5 MV
MDC_IDC_SET_ZONE_DETECTION_INTERVAL: 375 MS
MDC_IDC_SET_ZONE_TYPE: NORMAL
MDC_IDC_SET_ZONE_VENDOR_TYPE: NORMAL
MDC_IDC_STAT_BRADY_DTM_END: NORMAL
MDC_IDC_STAT_BRADY_DTM_START: NORMAL
MDC_IDC_STAT_BRADY_RA_PERCENT_PACED: 0 %
MDC_IDC_STAT_BRADY_RV_PERCENT_PACED: 98 %
MDC_IDC_STAT_CRT_DTM_END: NORMAL
MDC_IDC_STAT_CRT_DTM_START: NORMAL
MDC_IDC_STAT_CRT_LV_PERCENT_PACED: 98 %
MDC_IDC_STAT_EPISODE_RECENT_COUNT: 0
MDC_IDC_STAT_EPISODE_RECENT_COUNT: 7
MDC_IDC_STAT_EPISODE_RECENT_COUNT_DTM_END: NORMAL
MDC_IDC_STAT_EPISODE_RECENT_COUNT_DTM_START: NORMAL
MDC_IDC_STAT_EPISODE_TYPE: NORMAL
MDC_IDC_STAT_EPISODE_VENDOR_TYPE: NORMAL

## 2022-02-02 ENCOUNTER — LAB (OUTPATIENT)
Dept: LAB | Facility: CLINIC | Age: 87
End: 2022-02-02
Payer: MEDICARE

## 2022-02-02 ENCOUNTER — ANTICOAGULATION THERAPY VISIT (OUTPATIENT)
Dept: ANTICOAGULATION | Facility: CLINIC | Age: 87
End: 2022-02-02

## 2022-02-02 DIAGNOSIS — I48.92 ATRIAL FLUTTER, UNSPECIFIED TYPE (H): ICD-10-CM

## 2022-02-02 DIAGNOSIS — I50.9 CHF (CONGESTIVE HEART FAILURE) (H): Primary | ICD-10-CM

## 2022-02-02 DIAGNOSIS — I48.21 PERMANENT ATRIAL FIBRILLATION (H): ICD-10-CM

## 2022-02-02 DIAGNOSIS — Z79.01 LONG TERM CURRENT USE OF ANTICOAGULANT THERAPY: ICD-10-CM

## 2022-02-02 LAB — INR BLD: 2.4 (ref 0.9–1.1)

## 2022-02-02 PROCEDURE — 36416 COLLJ CAPILLARY BLOOD SPEC: CPT

## 2022-02-02 PROCEDURE — 85610 PROTHROMBIN TIME: CPT

## 2022-02-02 NOTE — PROGRESS NOTES
Anticoagulation Management    Unable to reach Drums today.    Today's INR result of 2.4 is therapeutic (goal INR of 2.0-3.0).  Result received from: Clinic Lab    Follow up required to confirm warfarin dose taken and assess for changes    No instructions provided. Unable to leave voicemail.     [place] RN's can be reached for return calls at [phone] (internal staff only). Please do not give this number out to patients or cold transfer. Thank you!         Anticoagulation clinic to follow up    Kerwin Faustin RN

## 2022-02-03 NOTE — PROGRESS NOTES
Anticoagulation Management     Unable to reach pt today.     INR from Wed 2/2 was 2.4 which is therapeutic (goal INR of 2.0-3.0).  Result received from: Clinic Lab     Follow up required to confirm warfarin dose taken and assess for changes     No instructions provided. Unable to leave voicemail. Will need to try again later         Anticoagulation clinic to follow up     Ashly Jones RN

## 2022-02-04 NOTE — PROGRESS NOTES
ANTICOAGULATION MANAGEMENT     Miguel Patten 89 year old male is on warfarin with therapeutic INR result. (Goal INR 2.0-3.0)    Recent labs: (last 7 days)     02/02/22  1446   INR 2.4*       ASSESSMENT     Source(s): Chart Review       Warfarin doses taken: Reviewed in chart    Diet: No new diet changes identified    New illness, injury, or hospitalization: No    Medication/supplement changes: None noted    Signs or symptoms of bleeding or clotting: No    Previous INR: Therapeutic last visit; previously outside of goal range    Additional findings: None     PLAN     Recommended plan for no diet, medication or health factor changes affecting INR     Dosing Instructions: Continue your current warfarin dose with next INR in 4 weeks       Summary  As of 2/2/2022    Full warfarin instructions:  3.75 mg every Mon; 2.5 mg all other days   Next INR check:  3/2/2022             Detailed voice message left for  On daughter's cell phone  with dosing instructions and follow up date.     Contact [phone]  to schedule and with any changes, questions or concerns.     Education provided: Please call back if any changes to your diet, medications or how you've been taking warfarin    Plan made per [facility] anticoagulation protocol    Sameera Musa RN  Anticoagulation Clinic  2/4/2022    _______________________________________________________________________     Anticoagulation Episode Summary     Current INR goal:  2.0-3.0   TTR:  83.7 % (1 y)   Target end date:  Indefinite   Send INR reminders to:  JAIRO FRANKLIN    Indications    CHF (congestive heart failure) (H) [I50.9]  Long-term (current) use of anticoagulants [Z79.01] [Z79.01]  Permanent atrial fibrillation (H) [I48.21]  Atrial flutter  unspecified type (H) [I48.92]           Comments:           Anticoagulation Care Providers     Provider Role Specialty Phone number    Vladimir Gonzales DO [facility] Internal Medicine [phone]    Virginia Cardona,  APRN CNP Referring Nurse Practitioner - Gerontology [phone]    Rober العراقي MD [facility] Medicine [phone]

## 2022-02-21 DIAGNOSIS — I10 HYPERTENSION GOAL BP (BLOOD PRESSURE) < 140/90: Primary | ICD-10-CM

## 2022-02-21 DIAGNOSIS — C61 MALIGNANT NEOPLASM OF PROSTATE (H): ICD-10-CM

## 2022-02-21 DIAGNOSIS — Z95.2 S/P AVR (AORTIC VALVE REPLACEMENT): ICD-10-CM

## 2022-02-21 DIAGNOSIS — J45.20 MILD INTERMITTENT ASTHMA WITHOUT COMPLICATION: ICD-10-CM

## 2022-02-23 RX ORDER — LOSARTAN POTASSIUM 100 MG/1
TABLET ORAL
Qty: 30 TABLET | Refills: 0 | Status: SHIPPED | OUTPATIENT
Start: 2022-02-23 | End: 2022-03-25

## 2022-02-23 RX ORDER — BICALUTAMIDE 50 MG/1
TABLET, FILM COATED ORAL
Qty: 30 TABLET | Refills: 0 | Status: SHIPPED | OUTPATIENT
Start: 2022-02-23 | End: 2022-03-25

## 2022-02-23 RX ORDER — SIMVASTATIN 40 MG
TABLET ORAL
Qty: 30 TABLET | Refills: 1 | Status: SHIPPED | OUTPATIENT
Start: 2022-02-23 | End: 2022-04-26

## 2022-02-23 RX ORDER — FLUTICASONE PROPIONATE 44 MCG
AEROSOL WITH ADAPTER (GRAM) INHALATION
Qty: 10.6 G | Refills: 0 | Status: SHIPPED | OUTPATIENT
Start: 2022-02-23 | End: 2022-06-17

## 2022-02-23 NOTE — TELEPHONE ENCOUNTER
"Pending Prescriptions:                       Disp   Refills    losartan (COZAAR) 100 MG tablet [Pharmacy *30 tab*0        Sig: TAKE ONE-HALF TABLET BY MOUTH TWICE A DAY    FLOVENT HFA 44 MCG/ACT inhaler [Pharmacy M*10.6 g 0        Sig: INHALE ONE PUFF BY MOUTH TWICE A DAY    simvastatin (ZOCOR) 40 MG tablet [Pharmacy*30 tab*1        Sig: TAKE ONE TABLET BY MOUTH EVERY NIGHT AT BEDTIME    bicalutamide (CASODEX) 50 MG tablet [Pharm*30 tab*0        Sig: TAKE ONE TABLET BY MOUTH ONCE DAILY    Routing refill request to provider for review/approval because:  Patient needs to be seen because it has been more than 1 year since last office visit.    Requested Prescriptions   Pending Prescriptions Disp Refills    losartan (COZAAR) 100 MG tablet [Pharmacy Med Name: LOSARTAN POTASSIUM 100MG TABS] 30 tablet 0     Sig: TAKE ONE-HALF TABLET BY MOUTH TWICE A DAY        Angiotensin-II Receptors Failed - 2/21/2022  4:05 PM        Failed - Recent (12 mo) or future (30 days) visit within the authorizing provider's specialty     Patient has had an office visit with the authorizing provider or a provider within the authorizing providers department within the previous 12 mos or has a future within next 30 days. See \"Patient Info\" tab in inbasket, or \"Choose Columns\" in Meds & Orders section of the refill encounter.              Failed - Normal serum creatinine on file in past 12 months     Recent Labs   Lab Test 08/27/21  0653 04/12/18  0932 06/02/17  1255   CR 0.62*   < >  --    CREAT  --   --  0.7    < > = values in this interval not displayed.       Ok to refill medication if creatinine is low          Passed - Last blood pressure under 140/90 in past 12 months       BP Readings from Last 3 Encounters:   01/06/22 138/74   10/26/21 121/62   10/13/21 (!) 147/87                 Passed - Medication is active on med list        Passed - Patient is age 18 or older        Passed - Normal serum potassium on file in past 12 months       Recent " "Labs   Lab Test 08/27/21  0653   POTASSIUM 4.3                       FLOVENT HFA 44 MCG/ACT inhaler [Pharmacy Med Name: FLOVENT HFA 44MCG/ACT AERO] 10.6 g 0     Sig: INHALE ONE PUFF BY MOUTH TWICE A DAY        Inhaled Steroids Protocol Failed - 2/21/2022  4:05 PM        Failed - Asthma control assessment score within normal limits in last 6 months     Please review ACT score.           Failed - Recent (6 mo) or future (30 days) visit within the authorizing provider's specialty     Patient had office visit in the last 6 months or has a visit in the next 30 days with authorizing provider or within the authorizing provider's specialty.  See \"Patient Info\" tab in inbasket, or \"Choose Columns\" in Meds & Orders section of the refill encounter.            Passed - Patient is age 12 or older        Passed - Medication is active on med list           simvastatin (ZOCOR) 40 MG tablet [Pharmacy Med Name: SIMVASTATIN 40MG TABS] 30 tablet 1     Sig: TAKE ONE TABLET BY MOUTH EVERY NIGHT AT BEDTIME        Statins Protocol Failed - 2/21/2022  4:05 PM        Failed - Recent (12 mo) or future (30 days) visit within the authorizing provider's specialty     Patient has had an office visit with the authorizing provider or a provider within the authorizing providers department within the previous 12 mos or has a future within next 30 days. See \"Patient Info\" tab in inbasket, or \"Choose Columns\" in Meds & Orders section of the refill encounter.              Passed - LDL on file in past 12 months     Recent Labs   Lab Test 08/27/21  0653   LDL 28               Passed - No abnormal creatine kinase in past 12 months     Recent Labs   Lab Test 11/15/20  0830   *                Passed - Medication is active on med list        Passed - Patient is age 18 or older           bicalutamide (CASODEX) 50 MG tablet [Pharmacy Med Name: BICALUTAMIDE 50MG TABS] 30 tablet 0     Sig: TAKE ONE TABLET BY MOUTH ONCE DAILY        There is no refill " protocol information for this order

## 2022-03-02 ENCOUNTER — LAB (OUTPATIENT)
Dept: LAB | Facility: CLINIC | Age: 87
End: 2022-03-02
Payer: MEDICARE

## 2022-03-02 ENCOUNTER — ANTICOAGULATION THERAPY VISIT (OUTPATIENT)
Dept: ANTICOAGULATION | Facility: CLINIC | Age: 87
End: 2022-03-02

## 2022-03-02 DIAGNOSIS — I48.21 PERMANENT ATRIAL FIBRILLATION (H): ICD-10-CM

## 2022-03-02 DIAGNOSIS — I48.92 ATRIAL FLUTTER, UNSPECIFIED TYPE (H): ICD-10-CM

## 2022-03-02 DIAGNOSIS — I50.9 CHF (CONGESTIVE HEART FAILURE) (H): Primary | ICD-10-CM

## 2022-03-02 DIAGNOSIS — Z79.01 LONG TERM CURRENT USE OF ANTICOAGULANT THERAPY: ICD-10-CM

## 2022-03-02 LAB — INR BLD: 2.3 (ref 0.9–1.1)

## 2022-03-02 PROCEDURE — 85610 PROTHROMBIN TIME: CPT

## 2022-03-02 PROCEDURE — 36416 COLLJ CAPILLARY BLOOD SPEC: CPT

## 2022-03-02 NOTE — PROGRESS NOTES
ANTICOAGULATION MANAGEMENT     Miguel Patten 89 year old male is on warfarin with therapeutic INR result. (Goal INR 2.0-3.0)    Recent labs: (last 7 days)     03/02/22  1312   INR 2.3*       ASSESSMENT       Source(s): Chart Review    Previous INR was Therapeutic last 2(+) visits    Medication, diet, health changes since last INR chart reviewed; none identified           PLAN     Unable to reach pt today.    No answer, unable to leave a VM. Will need to try again later.     Follow up required to confirm warfarin dose taken and assess for changes    Ashly Jones, RN  Anticoagulation Clinic  3/2/2022

## 2022-03-03 NOTE — PROGRESS NOTES
ANTICOAGULATION MANAGEMENT     Miguel Patten 89 year old male is on warfarin with therapeutic INR result. (Goal INR 2.0-3.0) - from Wed 3/2    Recent labs: (last 7 days)     03/02/22  1312   INR 2.3*       ASSESSMENT       Source(s): Chart Review    Previous INR was Therapeutic last 2(+) visits    Medication, diet, health changes since last INR chart reviewed; none identified           PLAN     Unable to reach pt today.    No instructions provided. Unable to leave voicemail.    Follow up required to confirm warfarin dose taken and assess for changes    Ashly Jones, RN  Anticoagulation Clinic  3/3/2022

## 2022-03-04 NOTE — PROGRESS NOTES
ANTICOAGULATION MANAGEMENT      Miguel Patten 89 year old male is on warfarin with therapeutic INR result. (Goal INR 2.0-3.0) - from Wed 3/2           Recent labs: (last 7 days)     03/02/22  1312   INR 2.3*         ASSESSMENT        Source(s): Chart Review    Previous INR was Therapeutic last 2(+) visits    Medication, diet, health changes since last INR chart reviewed; none identified               PLAN      Unable to reach pt today.     No instructions provided. Unable to leave voicemail.     Follow up required to confirm warfarin dose taken and assess for changes     Ashly Jones, RN  Anticoagulation Clinic  3/4/22

## 2022-03-07 NOTE — PROGRESS NOTES
ANTICOAGULATION MANAGEMENT     Miguel Patten 89 year old male is on warfarin with therapeutic INR result. (Goal INR 2.0-3.0)    Recent labs: (last 7 days)     03/02/22  1312   INR 2.3*       ASSESSMENT       Source(s): Chart Review    Previous INR was Therapeutic last 2(+) visits    Medication, diet, health changes since last INR chart reviewed; none identified           PLAN     Recommended plan for no diet, medication or health factor changes affecting INR     Dosing Instructions: Continue your current warfarin dose with next INR in 5 weeks       Summary  As of 3/2/2022    Full warfarin instructions:  3.75 mg every Mon; 2.5 mg all other days   Next INR check:  4/7/2022             VM left for Miguel to call [facility] back. Will await that call.     Contact [phone]  to schedule and with any changes, questions or concerns.     Education provided: Contact [phone]  with any changes, questions or concerns.     Plan made per [facility] anticoagulation protocol    Ashly Jones, RN  Anticoagulation Clinic  3/7/2022    _______________________________________________________________________     Anticoagulation Episode Summary     Current INR goal:  2.0-3.0   TTR:  83.6 % (1 y)   Target end date:  Indefinite   Send INR reminders to:  [facility]    Indications    CHF (congestive heart failure) (H) [I50.9]  Long-term (current) use of anticoagulants [Z79.01] [Z79.01]  Permanent atrial fibrillation (H) [I48.21]  Atrial flutter  unspecified type (H) [I48.92]           Comments:           Anticoagulation Care Providers     Provider Role Specialty Phone number    Vladimir Gonzales DO Referring Internal Medicine [phone]    Virginia Cardona APRN CNP Referring Nurse Practitioner - Gerontology [phone]    Rober العراقي MD [facility] Family Medicine [phone]

## 2022-03-24 ENCOUNTER — ASSISTED LIVING VISIT (OUTPATIENT)
Dept: GERIATRICS | Facility: CLINIC | Age: 87
End: 2022-03-24
Payer: MEDICARE

## 2022-03-24 VITALS
TEMPERATURE: 95.3 F | OXYGEN SATURATION: 95 % | RESPIRATION RATE: 16 BRPM | SYSTOLIC BLOOD PRESSURE: 140 MMHG | WEIGHT: 196.2 LBS | DIASTOLIC BLOOD PRESSURE: 76 MMHG | HEART RATE: 63 BPM | BODY MASS INDEX: 26.61 KG/M2

## 2022-03-24 DIAGNOSIS — M70.31 BURSITIS OF RIGHT ELBOW, UNSPECIFIED BURSA: Primary | ICD-10-CM

## 2022-03-24 DIAGNOSIS — I48.20 CHRONIC ATRIAL FIBRILLATION (H): ICD-10-CM

## 2022-03-24 DIAGNOSIS — J45.20 MILD INTERMITTENT ASTHMA WITHOUT STATUS ASTHMATICUS WITHOUT COMPLICATION: ICD-10-CM

## 2022-03-24 NOTE — LETTER
3/24/2022        RE: Miguel Patten  [facility]  [address]  [facility] [address]        [facility] GERIATRICS    Chief Complaint   Patient presents with     RECHECK     HPI:  Miguel Patten is a 89 year old  (12/16/1932), who is being seen today for an episodic care visit at: [facility] ([facility] [[id number]]. Today's concern is:     Diagnoses       Codes Comments    Bursitis of right elbow, unspecified bursa    -  Primary M70.31     Mild intermittent asthma without status asthmaticus without complication     J45.20     Chronic atrial fibrillation (H)     I48.20         Staff called this NP earlier this week due to swollen painful red right elbow.  Ordered a tapering dose of Prednisone for him as feeling it was Bursitis.      Found Miguel in his room reclined back in the recliner.  Good mood.  States he has no more pain in the elbow, can feed self now.  Did not go on a activity today as he is just trying to rest the arm for another day.  He figures he will try to shave tomorrow with a razor and hopefully will be fine.    Talked with him about the prednisone and warfarin together.  He stated he has been on prednisone multiple times his life and knows the signs.    Allergies, and PMH/PSH reviewed in [facility] today.    Current Outpatient Medications   Medication Sig Dispense Refill     acetaminophen (TYLENOL) 325 MG tablet Take 2 tablets (650 mg) by mouth every 4 hours as needed for mild pain or fever 100 tablet      bicalutamide (CASODEX) 50 MG tablet TAKE ONE TABLET BY MOUTH ONCE DAILY 30 tablet 0     Dimethicone (SECURA DIMETHICONE PROTECTANT) 5 % CREA Externally apply topically daily as needed       FLOVENT HFA 44 MCG/ACT inhaler INHALE ONE PUFF BY MOUTH TWICE A DAY 10.6 g 0     leuprolide (LUPRON DEPOT, 3-MONTH,) 22.5 MG kit Inject 22.5 MG, IM q 6 months per Dr. Zeyad Guevara, pt's Urologist in [place]. 10/17/2018 1 each 3     losartan (COZAAR) 100 MG tablet TAKE ONE-HALF TABLET BY MOUTH  TWICE A DAY 30 tablet 0     simvastatin (ZOCOR) 40 MG tablet TAKE ONE TABLET BY MOUTH EVERY NIGHT AT BEDTIME 30 tablet 1     sodium chloride (OCEAN) 0.65 % nasal spray Spray 1 spray into both nostrils 3 times daily as needed for congestion       warfarin ANTICOAGULANT (JANTOVEN ANTICOAGULANT) 2.5 MG tablet TAKE ONE AND ONE-HALF TABLETS (3.75MG) BY MOUTH ON MONDAYS AND TAKE 1 TABLET (2.5MG) BY MOUTH ALL OTHER DAYS 90 tablet 0       REVIEW OF SYSTEMS:  4 point ROS including Respiratory, CV, GI and , other than that noted in the HPI,  is negative    Objective:   BP (!) 140/76   Pulse 63   Temp (!) 95.3  F (35.2  C)   Resp 16   Wt 89 kg (196 lb 3.2 oz)   SpO2 95%   BMI 26.61 kg/m    Neatly groomed gentleman whom is alert and oriented x3.  Continues to drive.    Skin is pink, dry and warm.  No SOB, lungs clear.  Heart rate regular and strong.    Looked at right elbow - slight swelling noted, fading pink skin compared to 2 days ago.  No tenderness to touch.      Most Recent 3 CBC's:Recent Labs   Lab Test 08/27/21  0653 11/18/20  0633 11/17/20  0745   WBC 7.7 10.7 7.4   HGB 12.3* 12.5* 12.7*   MCV 94 93 94   PLT 79* 383 363     Most Recent 3 BMP's:Recent Labs   Lab Test 08/27/21  0653 11/19/20  1140 11/19/20  0617 11/18/20  1211 11/18/20  0633 11/17/20  1215 11/17/20  0745     --   --   --  136  --  137   POTASSIUM 4.3  --   --   --  4.1  --  4.3   CHLORIDE 111*  --   --   --  99  --  100   CO2 24  --   --   --  31  --  31   BUN 21  --   --   --  18  --  20   CR 0.62*  --   --   --  0.63*  --  0.69*   ANIONGAP 5  --   --   --  6  --  6   LIDIA 8.5  --   --   --  8.0*  --  8.3*   * 139 116   < > 109   < > 136*    < > = values in this interval not displayed.       Assessment/Plan:  (M70.31) Bursitis of right elbow, unspecified bursa  (primary encounter diagnosis)  Comment: currently has a tapering dose of Prednisone 30mg po daily for 2 days, 20mg po daily x 4 days, then 10mg for 4 days then 5mg po daily  for 4 days.  Appears to be working at this time.  No changes.    (J45.20) Mild intermittent asthma without status asthmaticus without complication  Comment: no acute issues at this time but discussed the use of the prednisone.  He stated he sometimes would have a to take a course of Prednisone with his breathing.  Has an inhaler but otherwise no issues.    (I48.20) Chronic atrial fibrillation (H)  Comment: is on coumadin and so asked him to watch out for extra bruising.  Just had his INR draw.  Trying to taper him down fairly quickly from the prednisone.      Orders:  No orders today as already put in place.  Bursitis is resolving.      Electronically signed by: PHANI Smith CNP             Sincerely,        PHANI Smith CNP

## 2022-03-25 DIAGNOSIS — I48.20 CHRONIC ATRIAL FIBRILLATION (H): ICD-10-CM

## 2022-03-25 DIAGNOSIS — C61 MALIGNANT NEOPLASM OF PROSTATE (H): ICD-10-CM

## 2022-03-25 DIAGNOSIS — I10 HYPERTENSION GOAL BP (BLOOD PRESSURE) < 140/90: ICD-10-CM

## 2022-03-25 RX ORDER — LOSARTAN POTASSIUM 100 MG/1
TABLET ORAL
Qty: 30 TABLET | Refills: 0 | Status: SHIPPED | OUTPATIENT
Start: 2022-03-25 | End: 2022-04-26

## 2022-03-25 RX ORDER — BICALUTAMIDE 50 MG/1
TABLET, FILM COATED ORAL
Qty: 30 TABLET | Refills: 0 | Status: SHIPPED | OUTPATIENT
Start: 2022-03-25 | End: 2022-04-26

## 2022-03-25 RX ORDER — WARFARIN SODIUM 2.5 MG/1
TABLET ORAL
Qty: 90 TABLET | Refills: 0 | Status: SHIPPED | OUTPATIENT
Start: 2022-03-25 | End: 2022-06-28

## 2022-03-25 NOTE — TELEPHONE ENCOUNTER
Pending Prescriptions:                       Disp   Refills    losartan (COZAAR) 100 MG tablet [Pharmacy *30 tab*0        Sig: TAKE ONE-HALF TABLET BY MOUTH TWICE A DAY    bicalutamide (CASODEX) 50 MG tablet [Pharm*30 tab*0        Sig: TAKE ONE TABLET BY MOUTH ONCE DAILY    Routing refill request to provider for review/approval because:  Drug not on the [facility] refill protocol: bicalutamide  Labs out of range:    Creatinine   Date Value Ref Range Status   08/27/2021 0.62 (L) 0.66 - 1.25 mg/dL Final   11/13/2020 0.85 0.66 - 1.25 mg/dL Final     BP Readings from Last 3 Encounters:   03/24/22 (!) 140/76   01/06/22 138/74   10/26/21 121/62     Patient needs to be seen because it has been more than 1 year since last office visit. Patient was last seen 6/5/2020    BASSAM WoodardN, RN

## 2022-03-26 NOTE — PROGRESS NOTES
[facility] GERIATRICS    Chief Complaint   Patient presents with     RECHECK     HPI:  Miguel Patten is a 89 year old  (12/16/1932), who is being seen today for an episodic care visit at: [facility] ([facility] [[id number]]. Today's concern is:     Diagnoses       Codes Comments    Bursitis of right elbow, unspecified bursa    -  Primary M70.31     Mild intermittent asthma without status asthmaticus without complication     J45.20     Chronic atrial fibrillation (H)     I48.20         Staff called this NP earlier this week due to swollen painful red right elbow.  Ordered a tapering dose of Prednisone for him as feeling it was Bursitis.      Found Miguel in his room reclined back in the recliner.  Good mood.  States he has no more pain in the elbow, can feed self now.  Did not go on a activity today as he is just trying to rest the arm for another day.  He figures he will try to shave tomorrow with a razor and hopefully will be fine.    Talked with him about the prednisone and warfarin together.  He stated he has been on prednisone multiple times his life and knows the signs.    Allergies, and PMH/PSH reviewed in [facility] today.    Current Outpatient Medications   Medication Sig Dispense Refill     acetaminophen (TYLENOL) 325 MG tablet Take 2 tablets (650 mg) by mouth every 4 hours as needed for mild pain or fever 100 tablet      bicalutamide (CASODEX) 50 MG tablet TAKE ONE TABLET BY MOUTH ONCE DAILY 30 tablet 0     Dimethicone (SECURA DIMETHICONE PROTECTANT) 5 % CREA Externally apply topically daily as needed       FLOVENT HFA 44 MCG/ACT inhaler INHALE ONE PUFF BY MOUTH TWICE A DAY 10.6 g 0     leuprolide (LUPRON DEPOT, 3-MONTH,) 22.5 MG kit Inject 22.5 MG, IM q 6 months per Dr. Zeyad Guevara, pt's Urologist in [place]. 10/17/2018 1 each 3     losartan (COZAAR) 100 MG tablet TAKE ONE-HALF TABLET BY MOUTH TWICE A DAY 30 tablet 0     simvastatin (ZOCOR) 40 MG tablet TAKE ONE TABLET BY MOUTH EVERY NIGHT AT BEDTIME 30  tablet 1     sodium chloride (OCEAN) 0.65 % nasal spray Spray 1 spray into both nostrils 3 times daily as needed for congestion       warfarin ANTICOAGULANT (JANTOVEN ANTICOAGULANT) 2.5 MG tablet TAKE ONE AND ONE-HALF TABLETS (3.75MG) BY MOUTH ON MONDAYS AND TAKE 1 TABLET (2.5MG) BY MOUTH ALL OTHER DAYS 90 tablet 0       REVIEW OF SYSTEMS:  4 point ROS including Respiratory, CV, GI and , other than that noted in the HPI,  is negative    Objective:   BP (!) 140/76   Pulse 63   Temp (!) 95.3  F (35.2  C)   Resp 16   Wt 89 kg (196 lb 3.2 oz)   SpO2 95%   BMI 26.61 kg/m    Neatly groomed gentleman whom is alert and oriented x3.  Continues to drive.    Skin is pink, dry and warm.  No SOB, lungs clear.  Heart rate regular and strong.    Looked at right elbow - slight swelling noted, fading pink skin compared to 2 days ago.  No tenderness to touch.      Most Recent 3 CBC's:Recent Labs   Lab Test 08/27/21  0653 11/18/20  0633 11/17/20  0745   WBC 7.7 10.7 7.4   HGB 12.3* 12.5* 12.7*   MCV 94 93 94   PLT 79* 383 363     Most Recent 3 BMP's:Recent Labs   Lab Test 08/27/21  0653 11/19/20  1140 11/19/20  0617 11/18/20  1211 11/18/20  0633 11/17/20  1215 11/17/20  0745     --   --   --  136  --  137   POTASSIUM 4.3  --   --   --  4.1  --  4.3   CHLORIDE 111*  --   --   --  99  --  100   CO2 24  --   --   --  31  --  31   BUN 21  --   --   --  18  --  20   CR 0.62*  --   --   --  0.63*  --  0.69*   ANIONGAP 5  --   --   --  6  --  6   LIDIA 8.5  --   --   --  8.0*  --  8.3*   * 139 116   < > 109   < > 136*    < > = values in this interval not displayed.       Assessment/Plan:  (M70.31) Bursitis of right elbow, unspecified bursa  (primary encounter diagnosis)  Comment: currently has a tapering dose of Prednisone 30mg po daily for 2 days, 20mg po daily x 4 days, then 10mg for 4 days then 5mg po daily for 4 days.  Appears to be working at this time.  No changes.    (J45.20) Mild intermittent asthma without status  asthmaticus without complication  Comment: no acute issues at this time but discussed the use of the prednisone.  He stated he sometimes would have a to take a course of Prednisone with his breathing.  Has an inhaler but otherwise no issues.    (I48.20) Chronic atrial fibrillation (H)  Comment: is on coumadin and so asked him to watch out for extra bruising.  Just had his INR draw.  Trying to taper him down fairly quickly from the prednisone.      Orders:  No orders today as already put in place.  Bursitis is resolving.      Electronically signed by: PHANI Smith CNP

## 2022-04-05 ENCOUNTER — ANTICOAGULATION THERAPY VISIT (OUTPATIENT)
Dept: ANTICOAGULATION | Facility: CLINIC | Age: 87
End: 2022-04-05

## 2022-04-05 ENCOUNTER — LAB (OUTPATIENT)
Dept: LAB | Facility: CLINIC | Age: 87
End: 2022-04-05
Payer: MEDICARE

## 2022-04-05 DIAGNOSIS — I48.92 ATRIAL FLUTTER, UNSPECIFIED TYPE (H): ICD-10-CM

## 2022-04-05 DIAGNOSIS — I50.9 CHF (CONGESTIVE HEART FAILURE) (H): Primary | ICD-10-CM

## 2022-04-05 DIAGNOSIS — Z79.01 LONG TERM CURRENT USE OF ANTICOAGULANT THERAPY: ICD-10-CM

## 2022-04-05 DIAGNOSIS — I50.9 CHF (CONGESTIVE HEART FAILURE) (H): ICD-10-CM

## 2022-04-05 DIAGNOSIS — I48.21 PERMANENT ATRIAL FIBRILLATION (H): ICD-10-CM

## 2022-04-05 LAB — INR BLD: 2.6 (ref 0.9–1.1)

## 2022-04-05 PROCEDURE — 85610 PROTHROMBIN TIME: CPT

## 2022-04-05 PROCEDURE — 36416 COLLJ CAPILLARY BLOOD SPEC: CPT

## 2022-04-05 NOTE — PROGRESS NOTES
ANTICOAGULATION MANAGEMENT     Miguel Patten 89 year old male is on warfarin with therapeutic INR result. (Goal INR 2.0-3.0)    Recent labs: (last 7 days)     04/05/22  1354   INR 2.6*       ASSESSMENT       Source(s): Chart Review       Warfarin doses taken: Reviewed in chart    Diet: LM    New illness, injury, or hospitalization: No    Medication/supplement changes: None noted    Signs or symptoms of bleeding or clotting: No    Previous INR: Therapeutic last 2(+) visits    Additional findings: None       PLAN     Recommended plan for no diet, medication or health factor changes affecting INR     Dosing Instructions: continue your current warfarin dose with next INR in 4 weeks       Summary  As of 4/5/2022    Full warfarin instructions:  3.75 mg every Mon; 2.5 mg all other days   Next INR check:  5/3/2022             Detailed voice message left for Miguel with dosing instructions and follow up date.     Contact [phone]  to schedule and with any changes, questions or concerns.     Education provided: Please call back if any changes to your diet, medications or how you've been taking warfarin    Plan made per [facility] anticoagulation protocol    Sameera Musa, KARI  Anticoagulation Clinic  4/5/2022    _______________________________________________________________________     Anticoagulation Episode Summary     Current INR goal:  2.0-3.0   TTR:  83.8 % (1 y)   Target end date:  Indefinite   Send INR reminders to:  JAIRO FRANKLIN    Indications    CHF (congestive heart failure) (H) [I50.9]  Long-term (current) use of anticoagulants [Z79.01] [Z79.01]  Permanent atrial fibrillation (H) [I48.21]  Atrial flutter  unspecified type (H) [I48.92]           Comments:           Anticoagulation Care Providers     Provider Role Specialty Phone number    Vladimir Gonzales DO Referring Internal Medicine [phone]    Virginia Cardona APRN CNP Referring Nurse Practitioner - Gerontology [phone]    Malcom  Rober Adler MD [facility] [phone]

## 2022-04-25 DIAGNOSIS — Z95.2 S/P AVR (AORTIC VALVE REPLACEMENT): ICD-10-CM

## 2022-04-25 DIAGNOSIS — I10 HYPERTENSION GOAL BP (BLOOD PRESSURE) < 140/90: ICD-10-CM

## 2022-04-25 DIAGNOSIS — C61 MALIGNANT NEOPLASM OF PROSTATE (H): ICD-10-CM

## 2022-04-25 NOTE — LETTER
[address] [address]  Phone: [phone]        April 29, 2022      Miguel Patten                                                                                                                                [facility]  [address] [facility] [address]            Dear Mr. Patten,    We are concerned about your health care.  We recently provided you with a medication refill.  Many medications require routine follow-up with your Doctor.      At this time we ask that: You schedule an appointment for your annual physical. Please call the clinic at [phone] option 1 to schedule.     Your prescription: ( Cozaar/ Casodex/ zocor) Has been refilled for 1 month so you may have time for the above noted follow-up.      Thank you,      Vladimir Gonzales DO  Care Team

## 2022-04-26 ENCOUNTER — INFUSION THERAPY VISIT (OUTPATIENT)
Dept: INFUSION THERAPY | Facility: CLINIC | Age: 87
End: 2022-04-26
Attending: INTERNAL MEDICINE
Payer: MEDICARE

## 2022-04-26 VITALS
WEIGHT: 194.7 LBS | DIASTOLIC BLOOD PRESSURE: 65 MMHG | SYSTOLIC BLOOD PRESSURE: 147 MMHG | RESPIRATION RATE: 18 BRPM | BODY MASS INDEX: 26.41 KG/M2 | HEART RATE: 60 BPM | OXYGEN SATURATION: 98 % | TEMPERATURE: 96.9 F

## 2022-04-26 DIAGNOSIS — C61 MALIGNANT NEOPLASM OF PROSTATE (H): Primary | ICD-10-CM

## 2022-04-26 DIAGNOSIS — I44.2 ATRIOVENTRICULAR BLOCK, COMPLETE (H): ICD-10-CM

## 2022-04-26 DIAGNOSIS — Z95.0 CARDIAC PACEMAKER IN SITU: Primary | ICD-10-CM

## 2022-04-26 PROCEDURE — 96402 CHEMO HORMON ANTINEOPL SQ/IM: CPT

## 2022-04-26 PROCEDURE — 250N000011 HC RX IP 250 OP 636: Performed by: INTERNAL MEDICINE

## 2022-04-26 RX ORDER — HEPARIN SODIUM,PORCINE 10 UNIT/ML
5 VIAL (ML) INTRAVENOUS
Status: CANCELLED | OUTPATIENT
Start: 2022-04-26

## 2022-04-26 RX ORDER — LOSARTAN POTASSIUM 100 MG/1
TABLET ORAL
Qty: 30 TABLET | Refills: 0 | Status: SHIPPED | OUTPATIENT
Start: 2022-04-26 | End: 2022-05-25

## 2022-04-26 RX ORDER — HEPARIN SODIUM (PORCINE) LOCK FLUSH IV SOLN 100 UNIT/ML 100 UNIT/ML
5 SOLUTION INTRAVENOUS
Status: CANCELLED | OUTPATIENT
Start: 2022-04-26

## 2022-04-26 RX ORDER — SIMVASTATIN 40 MG
TABLET ORAL
Qty: 30 TABLET | Refills: 1 | Status: SHIPPED | OUTPATIENT
Start: 2022-04-26 | End: 2022-06-28

## 2022-04-26 RX ORDER — BICALUTAMIDE 50 MG/1
TABLET, FILM COATED ORAL
Qty: 30 TABLET | Refills: 0 | Status: SHIPPED | OUTPATIENT
Start: 2022-04-26 | End: 2022-05-25

## 2022-04-26 RX ADMIN — LEUPROLIDE ACETATE 22.5 MG: KIT SUBCUTANEOUS at 09:18

## 2022-04-26 ASSESSMENT — PAIN SCALES - GENERAL: PAINLEVEL: NO PAIN (0)

## 2022-04-26 NOTE — TELEPHONE ENCOUNTER
Cozaar  Casodex  zocor  Routing refill request to provider for review/approval because:  Drug not on the [facility] refill protocol   Labs not current:  CR  Patient needs to be seen because it has been more than 1 year since last office visit.  BP failed RN protocol    Sending to scheduling for yearly office visit due    Michelle Martin RN

## 2022-05-03 ENCOUNTER — LAB (OUTPATIENT)
Dept: LAB | Facility: CLINIC | Age: 87
End: 2022-05-03
Payer: MEDICARE

## 2022-05-03 ENCOUNTER — ANTICOAGULATION THERAPY VISIT (OUTPATIENT)
Dept: ANTICOAGULATION | Facility: CLINIC | Age: 87
End: 2022-05-03

## 2022-05-03 DIAGNOSIS — I48.21 PERMANENT ATRIAL FIBRILLATION (H): ICD-10-CM

## 2022-05-03 DIAGNOSIS — I48.92 ATRIAL FLUTTER, UNSPECIFIED TYPE (H): ICD-10-CM

## 2022-05-03 DIAGNOSIS — Z79.01 LONG TERM CURRENT USE OF ANTICOAGULANT THERAPY: ICD-10-CM

## 2022-05-03 DIAGNOSIS — I50.9 CHF (CONGESTIVE HEART FAILURE) (H): ICD-10-CM

## 2022-05-03 DIAGNOSIS — I50.9 CHF (CONGESTIVE HEART FAILURE) (H): Primary | ICD-10-CM

## 2022-05-03 LAB — INR BLD: 1.9 (ref 0.9–1.1)

## 2022-05-03 PROCEDURE — 36416 COLLJ CAPILLARY BLOOD SPEC: CPT

## 2022-05-03 PROCEDURE — 85610 PROTHROMBIN TIME: CPT

## 2022-05-03 NOTE — PROGRESS NOTES
ANTICOAGULATION MANAGEMENT     Miguel Patten 89 year old male is on warfarin with subtherapeutic INR result. (Goal INR 2.0-3.0)    Recent labs: (last 7 days)     05/03/22  0955   INR 1.9*       ASSESSMENT       Source(s): Chart Review and Patient/Caregiver Call       Warfarin doses taken: Warfarin taken as instructed    Diet: No new diet changes identified    New illness, injury, or hospitalization: No    Medication/supplement changes: None noted    Signs or symptoms of bleeding or clotting: No    Previous INR: Therapeutic last 2(+) visits    Additional findings: None       PLAN     Recommended plan for no diet, medication or health factor changes affecting INR     Dosing Instructions: continue your current warfarin dose with next INR in 2 weeks       Summary  As of 5/3/2022    Full warfarin instructions:  3.75 mg every Mon; 2.5 mg all other days   Next INR check:  5/17/2022             Telephone call with Miguel who verbalizes understanding and agrees to plan    Patient offered & declined to schedule next visit    Education provided: Please call back if any changes to your diet, medications or how you've been taking warfarin    Plan made per [facility] anticoagulation protocol    Ashly Jones, RN  Anticoagulation Clinic  5/3/2022    _______________________________________________________________________     Anticoagulation Episode Summary     Current INR goal:  2.0-3.0   TTR:  82.7 % (1 y)   Target end date:  Indefinite   Send INR reminders to:  JAIRO MELISSABanner    Indications    CHF (congestive heart failure) (H) [I50.9]  Long-term (current) use of anticoagulants [Z79.01] [Z79.01]  Permanent atrial fibrillation (H) [I48.21]  Atrial flutter  unspecified type (H) [I48.92]           Comments:           Anticoagulation Care Providers     Provider Role Specialty Phone number    Virginia Cardona APRN CNP Referring Nurse Practitioner - Gerontology [phone]    Vladimir Gonzales DO Responsible  Internal Medicine [phone]

## 2022-05-03 NOTE — PROGRESS NOTES
ANTICOAGULATION MANAGEMENT     Miguel Patten 89 year old male is on warfarin with subtherapeutic INR result. (Goal INR 2.0-3.0)    Recent labs: (last 7 days)     05/03/22  0955   INR 1.9*       ASSESSMENT       Source(s): Chart Review    Previous INR was Therapeutic last 2(+) visits    Medication, diet, health changes since last INR chart reviewed; none identified           PLAN     Unable to reach pt today.    VM left to call [facility] back. Will try again later.     Follow up required to confirm warfarin dose taken and assess for changes    Ashly Jones, RN  Anticoagulation Clinic  5/3/2022

## 2022-05-23 DIAGNOSIS — I10 HYPERTENSION GOAL BP (BLOOD PRESSURE) < 140/90: ICD-10-CM

## 2022-05-23 DIAGNOSIS — C61 MALIGNANT NEOPLASM OF PROSTATE (H): ICD-10-CM

## 2022-05-24 ENCOUNTER — TELEPHONE (OUTPATIENT)
Dept: ANTICOAGULATION | Facility: CLINIC | Age: 87
End: 2022-05-24
Payer: MEDICARE

## 2022-05-24 NOTE — TELEPHONE ENCOUNTER
ANTICOAGULATION     MiguelMarion Patten is overdue for INR check.      Spoke with pt who declined to schedule a lab appointment at this time. If calling back please schedule as soon as possible. Said he'll go in next week.     Sameera Musa RN

## 2022-05-25 RX ORDER — LOSARTAN POTASSIUM 100 MG/1
TABLET ORAL
Qty: 30 TABLET | Refills: 0 | Status: SHIPPED | OUTPATIENT
Start: 2022-05-25 | End: 2022-06-29

## 2022-05-25 RX ORDER — BICALUTAMIDE 50 MG/1
TABLET, FILM COATED ORAL
Qty: 30 TABLET | Refills: 0 | Status: SHIPPED | OUTPATIENT
Start: 2022-05-25 | End: 2022-06-29

## 2022-05-25 NOTE — TELEPHONE ENCOUNTER
Casodex  Routing refill request to provider for review/approval because:  Drug not on the [facility] refill protocol       Losaartan  Routing refill request to provider for review/approval because:  Labs out of range:  CRE  Patient needs to be seen because it has been more than 1 year since last office visit.  BP elevated      Jose Ramon Perry RN

## 2022-05-31 ENCOUNTER — ANTICOAGULATION THERAPY VISIT (OUTPATIENT)
Dept: ANTICOAGULATION | Facility: CLINIC | Age: 87
End: 2022-05-31

## 2022-05-31 ENCOUNTER — LAB (OUTPATIENT)
Dept: LAB | Facility: CLINIC | Age: 87
End: 2022-05-31
Payer: MEDICARE

## 2022-05-31 DIAGNOSIS — Z79.01 LONG TERM CURRENT USE OF ANTICOAGULANT THERAPY: ICD-10-CM

## 2022-05-31 DIAGNOSIS — I50.9 CHF (CONGESTIVE HEART FAILURE) (H): Primary | ICD-10-CM

## 2022-05-31 DIAGNOSIS — I48.21 PERMANENT ATRIAL FIBRILLATION (H): ICD-10-CM

## 2022-05-31 DIAGNOSIS — I50.9 CHF (CONGESTIVE HEART FAILURE) (H): ICD-10-CM

## 2022-05-31 DIAGNOSIS — I48.92 ATRIAL FLUTTER, UNSPECIFIED TYPE (H): ICD-10-CM

## 2022-05-31 LAB — INR BLD: 1.9 (ref 0.9–1.1)

## 2022-05-31 PROCEDURE — 85610 PROTHROMBIN TIME: CPT

## 2022-05-31 PROCEDURE — 36416 COLLJ CAPILLARY BLOOD SPEC: CPT

## 2022-05-31 NOTE — PROGRESS NOTES
ANTICOAGULATION MANAGEMENT     Miguel Patten 89 year old male is on warfarin with subtherapeutic INR result. (Goal INR 2.0-3.0)    Recent labs: (last 7 days)     05/31/22  0842   INR 1.9*       ASSESSMENT       Source(s): Chart Review and Patient/Caregiver Call       Warfarin doses taken: Warfarin taken as instructed    Diet: possibly, he eats whatever they serve at his facility    New illness, injury, or hospitalization: No    Medication/supplement changes: None noted    Signs or symptoms of bleeding or clotting: No    Previous INR: Subtherapeutic    Additional findings: None       PLAN     Recommended plan for no diet, medication or health factor changes affecting INR     Dosing Instructions: Increase your warfarin dose (6.7% change) with next INR in 2 weeks       Summary  As of 5/31/2022    Full warfarin instructions:  3.75 mg every Mon, Thu; 2.5 mg all other days   Next INR check:  6/14/2022             Telephone call with Miguel who verbalizes understanding and agrees to plan and who agrees to plan and repeated back plan correctly    Patient offered & declined to schedule next visit    Education provided: Importance of following up at instructed interval    Plan made per [facility] anticoagulation protocol    Sameera Musa RN  Anticoagulation Clinic  5/31/2022    _______________________________________________________________________     Anticoagulation Episode Summary     Current INR goal:  2.0-3.0   TTR:  75.1 % (1 y)   Target end date:  Indefinite   Send INR reminders to:  BUBBA REID    Indications    CHF (congestive heart failure) (H) [I50.9]  Long-term (current) use of anticoagulants [Z79.01] [Z79.01]  Permanent atrial fibrillation (H) [I48.21]  Atrial flutter  unspecified type (H) [I48.92]           Comments:           Anticoagulation Care Providers     Provider Role Specialty Phone number    Virginia Cardona APRN CNP Referring Nurse Practitioner - Gerontology [phone]    Christian  Vladimir Sprague DO [facility] Internal Medicine [phone]

## 2022-06-14 ENCOUNTER — LAB (OUTPATIENT)
Dept: LAB | Facility: CLINIC | Age: 87
End: 2022-06-14
Payer: MEDICARE

## 2022-06-14 ENCOUNTER — ANTICOAGULATION THERAPY VISIT (OUTPATIENT)
Dept: ANTICOAGULATION | Facility: CLINIC | Age: 87
End: 2022-06-14

## 2022-06-14 DIAGNOSIS — I48.21 PERMANENT ATRIAL FIBRILLATION (H): ICD-10-CM

## 2022-06-14 DIAGNOSIS — I50.9 CHF (CONGESTIVE HEART FAILURE) (H): ICD-10-CM

## 2022-06-14 DIAGNOSIS — Z79.01 LONG TERM CURRENT USE OF ANTICOAGULANT THERAPY: ICD-10-CM

## 2022-06-14 DIAGNOSIS — I48.92 ATRIAL FLUTTER, UNSPECIFIED TYPE (H): ICD-10-CM

## 2022-06-14 DIAGNOSIS — Z79.01 LONG TERM CURRENT USE OF ANTICOAGULANT THERAPY: Primary | ICD-10-CM

## 2022-06-14 LAB — INR BLD: 2.4 (ref 0.9–1.1)

## 2022-06-14 PROCEDURE — 85610 PROTHROMBIN TIME: CPT

## 2022-06-14 PROCEDURE — 36416 COLLJ CAPILLARY BLOOD SPEC: CPT

## 2022-06-14 NOTE — PROGRESS NOTES
ANTICOAGULATION MANAGEMENT     Miguel Patten 89 year old male is on warfarin with therapeutic INR result. (Goal INR 2.0-3.0)    Recent labs: (last 7 days)     06/14/22  0902   INR 2.4*       ASSESSMENT       Source(s): Chart Review and Patient/Caregiver Call       Warfarin doses taken: Warfarin taken as instructed    Diet: No new diet changes identified    New illness, injury, or hospitalization: No    Medication/supplement changes: None noted    Signs or symptoms of bleeding or clotting: No    Previous INR: Subtherapeutic    Additional findings: None       PLAN     Recommended plan for no diet, medication or health factor changes affecting INR     Dosing Instructions: continue your current warfarin dose with next INR in 3 weeks       Summary  As of 6/14/2022    Full warfarin instructions:  3.75 mg every Mon, Thu; 2.5 mg all other days   Next INR check:  7/5/2022             Telephone call with Miguel who verbalizes understanding and agrees to plan    Patient offered & declined to schedule next visit  - pt states he will make an appt when he is here for other appts in July   Education provided: Please call back if any changes to your diet, medications or how you've been taking warfarin    Plan made per [facility] anticoagulation protocol    Ashly Jones, RN  Anticoagulation Clinic  6/14/2022    _______________________________________________________________________     Anticoagulation Episode Summary     Current INR goal:  2.0-3.0   TTR:  74.3 % (1 y)   Target end date:  Indefinite   Send INR reminders to:  [facility]    Indications    CHF (congestive heart failure) (H) [I50.9]  Long-term (current) use of anticoagulants [Z79.01] [Z79.01]  Permanent atrial fibrillation (H) [I48.21]  Atrial flutter  unspecified type (H) [I48.92]           Comments:           Anticoagulation Care Providers     Provider Role Specialty Phone number    Virginia Cardona APRN CNP Referring Nurse Practitioner - Gerontology  [phone]    Vladimir Gonzales DO [facility] Internal Medicine [phone]

## 2022-06-16 DIAGNOSIS — J45.20 MILD INTERMITTENT ASTHMA WITHOUT COMPLICATION: ICD-10-CM

## 2022-06-17 RX ORDER — FLUTICASONE PROPIONATE 44 MCG
AEROSOL WITH ADAPTER (GRAM) INHALATION
Qty: 10.6 G | Refills: 0 | Status: SHIPPED | OUTPATIENT
Start: 2022-06-17 | End: 2022-09-27

## 2022-06-20 NOTE — RESULT ENCOUNTER NOTE
Dr. Rivera,      Would you like to add any other labs prior to appt?  CMP, Lipid and CBC were ordered.    Please advise.   The test result has been reviewed.  Christian

## 2022-06-21 VITALS
TEMPERATURE: 96.4 F | SYSTOLIC BLOOD PRESSURE: 135 MMHG | RESPIRATION RATE: 18 BRPM | WEIGHT: 199 LBS | HEART RATE: 55 BPM | DIASTOLIC BLOOD PRESSURE: 67 MMHG | BODY MASS INDEX: 26.99 KG/M2

## 2022-06-22 ENCOUNTER — ASSISTED LIVING VISIT (OUTPATIENT)
Dept: GERIATRICS | Facility: CLINIC | Age: 87
End: 2022-06-22
Payer: MEDICARE

## 2022-06-22 DIAGNOSIS — R06.2 WHEEZING: ICD-10-CM

## 2022-06-22 DIAGNOSIS — I48.11 LONGSTANDING PERSISTENT ATRIAL FIBRILLATION (H): ICD-10-CM

## 2022-06-22 DIAGNOSIS — U07.1 INFECTION DUE TO 2019 NOVEL CORONAVIRUS: Primary | ICD-10-CM

## 2022-06-22 DIAGNOSIS — C61 MALIGNANT NEOPLASM OF PROSTATE (H): ICD-10-CM

## 2022-06-22 DIAGNOSIS — Z79.01 LONG TERM CURRENT USE OF ANTICOAGULANT THERAPY: ICD-10-CM

## 2022-06-22 DIAGNOSIS — J45.20 MILD INTERMITTENT ASTHMA WITHOUT STATUS ASTHMATICUS WITHOUT COMPLICATION: ICD-10-CM

## 2022-06-22 RX ORDER — DEXAMETHASONE 6 MG/1
6 TABLET ORAL EVERY MORNING
Qty: 7 TABLET | Refills: 0 | Status: SHIPPED | OUTPATIENT
Start: 2022-06-23 | End: 2022-08-25

## 2022-06-22 NOTE — LETTER
"    6/22/2022        RE: Miguel Patten  [facility] [address]  [facility] [address]        [facility] GERIATRICS  ACUTE/EPISODIC VISIT    [facility] Medical Record Number:  [id number]  Place of Service where encounter took place:  [facility] [[id number]]    Chief Complaint   Patient presents with     RECHECK       HPI:    Miguel Patten is a 89 year old  (12/16/1932), who is being seen today for an episodic care visit.  HPI information obtained from: facility staff, patient report and [facility] chart review.    Today's concern is:    Diagnoses       Codes Comments    Infection due to 2019 novel coronavirus    -  Primary U07.1     Mild intermittent asthma without status asthmaticus without complication     J45.20     Wheezing     R06.2     Longstanding persistent atrial fibrillation (H)     I48.11     Long term current use of anticoagulant therapy     Z79.01     Malignant neoplasm of prostate (H)     C61         Yesterday staff notified this NP that Miguel tested positive for COVID-19 and has to be in isolation for 10 days.  Was coming out to facility today but made a point to see Miguel as well due to his use of coumadin and now having COVID.  Yesterday's symptoms told to this NP was that he was hoarse and loose stools.  Asked what has he been up too except knowing he still drives in the community.  The facility was on a outing over the weekend - so possible exposure there.    Miguel is fairly healthy gentleman whom manages his own medications but this NP will follow him still.      While this NP was gowning up to go into his apartment could hear him speak with the aide as she was delivering his newspaper and other little things.  Could hear his voice was deep and raspy.  When the aide came back out, she asked if this NP could get his B/P has the electric cuff what not working the best for her.      Went in to see Miguel and asked how he was doing, \"I'm bored\".  \"Got 9 more days " "of this, I am sorry\".  He was so thankful for the visit.  Glad to see him as he needed attention.    Spoke about his breathing.  Could hear raspy voice, congested mild cough, and then when taking his B/P his breathing was more labored.  He said that he was having the pharmacy send over his inhaler that he reordered.  Just by chance nursing stuck their head in and told Miguel to call the pharmacy in regards to his delivery.    He called them and they said they can not deliver the inhaler unless he pays for it.  That got him anxious due to not being able to leave his room.  This NP took the phone while Miguel got his credit card out.  He handed the credit card over and this NP gave the information they needed and now they will deliver his inhaler.    Spoke to Miguel about starting the COVID medication versus just doing round of dexamethasone.  He stated he had a bottle of prednisone in his drawer that he kept in case he needed to refill again.  Did look at it and he had a empty bottle with directions for a prednisone taper.     Miguel is also on coumadin.  Next INR is not until early July.  He goes to the lab to get a INR drawn and then the coumadin clinic manages his dose.  This NP is ordering a INR regardless on Friday.        ALLERGIES:    Allergies   Allergen Reactions     No Known Drug Allergies         MEDICATIONS:  Post Discharge Medication Reconciliation Status: patient was not discharged from an inpatient facility. Medications reconciled today    Current Outpatient Medications   Medication Sig Dispense Refill     dexamethasone (DECADRON) 6 MG tablet Take 1 tablet (6 mg) by mouth every morning for 7 days 7 tablet 0     acetaminophen (TYLENOL) 325 MG tablet Take 2 tablets (650 mg) by mouth every 4 hours as needed for mild pain or fever (Patient not taking: Reported on 4/26/2022) 100 tablet      bicalutamide (CASODEX) 50 MG tablet TAKE ONE TABLET BY MOUTH ONCE DAILY 30 tablet 0     FLOVENT HFA 44 MCG/ACT inhaler " INHALE ONE PUFF BY MOUTH TWICE A DAY 10.6 g 0     leuprolide (LUPRON DEPOT, 3-MONTH,) 22.5 MG kit Inject 22.5 MG, IM q 6 months per Dr. Zeyad Guevara, pt's Urologist in [place]. 10/17/2018 1 each 3     losartan (COZAAR) 100 MG tablet TAKE ONE-HALF TABLET BY MOUTH TWICE A DAY 30 tablet 0     simvastatin (ZOCOR) 40 MG tablet TAKE ONE TABLET BY MOUTH EVERY NIGHT AT BEDTIME 30 tablet 1     sodium chloride (OCEAN) 0.65 % nasal spray Spray 1 spray into both nostrils 3 times daily as needed for congestion       warfarin ANTICOAGULANT (JANTOVEN ANTICOAGULANT) 2.5 MG tablet TAKE ONE AND ONE-HALF TABLETS (3.75MG) BY MOUTH ON MONDAYS AND TAKE 1 TABLET (2.5MG) BY MOUTH ALL OTHER DAYS 90 tablet 0     Medications reviewed:  Medications reconciled to facility chart and changes were made to reflect current medications as identified as above med list. Below are the changes that were made:   Medications stopped since last EPIC medication reconciliation:   There are no discontinued medications.    Medications started since last [facility] medication reconciliation:  Dexamethasone 6mg po daily for 7 days - wheezing, COVID 19        REVIEW OF SYSTEMS:  4 point ROS neg other than the symptoms noted above in the HPI.      PHYSICAL EXAM:  /67   Pulse 55   Temp (!) 96.4  F (35.8  C)   Resp 18   Wt 90.3 kg (199 lb)   BMI 26.99 kg/m    Physical Exam  Constitutional:       Appearance: Normal appearance.   HENT:      Head: Normocephalic.      Ears:      Comments: Wears hearing aides bilaterally       Mouth/Throat:      Mouth: Mucous membranes are moist.   Eyes:      Extraocular Movements: Extraocular movements intact.      Pupils: Pupils are equal, round, and reactive to light.   Cardiovascular:      Rate and Rhythm: Normal rate. Rhythm irregular.      Heart sounds: Normal heart sounds.   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  Lungs are clear in the lower bases, but wheeze with  exhale.  Mild congested cough but swallows what he brings up.  He denies bringing anything up.  Did not blow his nose during visit.    Abdominal:      General: Bowel sounds are normal.      Palpations: Abdomen is soft.   Musculoskeletal:         General: Normal range of motion.   Skin:     General: Skin is warm and dry.   Neurological:      Mental Status: He is alert and oriented to person, place, and time.   Psychiatric:         Behavior: Behavior normal.         Thought Content: Thought content normal.         Judgment: Judgment normal.       Lab Results   Component Value Date    INR 2.4 06/14/2022    INR 1.9 05/31/2022    INR 2.04 09/10/2021    INR 2.05 08/23/2021    INR 2.06 08/23/2021    INR 2.20 06/21/2021    INR 2.36 05/24/2021       ASSESSMENT / PLAN:  (U07.1) Infection due to 2019 novel coronavirus  (primary encounter diagnosis)  Comment: Miguel is not interested in taking the COVID medications offered due to side effects/interactions with other medications.  Did order Dexamethasone via [employer] and called the pharmacy to give Miguel's card payment again so they could bring it out with the inhaler he ordered.    Instructed him not to take it until tomorrow AM as it could affect his sleep.  Nursing will keep this NP posted.  Typically in town on Wednesday and can stop back in.  Plan: dexamethasone (DECADRON) 6 MG tablet    (J45.20) Mild intermittent asthma without status asthmaticus without complication  Comment: helped order his inhaler that he needed as no more puffs he said.      (R06.2) Wheezing  Comment: based on his breathing, feel he would benefit from dexamethasone course.  Has had prednisone taper before.    (I48.11) Longstanding persistent atrial fibrillation (H)  (Z79.01) Long term current use of anticoagulant therapy  Comment: order given to facility for INR to be drawn here on Friday.  This NP can manage this or if the coumadin clinic sees it they can as well.  Will watch his INR closely with the  dexamethasone.  Told Miguel to keep his date in July for the INR.  Would be out of quarantine by then.    (C61) Malignant neoplasm of prostate (H)  Comment: continues with Lupron injections and Casodex tablet daily.  He did speak about refilling his medications within the next week as he was running low.      Orders:  1.  Dexamethasone 6mg po daily x7 day for wheezing/COVID 19  2.  INR Friday here at the assisted living - Atrial Fib, medication use.    Electronically signed by  PHANI Smith CNP               Sincerely,        PHANI Smith CNP

## 2022-06-22 NOTE — PROGRESS NOTES
"[facility] GERIATRICS  ACUTE/EPISODIC VISIT    [facility] Medical Record Number:  [id number]  Place of Service where encounter took place:  [facility] [[id number]]    Chief Complaint   Patient presents with     RECHECK       HPI:    Miguel Patten is a 89 year old  (12/16/1932), who is being seen today for an episodic care visit.  HPI information obtained from: facility staff, patient report and [facility] chart review.    Today's concern is:    Diagnoses       Codes Comments    Infection due to 2019 novel coronavirus    -  Primary U07.1     Mild intermittent asthma without status asthmaticus without complication     J45.20     Wheezing     R06.2     Longstanding persistent atrial fibrillation (H)     I48.11     Long term current use of anticoagulant therapy     Z79.01     Malignant neoplasm of prostate (H)     C61         Yesterday staff notified this NP that Miguel tested positive for COVID-19 and has to be in isolation for 10 days.  Was coming out to facility today but made a point to see Miguel as well due to his use of coumadin and now having COVID.  Yesterday's symptoms told to this NP was that he was hoarse and loose stools.  Asked what has he been up too except knowing he still drives in the community.  The facility was on a outing over the weekend - so possible exposure there.    Miguel is fairly healthy gentleman whom manages his own medications but this NP will follow him still.      While this NP was gowning up to go into his apartment could hear him speak with the aide as she was delivering his newspaper and other little things.  Could hear his voice was deep and raspy.  When the aide came back out, she asked if this NP could get his B/P has the electric cuff what not working the best for her.      Went in to see Miguel and asked how he was doing, \"I'm bored\".  \"Got 9 more days of this, I am sorry\".  He was so thankful for the visit.  Glad to see him as he needed attention.    Spoke " about his breathing.  Could hear raspy voice, congested mild cough, and then when taking his B/P his breathing was more labored.  He said that he was having the pharmacy send over his inhaler that he reordered.  Just by chance nursing stuck their head in and told Miguel to call the pharmacy in regards to his delivery.    He called them and they said they can not deliver the inhaler unless he pays for it.  That got him anxious due to not being able to leave his room.  This NP took the phone while Miguel got his credit card out.  He handed the credit card over and this NP gave the information they needed and now they will deliver his inhaler.    Spoke to Miguel about starting the COVID medication versus just doing round of dexamethasone.  He stated he had a bottle of prednisone in his drawer that he kept in case he needed to refill again.  Did look at it and he had a empty bottle with directions for a prednisone taper.     Miguel is also on coumadin.  Next INR is not until early July.  He goes to the lab to get a INR drawn and then the coumadin clinic manages his dose.  This NP is ordering a INR regardless on Friday.        ALLERGIES:    Allergies   Allergen Reactions     No Known Drug Allergies         MEDICATIONS:  Post Discharge Medication Reconciliation Status: patient was not discharged from an inpatient facility. Medications reconciled today    Current Outpatient Medications   Medication Sig Dispense Refill     dexamethasone (DECADRON) 6 MG tablet Take 1 tablet (6 mg) by mouth every morning for 7 days 7 tablet 0     acetaminophen (TYLENOL) 325 MG tablet Take 2 tablets (650 mg) by mouth every 4 hours as needed for mild pain or fever (Patient not taking: Reported on 4/26/2022) 100 tablet      bicalutamide (CASODEX) 50 MG tablet TAKE ONE TABLET BY MOUTH ONCE DAILY 30 tablet 0     FLOVENT HFA 44 MCG/ACT inhaler INHALE ONE PUFF BY MOUTH TWICE A DAY 10.6 g 0     leuprolide (LUPRON DEPOT, 3-MONTH,) 22.5 MG kit Inject  22.5 MG, IM q 6 months per Dr. Zeyad Guevara, pt's Urologist in [place]. 10/17/2018 1 each 3     losartan (COZAAR) 100 MG tablet TAKE ONE-HALF TABLET BY MOUTH TWICE A DAY 30 tablet 0     simvastatin (ZOCOR) 40 MG tablet TAKE ONE TABLET BY MOUTH EVERY NIGHT AT BEDTIME 30 tablet 1     sodium chloride (OCEAN) 0.65 % nasal spray Spray 1 spray into both nostrils 3 times daily as needed for congestion       warfarin ANTICOAGULANT (JANTOVEN ANTICOAGULANT) 2.5 MG tablet TAKE ONE AND ONE-HALF TABLETS (3.75MG) BY MOUTH ON MONDAYS AND TAKE 1 TABLET (2.5MG) BY MOUTH ALL OTHER DAYS 90 tablet 0     Medications reviewed:  Medications reconciled to facility chart and changes were made to reflect current medications as identified as above med list. Below are the changes that were made:   Medications stopped since last EPIC medication reconciliation:   There are no discontinued medications.    Medications started since last [facility] medication reconciliation:  Dexamethasone 6mg po daily for 7 days - wheezing, COVID 19        REVIEW OF SYSTEMS:  4 point ROS neg other than the symptoms noted above in the HPI.      PHYSICAL EXAM:  /67   Pulse 55   Temp (!) 96.4  F (35.8  C)   Resp 18   Wt 90.3 kg (199 lb)   BMI 26.99 kg/m    Physical Exam  Constitutional:       Appearance: Normal appearance.   HENT:      Head: Normocephalic.      Ears:      Comments: Wears hearing aides bilaterally       Mouth/Throat:      Mouth: Mucous membranes are moist.   Eyes:      Extraocular Movements: Extraocular movements intact.      Pupils: Pupils are equal, round, and reactive to light.   Cardiovascular:      Rate and Rhythm: Normal rate. Rhythm irregular.      Heart sounds: Normal heart sounds.   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  Lungs are clear in the lower bases, but wheeze with exhale.  Mild congested cough but swallows what he brings up.  He denies bringing anything up.  Did not  blow his nose during visit.    Abdominal:      General: Bowel sounds are normal.      Palpations: Abdomen is soft.   Musculoskeletal:         General: Normal range of motion.   Skin:     General: Skin is warm and dry.   Neurological:      Mental Status: He is alert and oriented to person, place, and time.   Psychiatric:         Behavior: Behavior normal.         Thought Content: Thought content normal.         Judgment: Judgment normal.       Lab Results   Component Value Date    INR 2.4 06/14/2022    INR 1.9 05/31/2022    INR 2.04 09/10/2021    INR 2.05 08/23/2021    INR 2.06 08/23/2021    INR 2.20 06/21/2021    INR 2.36 05/24/2021       ASSESSMENT / PLAN:  (U07.1) Infection due to 2019 novel coronavirus  (primary encounter diagnosis)  Comment: Miguel is not interested in taking the COVID medications offered due to side effects/interactions with other medications.  Did order Dexamethasone via [facility] and called the pharmacy to give Miguel's card payment again so they could bring it out with the inhaler he ordered.    Instructed him not to take it until tomorrow AM as it could affect his sleep.  Nursing will keep this NP posted.  Typically in town on Wednesday and can stop back in.  Plan: dexamethasone (DECADRON) 6 MG tablet    (J45.20) Mild intermittent asthma without status asthmaticus without complication  Comment: helped order his inhaler that he needed as no more puffs he said.      (R06.2) Wheezing  Comment: based on his breathing, feel he would benefit from dexamethasone course.  Has had prednisone taper before.    (I48.11) Longstanding persistent atrial fibrillation (H)  (Z79.01) Long term current use of anticoagulant therapy  Comment: order given to facility for INR to be drawn here on Friday.  This NP can manage this or if the coumadin clinic sees it they can as well.  Will watch his INR closely with the dexamethasone.  Told Miguel to keep his date in July for the INR.  Would be out of quarantine by  then.    (C61) Malignant neoplasm of prostate (H)  Comment: continues with Lupron injections and Casodex tablet daily.  He did speak about refilling his medications within the next week as he was running low.      Orders:  1.  Dexamethasone 6mg po daily x7 day for wheezing/COVID 19  2.  INR Friday here at the assisted living - Atrial Fib, medication use.    Electronically signed by  PHANI Smith CNP

## 2022-06-23 ENCOUNTER — LAB REQUISITION (OUTPATIENT)
Dept: LAB | Facility: CLINIC | Age: 87
End: 2022-06-23
Payer: MEDICARE

## 2022-06-23 DIAGNOSIS — U07.1 COVID-19: ICD-10-CM

## 2022-06-24 ENCOUNTER — TELEPHONE (OUTPATIENT)
Dept: ANTICOAGULATION | Facility: CLINIC | Age: 87
End: 2022-06-24

## 2022-06-24 ENCOUNTER — ANTICOAGULATION THERAPY VISIT (OUTPATIENT)
Dept: ANTICOAGULATION | Facility: CLINIC | Age: 87
End: 2022-06-24

## 2022-06-24 ENCOUNTER — LAB REQUISITION (OUTPATIENT)
Dept: LAB | Facility: CLINIC | Age: 87
End: 2022-06-24
Payer: MEDICARE

## 2022-06-24 DIAGNOSIS — I48.21 PERMANENT ATRIAL FIBRILLATION (H): ICD-10-CM

## 2022-06-24 DIAGNOSIS — I10 HYPERTENSION GOAL BP (BLOOD PRESSURE) < 140/90: ICD-10-CM

## 2022-06-24 DIAGNOSIS — Z95.2 S/P AVR (AORTIC VALVE REPLACEMENT): ICD-10-CM

## 2022-06-24 DIAGNOSIS — I48.92 ATRIAL FLUTTER, UNSPECIFIED TYPE (H): ICD-10-CM

## 2022-06-24 DIAGNOSIS — I48.20 CHRONIC ATRIAL FIBRILLATION (H): ICD-10-CM

## 2022-06-24 DIAGNOSIS — I48.11 LONGSTANDING PERSISTENT ATRIAL FIBRILLATION (H): ICD-10-CM

## 2022-06-24 DIAGNOSIS — C61 MALIGNANT NEOPLASM OF PROSTATE (H): ICD-10-CM

## 2022-06-24 DIAGNOSIS — Z79.01 LONG TERM CURRENT USE OF ANTICOAGULANT THERAPY: Primary | ICD-10-CM

## 2022-06-24 LAB — INR PPP: 3.05 (ref 0.85–1.15)

## 2022-06-24 PROCEDURE — P9604 ONE-WAY ALLOW PRORATED TRIP: HCPCS | Mod: ORL | Performed by: NURSE PRACTITIONER

## 2022-06-24 PROCEDURE — 36415 COLL VENOUS BLD VENIPUNCTURE: CPT | Mod: ORL | Performed by: NURSE PRACTITIONER

## 2022-06-24 PROCEDURE — 85610 PROTHROMBIN TIME: CPT | Mod: ORL | Performed by: NURSE PRACTITIONER

## 2022-06-24 NOTE — TELEPHONE ENCOUNTER
Lab days are Monday and Friday.   Due to timing, I advised Monday recheck.   AVS faxed to [phone]  Ashly Jones RN

## 2022-06-24 NOTE — PROGRESS NOTES
10:49 AM    Writer will fax AVS to [phone] Lei CASTILLO.    Elio Cuevas RN, BSN, PHN  Anticoagulation Clinic   [place] # [id number]  [phone]

## 2022-06-24 NOTE — TELEPHONE ENCOUNTER
Pt COVID +. He has INR done today through the [facility], ordered by NP that saw him 6/22. He is quarantined through the end of the month. I have left a VM for [facility] nurse to see if they would be able to check his INR Tuesday 6/28 or Wed 6/29.   Waiting call back.     Ashly Jones RN

## 2022-06-27 ENCOUNTER — ANTICOAGULATION THERAPY VISIT (OUTPATIENT)
Dept: ANTICOAGULATION | Facility: CLINIC | Age: 87
End: 2022-06-27

## 2022-06-27 DIAGNOSIS — I50.9 CHF (CONGESTIVE HEART FAILURE) (H): Primary | ICD-10-CM

## 2022-06-27 DIAGNOSIS — I48.21 PERMANENT ATRIAL FIBRILLATION (H): ICD-10-CM

## 2022-06-27 DIAGNOSIS — I48.92 ATRIAL FLUTTER, UNSPECIFIED TYPE (H): ICD-10-CM

## 2022-06-27 DIAGNOSIS — Z79.01 LONG TERM CURRENT USE OF ANTICOAGULANT THERAPY: ICD-10-CM

## 2022-06-27 LAB — INR PPP: 4.55 (ref 0.85–1.15)

## 2022-06-27 PROCEDURE — P9604 ONE-WAY ALLOW PRORATED TRIP: HCPCS | Mod: ORL | Performed by: NURSE PRACTITIONER

## 2022-06-27 PROCEDURE — 85610 PROTHROMBIN TIME: CPT | Mod: ORL | Performed by: NURSE PRACTITIONER

## 2022-06-27 PROCEDURE — 36415 COLL VENOUS BLD VENIPUNCTURE: CPT | Mod: ORL | Performed by: NURSE PRACTITIONER

## 2022-06-27 NOTE — PROGRESS NOTES
ANTICOAGULATION MANAGEMENT     Miguel Patten 89 year old male is on warfarin with supratherapeutic INR result. (Goal INR 2.0-3.0)    Recent labs: (last 7 days)     06/27/22  0651   INR 4.55*       ASSESSMENT       Source(s): Chart Review and Patient/Caregiver Call       Warfarin doses taken: Warfarin taken as instructed    Diet: No new diet changes identified    New illness, injury, or hospitalization: Yes: COVID + - tested pos last week     Medication/supplement changes: None noted    Signs or symptoms of bleeding or clotting: No    Previous INR: Therapeutic last 2(+) visits    Additional findings: AVS faxed to [facility] ([phone]). Usually they do not check his INR but due to being COVID + and needing to be quarantined, they have been checking it. Lab days Mon and Fri VM left for  nurse ([phone]) letting them know that INR should be checked 7/1.        PLAN     Recommended plan for temporary change(s) affecting INR     Dosing Instructions: hold 2 doses then continue your current warfarin dose with next INR in 4 days       Summary  As of 6/27/2022    Full warfarin instructions:  6/27: Hold; 6/28: Hold; Otherwise 3.75 mg every Mon, Thu; 2.5 mg all other days   Next INR check:  7/1/2022             Telephone call with Miguel who verbalizes understanding and agrees to plan    Orders given to  Homecare nurse/facility to recheck    Education provided: Please call back if any changes to your diet, medications or how you've been taking warfarin, Goal range and significance of current result, Monitoring for bleeding signs and symptoms and Contact [phone]  with any changes, questions or concerns.     Plan made per [facility] anticoagulation protocol    Ashly Jones, RN  Anticoagulation Clinic  6/27/2022    _______________________________________________________________________     Anticoagulation Episode Summary     Current INR goal:  2.0-3.0   TTR:  73.3 % (1 y)   Target end date:  Indefinite   Send INR  reminders to:  [facility] [place]    Indications    CHF (congestive heart failure) (H) [I50.9]  Long-term (current) use of anticoagulants [Z79.01] [Z79.01]  Permanent atrial fibrillation (H) [I48.21]  Atrial flutter  unspecified type (H) [I48.92]           Comments:           Anticoagulation Care Providers     Provider Role Specialty Phone number    Virginia Cardona APRN CNP Referring Nurse Practitioner - Gerontology [phone]    Vladimir Gonzales DO [facility] Internal Medicine [phone]

## 2022-06-27 NOTE — PROGRESS NOTES
ANTICOAGULATION MANAGEMENT     Miguel Patten 89 year old male is on warfarin with supratherapeutic INR result. (Goal INR 2.0-3.0)    Recent labs: (last 7 days)     06/27/22  0651   INR 4.55*       ASSESSMENT       Source(s): Chart Review    Previous INR was Supratherapeutic    Medication, diet, health changes since last INR - pt dx with COVID last week, which is likely why INR is elevated today. [facility] has checked his last two INRs, will need to have them recheck it again on Friday this week ([phone])           PLAN     Unable to reach pt today.    VM left to call [facility]. Will try again later.     Follow up required to confirm warfarin dose taken and assess for changes     Note- pt dx with COVID last week     Ashly Jones RN  Anticoagulation Clinic  6/27/2022

## 2022-06-28 RX ORDER — SIMVASTATIN 40 MG
TABLET ORAL
Qty: 30 TABLET | Refills: 1 | Status: SHIPPED | OUTPATIENT
Start: 2022-06-28 | End: 2022-08-23

## 2022-06-28 RX ORDER — WARFARIN SODIUM 2.5 MG/1
TABLET ORAL
Qty: 40 TABLET | Refills: 0 | Status: SHIPPED | OUTPATIENT
Start: 2022-06-28 | End: 2022-07-26

## 2022-06-28 NOTE — TELEPHONE ENCOUNTER
Casodex  Routing refill request to provider for review/approval because:  Drug not on the [facility] refill protocol       Cozaar  Routing refill request to provider for review/approval because:  Labs not current:  MARIO Perry RN

## 2022-06-28 NOTE — TELEPHONE ENCOUNTER
ANTICOAGULATION MANAGEMENT:  Medication Refill    Anticoagulation Summary  As of 6/27/2022    Warfarin maintenance plan:  3.75 mg (2.5 mg x 1.5) every Mon, Thu; 2.5 mg (2.5 mg x 1) all other days   Next INR check:  7/1/2022   Target end date:  Indefinite    Indications    CHF (congestive heart failure) (H) [I50.9]  Long-term (current) use of anticoagulants [Z79.01] [Z79.01]  Permanent atrial fibrillation (H) [I48.21]  Atrial flutter  unspecified type (H) [I48.92]             Anticoagulation Care Providers     Provider Role Specialty Phone number    Virginia Cardona APRN CNP Referring Nurse Practitioner - Gerontology [phone]    Vladimir Gonzales DO [facility] Internal Medicine [phone]          Visit with referring provider/group within last year: No, last visit date: 5/6/20    [facility] referral signed within last year: Yes    Miguel does NOT meet all criteria for refill: Office visit with referring provider's group was >= 1 year ago. 30 day thomas fill approved; patient notified to schedule per [facility] protocol    Ashly Jones RN  Anticoagulation Clinic

## 2022-06-28 NOTE — TELEPHONE ENCOUNTER
Pending Prescriptions:                       Disp   Refills    JANTOVEN ANTICOAGULANT 2.5 MG tablet [Phar*90 tab*0        Sig: TAKE ONE AND ONE-HALF TABLETS (3.75MG) BY MOUTH ON           MONDAYS AND TAKE 1 TABLET (2.5MG) BY MOUTH ALL           OTHER DAYS    Routing refill request to provider for review/approval because:  Labs out of range:    INR   Date Value Ref Range Status   06/27/2022 4.55 (H) 0.85 - 1.15 Final   06/21/2021 2.20 (H) 0.86 - 1.14 Final     INR (External)   Date Value Ref Range Status   08/23/2021 2.05 (A) 0.9 - 1.1 Final

## 2022-06-29 RX ORDER — BICALUTAMIDE 50 MG/1
TABLET, FILM COATED ORAL
Qty: 30 TABLET | Refills: 0 | Status: SHIPPED | OUTPATIENT
Start: 2022-06-29 | End: 2022-07-26

## 2022-06-29 RX ORDER — LOSARTAN POTASSIUM 100 MG/1
TABLET ORAL
Qty: 30 TABLET | Refills: 0 | Status: SHIPPED | OUTPATIENT
Start: 2022-06-29 | End: 2022-07-26

## 2022-06-30 ENCOUNTER — LAB REQUISITION (OUTPATIENT)
Dept: LAB | Facility: CLINIC | Age: 87
End: 2022-06-30
Payer: MEDICARE

## 2022-06-30 DIAGNOSIS — I48.4 ATYPICAL ATRIAL FLUTTER (H): ICD-10-CM

## 2022-07-01 ENCOUNTER — ANTICOAGULATION THERAPY VISIT (OUTPATIENT)
Dept: ANTICOAGULATION | Facility: CLINIC | Age: 87
End: 2022-07-01

## 2022-07-01 DIAGNOSIS — I50.9 CHF (CONGESTIVE HEART FAILURE) (H): Primary | ICD-10-CM

## 2022-07-01 DIAGNOSIS — I48.92 ATRIAL FLUTTER, UNSPECIFIED TYPE (H): ICD-10-CM

## 2022-07-01 DIAGNOSIS — Z79.01 LONG TERM CURRENT USE OF ANTICOAGULANT THERAPY: ICD-10-CM

## 2022-07-01 DIAGNOSIS — I48.21 PERMANENT ATRIAL FIBRILLATION (H): ICD-10-CM

## 2022-07-01 LAB — INR PPP: 2.3 (ref 0.85–1.15)

## 2022-07-01 PROCEDURE — P9604 ONE-WAY ALLOW PRORATED TRIP: HCPCS | Mod: ORL | Performed by: NURSE PRACTITIONER

## 2022-07-01 PROCEDURE — 36415 COLL VENOUS BLD VENIPUNCTURE: CPT | Mod: ORL | Performed by: NURSE PRACTITIONER

## 2022-07-01 PROCEDURE — 85610 PROTHROMBIN TIME: CPT | Mod: ORL | Performed by: NURSE PRACTITIONER

## 2022-07-01 NOTE — PROGRESS NOTES
ANTICOAGULATION MANAGEMENT     Miguel Patten 89 year old male is on warfarin with therapeutic INR result. (Goal INR 2.0-3.0)    Recent labs: (last 7 days)     07/01/22  0651   INR 2.30*       ASSESSMENT       Source(s): Chart Review and Patient/Caregiver Call       Warfarin doses taken: Warfarin taken as instructed    Diet: No new diet changes identified    New illness, injury, or hospitalization: Yes: COVID + but feeling better    Medication/supplement changes: None noted    Signs or symptoms of bleeding or clotting: No    Previous INR: Supratherapeutic    Additional findings: None       PLAN     Recommended plan for temporary change(s) affecting INR     Dosing Instructions: continue your current warfarin dose with next INR in 1 week       Summary  As of 7/1/2022    Full warfarin instructions:  3.75 mg every Mon, Thu; 2.5 mg all other days   Next INR check:  7/8/2022             Telephone call with Miguel who verbalizes understanding and agrees to plan and who agrees to plan and repeated back plan correctly    Patient using outside facility for labs    Education provided: Please call back if any changes to your diet, medications or how you've been taking warfarin, Goal range and significance of current result, Importance of therapeutic range and Importance of following up at instructed interval    Plan made per [facility] anticoagulation protocol    Kerwin Faustin RN  Anticoagulation Clinic  7/1/2022    _______________________________________________________________________     Anticoagulation Episode Summary     Current INR goal:  2.0-3.0   TTR:  72.5 % (1 y)   Target end date:  Indefinite   Send INR reminders to:  [facility]    Indications    CHF (congestive heart failure) (H) [I50.9]  Long-term (current) use of anticoagulants [Z79.01] [Z79.01]  Permanent atrial fibrillation (H) [I48.21]  Atrial flutter  unspecified type (H) [I48.92]           Comments:           Anticoagulation Care Providers     Provider  Role Specialty Phone number    Virginia Cardona APRN CNP Referring Nurse Practitioner - Gerontology [phone]    Vladimir Gonzales DO [facility] Internal Medicine [phone]

## 2022-07-05 ENCOUNTER — HOSPITAL ENCOUNTER (OUTPATIENT)
Dept: CARDIOLOGY | Facility: CLINIC | Age: 87
Discharge: HOME | End: 2022-07-05
Attending: NURSE PRACTITIONER | Admitting: NURSE PRACTITIONER
Payer: MEDICARE

## 2022-07-05 DIAGNOSIS — Z95.2 S/P AVR (AORTIC VALVE REPLACEMENT): ICD-10-CM

## 2022-07-05 DIAGNOSIS — I49.5 SICK SINUS SYNDROME (H): ICD-10-CM

## 2022-07-05 LAB — LVEF ECHO: NORMAL

## 2022-07-05 PROCEDURE — 93306 TTE W/DOPPLER COMPLETE: CPT

## 2022-07-05 PROCEDURE — 93306 TTE W/DOPPLER COMPLETE: CPT | Mod: 26 | Performed by: INTERNAL MEDICINE

## 2022-07-07 ENCOUNTER — OFFICE VISIT (OUTPATIENT)
Dept: CARDIOLOGY | Facility: CLINIC | Age: 87
End: 2022-07-07
Attending: NURSE PRACTITIONER
Payer: MEDICARE

## 2022-07-07 VITALS
WEIGHT: 193 LBS | OXYGEN SATURATION: 100 % | SYSTOLIC BLOOD PRESSURE: 102 MMHG | BODY MASS INDEX: 26.14 KG/M2 | HEIGHT: 72 IN | DIASTOLIC BLOOD PRESSURE: 60 MMHG | HEART RATE: 60 BPM

## 2022-07-07 DIAGNOSIS — I48.21 PERMANENT ATRIAL FIBRILLATION (H): Primary | ICD-10-CM

## 2022-07-07 DIAGNOSIS — Z95.2 S/P AVR (AORTIC VALVE REPLACEMENT): ICD-10-CM

## 2022-07-07 DIAGNOSIS — I44.2 ATRIOVENTRICULAR BLOCK, COMPLETE (H): ICD-10-CM

## 2022-07-07 PROCEDURE — 99214 OFFICE O/P EST MOD 30 MIN: CPT | Performed by: NURSE PRACTITIONER

## 2022-07-07 NOTE — PROGRESS NOTES
HPI and Plan: #[id number]  See dictation    Today's clinic visit entailed:  Review of the result(s) of each unique test - labs and device check  No LOS data to display   Time spent doing chart review, history and exam, documentation and further activities per the note  Provider  Link to MDM Help Grid     The level of medical decision making during this visit was of moderate complexity.      Orders Placed This Encounter   Procedures     Follow-Up with Cardiology SERINA       No orders of the defined types were placed in this encounter.      There are no discontinued medications.      Encounter Diagnoses   Name Primary?     S/P AVR (aortic valve replacement)      Permanent atrial fibrillation (H) Yes       CURRENT MEDICATIONS:  Current Outpatient Medications   Medication Sig Dispense Refill     bicalutamide (CASODEX) 50 MG tablet TAKE ONE TABLET BY MOUTH ONCE DAILY 30 tablet 0     FLOVENT HFA 44 MCG/ACT inhaler INHALE ONE PUFF BY MOUTH TWICE A DAY 10.6 g 0     leuprolide (LUPRON DEPOT, 3-MONTH,) 22.5 MG kit Inject 22.5 MG, IM q 6 months per Dr. Zeyad Guevara, pt's Urologist in [place]. 10/17/2018 1 each 3     losartan (COZAAR) 100 MG tablet TAKE ONE-HALF TABLET BY MOUTH TWICE A DAY 30 tablet 0     simvastatin (ZOCOR) 40 MG tablet TAKE ONE TABLET BY MOUTH EVERY NIGHT AT BEDTIME 30 tablet 1     sodium chloride (OCEAN) 0.65 % nasal spray Spray 1 spray into both nostrils 3 times daily as needed for congestion       warfarin ANTICOAGULANT (JANTOVEN ANTICOAGULANT) 2.5 MG tablet Take 3.75 mg on Mon, Thurs and 2.5 mg all other days, or as directed by the Coumadin Clinic. Please schedule an annual visit with provider for further refills. 40 tablet 0     acetaminophen (TYLENOL) 325 MG tablet Take 2 tablets (650 mg) by mouth every 4 hours as needed for mild pain or fever (Patient not taking: No sig reported) 100 tablet      dexamethasone (DECADRON) 6 MG tablet Take 1 tablet (6 mg) by mouth every morning for 7 days 7 tablet 0        ALLERGIES     Allergies   Allergen Reactions     No Known Drug Allergies        PAST MEDICAL HISTORY:  Past Medical History:   Diagnosis Date     Arthritis      Ascending aortic aneurysm (H)     repaired with Hemashield graft 7/2014     Asthma      Complete AV block (H)     sp CRTP implant     Congestive heart failure (H)      Coronary artery disease     mild-moderate stenosis by angiography     H/O aortic valve replacement     bioprosthetic, 7/2014     Hypertension      Malignant neoplasm of prostate (H)     Prostate cancer     Permanent atrial fibrillation (H)     chronic       PAST SURGICAL HISTORY:  Past Surgical History:   Procedure Laterality Date     ARTHROPLASTY HIP  1/21/2013    Procedure: ARTHROPLASTY HIP;  right total hip arthroplasty;  Surgeon: Rober Alves MD;  Location: [facility] OR     GENITOURINARY SURGERY  2001    Prostatectomy       ORTHOPEDIC SURGERY      Left SONALI     PHACOEMULSIFICATION WITH STANDARD INTRAOCULAR LENS IMPLANT Right 10/6/2016    Procedure: PHACOEMULSIFICATION WITH STANDARD INTRAOCULAR LENS IMPLANT;  Surgeon: Elder Rueda MD;  Location: [facility] OR     PHACOEMULSIFICATION WITH STANDARD INTRAOCULAR LENS IMPLANT Left 10/20/2016    Procedure: PHACOEMULSIFICATION WITH STANDARD INTRAOCULAR LENS IMPLANT;  Surgeon: Elder Rueda MD;  Location: [facility] OR     REPAIR ANEURYSM ASCENDING AORTA  7/17/2014    2. Aortic aneurysm repair with 32 mm Hemashield graft.      REPLACE VALVE AORTIC  7/17/2014    1. Aortic valve replacement with 23 mm [facility] [facility] Trifecta tissue valve.      s/p aneurysm repair by Dr. Friedman       s/p avr       [facility] NONSPECIFIC PROCEDURE      Hernia repair     [facility] NONSPECIFIC PROCEDURE      Prostatectomy/cancer     [facility] TOTAL HIP ARTHROPLASTY  1/21/13    Right       FAMILY HISTORY:  Family History   Problem Relation Age of Onset     Asthma Father      Asthma Paternal Grandmother      Asthma Daughter        SOCIAL HISTORY:  Social History     Socioeconomic History      Marital status:    Tobacco Use     Smoking status: Never Smoker     Smokeless tobacco: Never Used   Substance and Sexual Activity     Alcohol use: Yes     Alcohol/week: 0.0 standard drinks     Comment: rarely, once a week or less     Drug use: No     Sexual activity: Not Currently     Partners: Female     Comment:  - live with friend - 3 children.   Other Topics Concern     Parent/sibling w/ CABG, MI or angioplasty before 65F 55M? No      Service Yes     Blood Transfusions No     Caffeine Concern No     Occupational Exposure No     Hobby Hazards No     Sleep Concern No     Stress Concern No     Weight Concern No     Special Diet No     Back Care No     Exercise Yes     Bike Helmet No     Comment: n/a     Seat Belt Yes     Self-Exams Yes       Review of Systems:  Skin:  Negative       Eyes:  Positive for glasses    ENT:  Negative      Respiratory:  Positive for shortness of breath had covid recenlty and shortness of breath is getting better   Cardiovascular:  Negative for;palpitations;chest pain;edema;lightheadedness;dizziness      Gastroenterology: Negative      Genitourinary:  Negative      Musculoskeletal:  Negative      Neurologic:  Negative      Psychiatric:  Negative      Heme/Lymph/Imm:  Negative      Endocrine:  Negative        Physical Exam:  Vitals: /60   Pulse 60   Ht 1.829 m (6')   Wt 87.5 kg (193 lb)   SpO2 100%   BMI 26.18 kg/m      Constitutional:  cooperative, alert and oriented, well developed, well nourished, in no acute distress        Skin:  warm and dry to the touch, no apparent skin lesions or masses noted   pacemaker incision in the left infraclavicular area was well-healed      Head:  normocephalic, no masses or lesions        Eyes:  pupils equal and round;conjunctivae and lids unremarkable        Lymph:      ENT:  no pallor or cyanosis, dentition good hearing aide(s) present      Neck:  JVP normal right carotid bruit      Respiratory:  clear to  auscultation;normal symmetry    few scattered crackles    Cardiac: regular rhythm;normal S1 and S2     crisp prosthetic valve sounds systolic murmur        not assessed this visit                                        GI:  abdomen soft;BS normoactive        Extremities and Muscular Skeletal:  no deformities, clubbing, cyanosis, erythema observed;no edema              Neurological:  no gross motor deficits;affect appropriate        Psych:  Alert and Oriented x 3        CC  Lillie Milan, PHANI CNP  [address]  [place] [address]

## 2022-07-07 NOTE — PROGRESS NOTES
Service Date: 07/07/2022    HISTORY OF PRESENT ILLNESS:  Mr. Patten is a delightful 89-year-old gentleman well known to me here at our [place] location.  He is an established patient of Dr. Caputo.  His past medical history includes:  1.  Aortic valve disease with bioprosthetic aortic valve replacement and graft repair of the ascending aorta.  2.  Atrial fibrillation with complete AV block.  The patient has a biventricular pacemaker that was implanted in 03/2015.  3.  Chronic AFib, on warfarin therapy.  4.  Hypertension with moderate/severe concentric left ventricular hypertrophy noted on previous echo.  5.  Recent COVID infection last month.  The patient continues to have a persistent cough, but otherwise feels well.    At today's visit, Miguel offers no concerns.  He was frustrated about being placed in isolation for 10 days at his current assisted-living facility.  Denies chest pain, shortness of breath, lightheadedness or dizziness.    Echocardiogram completed earlier this week showed that his 23 mm [employer] Trifecta tissue valve that was implanted in 2014 is well seated with mild periprosthetic valvular regurgitation.  Mean systolic gradient is 19 mmHg, which is up from 13 mmHg from 2019.  His ejection fraction is 60%-65%.  RV function is normal.  He has just a trace of tricuspid insufficiency.    Last device interrogation shows that he is BiV paced 98% of the time.  He has a [employer] CRT-P.  He is on chronic warfarin therapy.  Battery longevity is 2-1/2 years.  He has had some brief nonsustained VT noted on his device, rates ranging up to 170 beats per minute.  This is not a new finding.  The patient's code status is a DNR.  He has also been asymptomatic of the nonsustained VT.    At our last visit, patient pointed out a slightly irregular dark mole on the left side of his neck.  He now has 2 of them.  He admits he did not have time to discuss this with Dr. Gonzales, but plans to have a visit with him  in the near future.    All other review of systems, past medical history and physical exam are noted below.    ASSESSMENT AND PLAN:  Mr. Patten is a delightful 89-year-old gentleman here today for followup.  1.  Aortic valve disease with bioprosthetic aortic valve replacement and graft repair to the ascending aorta back in 2014.    Bioprosthetic valve is functioning normally.  Gradients are up slightly but still acceptable.  EF is normal.    2.  Chronic AFib with complete AV block.    The patient is 98% BiV paced with a CRT-P device.    3.  Hypertension with left ventricular hypertrophy.    Blood pressure today was 102/60.  His pressure initially was elevated up to 152/80.  By the end of the visit, it was normotensive.  The patient states he admits he sometimes gets anxious.  He was frustrated about a scheduling error that happened right before our visit.    4.  Hypercholesterolemia, on simvastatin 40 mg daily.  Last LDL was noted at 28.    5. Right carotid bruit.    His last carotid ultrasound was in 2014.  At that time, he had less than 50% stenosis of bilateral internal carotid arteries.  I discussed repeating the carotid ultrasound, which he respectfully declined today.  If he changes his mind, I have asked that he please contact me.    6.  Suspicious moles on the left side of his neck.    They are slightly irregular, very dark.  He will discuss this with Dr. Gonzales.    7.  Recent COVID infection.    Patient does have a few scattered crackles noted; however, oxygen saturation is good and patient is asymptomatic.    Thank you for including us in his care.  If you have questions regarding today's assessment and plan, please feel free to contact us.  I will see him next year for his annual visit.  His pacer threshold is scheduled in October.  I would like to coordinate the 2 visits in the summer or fall so the patient does not have to come to the Heart Clinic in the winter unless needed.    Lillie Milan,  NP        D: 2022   T: 2022   MT: [email or web address]    Name:     JAMI HUDSON  MRN:      [id number]        Account:      [id number]   :      1932           Service Date: 2022       Document: [id number]

## 2022-07-07 NOTE — PATIENT INSTRUCTIONS
Call my nurse with any questions or concerns:  [phone]  *If you have concerns after hours, please call [phone], option 2 to speak with on call Cardiologist.    No changes  Call with any concerns  Discuss mole with   Let me know if you change your mind on a carotid ultrasound

## 2022-07-07 NOTE — LETTER
7/7/2022    Vladimir Gonzales, DO  [address] [place] [address]    RE: Miguel Patten       Dear Colleague,     I had the pleasure of seeing Miguel [facility]atten [facility].  HPI and Plan: #[id number]  See dictation    Today's clinic visit entailed:  Review of the result(s) of each unique test - labs and device check  No LOS data to display   Time spent doing chart review, history and exam, documentation and further activities per the note  Provider  Link to MDM Help Grid     The level of medical decision making during this visit was of moderate complexity.      Orders Placed This Encounter   Procedures     Follow-Up with Cardiology SERINA       No orders of the defined types were placed in this encounter.      There are no discontinued medications.      Encounter Diagnoses   Name Primary?     S/P AVR (aortic valve replacement)      Permanent atrial fibrillation (H) Yes       CURRENT MEDICATIONS:  Current Outpatient Medications   Medication Sig Dispense Refill     bicalutamide (CASODEX) 50 MG tablet TAKE ONE TABLET BY MOUTH ONCE DAILY 30 tablet 0     FLOVENT HFA 44 MCG/ACT inhaler INHALE ONE PUFF BY MOUTH TWICE A DAY 10.6 g 0     leuprolide (LUPRON DEPOT, 3-MONTH,) 22.5 MG kit Inject 22.5 MG, IM q 6 months per Dr. Zeyad Guevara, pt's Urologist in [place]. 10/17/2018 1 each 3     losartan (COZAAR) 100 MG tablet TAKE ONE-HALF TABLET BY MOUTH TWICE A DAY 30 tablet 0     simvastatin (ZOCOR) 40 MG tablet TAKE ONE TABLET BY MOUTH EVERY NIGHT AT BEDTIME 30 tablet 1     sodium chloride (OCEAN) 0.65 % nasal spray Spray 1 spray into both nostrils 3 times daily as needed for congestion       warfarin ANTICOAGULANT (JANTOVEN ANTICOAGULANT) 2.5 MG tablet Take 3.75 mg on Mon, Thurs and 2.5 mg all other days, or as directed by the Coumadin Clinic. Please schedule an annual visit with provider for further refills. 40 tablet 0     acetaminophen (TYLENOL) 325 MG tablet Take 2 tablets (650 mg)  by mouth every 4 hours as needed for mild pain or fever (Patient not taking: No sig reported) 100 tablet      dexamethasone (DECADRON) 6 MG tablet Take 1 tablet (6 mg) by mouth every morning for 7 days 7 tablet 0       ALLERGIES     Allergies   Allergen Reactions     No Known Drug Allergies        PAST MEDICAL HISTORY:  Past Medical History:   Diagnosis Date     Arthritis      Ascending aortic aneurysm (H)     repaired with Hemashield graft 7/2014     Asthma      Complete AV block (H)     sp CRTP implant     Congestive heart failure (H)      Coronary artery disease     mild-moderate stenosis by angiography     H/O aortic valve replacement     bioprosthetic, 7/2014     Hypertension      Malignant neoplasm of prostate (H)     Prostate cancer     Permanent atrial fibrillation (H)     chronic       PAST SURGICAL HISTORY:  Past Surgical History:   Procedure Laterality Date     ARTHROPLASTY HIP  1/21/2013    Procedure: ARTHROPLASTY HIP;  right total hip arthroplasty;  Surgeon: Rober Alves MD;  Location: [facility]     GENITOURINARY SURGERY  2001    Prostatectomy       ORTHOPEDIC SURGERY      Left SONALI     PHACOEMULSIFICATION WITH STANDARD INTRAOCULAR LENS IMPLANT Right 10/6/2016    Procedure: PHACOEMULSIFICATION WITH STANDARD INTRAOCULAR LENS IMPLANT;  Surgeon: Elder Rueda MD;  Location: [facility] OR     PHACOEMULSIFICATION WITH STANDARD INTRAOCULAR LENS IMPLANT Left 10/20/2016    Procedure: PHACOEMULSIFICATION WITH STANDARD INTRAOCULAR LENS IMPLANT;  Surgeon: Elder Rueda MD;  Location: [facility] OR     REPAIR ANEURYSM ASCENDING AORTA  7/17/2014    2. Aortic aneurysm repair with 32 mm Hemashield graft.      REPLACE VALVE AORTIC  7/17/2014    1. Aortic valve replacement with 23 mm [employer] Miguel Trifecta tissue valve.      s/p aneurysm repair by Dr. Frideman       s/p avr       [facility] NONSPECIFIC PROCEDURE      Hernia repair     [facility] NONSPECIFIC PROCEDURE      Prostatectomy/cancer     [facility] TOTAL HIP ARTHROPLASTY  1/21/13     Right       FAMILY HISTORY:  Family History   Problem Relation Age of Onset     Asthma Father      Asthma Paternal Grandmother      Asthma Daughter        SOCIAL HISTORY:  Social History     Socioeconomic History     Marital status:    Tobacco Use     Smoking status: Never Smoker     Smokeless tobacco: Never Used   Substance and Sexual Activity     Alcohol use: Yes     Alcohol/week: 0.0 standard drinks     Comment: rarely, once a week or less     Drug use: No     Sexual activity: Not Currently     Partners: Female     Comment:  - live with friend - 3 children.   Other Topics Concern     Parent/sibling w/ CABG, MI or angioplasty before 65F 55M? No      Service Yes     Blood Transfusions No     Caffeine Concern No     Occupational Exposure No     Hobby Hazards No     Sleep Concern No     Stress Concern No     Weight Concern No     Special Diet No     Back Care No     Exercise Yes     Bike Helmet No     Comment: n/a     Seat Belt Yes     Self-Exams Yes       Review of Systems:  Skin:  Negative       Eyes:  Positive for glasses    ENT:  Negative      Respiratory:  Positive for shortness of breath had covid recenlty and shortness of breath is getting better   Cardiovascular:  Negative for;palpitations;chest pain;edema;lightheadedness;dizziness      Gastroenterology: Negative      Genitourinary:  Negative      Musculoskeletal:  Negative      Neurologic:  Negative      Psychiatric:  Negative      Heme/Lymph/Imm:  Negative      Endocrine:  Negative        Physical Exam:  Vitals: /60   Pulse 60   Ht 1.829 m (6')   Wt 87.5 kg (193 lb)   SpO2 100%   BMI 26.18 kg/m      Constitutional:  cooperative, alert and oriented, well developed, well nourished, in no acute distress        Skin:  warm and dry to the touch, no apparent skin lesions or masses noted   pacemaker incision in the left infraclavicular area was well-healed      Head:  normocephalic, no masses or lesions        Eyes:  pupils equal  and round;conjunctivae and lids unremarkable        Lymph:      ENT:  no pallor or cyanosis, dentition good hearing aide(s) present      Neck:  JVP normal right carotid bruit      Respiratory:  clear to auscultation;normal symmetry    few scattered crackles    Cardiac: regular rhythm;normal S1 and S2     crisp prosthetic valve sounds systolic murmur        not assessed this visit                                        GI:  abdomen soft;BS normoactive        Extremities and Muscular Skeletal:  no deformities, clubbing, cyanosis, erythema observed;no edema              Neurological:  no gross motor deficits;affect appropriate        Psych:  Alert and Oriented x 3        CC  Lillie Milan, APRN CNP  [address]  [place] [address]    Thank you for allowing me to participate in the care of your patient.      Sincerely,     Lillie Milan, NP, APRN CNP     [facility]

## 2022-07-11 ENCOUNTER — TELEPHONE (OUTPATIENT)
Dept: GERIATRICS | Facility: CLINIC | Age: 87
End: 2022-07-11

## 2022-07-11 DIAGNOSIS — H10.32 ACUTE BACTERIAL CONJUNCTIVITIS OF LEFT EYE: Primary | ICD-10-CM

## 2022-07-11 RX ORDER — CIPROFLOXACIN HYDROCHLORIDE 3.5 MG/ML
1 SOLUTION/ DROPS TOPICAL 3 TIMES DAILY
Qty: 2.5 ML | Refills: 0 | Status: SHIPPED | OUTPATIENT
Start: 2022-07-11 | End: 2022-07-16

## 2022-07-11 NOTE — TELEPHONE ENCOUNTER
[facility] Geriatrics Triage Nurse Telephone Encounter    Provider: PHANI Hdz CNP  Facility: [facility] Facility Type:  AL    Caller: Muriel  Call Back Number: [phone]    Allergies:    Allergies   Allergen Reactions     No Known Drug Allergies         Reason for call: Nurse called to report that patient had pink eye over the weekend in his left eye.  Eye is getting better but nurse notes that patient is using very old antibiotic eye drops.  Left eye is still red and has purulent drainage.      Verbal Order/Direction given by Provider: Have patient stop using old eye drops and start Ciprofloxacin 0.3% ophthalmic solution instill 1 drop to left eye TID x 5 days.      Provider giving Order:  PHANI Hdz CNP    Verbal Order given to: VM left on nurses line    Olga Barrientos RN

## 2022-07-11 NOTE — TELEPHONE ENCOUNTER
Triage called this NP due to nursing working with Miguel today stated he reported eye infection and using a very old ABX eye drop container - Cipro  Eye is improving but still has irritation at the corner of the eye    This NP will send a fresh order to [facility] for Cipro 0.3% eye drops 1 drop left eye TID for 5 days.    Informed Triage to call nurse back at have her tell him he needs to call that pharmacy to pay for it and have it delivered.    Electronically signed by Edie Boateng RN, CNP

## 2022-07-15 ENCOUNTER — TELEPHONE (OUTPATIENT)
Dept: ANTICOAGULATION | Facility: CLINIC | Age: 87
End: 2022-07-15

## 2022-07-15 NOTE — TELEPHONE ENCOUNTER
ANTICOAGULATION     Miguel F Patten is overdue for INR check.      Spoke with PT and scheduled lab appointment on 7/19    Ashly Jones RN

## 2022-07-19 ENCOUNTER — ANTICOAGULATION THERAPY VISIT (OUTPATIENT)
Dept: ANTICOAGULATION | Facility: CLINIC | Age: 87
End: 2022-07-19

## 2022-07-19 ENCOUNTER — LAB (OUTPATIENT)
Dept: LAB | Facility: CLINIC | Age: 87
End: 2022-07-19
Payer: MEDICARE

## 2022-07-19 DIAGNOSIS — I48.92 ATRIAL FLUTTER, UNSPECIFIED TYPE (H): ICD-10-CM

## 2022-07-19 DIAGNOSIS — Z79.01 LONG TERM CURRENT USE OF ANTICOAGULANT THERAPY: ICD-10-CM

## 2022-07-19 DIAGNOSIS — I50.9 CHF (CONGESTIVE HEART FAILURE) (H): Primary | ICD-10-CM

## 2022-07-19 DIAGNOSIS — I48.21 PERMANENT ATRIAL FIBRILLATION (H): ICD-10-CM

## 2022-07-19 DIAGNOSIS — I50.9 CHF (CONGESTIVE HEART FAILURE) (H): ICD-10-CM

## 2022-07-19 LAB — INR BLD: 2.4 (ref 0.9–1.1)

## 2022-07-19 PROCEDURE — 36416 COLLJ CAPILLARY BLOOD SPEC: CPT

## 2022-07-19 PROCEDURE — 85610 PROTHROMBIN TIME: CPT

## 2022-07-19 NOTE — PROGRESS NOTES
ANTICOAGULATION MANAGEMENT     Miguel Patten 89 year old male is on warfarin with therapeutic INR result. (Goal INR 2.0-3.0)    Recent labs: (last 7 days)     07/19/22  1017   INR 2.4*       ASSESSMENT       Source(s): Chart Review       Warfarin doses taken: Reviewed in chart    Diet: No new diet changes identified    New illness, injury, or hospitalization: No    Medication/supplement changes: None noted    Signs or symptoms of bleeding or clotting: No    Previous INR: Therapeutic last visit; previously outside of goal range    Additional findings: None       PLAN     Recommended plan for no diet, medication or health factor changes affecting INR     Dosing Instructions: continue your current warfarin dose with next INR in 2 weeks       Summary  As of 7/19/2022    Full warfarin instructions:  3.75 mg every Mon, Thu; 2.5 mg all other days   Next INR check:  8/2/2022             Detailed voice message left for Miguel with dosing instructions and follow up date.     Contact [phone]  to schedule and with any changes, questions or concerns.     Education provided: Please call back if any changes to your diet, medications or how you've been taking warfarin    Plan made per [facility] anticoagulation protocol    Sameera Musa, RN  Anticoagulation Clinic  7/19/2022    _______________________________________________________________________     Anticoagulation Episode Summary     Current INR goal:  2.0-3.0   TTR:  74.7 % (1 y)   Target end date:  Indefinite   Send INR reminders to:  JAIRO FRANKLIN    Indications    CHF (congestive heart failure) (H) [I50.9]  Long-term (current) use of anticoagulants [Z79.01] [Z79.01]  Permanent atrial fibrillation (H) [I48.21]  Atrial flutter  unspecified type (H) [I48.92]           Comments:           Anticoagulation Care Providers     Provider Role Specialty Phone number    Virginia Cardona APRN CNP Referring Nurse Practitioner - Gerontology [phone]    Vladimir Gonzales  DO [facility] [phone]

## 2022-07-23 DIAGNOSIS — I10 HYPERTENSION GOAL BP (BLOOD PRESSURE) < 140/90: ICD-10-CM

## 2022-07-23 DIAGNOSIS — I48.20 CHRONIC ATRIAL FIBRILLATION (H): ICD-10-CM

## 2022-07-23 DIAGNOSIS — C61 MALIGNANT NEOPLASM OF PROSTATE (H): ICD-10-CM

## 2022-07-26 RX ORDER — LOSARTAN POTASSIUM 100 MG/1
TABLET ORAL
Qty: 30 TABLET | Refills: 0 | Status: SHIPPED | OUTPATIENT
Start: 2022-07-26 | End: 2022-08-23

## 2022-07-26 RX ORDER — BICALUTAMIDE 50 MG/1
TABLET, FILM COATED ORAL
Qty: 30 TABLET | Refills: 0 | Status: SHIPPED | OUTPATIENT
Start: 2022-07-26 | End: 2022-08-23

## 2022-07-26 RX ORDER — WARFARIN SODIUM 2.5 MG/1
TABLET ORAL
Qty: 20 TABLET | Refills: 0 | Status: SHIPPED | OUTPATIENT
Start: 2022-07-26 | End: 2022-08-23

## 2022-07-26 NOTE — TELEPHONE ENCOUNTER
Routing refill request to provider for review/approval because:  Drug not on the [facility] refill protocol     José Miguel Hannon RN

## 2022-07-26 NOTE — TELEPHONE ENCOUNTER
ANTICOAGULATION MANAGEMENT:  Medication Refill    Anticoagulation Summary  As of 7/19/2022    Warfarin maintenance plan:  3.75 mg (2.5 mg x 1.5) every Mon, Thu; 2.5 mg (2.5 mg x 1) all other days   Next INR check:  8/2/2022   Target end date:  Indefinite    Indications    CHF (congestive heart failure) (H) [I50.9]  Long-term (current) use of anticoagulants [Z79.01] [Z79.01]  Permanent atrial fibrillation (H) [I48.21]  Atrial flutter  unspecified type (H) [I48.92]             Anticoagulation Care Providers     Provider Role Specialty Phone number    Virginia Cardona APRN CNP Referring Nurse Practitioner - Gerontology [phone]    Vladimir Gonzales DO [facility] Internal Medicine [phone]          Visit with referring provider/group within last year: No, last visit date: 5/21/19    [facility] referral signed within last year: Yes    Miguel does NOT meet all criteria for refill: Office visit with referring provider's group was >= 1 year ago. thomas fill previously given on 6/28/22; routing to referring provider for further refills  (I did send a 2 week refill and left a VM for pt letting him know that an is needed to see provider as his last appt was in 2019)per [facility] protocol    Ashly Jones, RN  Anticoagulation Clinic

## 2022-07-26 NOTE — TELEPHONE ENCOUNTER
Casodex  Routing refill request to provider for review/approval because:  Drug not on the [facility] refill protocol     Cozaar  Routing refill request to provider for review/approval because:  Labs not current:  CREAT  Patient needs to be seen because it has been more than 1 year since last office visit.    José Miguel Hannon RN

## 2022-07-27 ENCOUNTER — TELEPHONE (OUTPATIENT)
Dept: INTERNAL MEDICINE | Facility: CLINIC | Age: 87
End: 2022-07-27

## 2022-07-27 ENCOUNTER — ANCILLARY PROCEDURE (OUTPATIENT)
Dept: CARDIOLOGY | Facility: CLINIC | Age: 87
End: 2022-07-27
Attending: INTERNAL MEDICINE
Payer: MEDICARE

## 2022-07-27 DIAGNOSIS — Z95.0 CARDIAC PACEMAKER IN SITU: ICD-10-CM

## 2022-07-27 DIAGNOSIS — I44.2 ATRIOVENTRICULAR BLOCK, COMPLETE (H): ICD-10-CM

## 2022-07-27 PROCEDURE — 93296 REM INTERROG EVL PM/IDS: CPT | Performed by: INTERNAL MEDICINE

## 2022-07-27 PROCEDURE — 93294 REM INTERROG EVL PM/LDLS PM: CPT | Performed by: INTERNAL MEDICINE

## 2022-07-27 NOTE — TELEPHONE ENCOUNTER
Reason for Call:  Other anti-coag    Detailed comments: Patient called stating [facility]. Patient stated he was told that he hadn't had a connection w/a Dr in over 2 yrs. Please advise patient.    Phone Number Patient can be reached at: Home number on file [phone] (home) [facility] # listed in chart is [phone], patient stated his phone is [phone]  Best Time: any    Can we leave a detailed message on this number? YES    Call taken on 7/27/2022 at 10:45 AM by Sujatha Benton

## 2022-08-02 ENCOUNTER — LAB (OUTPATIENT)
Dept: LAB | Facility: CLINIC | Age: 87
End: 2022-08-02
Payer: MEDICARE

## 2022-08-02 ENCOUNTER — ANTICOAGULATION THERAPY VISIT (OUTPATIENT)
Dept: ANTICOAGULATION | Facility: CLINIC | Age: 87
End: 2022-08-02

## 2022-08-02 DIAGNOSIS — I50.9 CHF (CONGESTIVE HEART FAILURE) (H): ICD-10-CM

## 2022-08-02 DIAGNOSIS — Z79.01 LONG TERM CURRENT USE OF ANTICOAGULANT THERAPY: Primary | ICD-10-CM

## 2022-08-02 DIAGNOSIS — I48.21 PERMANENT ATRIAL FIBRILLATION (H): ICD-10-CM

## 2022-08-02 DIAGNOSIS — Z79.01 LONG TERM CURRENT USE OF ANTICOAGULANT THERAPY: ICD-10-CM

## 2022-08-02 DIAGNOSIS — I48.92 ATRIAL FLUTTER, UNSPECIFIED TYPE (H): ICD-10-CM

## 2022-08-02 LAB — INR BLD: 2.5 (ref 0.9–1.1)

## 2022-08-02 PROCEDURE — 36416 COLLJ CAPILLARY BLOOD SPEC: CPT

## 2022-08-02 PROCEDURE — 85610 PROTHROMBIN TIME: CPT

## 2022-08-02 NOTE — PROGRESS NOTES
ANTICOAGULATION MANAGEMENT     Miguel Patten 89 year old male is on warfarin with therapeutic INR result. (Goal INR 2.0-3.0)    Recent labs: (last 7 days)     08/02/22  0839   INR 2.5*       ASSESSMENT       Source(s): Chart Review    Previous INR was Therapeutic last 2(+) visits    Medication, diet, health changes since last INR chart reviewed; none identified    I left a detailed voicemail with the orders reflected in flowsheet. I have also requested a call back if there have been any missed doses, concerns, illness, fever, or if there have been any changes in medications, activity level, or diet         PLAN     Recommended plan for no diet, medication or health factor changes affecting INR     Dosing Instructions: Continue your current warfarin dose with next INR in 3 weeks       Summary  As of 8/2/2022    Full warfarin instructions:  3.75 mg every Mon, Thu; 2.5 mg all other days   Next INR check:  8/25/2022             Detailed voice message left for Miguel with dosing instructions and follow up date.     Check at provider office visit    Education provided: Contact [phone]  with any changes, questions or concerns.     Plan made per [facility] anticoagulation protocol    Ashly Jones, RN  Anticoagulation Clinic  8/2/2022    _______________________________________________________________________     Anticoagulation Episode Summary     Current INR goal:  2.0-3.0   TTR:  78.6 % (1 y)   Target end date:  Indefinite   [facility]    Indications    CHF (congestive heart failure) (H) [I50.9]  Long-term (current) use of anticoagulants [Z79.01] [Z79.01]  Permanent atrial fibrillation (H) [I48.21]  Atrial flutter  unspecified type (H) [I48.92]           Comments:           Anticoagulation Care Providers     Provider Role Specialty Phone number    Virginia Cardona APRN CNP Referring Nurse Practitioner - Gerontology [phone]    Vladimir Gonzales DO Responsible  Internal Medicine [phone]

## 2022-08-09 LAB
MDC_IDC_EPISODE_DTM: NORMAL
MDC_IDC_EPISODE_DTM: NORMAL
MDC_IDC_EPISODE_DURATION: 8 S
MDC_IDC_EPISODE_ID: NORMAL
MDC_IDC_EPISODE_ID: NORMAL
MDC_IDC_EPISODE_TYPE: NORMAL
MDC_IDC_EPISODE_TYPE: NORMAL
MDC_IDC_LEAD_IMPLANT_DT: NORMAL
MDC_IDC_LEAD_IMPLANT_DT: NORMAL
MDC_IDC_LEAD_LOCATION: NORMAL
MDC_IDC_LEAD_LOCATION: NORMAL
MDC_IDC_LEAD_LOCATION_DETAIL_1: NORMAL
MDC_IDC_LEAD_LOCATION_DETAIL_1: NORMAL
MDC_IDC_LEAD_MFG: NORMAL
MDC_IDC_LEAD_MFG: NORMAL
MDC_IDC_LEAD_MODEL: NORMAL
MDC_IDC_LEAD_MODEL: NORMAL
MDC_IDC_LEAD_POLARITY_TYPE: NORMAL
MDC_IDC_LEAD_POLARITY_TYPE: NORMAL
MDC_IDC_LEAD_SERIAL: NORMAL
MDC_IDC_LEAD_SERIAL: NORMAL
MDC_IDC_MSMT_BATTERY_DTM: NORMAL
MDC_IDC_MSMT_BATTERY_REMAINING_LONGEVITY: 30 MO
MDC_IDC_MSMT_BATTERY_REMAINING_PERCENTAGE: 40 %
MDC_IDC_MSMT_BATTERY_STATUS: NORMAL
MDC_IDC_MSMT_LEADCHNL_LV_IMPEDANCE_VALUE: 616 OHM
MDC_IDC_MSMT_LEADCHNL_LV_PACING_THRESHOLD_AMPLITUDE: 2.1 V
MDC_IDC_MSMT_LEADCHNL_LV_PACING_THRESHOLD_PULSEWIDTH: 1 MS
MDC_IDC_MSMT_LEADCHNL_RV_IMPEDANCE_VALUE: 486 OHM
MDC_IDC_PG_IMPLANT_DTM: NORMAL
MDC_IDC_PG_MFG: NORMAL
MDC_IDC_PG_MODEL: NORMAL
MDC_IDC_PG_SERIAL: NORMAL
MDC_IDC_PG_TYPE: NORMAL
MDC_IDC_SESS_CLINIC_NAME: NORMAL
MDC_IDC_SESS_DTM: NORMAL
MDC_IDC_SESS_TYPE: NORMAL
MDC_IDC_SET_BRADY_AT_MODE_SWITCH_RATE: 170 {BEATS}/MIN
MDC_IDC_SET_BRADY_LOWRATE: 60 {BEATS}/MIN
MDC_IDC_SET_BRADY_MAX_SENSOR_RATE: 120 {BEATS}/MIN
MDC_IDC_SET_BRADY_MODE: NORMAL
MDC_IDC_SET_CRT_LVRV_DELAY: 0 MS
MDC_IDC_SET_CRT_PACED_CHAMBERS: NORMAL
MDC_IDC_SET_LEADCHNL_LV_PACING_AMPLITUDE: 3 V
MDC_IDC_SET_LEADCHNL_LV_PACING_ANODE_ELECTRODE_1: NORMAL
MDC_IDC_SET_LEADCHNL_LV_PACING_ANODE_LOCATION_1: NORMAL
MDC_IDC_SET_LEADCHNL_LV_PACING_CATHODE_ELECTRODE_1: NORMAL
MDC_IDC_SET_LEADCHNL_LV_PACING_CATHODE_LOCATION_1: NORMAL
MDC_IDC_SET_LEADCHNL_LV_PACING_PULSEWIDTH: 1 MS
MDC_IDC_SET_LEADCHNL_LV_SENSING_ADAPTATION_MODE: NORMAL
MDC_IDC_SET_LEADCHNL_LV_SENSING_ANODE_ELECTRODE_1: NORMAL
MDC_IDC_SET_LEADCHNL_LV_SENSING_ANODE_LOCATION_1: NORMAL
MDC_IDC_SET_LEADCHNL_LV_SENSING_CATHODE_ELECTRODE_1: NORMAL
MDC_IDC_SET_LEADCHNL_LV_SENSING_CATHODE_LOCATION_1: NORMAL
MDC_IDC_SET_LEADCHNL_LV_SENSING_SENSITIVITY: 2.5 MV
MDC_IDC_SET_LEADCHNL_RA_SENSING_ADAPTATION_MODE: NORMAL
MDC_IDC_SET_LEADCHNL_RA_SENSING_SENSITIVITY: 0.5 MV
MDC_IDC_SET_LEADCHNL_RV_PACING_AMPLITUDE: 2.4 V
MDC_IDC_SET_LEADCHNL_RV_PACING_CAPTURE_MODE: NORMAL
MDC_IDC_SET_LEADCHNL_RV_PACING_POLARITY: NORMAL
MDC_IDC_SET_LEADCHNL_RV_PACING_PULSEWIDTH: 0.5 MS
MDC_IDC_SET_LEADCHNL_RV_SENSING_ADAPTATION_MODE: NORMAL
MDC_IDC_SET_LEADCHNL_RV_SENSING_POLARITY: NORMAL
MDC_IDC_SET_LEADCHNL_RV_SENSING_SENSITIVITY: 2.5 MV
MDC_IDC_SET_ZONE_DETECTION_INTERVAL: 375 MS
MDC_IDC_SET_ZONE_TYPE: NORMAL
MDC_IDC_SET_ZONE_VENDOR_TYPE: NORMAL
MDC_IDC_STAT_BRADY_DTM_END: NORMAL
MDC_IDC_STAT_BRADY_DTM_START: NORMAL
MDC_IDC_STAT_BRADY_RA_PERCENT_PACED: 0 %
MDC_IDC_STAT_BRADY_RV_PERCENT_PACED: 99 %
MDC_IDC_STAT_CRT_DTM_END: NORMAL
MDC_IDC_STAT_CRT_DTM_START: NORMAL
MDC_IDC_STAT_CRT_LV_PERCENT_PACED: 99 %
MDC_IDC_STAT_EPISODE_RECENT_COUNT: 0
MDC_IDC_STAT_EPISODE_RECENT_COUNT: 13
MDC_IDC_STAT_EPISODE_RECENT_COUNT_DTM_END: NORMAL
MDC_IDC_STAT_EPISODE_RECENT_COUNT_DTM_START: NORMAL
MDC_IDC_STAT_EPISODE_TYPE: NORMAL
MDC_IDC_STAT_EPISODE_VENDOR_TYPE: NORMAL

## 2022-08-19 DIAGNOSIS — Z95.2 S/P AVR (AORTIC VALVE REPLACEMENT): ICD-10-CM

## 2022-08-19 DIAGNOSIS — C61 MALIGNANT NEOPLASM OF PROSTATE (H): ICD-10-CM

## 2022-08-19 DIAGNOSIS — I10 HYPERTENSION GOAL BP (BLOOD PRESSURE) < 140/90: ICD-10-CM

## 2022-08-19 DIAGNOSIS — I48.20 CHRONIC ATRIAL FIBRILLATION (H): ICD-10-CM

## 2022-08-22 ENCOUNTER — TELEPHONE (OUTPATIENT)
Dept: GERIATRICS | Facility: CLINIC | Age: 87
End: 2022-08-22

## 2022-08-22 DIAGNOSIS — H57.89 IRRITATION OF BOTH EYES: Primary | ICD-10-CM

## 2022-08-22 RX ORDER — CYCLOSPORINE 0.5 MG/ML
1 EMULSION OPHTHALMIC 2 TIMES DAILY PRN
Qty: 1.5 ML | Refills: 3 | Status: SHIPPED | OUTPATIENT
Start: 2022-08-22 | End: 2023-01-01

## 2022-08-22 NOTE — TELEPHONE ENCOUNTER
Nursing called this AM to report that Miguel has c/o of one of his eyes red and tearing often that he has to dab the right eye.      Ok with ordering a lubricating eye drop.  Sounds inflamed.  Dry eye with the tearing.    Will order Restasis 0.05% eye drop 1 drop both eyes every 12 hours prn for dry eye.    Electronically signed by Edie Boateng RN, CNP

## 2022-08-23 RX ORDER — BICALUTAMIDE 50 MG/1
TABLET, FILM COATED ORAL
Qty: 30 TABLET | Refills: 0 | Status: SHIPPED | OUTPATIENT
Start: 2022-08-23 | End: 2022-09-27

## 2022-08-23 RX ORDER — SIMVASTATIN 40 MG
TABLET ORAL
Qty: 30 TABLET | Refills: 1 | Status: SHIPPED | OUTPATIENT
Start: 2022-08-23 | End: 2022-10-26

## 2022-08-23 RX ORDER — LOSARTAN POTASSIUM 100 MG/1
TABLET ORAL
Qty: 30 TABLET | Refills: 0 | Status: SHIPPED | OUTPATIENT
Start: 2022-08-23 | End: 2022-09-27

## 2022-08-23 RX ORDER — WARFARIN SODIUM 2.5 MG/1
TABLET ORAL
Qty: 100 TABLET | Refills: 1 | Status: SHIPPED | OUTPATIENT
Start: 2022-08-23 | End: 2023-01-25

## 2022-08-23 NOTE — TELEPHONE ENCOUNTER
"Casodex  Cozaar  Zocor  Routing refill request to provider for review/approval because:  Patient was last seen 6/5/2020, upcoming office visit 8/25/2022      Requested Prescriptions   Pending Prescriptions Disp Refills     simvastatin (ZOCOR) 40 MG tablet [Pharmacy Med Name: SIMVASTATIN 40MG TABS] 30 tablet 1     Sig: TAKE ONE TABLET BY MOUTH EVERY NIGHT AT BEDTIME       Statins Protocol Passed - 8/19/2022  8:52 AM        Passed - LDL on file in past 12 months     Recent Labs   Lab Test 08/27/21  0653   LDL 28             Passed - No abnormal creatine kinase in past 12 months     Recent Labs   Lab Test 11/15/20  0830   *                Passed - Recent (12 mo) or future (30 days) visit within the authorizing provider's specialty     Patient has had an office visit with the authorizing provider or a provider within the authorizing providers department within the previous 12 mos or has a future within next 30 days. See \"Patient Info\" tab in inbasket, or \"Choose Columns\" in Meds & Orders section of the refill encounter.              Passed - Medication is active on med list        Passed - Patient is age 18 or older           losartan (COZAAR) 100 MG tablet [Pharmacy Med Name: LOSARTAN POTASSIUM 100MG TABS] 30 tablet 0     Sig: TAKE ONE-HALF TABLET BY MOUTH TWICE A DAY       Angiotensin-II Receptors Failed - 8/19/2022  8:52 AM        Failed - Normal serum creatinine on file in past 12 months     Recent Labs   Lab Test 08/27/21  0653 04/12/18  0932 06/02/17  1255   CR 0.62*   < >  --    CREAT  --   --  0.7    < > = values in this interval not displayed.       Ok to refill medication if creatinine is low          Passed - Last blood pressure under 140/90 in past 12 months     BP Readings from Last 3 Encounters:   07/07/22 102/60   06/21/22 135/67   04/26/22 (!) 147/65                 Passed - Recent (12 mo) or future (30 days) visit within the authorizing provider's specialty     Patient has had an office visit " "with the authorizing provider or a provider within the authorizing providers department within the previous 12 mos or has a future within next 30 days. See \"Patient Info\" tab in inbasket, or \"Choose Columns\" in Meds & Orders section of the refill encounter.              Passed - Medication is active on med list        Passed - Patient is age 18 or older        Passed - Normal serum potassium on file in past 12 months     Recent Labs   Lab Test 08/27/21  0653   POTASSIUM 4.3               bicalutamide (CASODEX) 50 MG tablet [Pharmacy Med Name: BICALUTAMIDE 50MG TABS] 30 tablet 0     Sig: TAKE ONE TABLET BY MOUTH ONCE DAILY       There is no refill protocol information for this order          "

## 2022-08-23 NOTE — TELEPHONE ENCOUNTER
ANTICOAGULATION MANAGEMENT:  Medication Refill    Anticoagulation Summary  As of 8/2/2022    Warfarin maintenance plan:  3.75 mg (2.5 mg x 1.5) every Mon, Thu; 2.5 mg (2.5 mg x 1) all other days   Next INR check:  8/25/2022   Target end date:  Indefinite    Indications    CHF (congestive heart failure) (H) [I50.9]  Long-term (current) use of anticoagulants [Z79.01] [Z79.01]  Permanent atrial fibrillation (H) [I48.21]  Atrial flutter  unspecified type (H) [I48.92]             Anticoagulation Care Providers     Provider Role Specialty Phone number    Virginia Cardona APRN CNP Referring Nurse Practitioner - Gerontology [phone]    Vladimir Gonzales DO [facility] Internal [facility] [phone]          Visit with referring provider/group within last year: no, but has appt scheduled for 8/25    [facility] referral signed within last year: Yes    Miguel meets all criteria for refill (current [facility] referral, office visit with referring provider/group in last year, lab monitoring up to date or not exceeding 2 weeks overdue). Rx instructions and quantity supplied updated to match patient's current dosing plan. Warfarin 90 day supply with 1 refill granted per [facility] protocol     Ashly Jones RN  Anticoagulation Clinic

## 2022-08-23 NOTE — TELEPHONE ENCOUNTER
"Sending to anticoagulant team for review  Requested Prescriptions   Pending Prescriptions Disp Refills     warfarin ANTICOAGULANT (JANTOVEN ANTICOAGULANT) 2.5 MG tablet [Pharmacy Med Name: JANTOVEN 2.5MG TABS] 20 tablet 0     Sig: TAKE ONE  AND ONE-HALF TABLETS (3.75 MG) ON MONDAY AND THURSDAY.  AND TAKE  ONE TABLET  (2.5 MG)  ALL OTHER DAYS, OR AS DIRECTED BY THE COUMADIN CLINIC. PLEASE SCHEDULE AN ANNUAL VISIT WITH PROVIDER FOR FURTHER REFILLS.       Vitamin K Antagonists Failed - 8/19/2022  8:52 AM        Failed - Recent (12 mo) or future (30 days) visit within the authorizing provider's specialty     Patient has had an office visit with the authorizing provider or a provider within the authorizing providers department within the previous 12 mos or has a future within next 30 days. See \"Patient Info\" tab in inbasket, or \"Choose Columns\" in Meds & Orders section of the refill encounter.              Failed - INR is within goal in the past 6 weeks     Confirm INR is within goal in the past 6 weeks.     Recent Labs   Lab Test 08/02/22  0839   INR 2.5*                       Passed - Medication is active on med list        Passed - Patient is 18 years of age or older           Michelle Martin RN    "

## 2022-08-24 ENCOUNTER — ASSISTED LIVING VISIT (OUTPATIENT)
Dept: GERIATRICS | Facility: CLINIC | Age: 87
End: 2022-08-24
Payer: MEDICARE

## 2022-08-24 VITALS
BODY MASS INDEX: 26.35 KG/M2 | TEMPERATURE: 97.6 F | RESPIRATION RATE: 18 BRPM | DIASTOLIC BLOOD PRESSURE: 93 MMHG | WEIGHT: 194.3 LBS | OXYGEN SATURATION: 97 % | HEART RATE: 66 BPM | SYSTOLIC BLOOD PRESSURE: 105 MMHG

## 2022-08-24 DIAGNOSIS — H57.89 IRRITATION OF BOTH EYES: ICD-10-CM

## 2022-08-24 DIAGNOSIS — H04.123 DRY EYES: Primary | ICD-10-CM

## 2022-08-24 NOTE — LETTER
8/24/2022        RE: Miguel Patten  [facility]  [address]  [facility] [address]        [facility] GERIATRICS  ACUTE/EPISODIC VISIT    [facility] Medical Record Number:  [id number]  Place of Service where encounter took place:  [facility] [[id number]]    Chief Complaint   Patient presents with     RECHECK       HPI:    Miguel Patten is a 89 year old  (12/16/1932), who is being seen today for an episodic care visit.  HPI information obtained from: patient report and [facility] chart review.    Today's concern is:    Diagnoses       Codes Comments    Dry eyes    -  Primary H04.123     Irritation of both eyes     H57.89         Came to see Miguel today as yesterday nursing updated this NP that Miguel's right eye irritated, sclera red and tearing a lot that he is dabbing the eye multiple times.  No green drainage seen.  No swelling of the eye lids.    Ordered Restasis eye drops prn for eye irritation/inflammation.      Today found Miguel sitting on his 4WW out in the [place] area working on a puzzle.  Spoke to him about his eye and he felt it looked better but still tearing.  The eye drops were to be delivered today.      Miguel mentioned he was going to see Dr. Gonzales tomorrow for some concerns and so took that moment to bow out of his care.    Miguel use to have staff manage his medications and he took that back but decision made to continue to follow him as Miguel would approach nursing about acute health issues.    It has been slightly difficult to help manage his cares as Miguel is doing well managing his health issues.  Explained to Miguel that this NP will take her name off his chart as a care team member as he has proven to care well for self and manage his coumadin with the coumadin clinic.  He was fine with this move and aware than to call the clinic for concerns.      ALLERGIES:    Allergies   Allergen Reactions     No Known Drug Allergies         MEDICATIONS:  Post  Discharge Medication Reconciliation Status: patient was not discharged from an inpatient facility or TCU. Medications reviewed today    Current Outpatient Medications   Medication Sig Dispense Refill     acetaminophen (TYLENOL) 325 MG tablet Take 2 tablets (650 mg) by mouth every 4 hours as needed for mild pain or fever (Patient not taking: No sig reported) 100 tablet      bicalutamide (CASODEX) 50 MG tablet TAKE ONE TABLET BY MOUTH ONCE DAILY 30 tablet 0     cycloSPORINE (RESTASIS) 0.05 % ophthalmic emulsion Place 1 drop into both eyes 2 times daily as needed for dry eyes 1.5 mL 3     dexamethasone (DECADRON) 6 MG tablet Take 1 tablet (6 mg) by mouth every morning for 7 days 7 tablet 0     FLOVENT HFA 44 MCG/ACT inhaler INHALE ONE PUFF BY MOUTH TWICE A DAY 10.6 g 0     leuprolide (LUPRON DEPOT, 3-MONTH,) 22.5 MG kit Inject 22.5 MG, IM q 6 months per Dr. Zeyad Guevara, pt's Urologist in [place]. 10/17/2018 1 each 3     losartan (COZAAR) 100 MG tablet TAKE ONE-HALF TABLET BY MOUTH TWICE A DAY 30 tablet 0     simvastatin (ZOCOR) 40 MG tablet TAKE ONE TABLET BY MOUTH EVERY NIGHT AT BEDTIME 30 tablet 1     sodium chloride (OCEAN) 0.65 % nasal spray Spray 1 spray into both nostrils 3 times daily as needed for congestion       warfarin ANTICOAGULANT (JANTOVEN ANTICOAGULANT) 2.5 MG tablet TAKE ONE  AND ONE-HALF TABLETS (3.75 MG) ON MONDAY AND THURSDAY.  AND TAKE  ONE TABLET  (2.5 MG)  ALL OTHER DAYS, OR AS DIRECTED BY THE COUMADIN CLINIC. 100 tablet 1       REVIEW OF SYSTEMS:  4 point ROS neg other than the symptoms noted above in the HPI.    PHYSICAL EXAM:  BP (!) 105/93   Pulse 66   Temp 97.6  F (36.4  C)   Resp 18   Wt 88.1 kg (194 lb 4.8 oz)   SpO2 97%   BMI 26.35 kg/m    Sitting on his 4WW in no acute distress.  Able to hear voice when up on his left side.  Doing a puzzle with many colors and he was sitting looking at it upside down as another resident looking at it the right side.    Wearing glasses.  No  redness of eyelids, no swelling.  Today the sclera's are clear.  No irritation seen.  Eyes do not appear to be over tearing.        ASSESSMENT / PLAN:  1. Dry eyes    2. Irritation of both eyes      Brief visit to make sure he is satisfied with the order for the lubricating eye drops but he is still waiting for them to arrive.  Spoke about directions:  1 drop both eyes, about 12 hours apart - AM and pm as needed.  This will help dry eyes, irritation.  Miguel expressed understanding of directions.    Wished him the best with his visit tomorrow and the bowing out of this NP as part of his care team.  Did give an order to facility to remove this NP from his list and Dr. Gonzales to be primary.      Orders:  1.  Please remove this NP as primary for Miguel as a provider and Dr. Gonzales will be his clinic doctor.  Will not see Miguel anymore to help simple care needs.       Electronically signed by  PHANI Smith CNP               Sincerely,        PHANI Smith CNP       Statement Selected

## 2022-08-24 NOTE — PROGRESS NOTES
[facility] GERIATRICS  ACUTE/EPISODIC VISIT    [facility] Medical Record Number:  [id number]  Place of Service where encounter took place:  [facility] [[id number]]    Chief Complaint   Patient presents with     RECHECK       HPI:    Miguel Patten is a 89 year old  (12/16/1932), who is being seen today for an episodic care visit.  HPI information obtained from: patient report and [facility] chart review.    Today's concern is:    Diagnoses       Codes Comments    Dry eyes    -  Primary H04.123     Irritation of both eyes     H57.89         Came to see Miguel today as yesterday nursing updated this NP that Miguel's right eye irritated, sclera red and tearing a lot that he is dabbing the eye multiple times.  No green drainage seen.  No swelling of the eye lids.    Ordered Restasis eye drops prn for eye irritation/inflammation.      Today found Miguel sitting on his 4WW out in the [place] area working on a puzzle.  Spoke to him about his eye and he felt it looked better but still tearing.  The eye drops were to be delivered today.      Miguel mentioned he was going to see Dr. Gonzales tomorrow for some concerns and so took that moment to bow out of his care.    Miguel use to have staff manage his medications and he took that back but decision made to continue to follow him as Miguel would approach nursing about acute health issues.    It has been slightly difficult to help manage his cares as Miguel is doing well managing his health issues.  Explained to Miguel that this NP will take her name off his chart as a care team member as he has proven to care well for self and manage his coumadin with the coumadin clinic.  He was fine with this move and aware than to call the clinic for concerns.      ALLERGIES:    Allergies   Allergen Reactions     No Known Drug Allergies         MEDICATIONS:  Post Discharge Medication Reconciliation Status: patient was not discharged from an inpatient facility or TCU.  Medications reviewed today    Current Outpatient Medications   Medication Sig Dispense Refill     acetaminophen (TYLENOL) 325 MG tablet Take 2 tablets (650 mg) by mouth every 4 hours as needed for mild pain or fever (Patient not taking: No sig reported) 100 tablet      bicalutamide (CASODEX) 50 MG tablet TAKE ONE TABLET BY MOUTH ONCE DAILY 30 tablet 0     cycloSPORINE (RESTASIS) 0.05 % ophthalmic emulsion Place 1 drop into both eyes 2 times daily as needed for dry eyes 1.5 mL 3     dexamethasone (DECADRON) 6 MG tablet Take 1 tablet (6 mg) by mouth every morning for 7 days 7 tablet 0     FLOVENT HFA 44 MCG/ACT inhaler INHALE ONE PUFF BY MOUTH TWICE A DAY 10.6 g 0     leuprolide (LUPRON DEPOT, 3-MONTH,) 22.5 MG kit Inject 22.5 MG, IM q 6 months per Dr. Zeyad Guevara, pt's Urologist in [place]. 10/17/2018 1 each 3     losartan (COZAAR) 100 MG tablet TAKE ONE-HALF TABLET BY MOUTH TWICE A DAY 30 tablet 0     simvastatin (ZOCOR) 40 MG tablet TAKE ONE TABLET BY MOUTH EVERY NIGHT AT BEDTIME 30 tablet 1     sodium chloride (OCEAN) 0.65 % nasal spray Spray 1 spray into both nostrils 3 times daily as needed for congestion       warfarin ANTICOAGULANT (JANTOVEN ANTICOAGULANT) 2.5 MG tablet TAKE ONE  AND ONE-HALF TABLETS (3.75 MG) ON MONDAY AND THURSDAY.  AND TAKE  ONE TABLET  (2.5 MG)  ALL OTHER DAYS, OR AS DIRECTED BY THE COUMADIN CLINIC. 100 tablet 1       REVIEW OF SYSTEMS:  4 point ROS neg other than the symptoms noted above in the HPI.    PHYSICAL EXAM:  BP (!) 105/93   Pulse 66   Temp 97.6  F (36.4  C)   Resp 18   Wt 88.1 kg (194 lb 4.8 oz)   SpO2 97%   BMI 26.35 kg/m    Sitting on his 4WW in no acute distress.  Able to hear voice when up on his left side.  Doing a puzzle with many colors and he was sitting looking at it upside down as another resident looking at it the right side.    Wearing glasses.  No redness of eyelids, no swelling.  Today the sclera's are clear.  No irritation seen.  Eyes do not appear  to be over tearing.        ASSESSMENT / PLAN:  1. Dry eyes    2. Irritation of both eyes      Brief visit to make sure he is satisfied with the order for the lubricating eye drops but he is still waiting for them to arrive.  Spoke about directions:  1 drop both eyes, about 12 hours apart - AM and pm as needed.  This will help dry eyes, irritation.  Miguel expressed understanding of directions.    Wished him the best with his visit tomorrow and the bowing out of this NP as part of his care team.  Did give an order to facility to remove this NP from his list and Dr. Gonzales to be primary.      Orders:  1.  Please remove this NP as primary for Miguel as a provider and Dr. Gonzales will be his clinic doctor.  Will not see Miguel anymore to help simple care needs.       Electronically signed by  PHANI Smith CNP

## 2022-08-25 ENCOUNTER — LAB (OUTPATIENT)
Dept: LAB | Facility: CLINIC | Age: 87
End: 2022-08-25
Payer: MEDICARE

## 2022-08-25 ENCOUNTER — OFFICE VISIT (OUTPATIENT)
Dept: INTERNAL MEDICINE | Facility: CLINIC | Age: 87
End: 2022-08-25

## 2022-08-25 ENCOUNTER — ANTICOAGULATION THERAPY VISIT (OUTPATIENT)
Dept: ANTICOAGULATION | Facility: CLINIC | Age: 87
End: 2022-08-25

## 2022-08-25 VITALS
DIASTOLIC BLOOD PRESSURE: 80 MMHG | OXYGEN SATURATION: 98 % | BODY MASS INDEX: 25.96 KG/M2 | HEART RATE: 97 BPM | TEMPERATURE: 97.9 F | SYSTOLIC BLOOD PRESSURE: 124 MMHG | WEIGHT: 191.38 LBS | RESPIRATION RATE: 20 BRPM

## 2022-08-25 DIAGNOSIS — I10 HYPERTENSION GOAL BP (BLOOD PRESSURE) < 140/90: ICD-10-CM

## 2022-08-25 DIAGNOSIS — I48.92 ATRIAL FLUTTER, UNSPECIFIED TYPE (H): ICD-10-CM

## 2022-08-25 DIAGNOSIS — Z95.2 S/P AVR (AORTIC VALVE REPLACEMENT): ICD-10-CM

## 2022-08-25 DIAGNOSIS — L98.9 SKIN LESION: ICD-10-CM

## 2022-08-25 DIAGNOSIS — Z79.01 LONG TERM CURRENT USE OF ANTICOAGULANT THERAPY: ICD-10-CM

## 2022-08-25 DIAGNOSIS — I48.21 PERMANENT ATRIAL FIBRILLATION (H): ICD-10-CM

## 2022-08-25 DIAGNOSIS — I50.22 CHRONIC SYSTOLIC CONGESTIVE HEART FAILURE (H): ICD-10-CM

## 2022-08-25 DIAGNOSIS — Z00.00 MEDICARE ANNUAL WELLNESS VISIT, SUBSEQUENT: Primary | ICD-10-CM

## 2022-08-25 DIAGNOSIS — I50.9 CHF (CONGESTIVE HEART FAILURE) (H): Primary | ICD-10-CM

## 2022-08-25 DIAGNOSIS — E78.2 MIXED HYPERLIPIDEMIA: ICD-10-CM

## 2022-08-25 DIAGNOSIS — I50.9 CHF (CONGESTIVE HEART FAILURE) (H): ICD-10-CM

## 2022-08-25 LAB — INR BLD: 1.8 (ref 0.9–1.1)

## 2022-08-25 PROCEDURE — 85610 PROTHROMBIN TIME: CPT

## 2022-08-25 PROCEDURE — G0439 PPPS, SUBSEQ VISIT: HCPCS | Performed by: INTERNAL MEDICINE

## 2022-08-25 PROCEDURE — 36416 COLLJ CAPILLARY BLOOD SPEC: CPT

## 2022-08-25 ASSESSMENT — ENCOUNTER SYMPTOMS
DIARRHEA: 0
SHORTNESS OF BREATH: 0
JOINT SWELLING: 0
DIZZINESS: 0
HEMATURIA: 0
HEADACHES: 0
PALPITATIONS: 0
EYE PAIN: 0
COUGH: 0
MYALGIAS: 0
CHILLS: 0
FREQUENCY: 0
HEARTBURN: 0
PARESTHESIAS: 0
NAUSEA: 0
DYSURIA: 0
HEMATOCHEZIA: 0
ABDOMINAL PAIN: 0
NERVOUS/ANXIOUS: 0
CONSTIPATION: 0
ARTHRALGIAS: 0
FEVER: 0
SORE THROAT: 0
WEAKNESS: 0

## 2022-08-25 ASSESSMENT — ACTIVITIES OF DAILY LIVING (ADL): CURRENT_FUNCTION: NO ASSISTANCE NEEDED

## 2022-08-25 NOTE — PROGRESS NOTES
ANTICOAGULATION MANAGEMENT     Miguel Patten 89 year old male is on warfarin with subtherapeutic INR result. (Goal INR 2.0-3.0)    Recent labs: (last 7 days)     08/25/22  1433   INR 1.8*       ASSESSMENT       Source(s): Chart Review and Patient/Caregiver Call       Warfarin doses taken: Missed dose(s) may be affecting INR - pt thinks he maybe missed a dose but not sure what day    Diet: No new diet changes identified    New illness, injury, or hospitalization: No    Medication/supplement changes: None noted    Signs or symptoms of bleeding or clotting: No    Previous INR: Therapeutic last 2(+) visits    Additional findings: None       PLAN     Recommended plan for temporary change(s) affecting INR     Dosing Instructions: booster dose then continue your current warfarin dose with next INR in 2 weeks       Summary  As of 8/25/2022    Full warfarin instructions:  8/25: 5 mg; Otherwise 3.75 mg every Mon, Thu; 2.5 mg all other days   Next INR check:  9/8/2022             Telephone call with Miguel who verbalizes understanding and agrees to plan and who agrees to plan and repeated back plan correctly    Lab visit scheduled    Education provided: Please call back if any changes to your diet, medications or how you've been taking warfarin    Plan made per [facility] anticoagulation protocol    Kerwin Faustin RN  Anticoagulation Clinic  8/25/2022    _______________________________________________________________________     Anticoagulation Episode Summary     Current INR goal:  2.0-3.0   TTR:  81.8 % (1 y)   Target end date:  Indefinite   Send INR reminders to:  BUBBA REID    Indications    CHF (congestive heart failure) (H) [I50.9]  Long-term (current) use of anticoagulants [Z79.01] [Z79.01]  Permanent atrial fibrillation (H) [I48.21]  Atrial flutter  unspecified type (H) [I48.92]           Comments:           Anticoagulation Care Providers     Provider Role Specialty Phone number    Virginia Cardona APRN  CNP Referring Nurse Practitioner - Gerontology [phone]    Vladimir Gonzales DO [facility] Internal Medicine [phone]

## 2022-08-25 NOTE — PROGRESS NOTES
"SUBJECTIVE:   Miguel Patten is a 89 year old male who presents for Preventive Visit.      Patient has been advised of split billing requirements and indicates understanding: Yes  Are you in the first 12 months of your Medicare coverage?  No    Healthy Habits:     In general, how would you rate your overall health?  Good    Frequency of exercise:  None    Do you usually eat at least 4 servings of fruit and vegetables a day, include whole grains    & fiber and avoid regularly eating high fat or \"junk\" foods?  Yes    Taking medications regularly:  Yes    Medication side effects:  None    Ability to successfully perform activities of daily living:  No assistance needed    Home Safety:  Lack of grab bars in the bathroom    Hearing Impairment:  Difficulty following a conversation in a noisy restaurant or crowded room, need to ask people to speak up or repeat themselves, difficulty understanding soft or whispered speech and difficulty understanding speech on the telephone    In the past 6 months, have you been bothered by leaking of urine?  No    In general, how would you rate your overall mental or emotional health?  Good      PHQ-2 Total Score: 0    Additional concerns today:  No    Do you feel safe in your environment? Yes    Have you ever done Advance Care Planning? (For example, a Health Directive, POLST, or a discussion with a medical provider or your loved ones about your wishes): Yes, patient states has an Advance Care Planning document and will bring a copy to the clinic.     ACT not completed due to patient recent COVID positive     Fall risk  Fallen 2 or more times in the past year?: No  Any fall with injury in the past year?: No    Cognitive Screening   1) Repeat 3 items (Leader, Season, Table)    2) Clock draw: NORMAL  3) 3 item recall: Recalls 3 objects  Results: 3 items recalled: COGNITIVE IMPAIRMENT LESS LIKELY    Mini-CogTM Copyright S Rupa. Licensed by the author for use in [facility]; " reprinted with permission (reilly@.[email or web address]). All rights reserved.      Do you have sleep apnea, excessive snoring or daytime drowsiness?: yes    Reviewed and updated as needed this visit by clinical staff   Tobacco  Allergies  Meds   Med Hx  Surg Hx  Fam Hx            Reviewed and updated as needed this visit by Provider                   Social History     Tobacco Use     Smoking status: Never Smoker     Smokeless tobacco: Never Used   Substance Use Topics     Alcohol use: Yes     Alcohol/week: 0.0 standard drinks     Comment: rarely, once a week or less     If you drink alcohol do you typically have >3 drinks per day or >7 drinks per week? No    Alcohol Use 8/25/2022   Prescreen: >3 drinks/day or >7 drinks/week? Not Applicable   Prescreen: >3 drinks/day or >7 drinks/week? -           -------------------------------------    Current providers sharing in care for this patient include:   Patient Care Team:  Vladimir Gonzales DO as PCP - General (Internal Medicine)  Celestine Clements MD as MD (Cardiology)  Royal(Fgs), Vladimir Steward DO as Assigned PCP  Lillie Milan APRN CNP as Assigned Heart and Vascular Provider    The following health maintenance items are reviewed in Epic and correct as of today:  Health Maintenance Due   Topic Date Due     ANNUAL REVIEW OF HM ORDERS  Never done     MEDICARE ANNUAL WELLNESS VISIT  08/28/2016     ASTHMA ACTION PLAN  04/12/2019     DTAP/TDAP/TD IMMUNIZATION (2 - Td or Tdap) 01/08/2020     HF ACTION PLAN  04/19/2020     ASTHMA CONTROL TEST  12/05/2020     FALL RISK ASSESSMENT  06/05/2021     COVID-19 Vaccine (2 - Booster for Jay series) 07/07/2021     ALT  11/14/2021     BMP  02/27/2022     ZOSTER IMMUNIZATION (2 of 2) 11/10/2021     LIPID  08/27/2022     CBC  08/27/2022     INFLUENZA VACCINE (1) 09/01/2022     Lab work is in process  Labs reviewed in EPIC  BP Readings from Last 3 Encounters:   08/25/22 124/80   08/24/22 (!) 105/93   07/07/22  102/60    Wt Readings from Last 3 Encounters:   08/25/22 86.8 kg (191 lb 6 oz)   08/24/22 88.1 kg (194 lb 4.8 oz)   07/07/22 87.5 kg (193 lb)                  Patient Active Problem List   Diagnosis     Malignant neoplasm of prostate (H)     Health Care Home     Gout     History of total hip arthroplasty - bilateral     Osteoarthritis of hip - right     Atrial flutter (H)     CHF (congestive heart failure) (H)     Hypertension goal BP (blood pressure) < 140/90     Aortic regurgitation     S/P AVR (aortic valve replacement)     S/P ascending aortic replacement     Atrial fibrillation (H)     Long-term (current) use of anticoagulants [Z79.01]     Mild intermittent asthma without complication     Appendicitis with perforation     Chronic systolic congestive heart failure (H)     Mixed hyperlipidemia     Permanent atrial fibrillation (H)     Atrial flutter, unspecified type (H)     2019 novel coronavirus disease (COVID-19)     Asthma without status asthmaticus     Fall (on) (from) other stairs and steps, initial encounter     Traumatic rhabdomyolysis, initial encounter (H)     Past Surgical History:   Procedure Laterality Date     ARTHROPLASTY HIP  1/21/2013    Procedure: ARTHROPLASTY HIP;  right total hip arthroplasty;  Surgeon: Rober Alves MD;  Location: [facility]     GENITOURINARY SURGERY  2001    Prostatectomy       ORTHOPEDIC SURGERY      Left SONALI     PHACOEMULSIFICATION WITH STANDARD INTRAOCULAR LENS IMPLANT Right 10/6/2016    Procedure: PHACOEMULSIFICATION WITH STANDARD INTRAOCULAR LENS IMPLANT;  Surgeon: Elder Rueda MD;  Location: [facility] OR     PHACOEMULSIFICATION WITH STANDARD INTRAOCULAR LENS IMPLANT Left 10/20/2016    Procedure: PHACOEMULSIFICATION WITH STANDARD INTRAOCULAR LENS IMPLANT;  Surgeon: Elder Rueda MD;  Location: [facility] OR     REPAIR ANEURYSM ASCENDING AORTA  7/17/2014    2. Aortic aneurysm repair with 32 mm Hemashield graft.      REPLACE VALVE AORTIC  7/17/2014    1. Aortic  valve replacement with 23 mm [facility] Trifecta tissue valve.      s/p aneurysm repair by Dr. Frideman       s/p avr       [facility] NONSPECIFIC PROCEDURE      Hernia repair     [facility] NONSPECIFIC PROCEDURE      Prostatectomy/cancer     [facility] TOTAL HIP ARTHROPLASTY  1/21/13    Right       Social History     Tobacco Use     Smoking status: Never Smoker     Smokeless tobacco: Never Used   Substance Use Topics     Alcohol use: Yes     Alcohol/week: 0.0 standard drinks     Comment: rarely, once a week or less     Family History   Problem Relation Age of Onset     Asthma Father      Asthma Paternal Grandmother      Asthma Daughter          Current Outpatient Medications   Medication Sig Dispense Refill     acetaminophen (TYLENOL) 325 MG tablet Take 650 mg by mouth every 4 hours as needed for mild pain or fever 100 tablet      bicalutamide (CASODEX) 50 MG tablet TAKE ONE TABLET BY MOUTH ONCE DAILY 30 tablet 0     cycloSPORINE (RESTASIS) 0.05 % ophthalmic emulsion Place 1 drop into both eyes 2 times daily as needed for dry eyes 1.5 mL 3     FLOVENT HFA 44 MCG/ACT inhaler INHALE ONE PUFF BY MOUTH TWICE A DAY 10.6 g 0     leuprolide (LUPRON DEPOT, 3-MONTH,) 22.5 MG kit Inject 22.5 MG, IM q 6 months per Dr. Zeyad Guevara, pt's Urologist in [place]. 10/17/2018 1 each 3     losartan (COZAAR) 100 MG tablet TAKE ONE-HALF TABLET BY MOUTH TWICE A DAY 30 tablet 0     simvastatin (ZOCOR) 40 MG tablet TAKE ONE TABLET BY MOUTH EVERY NIGHT AT BEDTIME 30 tablet 1     sodium chloride (OCEAN) 0.65 % nasal spray Spray 1 spray into both nostrils 3 times daily as needed for congestion       warfarin ANTICOAGULANT (JANTOVEN ANTICOAGULANT) 2.5 MG tablet TAKE ONE  AND ONE-HALF TABLETS (3.75 MG) ON MONDAY AND THURSDAY.  AND TAKE  ONE TABLET  (2.5 MG)  ALL OTHER DAYS, OR AS DIRECTED BY THE COUMADIN CLINIC. 100 tablet 1     Allergies   Allergen Reactions     No Known Drug Allergies          I have reviewed the patient's vaccination schedule and discussed the  benefits of prophylactic vaccination in detail.  I recommend the patient contact their pharmacist (not the pharmacy within the clinic) for vaccinations.  Discussed that most insurance companies now favor reimbursement to the pharmacies and it will financially behoove the patient to have vaccinations performed at their pharmacy.            Review of Systems   Constitutional: Negative for chills and fever.   HENT: Positive for hearing loss. Negative for congestion, ear pain and sore throat.    Eyes: Negative for pain and visual disturbance.   Respiratory: Negative for cough and shortness of breath.    Cardiovascular: Negative for chest pain, palpitations and peripheral edema.   Gastrointestinal: Negative for abdominal pain, constipation, diarrhea, heartburn, hematochezia and nausea.   Genitourinary: Negative for dysuria, frequency, genital sores, hematuria, impotence, penile discharge and urgency.   Musculoskeletal: Negative for arthralgias, joint swelling and myalgias.   Skin: Negative for rash.   Neurological: Negative for dizziness, weakness, headaches and paresthesias.   Psychiatric/Behavioral: Negative for mood changes. The patient is not nervous/anxious.        OBJECTIVE:   /80   Pulse 97   Temp 97.9  F (36.6  C) (Temporal)   Resp 20   Wt 86.8 kg (191 lb 6 oz)   SpO2 98%   BMI 25.96 kg/m   Estimated body mass index is 25.96 kg/m  as calculated from the following:    Height as of 7/7/22: 1.829 m (6').    Weight as of this encounter: 86.8 kg (191 lb 6 oz).  Physical Exam  GENERAL: healthy, alert and no distress  EYES: Eyes grossly normal to inspection, PERRL and conjunctivae and sclerae normal  HENT: ear canals and TM's normal, nose and mouth without ulcers or lesions  NECK: no adenopathy, no asymmetry, masses, or scars and thyroid normal to palpation  RESP: lungs clear to auscultation - no rales, rhonchi or wheezes  CV: regular rate and rhythm, normal S1 S2, no S3 or S4, systolic ejection murmur is  heard.  No valvular click noted.  No peripheral edema and peripheral pulses strong  ABDOMEN: soft, nontender, no hepatosplenomegaly, no masses and bowel sounds normal  MS: no gross musculoskeletal defects noted, no edema  SKIN: Patient has a dark in the slightly inner lesion on his left neck.  He notes that it has been growing and is somewhat irregular in appearance.  Concerns regarding melanoma are entertained.  NEURO: Normal strength and tone, mentation intact and speech normal  PSYCH: mentation appears normal, affect normal/bright    Diagnostic Test Results:  No results found for this or any previous visit (from the past 24 hour(s)).    ASSESSMENT / PLAN:       ICD-10-CM    1. Skin lesion  L98.9 Adult General Surg Referral   2. Medicare annual wellness visit, subsequent  Z00.00    3. Mixed hyperlipidemia  E78.2 ALT   4. Hypertension goal BP (blood pressure) < 140/90  I10 BASIC METABOLIC PANEL   5. S/P AVR (aortic valve replacement)  Z95.2 CBC with Platelets   6. Chronic systolic congestive heart failure (H)  I50.22        Patient has been advised of split billing requirements and indicates understanding: Yes    COUNSELING:  Reviewed preventive health counseling, as reflected in patient instructions       Regular exercise       Healthy diet/nutrition       Vision screening       Hearing screening    Estimated body mass index is 25.96 kg/m  as calculated from the following:    Height as of 7/7/22: 1.829 m (6').    Weight as of this encounter: 86.8 kg (191 lb 6 oz).        He reports that he has never smoked. He has never used smokeless tobacco.      Appropriate preventive services were discussed with this patient, including applicable screening as appropriate for cardiovascular disease, diabetes, osteopenia/osteoporosis, and glaucoma.  As appropriate for age/gender, discussed screening for colorectal cancer, prostate cancer, breast cancer, and cervical cancer. Checklist reviewing preventive services available has  been given to the patient.    Reviewed patients plan of care and provided an AVS. The Basic Care Plan (routine screening as documented in Health Maintenance) for Miguel meets the Care Plan requirement. This Care Plan has been established and reviewed with the Patient.    Counseling Resources:  ATP IV Guidelines  Pooled Cohorts Equation Calculator  Breast Cancer Risk Calculator  Breast Cancer: Medication to Reduce Risk  FRAX Risk Assessment  ICSI Preventive Guidelines  Dietary Guidelines for Americans, 2010  Contactually's MyPlate  ASA Prophylaxis  Lung CA Screening    Vladimir Gonzales [facility]    Identified Health Risks:

## 2022-09-07 ENCOUNTER — LAB (OUTPATIENT)
Dept: LAB | Facility: CLINIC | Age: 87
End: 2022-09-07
Payer: MEDICARE

## 2022-09-07 ENCOUNTER — ANTICOAGULATION THERAPY VISIT (OUTPATIENT)
Dept: ANTICOAGULATION | Facility: CLINIC | Age: 87
End: 2022-09-07

## 2022-09-07 DIAGNOSIS — Z79.01 LONG TERM CURRENT USE OF ANTICOAGULANT THERAPY: Primary | ICD-10-CM

## 2022-09-07 DIAGNOSIS — I48.92 ATRIAL FLUTTER, UNSPECIFIED TYPE (H): ICD-10-CM

## 2022-09-07 DIAGNOSIS — I50.9 CHF (CONGESTIVE HEART FAILURE) (H): ICD-10-CM

## 2022-09-07 DIAGNOSIS — I48.21 PERMANENT ATRIAL FIBRILLATION (H): ICD-10-CM

## 2022-09-07 DIAGNOSIS — Z79.01 LONG TERM CURRENT USE OF ANTICOAGULANT THERAPY: ICD-10-CM

## 2022-09-07 LAB — INR BLD: 2.6 (ref 0.9–1.1)

## 2022-09-07 PROCEDURE — 85610 PROTHROMBIN TIME: CPT

## 2022-09-07 PROCEDURE — 36416 COLLJ CAPILLARY BLOOD SPEC: CPT

## 2022-09-07 NOTE — PROGRESS NOTES
ANTICOAGULATION MANAGEMENT     Miguel Patten 89 year old male is on warfarin with therapeutic INR result. (Goal INR 2.0-3.0)    Recent labs: (last 7 days)     09/07/22  0836   INR 2.6*       ASSESSMENT       Source(s): Chart Review and Patient/Caregiver Call       Warfarin doses taken: Warfarin taken as instructed    Diet: No new diet changes identified    New illness, injury, or hospitalization: No    Medication/supplement changes: None noted    Signs or symptoms of bleeding or clotting: No    Previous INR: Subtherapeutic    Additional findings: None       PLAN     Recommended plan for no diet, medication or health factor changes affecting INR     Dosing Instructions: Continue your current warfarin dose with next INR in 3 weeks       Summary  As of 9/7/2022    Full warfarin instructions:  3.75 mg every Mon, Thu; 2.5 mg all other days   Next INR check:  9/28/2022             Telephone call with Miguel who verbalizes understanding and agrees to plan    Lab visit scheduled    Education provided: Please call back if any changes to your diet, medications or how you've been taking warfarin    Plan made per [facility] anticoagulation protocol    Ashly Jones, RN  Anticoagulation Clinic  9/7/2022    _______________________________________________________________________     Anticoagulation Episode Summary     Current INR goal:  2.0-3.0   TTR:  80.9 % (1 y)   Target end date:  Indefinite   Send INR reminders to:  BUB[facility] REID    Indications    CHF (congestive heart failure) (H) [I50.9]  Long-term (current) use of anticoagulants [Z79.01] [Z79.01]  Permanent atrial fibrillation (H) [I48.21]  Atrial flutter  unspecified type (H) [I48.92]           Comments:           Anticoagulation Care Providers     Provider Role Specialty Phone number    Virginia Cardona APRN CNP Referring Nurse Practitioner - Gerontology [phone]    Vladimir Gonzales DO [facility] Internal Medicine [phone]

## 2022-09-11 NOTE — PROGRESS NOTES
ANTICOAGULATION FOLLOW-UP CLINIC VISIT    Patient Name:  Miguel Patten  Date:  1/15/2020  Contact Type:  Face to Face    SUBJECTIVE:  Patient Findings     Comments:   Patient denies any identifiable changes that caused the supratherapeutic INR.   Pt states he already took dose today. He will take 1.25 mg tomorrow.   Ashly Jones RN            Clinical Outcomes     Comments:   Patient denies any identifiable changes that caused the supratherapeutic INR.   Pt states he already took dose today. He will take 1.25 mg tomorrow.   Ashly Jones RN               OBJECTIVE    INR Protime   Date Value Ref Range Status   01/15/2020 3.2 (A) 0.9 - 1.1 Final       ASSESSMENT / PLAN  INR assessment SUPRA    Recheck INR In: 8 WEEKS    INR Location Clinic      Anticoagulation Summary  As of 1/15/2020    INR goal:   2.0-3.0   TTR:   97.2 % (1 y)   INR used for dosing:   3.2! (1/15/2020)   Warfarin maintenance plan:   3.75 mg (2.5 mg x 1.5) every Tue, Sat; 2.5 mg (2.5 mg x 1) all other days   Full warfarin instructions:   1/16: 1.25 mg; Otherwise 3.75 mg every Tue, Sat; 2.5 mg all other days   Weekly warfarin total:   20 mg   Plan last modified:   Ashly Jones RN (12/5/2018)   Next INR check:   3/11/2020   Priority:   Maintenance   Target end date:       Indications    CHF (congestive heart failure) (H) [I50.9]  Long-term (current) use of anticoagulants [Z79.01] [Z79.01]             Anticoagulation Episode Summary     INR check location:       Preferred lab:       Send INR reminders to:   [facility] [place]    Comments:   2.5 mg tablets, book, takes in AM      Anticoagulation Care Providers     Provider Role Specialty Phone number    Rober العراقي MD [facility] [phone]            See the Encounter Report to view Anticoagulation Flowsheet and Dosing Calendar (Go to Encounters tab in chart review, and find the Anticoagulation Therapy Visit)    Dosage adjustment made based on physician directed  [Home] : at home, [unfilled] , at the time of the visit. care plan.      Ashly Jones RN                  [Medical Office: ([facility])___] : at the medical office located in  [Verbal consent obtained from patient] : the patient, [unfilled] [de-identified] : 82 year old woman with past medical history HTN, HLD, CAD, PAD, MI, DM2, peripheral neuropathy who was undergoing outpatient orthopedic evaluation due to ongoing chronic back pain and gait imbalance. MRI outpatient was reported to be abnormal and she was instructed to go to ED for further imaging. She ultimately presented to [facility] ED on 7/19/22. Brain imaging demonstrated evidence of metastatic disease including a dominant right temporal lobe 3.1 cm enhancing mass with vasogenic edema. There was also a left parafalcine lesion noted. CT scan of her body demonstrated a ANTHONY tumor with subcentimeter pulmonary nodules and a nonspecific adrenal nodule \par \par Bronchoscopy with biopsy was done on 7/20/22 which demonstrated adenocarcinoma consistent with lung primary; tumor tested PDL-1 negative and was negative for actionable mutations. \par \par She underwent craniotomy for resection of dominant brain = mass on 7/27/2020. Her hospital course was complicated by NSTEMI and AFIB with RVR and RML PE. SHe was placed on anticoagulation and remains on enoxaparin 40 mg daily, pending clearance for therapeutic anticoagulation by neurosurgery. She was discharged to rehab on 8/10/22-8/26/22.\par \par She presents today for discussion of GKRS. She is off steroids and remains on keppra/depakote.

## 2022-09-12 ENCOUNTER — OFFICE VISIT (OUTPATIENT)
Dept: FAMILY MEDICINE | Facility: CLINIC | Age: 87
End: 2022-09-12
Payer: MEDICARE

## 2022-09-12 ENCOUNTER — NURSE TRIAGE (OUTPATIENT)
Dept: INTERNAL MEDICINE | Facility: CLINIC | Age: 87
End: 2022-09-12

## 2022-09-12 VITALS
RESPIRATION RATE: 10 BRPM | WEIGHT: 192 LBS | DIASTOLIC BLOOD PRESSURE: 64 MMHG | HEART RATE: 65 BPM | SYSTOLIC BLOOD PRESSURE: 128 MMHG | BODY MASS INDEX: 26.04 KG/M2 | OXYGEN SATURATION: 97 % | TEMPERATURE: 97 F

## 2022-09-12 DIAGNOSIS — H10.31 ACUTE BACTERIAL CONJUNCTIVITIS OF RIGHT EYE: Primary | ICD-10-CM

## 2022-09-12 PROCEDURE — 99213 OFFICE O/P EST LOW 20 MIN: CPT | Performed by: FAMILY MEDICINE

## 2022-09-12 RX ORDER — NEOMYCIN/POLYMYXIN B/HYDROCORT 3.5-10K-1
2 SUSPENSION, DROPS(FINAL DOSAGE FORM)(ML) OPHTHALMIC (EYE) 4 TIMES DAILY
Qty: 2 ML | Refills: 0 | Status: SHIPPED | OUTPATIENT
Start: 2022-09-12 | End: 2022-09-17

## 2022-09-12 ASSESSMENT — ASTHMA QUESTIONNAIRES: ACT_TOTALSCORE: 25

## 2022-09-12 NOTE — PROGRESS NOTES
Assessment & Plan     Acute bacterial conjunctivitis of right eye  Most of the time conjunctivitis (pink eye) is related to an underlying viral infection.  This often causes a stuffy nose or irritation of the throat like a cold.  Gently cleansing the eye with a warm washcloth often helps remove the crust and encourages clearing.  If the discharge is thick and there is not an associated cold, it is more likely to involve a low-grade infection that often responds quickly to a topical antibiotic.  If you develop visual changes, increasing redness around the eye or fevers, please let me know right away.  I will send in a prescription for an antibiotic along with directions.  Good luck!    - neomycin-polymyxin-hydrocortisone (CORTISPORIN) 3.5-35817-4 ophthalmic suspension; Place 2 drops into the right eye 4 times daily for 5 days    Prescription drug management         There are no Patient Instructions on file for this visit.    Return in about 2 weeks (around 9/26/2022) for Follow up, in person, If symptoms persist or do not improve.    [facility]ileep Hernandez MD  [facility] 89 [facility], presenting for the following health issues:  Eye Problem      HPI     Acute Illness  Acute illness concerns: Eye   Onset/Duration: 2  weeks  Symptoms:  Fever: No  Chills/Sweats: No  Headache (location?): No  Sinus Pressure: No  Conjunctivitis:  YES- right eye  Ear Pain: no  Rhinorrhea: No  Congestion: No  Sore Throat: No  Cough: no  Wheeze: No  Decreased Appetite: No  Nausea: No  Vomiting: No  Diarrhea: No  Dysuria/Freq.: No  Dysuria or Hematuria: No  Fatigue/Achiness: No  Sick/Strep Exposure: No  Therapies tried and outcome: restasis    Patient Active Problem List   Diagnosis     Malignant neoplasm of prostate (H)     Health Care Home     Gout     History of total hip arthroplasty - bilateral     Osteoarthritis of hip - right     Atrial flutter (H)     CHF (congestive heart failure) (H)      Hypertension goal BP (blood pressure) < 140/90     Aortic regurgitation     S/P AVR (aortic valve replacement)     S/P ascending aortic replacement     Atrial fibrillation (H)     Long-term (current) use of anticoagulants [Z79.01]     Mild intermittent asthma without complication     Appendicitis with perforation     Chronic systolic congestive heart failure (H)     Mixed hyperlipidemia     Permanent atrial fibrillation (H)     Atrial flutter, unspecified type (H)     2019 novel coronavirus disease (COVID-19)     Asthma without status asthmaticus     Fall (on) (from) other stairs and steps, initial encounter     Traumatic rhabdomyolysis, initial encounter (H)     Current Outpatient Medications   Medication Sig Dispense Refill     bicalutamide (CASODEX) 50 MG tablet TAKE ONE TABLET BY MOUTH ONCE DAILY 30 tablet 0     cycloSPORINE (RESTASIS) 0.05 % ophthalmic emulsion Place 1 drop into both eyes 2 times daily as needed for dry eyes 1.5 mL 3     FLOVENT HFA 44 MCG/ACT inhaler INHALE ONE PUFF BY MOUTH TWICE A DAY 10.6 g 0     leuprolide (LUPRON DEPOT, 3-MONTH,) 22.5 MG kit Inject 22.5 MG, IM q 6 months per Dr. Zeyad Guevara, pt's Urologist in [place]. 10/17/2018 1 each 3     losartan (COZAAR) 100 MG tablet TAKE ONE-HALF TABLET BY MOUTH TWICE A DAY 30 tablet 0     simvastatin (ZOCOR) 40 MG tablet TAKE ONE TABLET BY MOUTH EVERY NIGHT AT BEDTIME 30 tablet 1     sodium chloride (OCEAN) 0.65 % nasal spray Spray 1 spray into both nostrils 3 times daily as needed for congestion       warfarin ANTICOAGULANT (JANTOVEN ANTICOAGULANT) 2.5 MG tablet TAKE ONE  AND ONE-HALF TABLETS (3.75 MG) ON MONDAY AND THURSDAY.  AND TAKE  ONE TABLET  (2.5 MG)  ALL OTHER DAYS, OR AS DIRECTED BY THE COUMADIN CLINIC. 100 tablet 1     acetaminophen (TYLENOL) 325 MG tablet Take 650 mg by mouth every 4 hours as needed for mild pain or fever 100 tablet          Review of Systems   CONSTITUTIONAL: NEGATIVE for fever, chills, change in weight  EYES:  POSITIVE for mattering right, redness right and tearing right and NEGATIVE for eye pain, foreign body sensation and photophobia  ENT/MOUTH: NEGATIVE for ear, mouth and throat problems  RESP: NEGATIVE for significant cough or SOB  CV: NEGATIVE for chest pain, palpitations or peripheral edema      Objective    /64   Pulse 65   Temp 97  F (36.1  C)   Resp 10   Wt 87.1 kg (192 lb)   SpO2 97%   BMI 26.04 kg/m    Body mass index is 26.04 kg/m .  Physical Exam   GENERAL: healthy, alert and no distress  EYES: PERRL, EOMI, visual fields normal and conjunctiva/corneas- conjunctival injection OD

## 2022-09-12 NOTE — TELEPHONE ENCOUNTER
OK for workin today ANU. Please call patient  to schedule time.  Electronically signed by Dileep Hernandez MD

## 2022-09-12 NOTE — TELEPHONE ENCOUNTER
"Nurse Triage SBAR    Is this a 2nd Level Triage? YES, LICENSED PRACTITIONER REVIEW IS REQUIRED    Situation: Patient reports worsening redness to right eye over the last week, noted tome crusting over R eyelid    Background: Has been trying Restasis, not helping, eye is getting redder    Assessment: Patient may have pinkeye or infecion    Protocol Recommended Disposition:   See in Office Today    Recommendation: Patient would like to be seen as he has a procedure on 9/14.  He does not have MyChart to do a virtual.    Routed to provider    Does the patient meet one of the following criteria for ADS visit consideration? No    Reason for Disposition    Patient wants to be seen    Additional Information    Negative: Chemical got in the eye    Negative: Piece of something got in the eye    Negative: Followed an eye injury    Negative: Yellow or green pus in the eyes    Negative: SEVERE eye pain    Negative: Recent eye surgery and has increasing eye pain    Negative: Patient sounds very sick or weak to the triager    Negative: MODERATE eye pain or discomfort (e.g., interferes with normal activities or awakens from sleep; more than mild)    Negative: Looking at light causes MODERATE to SEVERE eye pain (i.e., photophobia)    Negative: New or worsening blurred vision    Negative: Cloudy spot or sore seen on the cornea (clear part of the eye)    Negative: Eyelids are very swollen (shut or almost)    Negative: Eyelid (outer) is very red    Negative: Vomiting    Negative: Foreign body sensation ('feels like something is in there')    Answer Assessment - Initial Assessment Questions  1. LOCATION: Location: \"What's red, the eyeball or the outer eyelids?\" (Note: when callers say the eye is red, they usually mean the sclera is red)        Patient reports some crusting to top of eyelid, increasing redness to R eye the past week  2. REDNESS OF SCLERA: \"Is the redness in one or both eyes?\" \"When did the redness start?\"       One, eye " "just the R eye  3. ONSET: \"When did the eye become red?\" (e.g., hours, days)       Started a week ago and is getting worse  4. EYELIDS: \"Are the eyelids red or swollen?\" If Yes, ask: \"How much?\"       Mildly swollen to eyelind  5. VISION: \"Is there any difficulty seeing clearly?\"       No  6. ITCHING: \"Does it feel itchy?\" If so ask: \"How bad is it\" (e.g., Scale 1-10; or mild, moderate, severe)      No itching  7. PAIN: \"Is there any pain? If Yes, ask: \"How bad is it?\" (e.g., Scale 1-10; or mild, moderate, severe)      Mild pain and irritation  8. CONTACT LENS: \"Do you wear contacts?\"      No  9. CAUSE: \"What do you think is causing the redness?\"      Possible pink eye. Has been trying Restasis, not helping  10. OTHER SYMPTOMS: \"Do you have any other symptoms?\" (e.g., fever, runny nose, cough, vomiting)        No    Protocols used: EYE - RED WITHOUT PUS-A-OH      " low sodium, low fat

## 2022-09-12 NOTE — TELEPHONE ENCOUNTER
Phoned patient and offered office visit per Dr. Mary MD as stated below.  Patient stated he would gladly take the 4:00 visit, coming into the clinic for check in at 3:40 today.    Michelle Martin RN

## 2022-09-14 ENCOUNTER — OFFICE VISIT (OUTPATIENT)
Dept: SURGERY | Facility: CLINIC | Age: 87
End: 2022-09-14
Attending: INTERNAL MEDICINE
Payer: MEDICARE

## 2022-09-14 VITALS
WEIGHT: 190 LBS | DIASTOLIC BLOOD PRESSURE: 78 MMHG | SYSTOLIC BLOOD PRESSURE: 120 MMHG | BODY MASS INDEX: 25.73 KG/M2 | HEIGHT: 72 IN | TEMPERATURE: 96.7 F

## 2022-09-14 DIAGNOSIS — L98.9 SKIN LESION: ICD-10-CM

## 2022-09-14 PROCEDURE — 99203 OFFICE O/P NEW LOW 30 MIN: CPT | Mod: 25 | Performed by: SURGERY

## 2022-09-14 PROCEDURE — 88305 TISSUE EXAM BY PATHOLOGIST: CPT | Performed by: SURGERY

## 2022-09-14 PROCEDURE — 11104 PUNCH BX SKIN SINGLE LESION: CPT | Performed by: SURGERY

## 2022-09-14 PROCEDURE — 11105 PUNCH BX SKIN EA SEP/ADDL: CPT | Performed by: SURGERY

## 2022-09-14 NOTE — PROGRESS NOTES
Patient seen in consultation for left neck atypical skin lesions  by Vladimir Gonzales    HPI:  Patient is a 89 year old male with at least 2+month history of atypical skin lesions left neck. He thinks he may have a remote history of something similar but cannot remember. He does take coumadin. He is here for biopsy/excision    Review Of Systems    Skin: as above  Ears/Nose/Throat: negative  Respiratory: No shortness of breath, dyspnea on exertion, cough, or hemoptysis  Cardiovascular: negative  Gastrointestinal: negative  Genitourinary: negative  Musculoskeletal: negative  Neurologic: negative  Hematologic/Lymphatic/Immunologic: negative  Endocrine: negative      Past Medical History:   Diagnosis Date     Arthritis      Ascending aortic aneurysm (H)     repaired with Hemashield graft 7/2014     Asthma      Complete AV block (H)     sp CRTP implant     Congestive heart failure (H)      Coronary artery disease     mild-moderate stenosis by angiography     H/O aortic valve replacement     bioprosthetic, 7/2014     Hypertension      Malignant neoplasm of prostate (H)     Prostate cancer     Permanent atrial fibrillation (H)     chronic       Past Surgical History:   Procedure Laterality Date     ARTHROPLASTY HIP  1/21/2013    Procedure: ARTHROPLASTY HIP;  right total hip arthroplasty;  Surgeon: Rober Alves MD;  [facility]: [facility]     GENITOURINARY SURGERY  2001    Prostatectomy       ORTHOPEDIC SURGERY      Left SONALI     PHACOEMULSIFICATION WITH STANDARD INTRAOCULAR LENS IMPLANT Right 10/6/2016    Procedure: PHACOEMULSIFICATION WITH STANDARD INTRAOCULAR LENS IMPLANT;  Surgeon: Elder Rueda MD;  [facility]     PHACOEMULSIFICATION WITH STANDARD INTRAOCULAR LENS IMPLANT Left 10/20/2016    Procedure: PHACOEMULSIFICATION WITH STANDARD INTRAOCULAR LENS IMPLANT;  Surgeon: Elder Rueda MD;  [facility]     REPAIR ANEURYSM ASCENDING AORTA  7/17/2014    2. Aortic aneurysm repair with  32 mm Hemashield graft.      REPLACE VALVE AORTIC  7/17/2014    1. Aortic valve replacement with 23 mm [employer] Trifecta tissue valve.      s/p aneurysm repair by Dr. Friedman       s/p avr       [facility] NONSPECIFIC PROCEDURE      Hernia repair     [facility] NONSPECIFIC PROCEDURE      Prostatectomy/cancer     [facility] TOTAL HIP ARTHROPLASTY  1/21/13    Right       Family History   Problem Relation Age of Onset     Asthma Father      Asthma Paternal Grandmother      Asthma Daughter        Social History     Socioeconomic History     Marital status:      Spouse name: Not on file     Number of children: Not on file     Years of education: Not on file     Highest education level: Not on file   Occupational History     Not on file   Tobacco Use     Smoking status: Never Smoker     Smokeless tobacco: Never Used   Vaping Use     Vaping Use: Never used   Substance and Sexual Activity     Alcohol use: Yes     Alcohol/week: 0.0 standard drinks     Comment: rarely, once a week or less     Drug use: No     Sexual activity: Not Currently     Partners: Female     Comment:  - live with friend - 3 children.   Other Topics Concern     Parent/sibling w/ CABG, MI or angioplasty before 65F 55M? No      Service Yes     Blood Transfusions No     Caffeine Concern No     Occupational Exposure No     Hobby Hazards No     Sleep Concern No     Stress Concern No     Weight Concern No     Special Diet No     Back Care No     Exercise Yes     Bike Helmet No     Comment: n/a     Seat Belt Yes     Self-Exams Yes   Social History Narrative     Not on file     Social Determinants of Health     Financial Resource Strain: Not on file   Food Insecurity: Not on file   Transportation Needs: Not on file   Physical Activity: Not on file   Stress: Not on file   Social Connections: Not on file   Intimate Partner Violence: Not on file   Housing Stability: Not on file       Current Outpatient Medications   Medication Sig Dispense Refill     acetaminophen  (TYLENOL) 325 MG tablet Take 650 mg by mouth every 4 hours as needed for mild pain or fever 100 tablet      bicalutamide (CASODEX) 50 MG tablet TAKE ONE TABLET BY MOUTH ONCE DAILY 30 tablet 0     cycloSPORINE (RESTASIS) 0.05 % ophthalmic emulsion Place 1 drop into both eyes 2 times daily as needed for dry eyes 1.5 mL 3     FLOVENT HFA 44 MCG/ACT inhaler INHALE ONE PUFF BY MOUTH TWICE A DAY 10.6 g 0     leuprolide (LUPRON DEPOT, 3-MONTH,) 22.5 MG kit Inject 22.5 MG, IM q 6 months per Dr. Zeyad Guevara, pt's Urologist in [place]. 10/17/2018 1 each 3     losartan (COZAAR) 100 MG tablet TAKE ONE-HALF TABLET BY MOUTH TWICE A DAY 30 tablet 0     neomycin-polymyxin-hydrocortisone (CORTISPORIN) 3.5-60508-2 ophthalmic suspension Place 2 drops into the right eye 4 times daily for 5 days 2 mL 0     simvastatin (ZOCOR) 40 MG tablet TAKE ONE TABLET BY MOUTH EVERY NIGHT AT BEDTIME 30 tablet 1     sodium chloride (OCEAN) 0.65 % nasal spray Spray 1 spray into both nostrils 3 times daily as needed for congestion       warfarin ANTICOAGULANT (JANTOVEN ANTICOAGULANT) 2.5 MG tablet TAKE ONE  AND ONE-HALF TABLETS (3.75 MG) ON MONDAY AND THURSDAY.  AND TAKE  ONE TABLET  (2.5 MG)  ALL OTHER DAYS, OR AS DIRECTED BY THE COUMADIN CLINIC. 100 tablet 1       Medications and history reviewed    Physical exam:  Vitals: /78   Temp (!) 96.7  F (35.9  C) (Temporal)   Ht 1.829 m (6')   Wt 86.2 kg (190 lb)   BMI 25.77 kg/m    BMI= Body mass index is 25.77 kg/m .    Constitutional: alert, non-distressed   Head: Normo-cephalic, atraumatic, no lesions, masses or tenderness   Cardiovascular: RRR, no new murmurs, +S1, +S2 heart sounds, no clicks, rubs or gallops   Respiratory: CTAB, no rales, rhonchi or wheezing, equal chest rise, good respiratory effort   Gastrointestinal: Soft, non-tender, non distended, no rebound rigidity or guarding, no masses or hernias palpated  : Deferred  Musculoskeletal: Moves all extremities, normal   strength, no deformities noted   Skin: left neck. Two lesions one pigmented and one not. Both slightly raised. No surrounding erythema. Both ~5mm diameter.   Psychiatric: Mentation appears normal, affect appropriate   Hematologic/Lymphatic/Immunologic: Normal cervical and supraclavicular lymph nodes   Patient able to get up on table with mild difficulty.    Labs show:  No results found for this or any previous visit (from the past 24 hour(s)).    Imaging shows:  No results found for this or any previous visit (from the past 744 hour(s)).     Assessment:     ICD-10-CM    1. Skin lesion  L98.9 Adult General Surg Referral     Plan: I recommend diagnostic punch biopsy prior to definitive excision. We can perform this today in the office. We discussed the procedure in detail. We also discussed the risks, benefits, alternatives and post-procedure care. After our informed discussion we decided to proceed. I will call him to discuss results when available.    PROCEDURE:3mm punch biopsy x 2 left neck atypical skin lesions   Written consent was obtained    The left neck area was prepped and appropriately anesthetized with 1% lidocaine with epinephrine. Using the usual technique, 3mm punch was performed on the superior and inferior lesions. The total area excised, including lesion and margins was 0.3 cm. Exofin dressing was applied.  The procedure was well tolerated and without complications. Specimen was sent to Pathology.    45 minutes spent on the date of the encounter doing chart review, history and exam, documentation and further activities per the note    Nicolás Barnett,

## 2022-09-14 NOTE — LETTER
9/14/2022         RE: Miguel Patten  [facility]  [address]  [facility] [address]        Dear Colleague,    Thank you for referring your patient, Miguel Patten, to the [facility]. Please see a copy of my visit note below.    Patient seen in consultation for left neck atypical skin lesions  by Vladimir Gonzales    HPI:  Patient is a 89 year old male with at least 2+month history of atypical skin lesions left neck. He thinks he may have a remote history of something similar but cannot remember. He does take coumadin. He is here for biopsy/excision    Review Of Systems    Skin: as above  Ears/Nose/Throat: negative  Respiratory: No shortness of breath, dyspnea on exertion, cough, or hemoptysis  Cardiovascular: negative  Gastrointestinal: negative  Genitourinary: negative  Musculoskeletal: negative  Neurologic: negative  Hematologic/Lymphatic/Immunologic: negative  Endocrine: negative      Past Medical History:   Diagnosis Date     Arthritis      Ascending aortic aneurysm (H)     repaired with Hemashield graft 7/2014     Asthma      Complete AV block (H)     sp CRTP implant     Congestive heart failure (H)      Coronary artery disease     mild-moderate stenosis by angiography     H/O aortic valve replacement     bioprosthetic, 7/2014     Hypertension      Malignant neoplasm of prostate (H)     Prostate cancer     Permanent atrial fibrillation (H)     chronic       Past Surgical History:   Procedure Laterality Date     ARTHROPLASTY HIP  1/21/2013    Procedure: ARTHROPLASTY HIP;  right total hip arthroplasty;  Surgeon: Rober Alves MD;  Location:  OR     GENITOURINARY SURGERY  2001    Prostatectomy       ORTHOPEDIC SURGERY      Left SONALI     PHACOEMULSIFICATION WITH STANDARD INTRAOCULAR LENS IMPLANT Right 10/6/2016    Procedure: PHACOEMULSIFICATION WITH STANDARD INTRAOCULAR LENS IMPLANT;  Surgeon: Elder Rueda MD;  Location:  OR      PHACOEMULSIFICATION WITH STANDARD INTRAOCULAR LENS IMPLANT Left 10/20/2016    Procedure: PHACOEMULSIFICATION WITH STANDARD INTRAOCULAR LENS IMPLANT;  Surgeon: Elder Rueda MD;  [facility]     REPAIR ANEURYSM ASCENDING AORTA  7/17/2014    2. Aortic aneurysm repair with 32 mm Hemashield graft.      REPLACE VALVE AORTIC  7/17/2014    1. Aortic valve replacement with 23 mm [employer] Trifecta tissue valve.      s/p aneurysm repair by Dr. Friedman       s/p avr       [facility] NONSPECIFIC PROCEDURE      Hernia repair     [facility] NONSPECIFIC PROCEDURE      Prostatectomy/cancer     [facility] TOTAL HIP ARTHROPLASTY  1/21/13    Right       Family History   Problem Relation Age of Onset     Asthma Father      Asthma Paternal Grandmother      Asthma Daughter        Social History     Socioeconomic History     Marital status:      Spouse name: Not on file     Number of children: Not on file     Years of education: Not on file     Highest education level: Not on file   Occupational History     Not on file   Tobacco Use     Smoking status: Never Smoker     Smokeless tobacco: Never Used   Vaping Use     Vaping Use: Never used   Substance and Sexual Activity     Alcohol use: Yes     Alcohol/week: 0.0 standard drinks     Comment: rarely, once a week or less     Drug use: No     Sexual activity: Not Currently     Partners: Female     Comment:  - live with friend - 3 children.   Other Topics Concern     Parent/sibling w/ CABG, MI or angioplasty before 65F 55M? No      Service Yes     Blood Transfusions No     Caffeine Concern No     Occupational Exposure No     Hobby Hazards No     Sleep Concern No     Stress Concern No     Weight Concern No     Special Diet No     Back Care No     Exercise Yes     Bike Helmet No     Comment: n/a     Seat Belt Yes     Self-Exams Yes   Social History Narrative     Not on file     Social Determinants of Health     Financial Resource Strain: Not on file   Food Insecurity: Not on file    Transportation Needs: Not on file   Physical Activity: Not on file   Stress: Not on file   Social Connections: Not on file   Intimate Partner Violence: Not on file   Housing Stability: Not on file       Current Outpatient Medications   Medication Sig Dispense Refill     acetaminophen (TYLENOL) 325 MG tablet Take 650 mg by mouth every 4 hours as needed for mild pain or fever 100 tablet      bicalutamide (CASODEX) 50 MG tablet TAKE ONE TABLET BY MOUTH ONCE DAILY 30 tablet 0     cycloSPORINE (RESTASIS) 0.05 % ophthalmic emulsion Place 1 drop into both eyes 2 times daily as needed for dry eyes 1.5 mL 3     FLOVENT HFA 44 MCG/ACT inhaler INHALE ONE PUFF BY MOUTH TWICE A DAY 10.6 g 0     leuprolide (LUPRON DEPOT, 3-MONTH,) 22.5 MG kit Inject 22.5 MG, IM q 6 months per Dr. Zeyad Guevara, pt's Urologist in [place]. 10/17/2018 1 each 3     losartan (COZAAR) 100 MG tablet TAKE ONE-HALF TABLET BY MOUTH TWICE A DAY 30 tablet 0     neomycin-polymyxin-hydrocortisone (CORTISPORIN) 3.5-39820-5 ophthalmic suspension Place 2 drops into the right eye 4 times daily for 5 days 2 mL 0     simvastatin (ZOCOR) 40 MG tablet TAKE ONE TABLET BY MOUTH EVERY NIGHT AT BEDTIME 30 tablet 1     sodium chloride (OCEAN) 0.65 % nasal spray Spray 1 spray into both nostrils 3 times daily as needed for congestion       warfarin ANTICOAGULANT (JANTOVEN ANTICOAGULANT) 2.5 MG tablet TAKE ONE  AND ONE-HALF TABLETS (3.75 MG) ON MONDAY AND THURSDAY.  AND TAKE  ONE TABLET  (2.5 MG)  ALL OTHER DAYS, OR AS DIRECTED BY THE COUMADIN CLINIC. 100 tablet 1       Medications and history reviewed    Physical exam:  Vitals: /78   Temp (!) 96.7  F (35.9  C) (Temporal)   Ht 1.829 m (6')   Wt 86.2 kg (190 lb)   BMI 25.77 kg/m    BMI= Body mass index is 25.77 kg/m .    Constitutional: alert, non-distressed   Head: Normo-cephalic, atraumatic, no lesions, masses or tenderness   Cardiovascular: RRR, no new murmurs, +S1, +S2 heart sounds, no clicks, rubs or  gallops   Respiratory: CTAB, no rales, rhonchi or wheezing, equal chest rise, good respiratory effort   Gastrointestinal: Soft, non-tender, non distended, no rebound rigidity or guarding, no masses or hernias palpated  : Deferred  Musculoskeletal: Moves all extremities, normal  strength, no deformities noted   Skin: left neck. Two lesions one pigmented and one not. Both slightly raised. No surrounding erythema. Both ~5mm diameter.   Psychiatric: Mentation appears normal, affect appropriate   Hematologic/Lymphatic/Immunologic: Normal cervical and supraclavicular lymph nodes   Patient able to get up on table with mild difficulty.    Labs show:  No results found for this or any previous visit (from the past 24 hour(s)).    Imaging shows:  No results found for this or any previous visit (from the past 744 hour(s)).     Assessment:     ICD-10-CM    1. Skin lesion  L98.9 Adult General Surg Referral     Plan: I recommend diagnostic punch biopsy prior to definitive excision. We can perform this today in the office. We discussed the procedure in detail. We also discussed the risks, benefits, alternatives and post-procedure care. After our informed discussion we decided to proceed. I will call him to discuss results when available.    PROCEDURE:3mm punch biopsy x 2 left neck atypical skin lesions   Written consent was obtained    The left neck area was prepped and appropriately anesthetized with 1% lidocaine with epinephrine. Using the usual technique, 3mm punch was performed on the superior and inferior lesions. The total area excised, including lesion and margins was 0.3 cm. Exofin dressing was applied.  The procedure was well tolerated and without complications. Specimen was sent to Pathology.    45 minutes spent on the date of the encounter doing chart review, history and exam, documentation and further activities per the note    Nicolás Barnett, DO        Again, thank you for allowing me to participate in the  care of your patient.        Sincerely,        Nicolás Barnett, DO

## 2022-09-16 LAB
PATH REPORT.COMMENTS IMP SPEC: NORMAL
PATH REPORT.COMMENTS IMP SPEC: NORMAL
PATH REPORT.FINAL DX SPEC: NORMAL
PATH REPORT.GROSS SPEC: NORMAL
PATH REPORT.MICROSCOPIC SPEC OTHER STN: NORMAL
PATH REPORT.RELEVANT HX SPEC: NORMAL

## 2022-09-22 DIAGNOSIS — I10 HYPERTENSION GOAL BP (BLOOD PRESSURE) < 140/90: ICD-10-CM

## 2022-09-22 DIAGNOSIS — J45.20 MILD INTERMITTENT ASTHMA WITHOUT COMPLICATION: ICD-10-CM

## 2022-09-22 DIAGNOSIS — C61 MALIGNANT NEOPLASM OF PROSTATE (H): ICD-10-CM

## 2022-09-27 RX ORDER — BICALUTAMIDE 50 MG/1
TABLET, FILM COATED ORAL
Qty: 30 TABLET | Refills: 0 | Status: SHIPPED | OUTPATIENT
Start: 2022-09-27 | End: 2022-10-26

## 2022-09-27 RX ORDER — FLUTICASONE PROPIONATE 44 MCG
AEROSOL WITH ADAPTER (GRAM) INHALATION
Qty: 10.6 G | Refills: 5 | Status: SHIPPED | OUTPATIENT
Start: 2022-09-27 | End: 2023-01-01

## 2022-09-27 RX ORDER — LOSARTAN POTASSIUM 100 MG/1
TABLET ORAL
Qty: 30 TABLET | Refills: 0 | Status: SHIPPED | OUTPATIENT
Start: 2022-09-27 | End: 2022-10-26

## 2022-09-27 NOTE — TELEPHONE ENCOUNTER
"Cozaar  Casodex  Routing refill request to provider for review/approval because:    Requested Prescriptions   Pending Prescriptions Disp Refills     bicalutamide (CASODEX) 50 MG tablet [Pharmacy Med Name: BICALUTAMIDE 50MG TABS] 30 tablet 0     Sig: TAKE ONE TABLET (50 MG) BY MOUTH ONCE DAILY       There is no refill protocol information for this order        losartan (COZAAR) 100 MG tablet [Pharmacy Med Name: LOSARTAN POTASSIUM 100MG TABS] 30 tablet 0     Sig: TAKE ONE-HALF TABLET (50 MG) BY MOUTH TWICE A DAY       Angiotensin-II Receptors Failed - 9/22/2022  1:58 PM        Failed - Normal serum creatinine on file in past 12 months     Recent Labs   Lab Test 08/27/21  0653 04/12/18  0932 06/02/17  1255   CR 0.62*   < >  --    CREAT  --   --  0.7    < > = values in this interval not displayed.       Ok to refill medication if creatinine is low          Failed - Normal serum potassium on file in past 12 months     Recent Labs   Lab Test 08/27/21  0653   POTASSIUM 4.3                    Passed - Last blood pressure under 140/90 in past 12 months     BP Readings from Last 3 Encounters:   09/14/22 120/78   09/12/22 128/64   08/25/22 124/80                 Passed - Recent (12 mo) or future (30 days) visit within the authorizing provider's specialty     Patient has had an office visit with the authorizing provider or a provider within the authorizing providers department within the previous 12 mos or has a future within next 30 days. See \"Patient Info\" tab in inbasket, or \"Choose Columns\" in Meds & Orders section of the refill encounter.              Passed - Medication is active on med list        Passed - Patient is age 18 or older         Michelle Martin RN      "

## 2022-09-28 ENCOUNTER — ANTICOAGULATION THERAPY VISIT (OUTPATIENT)
Dept: ANTICOAGULATION | Facility: CLINIC | Age: 87
End: 2022-09-28

## 2022-09-28 ENCOUNTER — LAB (OUTPATIENT)
Dept: LAB | Facility: CLINIC | Age: 87
End: 2022-09-28
Payer: MEDICARE

## 2022-09-28 DIAGNOSIS — I50.9 CHF (CONGESTIVE HEART FAILURE) (H): ICD-10-CM

## 2022-09-28 DIAGNOSIS — Z79.01 LONG TERM CURRENT USE OF ANTICOAGULANT THERAPY: Primary | ICD-10-CM

## 2022-09-28 DIAGNOSIS — I10 HYPERTENSION GOAL BP (BLOOD PRESSURE) < 140/90: ICD-10-CM

## 2022-09-28 DIAGNOSIS — I48.21 PERMANENT ATRIAL FIBRILLATION (H): ICD-10-CM

## 2022-09-28 DIAGNOSIS — E78.2 MIXED HYPERLIPIDEMIA: ICD-10-CM

## 2022-09-28 DIAGNOSIS — Z95.2 S/P AVR (AORTIC VALVE REPLACEMENT): ICD-10-CM

## 2022-09-28 DIAGNOSIS — Z79.01 LONG TERM CURRENT USE OF ANTICOAGULANT THERAPY: ICD-10-CM

## 2022-09-28 DIAGNOSIS — I48.92 ATRIAL FLUTTER, UNSPECIFIED TYPE (H): ICD-10-CM

## 2022-09-28 LAB
ALT SERPL W P-5'-P-CCNC: 20 U/L (ref 0–70)
ANION GAP SERPL CALCULATED.3IONS-SCNC: 6 MMOL/L (ref 3–14)
BUN SERPL-MCNC: 19 MG/DL (ref 7–30)
CALCIUM SERPL-MCNC: 9 MG/DL (ref 8.5–10.1)
CHLORIDE BLD-SCNC: 107 MMOL/L (ref 94–109)
CHOLEST SERPL-MCNC: 132 MG/DL
CO2 SERPL-SCNC: 27 MMOL/L (ref 20–32)
CREAT SERPL-MCNC: 0.72 MG/DL (ref 0.66–1.25)
ERYTHROCYTE [DISTWIDTH] IN BLOOD BY AUTOMATED COUNT: 15.6 % (ref 10–15)
FASTING STATUS PATIENT QL REPORTED: YES
GFR SERPL CREATININE-BSD FRML MDRD: 87 ML/MIN/1.73M2
GLUCOSE BLD-MCNC: 95 MG/DL (ref 70–99)
HCT VFR BLD AUTO: 41.8 % (ref 40–53)
HDLC SERPL-MCNC: 84 MG/DL
HGB BLD-MCNC: 13.7 G/DL (ref 13.3–17.7)
INR BLD: 2.3 (ref 0.9–1.1)
LDLC SERPL CALC-MCNC: 34 MG/DL
MCH RBC QN AUTO: 32.1 PG (ref 26.5–33)
MCHC RBC AUTO-ENTMCNC: 32.8 G/DL (ref 31.5–36.5)
MCV RBC AUTO: 98 FL (ref 78–100)
NONHDLC SERPL-MCNC: 48 MG/DL
PLATELET # BLD AUTO: 150 10E3/UL (ref 150–450)
POTASSIUM BLD-SCNC: 4 MMOL/L (ref 3.4–5.3)
RBC # BLD AUTO: 4.27 10E6/UL (ref 4.4–5.9)
SODIUM SERPL-SCNC: 140 MMOL/L (ref 133–144)
TRIGL SERPL-MCNC: 69 MG/DL
WBC # BLD AUTO: 9.7 10E3/UL (ref 4–11)

## 2022-09-28 PROCEDURE — 85610 PROTHROMBIN TIME: CPT

## 2022-09-28 PROCEDURE — 36415 COLL VENOUS BLD VENIPUNCTURE: CPT

## 2022-09-28 PROCEDURE — 80048 BASIC METABOLIC PNL TOTAL CA: CPT

## 2022-09-28 PROCEDURE — 85027 COMPLETE CBC AUTOMATED: CPT

## 2022-09-28 PROCEDURE — 80061 LIPID PANEL: CPT

## 2022-09-28 PROCEDURE — 84460 ALANINE AMINO (ALT) (SGPT): CPT

## 2022-09-28 PROCEDURE — 36416 COLLJ CAPILLARY BLOOD SPEC: CPT

## 2022-09-28 NOTE — PROGRESS NOTES
ANTICOAGULATION MANAGEMENT     Miguel Patten 89 year old male is on warfarin with therapeutic INR result. (Goal INR 2.0-3.0)    Recent labs: (last 7 days)     09/28/22  0839   INR 2.3*       ASSESSMENT       Source(s): Chart Review    Previous INR was Therapeutic last visit; previously outside of goal range    Medication, diet, health changes since last INR chart reviewed; none identified           PLAN     Unable to reach pt today.    VM left to call [facility] back. Will try again later.     Follow up required to confirm warfarin dose taken and assess for changes    Ashly Jones, RN  Anticoagulation Clinic  9/28/2022

## 2022-09-30 NOTE — PROGRESS NOTES
ANTICOAGULATION MANAGEMENT     Miguel Patten 89 year old male is on warfarin with therapeutic INR result. (Goal INR 2.0-3.0)    Recent labs: (last 7 days)     09/28/22  0839   INR 2.3*       ASSESSMENT       Source(s): Chart Review and Patient/Caregiver Call       Warfarin doses taken: Warfarin taken as instructed    Diet: No new diet changes identified    New illness, injury, or hospitalization: No    Medication/supplement changes: None noted    Signs or symptoms of bleeding or clotting: No    Previous INR: Therapeutic last 2(+) visits    Additional findings: None       PLAN     Recommended plan for no diet, medication or health factor changes affecting INR     Dosing Instructions: Continue your current warfarin dose with next INR in 4 weeks       Summary  As of 9/28/2022    Full warfarin instructions:  3.75 mg every Mon, Thu; 2.5 mg all other days   Next INR check:  10/26/2022             Telephone call with Miguel who verbalizes understanding and agrees to plan    Lab visit scheduled    Education provided: Please call back if any changes to your diet, medications or how you've been taking warfarin    Plan made per [facility] anticoagulation protocol    Ashly Jones, RN  Anticoagulation Clinic  9/30/2022    _______________________________________________________________________     Anticoagulation Episode Summary     Current INR goal:  2.0-3.0   TTR:  80.8 % (1 y)   Target end date:  Indefinite   Send INR reminders to:  JAIRO FRANKLIN    Indications    CHF (congestive heart failure) (H) [I50.9]  Long-term (current) use of anticoagulants [Z79.01] [Z79.01]  Permanent atrial fibrillation (H) [I48.21]  Atrial flutter  unspecified type (H) [I48.92]           Comments:           Anticoagulation Care Providers     Provider Role Specialty Phone number    Virginia Cardona APRN CNP Referring Nurse Practitioner - Gerontology [phone]    Vladimir Gonzales DO [facility]  [phone]

## 2022-09-30 NOTE — PROGRESS NOTES
I have attempted to contact pt several times and have left VMs and no reply.   I have contact his emergency contact, Savi, with no answer. VM left for her to call [facility] back.   Ashly Jones RN

## 2022-10-13 ENCOUNTER — ANCILLARY PROCEDURE (OUTPATIENT)
Dept: CARDIOLOGY | Facility: CLINIC | Age: 87
End: 2022-10-13
Attending: INTERNAL MEDICINE
Payer: MEDICARE

## 2022-10-13 DIAGNOSIS — I44.2 ATRIOVENTRICULAR BLOCK, COMPLETE (H): ICD-10-CM

## 2022-10-13 DIAGNOSIS — Z95.0 CARDIAC PACEMAKER IN SITU: ICD-10-CM

## 2022-10-13 PROCEDURE — 93280 PM DEVICE PROGR EVAL DUAL: CPT | Performed by: INTERNAL MEDICINE

## 2022-10-17 LAB
MDC_IDC_LEAD_IMPLANT_DT: NORMAL
MDC_IDC_LEAD_IMPLANT_DT: NORMAL
MDC_IDC_LEAD_LOCATION: NORMAL
MDC_IDC_LEAD_LOCATION: NORMAL
MDC_IDC_LEAD_LOCATION_DETAIL_1: NORMAL
MDC_IDC_LEAD_LOCATION_DETAIL_1: NORMAL
MDC_IDC_LEAD_MFG: NORMAL
MDC_IDC_LEAD_MFG: NORMAL
MDC_IDC_LEAD_MODEL: NORMAL
MDC_IDC_LEAD_MODEL: NORMAL
MDC_IDC_LEAD_POLARITY_TYPE: NORMAL
MDC_IDC_LEAD_POLARITY_TYPE: NORMAL
MDC_IDC_LEAD_SERIAL: NORMAL
MDC_IDC_LEAD_SERIAL: NORMAL
MDC_IDC_MSMT_BATTERY_STATUS: NORMAL
MDC_IDC_MSMT_LEADCHNL_LV_IMPEDANCE_VALUE: 611 OHM
MDC_IDC_MSMT_LEADCHNL_LV_PACING_THRESHOLD_AMPLITUDE: 2.1 V
MDC_IDC_MSMT_LEADCHNL_LV_PACING_THRESHOLD_PULSEWIDTH: 1 MS
MDC_IDC_MSMT_LEADCHNL_LV_SENSING_INTR_AMPL: 17.6 MV
MDC_IDC_MSMT_LEADCHNL_RA_IMPEDANCE_VALUE: 2500 OHM
MDC_IDC_MSMT_LEADCHNL_RV_IMPEDANCE_VALUE: 495 OHM
MDC_IDC_MSMT_LEADCHNL_RV_PACING_THRESHOLD_AMPLITUDE: 0.5 V
MDC_IDC_MSMT_LEADCHNL_RV_PACING_THRESHOLD_PULSEWIDTH: 0.5 MS
MDC_IDC_MSMT_LEADCHNL_RV_SENSING_INTR_AMPL: 3.1 MV
MDC_IDC_PG_IMPLANT_DTM: NORMAL
MDC_IDC_PG_MFG: NORMAL
MDC_IDC_PG_MODEL: NORMAL
MDC_IDC_PG_SERIAL: NORMAL
MDC_IDC_PG_TYPE: NORMAL
MDC_IDC_SESS_CLINIC_NAME: NORMAL
MDC_IDC_SESS_DTM: NORMAL
MDC_IDC_SESS_TYPE: NORMAL
MDC_IDC_SET_BRADY_AT_MODE_SWITCH_MODE: NORMAL
MDC_IDC_SET_BRADY_LOWRATE: 60 {BEATS}/MIN
MDC_IDC_SET_BRADY_MAX_SENSOR_RATE: 120 {BEATS}/MIN
MDC_IDC_SET_BRADY_MODE: NORMAL
MDC_IDC_SET_BRADY_PAV_DELAY_HIGH: 180 MS
MDC_IDC_SET_BRADY_PAV_DELAY_LOW: 180 MS
MDC_IDC_SET_BRADY_SAV_DELAY_LOW: 120 MS
MDC_IDC_SET_CRT_LVRV_DELAY: 0 MS
MDC_IDC_SET_CRT_PACED_CHAMBERS: NORMAL
MDC_IDC_SET_LEADCHNL_LV_PACING_AMPLITUDE: 3 V
MDC_IDC_SET_LEADCHNL_LV_PACING_ANODE_ELECTRODE_1: NORMAL
MDC_IDC_SET_LEADCHNL_LV_PACING_ANODE_LOCATION_1: NORMAL
MDC_IDC_SET_LEADCHNL_LV_PACING_CAPTURE_MODE: NORMAL
MDC_IDC_SET_LEADCHNL_LV_PACING_CATHODE_ELECTRODE_1: NORMAL
MDC_IDC_SET_LEADCHNL_LV_PACING_CATHODE_LOCATION_1: NORMAL
MDC_IDC_SET_LEADCHNL_LV_PACING_POLARITY: NORMAL
MDC_IDC_SET_LEADCHNL_LV_PACING_PULSEWIDTH: 1 MS
MDC_IDC_SET_LEADCHNL_LV_SENSING_ADAPTATION_MODE: NORMAL
MDC_IDC_SET_LEADCHNL_LV_SENSING_ANODE_ELECTRODE_1: NORMAL
MDC_IDC_SET_LEADCHNL_LV_SENSING_ANODE_LOCATION_1: NORMAL
MDC_IDC_SET_LEADCHNL_LV_SENSING_CATHODE_ELECTRODE_1: NORMAL
MDC_IDC_SET_LEADCHNL_LV_SENSING_CATHODE_LOCATION_1: NORMAL
MDC_IDC_SET_LEADCHNL_LV_SENSING_POLARITY: NORMAL
MDC_IDC_SET_LEADCHNL_LV_SENSING_SENSITIVITY: 2.5 MV
MDC_IDC_SET_LEADCHNL_RA_PACING_CAPTURE_MODE: NORMAL
MDC_IDC_SET_LEADCHNL_RA_PACING_POLARITY: NORMAL
MDC_IDC_SET_LEADCHNL_RA_SENSING_ADAPTATION_MODE: NORMAL
MDC_IDC_SET_LEADCHNL_RA_SENSING_POLARITY: NORMAL
MDC_IDC_SET_LEADCHNL_RA_SENSING_SENSITIVITY: 0.5 MV
MDC_IDC_SET_LEADCHNL_RV_PACING_AMPLITUDE: 2 V
MDC_IDC_SET_LEADCHNL_RV_PACING_CAPTURE_MODE: NORMAL
MDC_IDC_SET_LEADCHNL_RV_PACING_POLARITY: NORMAL
MDC_IDC_SET_LEADCHNL_RV_PACING_PULSEWIDTH: 0.5 MS
MDC_IDC_SET_LEADCHNL_RV_SENSING_ADAPTATION_MODE: NORMAL
MDC_IDC_SET_LEADCHNL_RV_SENSING_POLARITY: NORMAL
MDC_IDC_SET_LEADCHNL_RV_SENSING_SENSITIVITY: 2.5 MV
MDC_IDC_SET_ZONE_DETECTION_INTERVAL: 375 MS
MDC_IDC_SET_ZONE_TYPE: NORMAL
MDC_IDC_SET_ZONE_VENDOR_TYPE: NORMAL
MDC_IDC_STAT_EPISODE_RECENT_COUNT: 15
MDC_IDC_STAT_EPISODE_RECENT_COUNT_DTM_END: NORMAL
MDC_IDC_STAT_EPISODE_RECENT_COUNT_DTM_START: NORMAL
MDC_IDC_STAT_EPISODE_TOTAL_COUNT: 143
MDC_IDC_STAT_EPISODE_TOTAL_COUNT_DTM_END: NORMAL
MDC_IDC_STAT_EPISODE_TYPE: NORMAL
MDC_IDC_STAT_EPISODE_TYPE: NORMAL
MDC_IDC_STAT_EPISODE_VENDOR_TYPE: NORMAL
MDC_IDC_STAT_EPISODE_VENDOR_TYPE: NORMAL

## 2022-10-24 DIAGNOSIS — C61 MALIGNANT NEOPLASM OF PROSTATE (H): ICD-10-CM

## 2022-10-24 DIAGNOSIS — Z95.2 S/P AVR (AORTIC VALVE REPLACEMENT): ICD-10-CM

## 2022-10-24 DIAGNOSIS — I10 HYPERTENSION GOAL BP (BLOOD PRESSURE) < 140/90: ICD-10-CM

## 2022-10-26 ENCOUNTER — ANTICOAGULATION THERAPY VISIT (OUTPATIENT)
Dept: ANTICOAGULATION | Facility: CLINIC | Age: 87
End: 2022-10-26

## 2022-10-26 ENCOUNTER — LAB (OUTPATIENT)
Dept: LAB | Facility: CLINIC | Age: 87
End: 2022-10-26
Payer: MEDICARE

## 2022-10-26 DIAGNOSIS — I50.9 CHF (CONGESTIVE HEART FAILURE) (H): ICD-10-CM

## 2022-10-26 DIAGNOSIS — I48.92 ATRIAL FLUTTER, UNSPECIFIED TYPE (H): ICD-10-CM

## 2022-10-26 DIAGNOSIS — Z79.01 LONG TERM CURRENT USE OF ANTICOAGULANT THERAPY: ICD-10-CM

## 2022-10-26 DIAGNOSIS — I50.40 COMBINED SYSTOLIC AND DIASTOLIC CONGESTIVE HEART FAILURE, UNSPECIFIED HF CHRONICITY (H): Primary | ICD-10-CM

## 2022-10-26 DIAGNOSIS — I48.21 PERMANENT ATRIAL FIBRILLATION (H): ICD-10-CM

## 2022-10-26 LAB — INR BLD: 2.4 (ref 0.9–1.1)

## 2022-10-26 PROCEDURE — 85610 PROTHROMBIN TIME: CPT

## 2022-10-26 PROCEDURE — 36416 COLLJ CAPILLARY BLOOD SPEC: CPT

## 2022-10-26 RX ORDER — BICALUTAMIDE 50 MG/1
TABLET, FILM COATED ORAL
Qty: 30 TABLET | Refills: 0 | Status: SHIPPED | OUTPATIENT
Start: 2022-10-26 | End: 2022-12-01

## 2022-10-26 RX ORDER — LOSARTAN POTASSIUM 100 MG/1
TABLET ORAL
Qty: 90 TABLET | Refills: 0 | Status: SHIPPED | OUTPATIENT
Start: 2022-10-26 | End: 2023-01-25

## 2022-10-26 RX ORDER — SIMVASTATIN 40 MG
TABLET ORAL
Qty: 90 TABLET | Refills: 3 | Status: SHIPPED | OUTPATIENT
Start: 2022-10-26 | End: 2023-01-01

## 2022-10-26 NOTE — PROGRESS NOTES
ANTICOAGULATION MANAGEMENT     Miguel Patten 89 year old male is on warfarin with therapeutic INR result. (Goal INR 2.0-3.0)    Recent labs: (last 7 days)     10/26/22  0848   INR 2.4*       ASSESSMENT       Source(s): Chart Review and Patient/Caregiver Call       Warfarin doses taken: Warfarin taken as instructed    Diet: No new diet changes identified    New illness, injury, or hospitalization: No    Medication/supplement changes: None noted    Signs or symptoms of bleeding or clotting: No    Previous INR: Therapeutic last 2(+) visits    Additional findings: None       PLAN     Recommended plan for no diet, medication or health factor changes affecting INR     Dosing Instructions: Continue your current warfarin dose with next INR in 5 weeks       Summary  As of 10/26/2022    Full warfarin instructions:  3.75 mg every Mon, Thu; 2.5 mg all other days; Starting 10/26/2022   Next INR check:  11/30/2022             Telephone call with Miguel who verbalizes understanding and agrees to plan    Lab visit scheduled    Education provided:     Please call back if any changes to your diet, medications or how you've been taking warfarin    Plan made per [facility] anticoagulation protocol    Ashly Jones, RN  Anticoagulation Clinic  10/26/2022    _______________________________________________________________________     Anticoagulation Episode Summary     Current INR goal:  2.0-3.0   TTR:  80.9 % (1 y)   Target end date:  Indefinite   [facility]    Indications    CHF (congestive heart failure) (H) [I50.9]  Long-term (current) use of anticoagulants [Z79.01] [Z79.01]  Permanent atrial fibrillation (H) [I48.21]  Atrial flutter  unspecified type (H) [I48.92]           Comments:           Anticoagulation Care Providers     Provider Role Specialty Phone number    Virginia Cardona APRN CNP Referring Nurse Practitioner - Gerontology [phone]    Vladimir Gonzales DO Responsible  Internal Medicine [phone]

## 2022-10-26 NOTE — TELEPHONE ENCOUNTER
Pending Prescriptions:                       Disp   Refills    bicalutamide (CASODEX) 50 MG tablet [Pharm*30 tab*0        Sig: TAKE ONE TABLET (50 MG) BY MOUTH ONCE DAILY    Signed Prescriptions:                        Disp   Refills    losartan (COZAAR) 100 MG tablet            90 tab*0        Sig: TAKE ONE-HALF TABLET (50 MG) BY MOUTH TWICE A DAY  Authorizing Provider: FLY MOON  Ordering User: OC RIVAS      Routing refill request to provider for review/approval because:  Drug not on the [facility] refill protocol     Oc Rivas RN

## 2022-10-26 NOTE — PROGRESS NOTES
ANTICOAGULATION MANAGEMENT     Miguel Patten 89 year old male is on warfarin with therapeutic INR result. (Goal INR 2.0-3.0)    Recent labs: (last 7 days)     10/26/22  0848   INR 2.4*       ASSESSMENT       Source(s): Chart Review    Previous INR was Therapeutic last 2(+) visits    Medication, diet, health changes since last INR chart reviewed; none identified           PLAN     Unable to reach pt today.    VM left to call [facility] back. Will try again later     Follow up required to confirm warfarin dose taken and assess for changes    Ashly Jones RN  Anticoagulation Clinic  10/26/2022

## 2022-11-01 ENCOUNTER — INFUSION THERAPY VISIT (OUTPATIENT)
Dept: INFUSION THERAPY | Facility: CLINIC | Age: 87
End: 2022-11-01
Attending: INTERNAL MEDICINE
Payer: MEDICARE

## 2022-11-01 VITALS
DIASTOLIC BLOOD PRESSURE: 60 MMHG | BODY MASS INDEX: 26.18 KG/M2 | HEART RATE: 60 BPM | RESPIRATION RATE: 20 BRPM | OXYGEN SATURATION: 99 % | SYSTOLIC BLOOD PRESSURE: 148 MMHG | WEIGHT: 193 LBS | TEMPERATURE: 97 F

## 2022-11-01 DIAGNOSIS — C61 MALIGNANT NEOPLASM OF PROSTATE (H): Primary | ICD-10-CM

## 2022-11-01 PROCEDURE — 250N000011 HC RX IP 250 OP 636: Performed by: INTERNAL MEDICINE

## 2022-11-01 PROCEDURE — 96402 CHEMO HORMON ANTINEOPL SQ/IM: CPT

## 2022-11-01 RX ORDER — HEPARIN SODIUM (PORCINE) LOCK FLUSH IV SOLN 100 UNIT/ML 100 UNIT/ML
5 SOLUTION INTRAVENOUS
Status: CANCELLED | OUTPATIENT
Start: 2022-11-01

## 2022-11-01 RX ORDER — HEPARIN SODIUM,PORCINE 10 UNIT/ML
5 VIAL (ML) INTRAVENOUS
Status: CANCELLED | OUTPATIENT
Start: 2022-11-01

## 2022-11-01 RX ADMIN — LEUPROLIDE ACETATE 22.5 MG: KIT SUBCUTANEOUS at 09:15

## 2022-11-01 NOTE — PROGRESS NOTES
Infusion Nursing Note:  Miguel Patten presents today for eligard injection.    Patient seen by provider today: No   present during visit today: Not Applicable.    Note: pt here today for eligard injection that he gets every 6 months.  In his plan there are conflicting orders- one area says he should get it every 3 months and another area says he should get it every 6 months.  Message sent to  for clarification. .    Intravenous Access:  No Intravenous access/labs at this visit.    Treatment Conditions:  Not Applicable.    Post Infusion Assessment:  Patient tolerated injection without incident.  Patient observed for 15 minutes post injection.    Discharge Plan:   Patient discharged in stable condition accompanied by: self.  Departure Mode: Wheelchair. Walked with a cane.  Was pushed out in a wheelchair..      Nadine Silva RN

## 2022-11-14 RX ORDER — HEPARIN SODIUM,PORCINE 10 UNIT/ML
5 VIAL (ML) INTRAVENOUS
Status: CANCELLED | OUTPATIENT
Start: 2022-11-14

## 2022-11-14 RX ORDER — HEPARIN SODIUM (PORCINE) LOCK FLUSH IV SOLN 100 UNIT/ML 100 UNIT/ML
5 SOLUTION INTRAVENOUS
Status: CANCELLED | OUTPATIENT
Start: 2022-11-14

## 2022-11-29 DIAGNOSIS — C61 MALIGNANT NEOPLASM OF PROSTATE (H): ICD-10-CM

## 2022-11-30 ENCOUNTER — LAB (OUTPATIENT)
Dept: LAB | Facility: CLINIC | Age: 87
End: 2022-11-30
Payer: MEDICARE

## 2022-11-30 ENCOUNTER — ANTICOAGULATION THERAPY VISIT (OUTPATIENT)
Dept: ANTICOAGULATION | Facility: CLINIC | Age: 87
End: 2022-11-30

## 2022-11-30 DIAGNOSIS — Z79.01 LONG TERM CURRENT USE OF ANTICOAGULANT THERAPY: ICD-10-CM

## 2022-11-30 DIAGNOSIS — I48.21 PERMANENT ATRIAL FIBRILLATION (H): ICD-10-CM

## 2022-11-30 DIAGNOSIS — I50.9 CHF (CONGESTIVE HEART FAILURE) (H): ICD-10-CM

## 2022-11-30 DIAGNOSIS — I50.40 COMBINED SYSTOLIC AND DIASTOLIC CONGESTIVE HEART FAILURE, UNSPECIFIED HF CHRONICITY (H): Primary | ICD-10-CM

## 2022-11-30 DIAGNOSIS — I48.92 ATRIAL FLUTTER, UNSPECIFIED TYPE (H): ICD-10-CM

## 2022-11-30 LAB — INR BLD: 2.4 (ref 0.9–1.1)

## 2022-11-30 PROCEDURE — 36416 COLLJ CAPILLARY BLOOD SPEC: CPT

## 2022-11-30 PROCEDURE — 85610 PROTHROMBIN TIME: CPT

## 2022-11-30 NOTE — PROGRESS NOTES
ANTICOAGULATION MANAGEMENT     Miguel Patten 89 year old male is on warfarin with therapeutic INR result. (Goal INR 2.0-3.0)    Recent labs: (last 7 days)     11/30/22  0852   INR 2.4*       ASSESSMENT       Source(s): Chart Review and Patient/Caregiver Call       Warfarin doses taken: Warfarin taken as instructed    Diet: No new diet changes identified    New illness, injury, or hospitalization: No    Medication/supplement changes: None noted    Signs or symptoms of bleeding or clotting: No    Previous INR: Therapeutic last 2(+) visits    Additional findings: None       PLAN     Recommended plan for no diet, medication or health factor changes affecting INR     Dosing Instructions: Continue your current warfarin dose with next INR in 6 weeks       Summary  As of 11/30/2022    Full warfarin instructions:  3.75 mg every Mon, Thu; 2.5 mg all other days; Starting 11/30/2022   Next INR check:  1/11/2023             Telephone call with Miguel who verbalizes understanding and agrees to plan    Lab visit scheduled    Education provided:     Please call back if any changes to your diet, medications or how you've been taking warfarin    Plan made per [facility] anticoagulation protocol    Ashly Jones, RN  Anticoagulation Clinic  11/30/2022    _______________________________________________________________________     Anticoagulation Episode Summary     Current INR goal:  2.0-3.0   TTR:  80.9 % (1 y)   Target end date:  Indefinite   Send INR reminders to:  [facility]    Indications    CHF (congestive heart failure) (H) [I50.9]  Long-term (current) use of anticoagulants [Z79.01] [Z79.01]  Permanent atrial fibrillation (H) [I48.21]  Atrial flutter  unspecified type (H) [I48.92]           Comments:           Anticoagulation Care Providers     Provider Role Specialty Phone number    Virginia Cardona APRN CNP Referring Nurse Practitioner - Gerontology [phone]    Vladimir Gonzales DO Responsible  Internal Medicine [phone]

## 2022-11-30 NOTE — PROGRESS NOTES
ANTICOAGULATION MANAGEMENT     Miguel Patten 89 year old male is on warfarin with therapeutic INR result. (Goal INR 2.0-3.0)    Recent labs: (last 7 days)     11/30/22  0852   INR 2.4*       ASSESSMENT       Source(s): Chart Review    Previous INR was Therapeutic last 2(+) visits    Medication, diet, health changes since last INR chart reviewed; none identified           PLAN     Unable to reach pt today.    VM left to call [facility] back. Will try again late.r     Follow up required to confirm warfarin dose taken and assess for changes    Ashly Jones, RN  Anticoagulation Clinic  11/30/2022

## 2022-12-01 RX ORDER — BICALUTAMIDE 50 MG/1
TABLET, FILM COATED ORAL
Qty: 30 TABLET | Refills: 0 | Status: SHIPPED | OUTPATIENT
Start: 2022-12-01 | End: 2022-12-26

## 2022-12-01 NOTE — TELEPHONE ENCOUNTER
Pending Prescriptions:                       Disp   Refills    bicalutamide (CASODEX) 50 MG tablet [Pharm*30 tab*0        Sig: TAKE ONE TABLET BY MOUTH ONCE DAILY      Routing refill request to provider for review/approval because:  Drug not on the [facility] refill protocol     Vesna Mcgraw RN

## 2022-12-26 DIAGNOSIS — C61 MALIGNANT NEOPLASM OF PROSTATE (H): ICD-10-CM

## 2022-12-26 RX ORDER — BICALUTAMIDE 50 MG/1
TABLET, FILM COATED ORAL
Qty: 30 TABLET | Refills: 0 | Status: SHIPPED | OUTPATIENT
Start: 2022-12-26 | End: 2023-01-25

## 2022-12-26 NOTE — TELEPHONE ENCOUNTER
Pending Prescriptions:                       Disp   Refills    bicalutamide (CASODEX) 50 MG tablet [Pharm*30 tab*0        Sig: TAKE ONE TABLET BY MOUTH ONCE DAILY    Routing refill request to provider for review/approval because:  Drug not on the [facility] refill protocol     bicalutamide (CASODEX) 50 MG tablet 30 tablet 0 12/1/2022  No   Sig: TAKE ONE TABLET BY MOUTH ONCE DAILY   Sent to pharmacy as: Bicalutamide 50 MG Oral Tablet (CASODEX)   Class: E-Prescribe   Order: [id number]   E-Prescribing Status: Receipt confirmed by pharmacy (12/1/2022  1:04 PM CST)     Vero Falcon RN on 12/26/2022 at 3:23 PM

## 2023-01-01 ENCOUNTER — TELEPHONE (OUTPATIENT)
Dept: INTERNAL MEDICINE | Facility: CLINIC | Age: 88
End: 2023-01-01
Payer: MEDICARE

## 2023-01-01 ENCOUNTER — PATIENT OUTREACH (OUTPATIENT)
Dept: CARE COORDINATION | Facility: CLINIC | Age: 88
End: 2023-01-01
Payer: MEDICARE

## 2023-01-01 ENCOUNTER — INFUSION THERAPY VISIT (OUTPATIENT)
Dept: INFUSION THERAPY | Facility: CLINIC | Age: 88
End: 2023-01-01
Attending: INTERNAL MEDICINE
Payer: MEDICARE

## 2023-01-01 ENCOUNTER — LAB (OUTPATIENT)
Dept: LAB | Facility: CLINIC | Age: 88
End: 2023-01-01
Payer: MEDICARE

## 2023-01-01 ENCOUNTER — ANCILLARY PROCEDURE (OUTPATIENT)
Dept: CARDIOLOGY | Facility: CLINIC | Age: 88
End: 2023-01-01
Attending: INTERNAL MEDICINE
Payer: MEDICARE

## 2023-01-01 ENCOUNTER — ANTICOAGULATION THERAPY VISIT (OUTPATIENT)
Dept: ANTICOAGULATION | Facility: CLINIC | Age: 88
End: 2023-01-01

## 2023-01-01 ENCOUNTER — DOCUMENTATION ONLY (OUTPATIENT)
Dept: ANTICOAGULATION | Facility: CLINIC | Age: 88
End: 2023-01-01
Payer: MEDICARE

## 2023-01-01 ENCOUNTER — HOSPITAL ENCOUNTER (EMERGENCY)
Facility: CLINIC | Age: 88
Discharge: HOME | End: 2023-10-18
Attending: EMERGENCY MEDICINE | Admitting: EMERGENCY MEDICINE
Payer: MEDICARE

## 2023-01-01 ENCOUNTER — DOCUMENTATION ONLY (OUTPATIENT)
Dept: OTHER | Facility: CLINIC | Age: 88
End: 2023-01-01
Payer: MEDICARE

## 2023-01-01 ENCOUNTER — OFFICE VISIT (OUTPATIENT)
Dept: CARDIOLOGY | Facility: CLINIC | Age: 88
End: 2023-01-01
Payer: MEDICARE

## 2023-01-01 ENCOUNTER — OFFICE VISIT (OUTPATIENT)
Dept: INTERNAL MEDICINE | Facility: CLINIC | Age: 88
End: 2023-01-01
Payer: MEDICARE

## 2023-01-01 ENCOUNTER — HOSPITAL ENCOUNTER (EMERGENCY)
Facility: CLINIC | Age: 88
Discharge: HOME | End: 2023-06-17
Attending: FAMILY MEDICINE | Admitting: FAMILY MEDICINE
Payer: MEDICARE

## 2023-01-01 ENCOUNTER — APPOINTMENT (OUTPATIENT)
Dept: GENERAL RADIOLOGY | Facility: CLINIC | Age: 88
End: 2023-01-01
Attending: FAMILY MEDICINE
Payer: MEDICARE

## 2023-01-01 ENCOUNTER — APPOINTMENT (OUTPATIENT)
Dept: GENERAL RADIOLOGY | Facility: CLINIC | Age: 88
End: 2023-01-01
Attending: EMERGENCY MEDICINE
Payer: MEDICARE

## 2023-01-01 VITALS
DIASTOLIC BLOOD PRESSURE: 83 MMHG | RESPIRATION RATE: 18 BRPM | WEIGHT: 189 LBS | TEMPERATURE: 98.2 F | HEART RATE: 61 BPM | OXYGEN SATURATION: 95 % | HEIGHT: 72 IN | BODY MASS INDEX: 25.6 KG/M2 | SYSTOLIC BLOOD PRESSURE: 133 MMHG

## 2023-01-01 VITALS
HEIGHT: 72 IN | SYSTOLIC BLOOD PRESSURE: 138 MMHG | RESPIRATION RATE: 22 BRPM | BODY MASS INDEX: 25.26 KG/M2 | OXYGEN SATURATION: 99 % | WEIGHT: 186.5 LBS | HEART RATE: 54 BPM | DIASTOLIC BLOOD PRESSURE: 82 MMHG

## 2023-01-01 VITALS
SYSTOLIC BLOOD PRESSURE: 118 MMHG | RESPIRATION RATE: 18 BRPM | HEART RATE: 60 BPM | DIASTOLIC BLOOD PRESSURE: 72 MMHG | OXYGEN SATURATION: 99 % | BODY MASS INDEX: 24.45 KG/M2 | TEMPERATURE: 98.5 F | WEIGHT: 185.31 LBS

## 2023-01-01 VITALS
BODY MASS INDEX: 24.54 KG/M2 | SYSTOLIC BLOOD PRESSURE: 136 MMHG | RESPIRATION RATE: 20 BRPM | HEART RATE: 64 BPM | WEIGHT: 186 LBS | TEMPERATURE: 97.6 F | OXYGEN SATURATION: 100 % | DIASTOLIC BLOOD PRESSURE: 59 MMHG

## 2023-01-01 VITALS
SYSTOLIC BLOOD PRESSURE: 154 MMHG | WEIGHT: 185 LBS | BODY MASS INDEX: 24.52 KG/M2 | HEIGHT: 73 IN | DIASTOLIC BLOOD PRESSURE: 77 MMHG | TEMPERATURE: 99 F | RESPIRATION RATE: 15 BRPM | HEART RATE: 63 BPM | OXYGEN SATURATION: 98 %

## 2023-01-01 DIAGNOSIS — I48.21 PERMANENT ATRIAL FIBRILLATION (H): ICD-10-CM

## 2023-01-01 DIAGNOSIS — I44.2 ATRIOVENTRICULAR BLOCK, COMPLETE (H): ICD-10-CM

## 2023-01-01 DIAGNOSIS — Z79.01 LONG TERM CURRENT USE OF ANTICOAGULANT THERAPY: ICD-10-CM

## 2023-01-01 DIAGNOSIS — E78.2 MIXED HYPERLIPIDEMIA: ICD-10-CM

## 2023-01-01 DIAGNOSIS — C61 MALIGNANT NEOPLASM OF PROSTATE (H): ICD-10-CM

## 2023-01-01 DIAGNOSIS — M70.41 PREPATELLAR BURSITIS OF RIGHT KNEE: ICD-10-CM

## 2023-01-01 DIAGNOSIS — K59.09 OTHER CONSTIPATION: ICD-10-CM

## 2023-01-01 DIAGNOSIS — I48.92 ATRIAL FLUTTER, UNSPECIFIED TYPE (H): ICD-10-CM

## 2023-01-01 DIAGNOSIS — I48.20 CHRONIC ATRIAL FIBRILLATION (H): ICD-10-CM

## 2023-01-01 DIAGNOSIS — J45.20 MILD INTERMITTENT ASTHMA WITHOUT COMPLICATION: ICD-10-CM

## 2023-01-01 DIAGNOSIS — Z95.0 CARDIAC PACEMAKER IN SITU: ICD-10-CM

## 2023-01-01 DIAGNOSIS — I10 HYPERTENSION GOAL BP (BLOOD PRESSURE) < 140/90: ICD-10-CM

## 2023-01-01 DIAGNOSIS — I50.40 COMBINED SYSTOLIC AND DIASTOLIC CONGESTIVE HEART FAILURE, UNSPECIFIED HF CHRONICITY (H): Primary | ICD-10-CM

## 2023-01-01 DIAGNOSIS — I48.21 PERMANENT ATRIAL FIBRILLATION (H): Primary | ICD-10-CM

## 2023-01-01 DIAGNOSIS — K59.01 SLOW TRANSIT CONSTIPATION: Primary | ICD-10-CM

## 2023-01-01 DIAGNOSIS — Z95.2 S/P AVR (AORTIC VALVE REPLACEMENT): ICD-10-CM

## 2023-01-01 DIAGNOSIS — M17.11 OSTEOARTHRITIS OF RIGHT KNEE, UNSPECIFIED OSTEOARTHRITIS TYPE: ICD-10-CM

## 2023-01-01 DIAGNOSIS — M25.561 ACUTE PAIN OF RIGHT KNEE: ICD-10-CM

## 2023-01-01 DIAGNOSIS — Z95.0 CARDIAC PACEMAKER IN SITU: Primary | ICD-10-CM

## 2023-01-01 DIAGNOSIS — C61 MALIGNANT NEOPLASM OF PROSTATE (H): Primary | ICD-10-CM

## 2023-01-01 DIAGNOSIS — I50.22 CHRONIC SYSTOLIC CONGESTIVE HEART FAILURE (H): ICD-10-CM

## 2023-01-01 DIAGNOSIS — I49.5 SICK SINUS SYNDROME (H): ICD-10-CM

## 2023-01-01 LAB
ANION GAP SERPL CALCULATED.3IONS-SCNC: 12 MMOL/L (ref 7–15)
APPEARANCE FLD: ABNORMAL
BACTERIA SNV CULT: NO GROWTH
BASOPHILS # BLD AUTO: 0.1 10E3/UL (ref 0–0.2)
BASOPHILS NFR BLD AUTO: 1 %
BUN SERPL-MCNC: 27.1 MG/DL (ref 8–23)
CALCIUM SERPL-MCNC: 8.8 MG/DL (ref 8.2–9.6)
CELL COUNT BODY FLUID SOURCE: ABNORMAL
CHLORIDE SERPL-SCNC: 103 MMOL/L (ref 98–107)
COLOR FLD: YELLOW
CREAT SERPL-MCNC: 0.74 MG/DL (ref 0.67–1.17)
CRP SERPL-MCNC: 47.47 MG/L
CRYSTALS SNV MICRO: NORMAL
DEPRECATED HCO3 PLAS-SCNC: 21 MMOL/L (ref 22–29)
EOSINOPHIL # BLD AUTO: 0 10E3/UL (ref 0–0.7)
EOSINOPHIL NFR BLD AUTO: 0 %
ERYTHROCYTE [DISTWIDTH] IN BLOOD BY AUTOMATED COUNT: 16 % (ref 10–15)
GFR SERPL CREATININE-BSD FRML MDRD: 86 ML/MIN/1.73M2
GLUCOSE BODY FLUID SOURCE: NORMAL
GLUCOSE FLD-MCNC: 83 MG/DL
GLUCOSE SERPL-MCNC: 106 MG/DL (ref 70–99)
GRAM STAIN RESULT: NORMAL
GRAM STAIN RESULT: NORMAL
HCT VFR BLD AUTO: 34.8 % (ref 40–53)
HGB BLD-MCNC: 11.5 G/DL (ref 13.3–17.7)
IMM GRANULOCYTES # BLD: 0.1 10E3/UL
IMM GRANULOCYTES NFR BLD: 1 %
INR BLD: 2 (ref 0.9–1.1)
INR BLD: 2.4 (ref 0.9–1.1)
INR BLD: 2.5 (ref 0.9–1.1)
INR BLD: 2.5 (ref 0.9–1.1)
INR BLD: 2.6 (ref 0.9–1.1)
INR BLD: 3 (ref 0.9–1.1)
LYMPHOCYTES # BLD AUTO: 1.7 10E3/UL (ref 0.8–5.3)
LYMPHOCYTES NFR BLD AUTO: 16 %
LYMPHOCYTES NFR FLD MANUAL: 5 %
MCH RBC QN AUTO: 31.9 PG (ref 26.5–33)
MCHC RBC AUTO-ENTMCNC: 33 G/DL (ref 31.5–36.5)
MCV RBC AUTO: 97 FL (ref 78–100)
MDC_IDC_EPISODE_DTM: NORMAL
MDC_IDC_EPISODE_DURATION: 10 S
MDC_IDC_EPISODE_DURATION: 15 S
MDC_IDC_EPISODE_DURATION: 8 S
MDC_IDC_EPISODE_DURATION: 9 S
MDC_IDC_EPISODE_ID: NORMAL
MDC_IDC_EPISODE_TYPE: NORMAL
MDC_IDC_LEAD_CONNECTION_STATUS: NORMAL
MDC_IDC_LEAD_CONNECTION_STATUS: NORMAL
MDC_IDC_LEAD_IMPLANT_DT: NORMAL
MDC_IDC_LEAD_LOCATION: NORMAL
MDC_IDC_LEAD_LOCATION_DETAIL_1: NORMAL
MDC_IDC_LEAD_MFG: NORMAL
MDC_IDC_LEAD_MODEL: NORMAL
MDC_IDC_LEAD_POLARITY_TYPE: NORMAL
MDC_IDC_LEAD_SERIAL: NORMAL
MDC_IDC_MSMT_BATTERY_DTM: NORMAL
MDC_IDC_MSMT_BATTERY_DTM: NORMAL
MDC_IDC_MSMT_BATTERY_REMAINING_LONGEVITY: 12 MO
MDC_IDC_MSMT_BATTERY_REMAINING_LONGEVITY: 12 MO
MDC_IDC_MSMT_BATTERY_REMAINING_PERCENTAGE: 20 %
MDC_IDC_MSMT_BATTERY_REMAINING_PERCENTAGE: 23 %
MDC_IDC_MSMT_BATTERY_STATUS: NORMAL
MDC_IDC_MSMT_BATTERY_STATUS: NORMAL
MDC_IDC_MSMT_LEADCHNL_LV_IMPEDANCE_VALUE: 575 OHM
MDC_IDC_MSMT_LEADCHNL_LV_IMPEDANCE_VALUE: 653 OHM
MDC_IDC_MSMT_LEADCHNL_LV_LEAD_CHANNEL_STATUS: NORMAL
MDC_IDC_MSMT_LEADCHNL_LV_PACING_THRESHOLD_AMPLITUDE: 2 V
MDC_IDC_MSMT_LEADCHNL_LV_PACING_THRESHOLD_AMPLITUDE: 2.1 V
MDC_IDC_MSMT_LEADCHNL_LV_PACING_THRESHOLD_PULSEWIDTH: 1 MS
MDC_IDC_MSMT_LEADCHNL_LV_PACING_THRESHOLD_PULSEWIDTH: 1 MS
MDC_IDC_MSMT_LEADCHNL_RA_IMPEDANCE_VALUE: 2500 OHM
MDC_IDC_MSMT_LEADCHNL_RV_IMPEDANCE_VALUE: 511 OHM
MDC_IDC_MSMT_LEADCHNL_RV_IMPEDANCE_VALUE: 513 OHM
MDC_IDC_MSMT_LEADCHNL_RV_LEAD_CHANNEL_STATUS: NORMAL
MDC_IDC_MSMT_LEADCHNL_RV_PACING_THRESHOLD_AMPLITUDE: 0.5 V
MDC_IDC_MSMT_LEADCHNL_RV_PACING_THRESHOLD_PULSEWIDTH: 0.5 MS
MDC_IDC_PG_IMPLANT_DTM: NORMAL
MDC_IDC_PG_IMPLANT_DTM: NORMAL
MDC_IDC_PG_MFG: NORMAL
MDC_IDC_PG_MFG: NORMAL
MDC_IDC_PG_MODEL: NORMAL
MDC_IDC_PG_MODEL: NORMAL
MDC_IDC_PG_SERIAL: NORMAL
MDC_IDC_PG_SERIAL: NORMAL
MDC_IDC_PG_TYPE: NORMAL
MDC_IDC_PG_TYPE: NORMAL
MDC_IDC_SESS_CLINIC_NAME: NORMAL
MDC_IDC_SESS_CLINIC_NAME: NORMAL
MDC_IDC_SESS_DTM: NORMAL
MDC_IDC_SESS_DTM: NORMAL
MDC_IDC_SESS_TYPE: NORMAL
MDC_IDC_SESS_TYPE: NORMAL
MDC_IDC_SET_BRADY_AT_MODE_SWITCH_RATE: 170 {BEATS}/MIN
MDC_IDC_SET_BRADY_LOWRATE: 60 {BEATS}/MIN
MDC_IDC_SET_BRADY_LOWRATE: 60 {BEATS}/MIN
MDC_IDC_SET_BRADY_MAX_SENSOR_RATE: 120 {BEATS}/MIN
MDC_IDC_SET_BRADY_MAX_SENSOR_RATE: 120 {BEATS}/MIN
MDC_IDC_SET_BRADY_MODE: NORMAL
MDC_IDC_SET_BRADY_MODE: NORMAL
MDC_IDC_SET_CRT_LVRV_DELAY: 0 MS
MDC_IDC_SET_CRT_LVRV_DELAY: 0 MS
MDC_IDC_SET_CRT_PACED_CHAMBERS: NORMAL
MDC_IDC_SET_CRT_PACED_CHAMBERS: NORMAL
MDC_IDC_SET_LEADCHNL_LV_PACING_AMPLITUDE: 3 V
MDC_IDC_SET_LEADCHNL_LV_PACING_AMPLITUDE: 3 V
MDC_IDC_SET_LEADCHNL_LV_PACING_ANODE_ELECTRODE_1: NORMAL
MDC_IDC_SET_LEADCHNL_LV_PACING_ANODE_ELECTRODE_1: NORMAL
MDC_IDC_SET_LEADCHNL_LV_PACING_ANODE_LOCATION_1: NORMAL
MDC_IDC_SET_LEADCHNL_LV_PACING_ANODE_LOCATION_1: NORMAL
MDC_IDC_SET_LEADCHNL_LV_PACING_CATHODE_ELECTRODE_1: NORMAL
MDC_IDC_SET_LEADCHNL_LV_PACING_CATHODE_ELECTRODE_1: NORMAL
MDC_IDC_SET_LEADCHNL_LV_PACING_CATHODE_LOCATION_1: NORMAL
MDC_IDC_SET_LEADCHNL_LV_PACING_CATHODE_LOCATION_1: NORMAL
MDC_IDC_SET_LEADCHNL_LV_PACING_PULSEWIDTH: 1 MS
MDC_IDC_SET_LEADCHNL_LV_PACING_PULSEWIDTH: 1 MS
MDC_IDC_SET_LEADCHNL_LV_SENSING_ADAPTATION_MODE: NORMAL
MDC_IDC_SET_LEADCHNL_LV_SENSING_ADAPTATION_MODE: NORMAL
MDC_IDC_SET_LEADCHNL_LV_SENSING_ANODE_ELECTRODE_1: NORMAL
MDC_IDC_SET_LEADCHNL_LV_SENSING_ANODE_ELECTRODE_1: NORMAL
MDC_IDC_SET_LEADCHNL_LV_SENSING_ANODE_LOCATION_1: NORMAL
MDC_IDC_SET_LEADCHNL_LV_SENSING_ANODE_LOCATION_1: NORMAL
MDC_IDC_SET_LEADCHNL_LV_SENSING_CATHODE_ELECTRODE_1: NORMAL
MDC_IDC_SET_LEADCHNL_LV_SENSING_CATHODE_ELECTRODE_1: NORMAL
MDC_IDC_SET_LEADCHNL_LV_SENSING_CATHODE_LOCATION_1: NORMAL
MDC_IDC_SET_LEADCHNL_LV_SENSING_CATHODE_LOCATION_1: NORMAL
MDC_IDC_SET_LEADCHNL_LV_SENSING_SENSITIVITY: 2.5 MV
MDC_IDC_SET_LEADCHNL_LV_SENSING_SENSITIVITY: 2.5 MV
MDC_IDC_SET_LEADCHNL_RA_SENSING_ADAPTATION_MODE: NORMAL
MDC_IDC_SET_LEADCHNL_RA_SENSING_ADAPTATION_MODE: NORMAL
MDC_IDC_SET_LEADCHNL_RA_SENSING_SENSITIVITY: 0.5 MV
MDC_IDC_SET_LEADCHNL_RA_SENSING_SENSITIVITY: 0.5 MV
MDC_IDC_SET_LEADCHNL_RV_PACING_AMPLITUDE: 2 V
MDC_IDC_SET_LEADCHNL_RV_PACING_AMPLITUDE: 2 V
MDC_IDC_SET_LEADCHNL_RV_PACING_CAPTURE_MODE: NORMAL
MDC_IDC_SET_LEADCHNL_RV_PACING_CAPTURE_MODE: NORMAL
MDC_IDC_SET_LEADCHNL_RV_PACING_POLARITY: NORMAL
MDC_IDC_SET_LEADCHNL_RV_PACING_POLARITY: NORMAL
MDC_IDC_SET_LEADCHNL_RV_PACING_PULSEWIDTH: 0.5 MS
MDC_IDC_SET_LEADCHNL_RV_PACING_PULSEWIDTH: 0.5 MS
MDC_IDC_SET_LEADCHNL_RV_SENSING_ADAPTATION_MODE: NORMAL
MDC_IDC_SET_LEADCHNL_RV_SENSING_ADAPTATION_MODE: NORMAL
MDC_IDC_SET_LEADCHNL_RV_SENSING_POLARITY: NORMAL
MDC_IDC_SET_LEADCHNL_RV_SENSING_POLARITY: NORMAL
MDC_IDC_SET_LEADCHNL_RV_SENSING_SENSITIVITY: 2.5 MV
MDC_IDC_SET_LEADCHNL_RV_SENSING_SENSITIVITY: 2.5 MV
MDC_IDC_SET_ZONE_DETECTION_INTERVAL: 375 MS
MDC_IDC_SET_ZONE_DETECTION_INTERVAL: 375 MS
MDC_IDC_SET_ZONE_STATUS: NORMAL
MDC_IDC_SET_ZONE_TYPE: NORMAL
MDC_IDC_SET_ZONE_TYPE: NORMAL
MDC_IDC_SET_ZONE_VENDOR_TYPE: NORMAL
MDC_IDC_SET_ZONE_VENDOR_TYPE: NORMAL
MDC_IDC_STAT_BRADY_DTM_END: NORMAL
MDC_IDC_STAT_BRADY_DTM_END: NORMAL
MDC_IDC_STAT_BRADY_DTM_START: NORMAL
MDC_IDC_STAT_BRADY_DTM_START: NORMAL
MDC_IDC_STAT_BRADY_RA_PERCENT_PACED: 0 %
MDC_IDC_STAT_BRADY_RA_PERCENT_PACED: 0 %
MDC_IDC_STAT_BRADY_RV_PERCENT_PACED: 99 %
MDC_IDC_STAT_BRADY_RV_PERCENT_PACED: 99 %
MDC_IDC_STAT_CRT_DTM_END: NORMAL
MDC_IDC_STAT_CRT_DTM_END: NORMAL
MDC_IDC_STAT_CRT_DTM_START: NORMAL
MDC_IDC_STAT_CRT_DTM_START: NORMAL
MDC_IDC_STAT_CRT_LV_PERCENT_PACED: 99 %
MDC_IDC_STAT_CRT_LV_PERCENT_PACED: 99 %
MDC_IDC_STAT_EPISODE_RECENT_COUNT: 0
MDC_IDC_STAT_EPISODE_RECENT_COUNT: 9
MDC_IDC_STAT_EPISODE_RECENT_COUNT: 9
MDC_IDC_STAT_EPISODE_RECENT_COUNT_DTM_END: NORMAL
MDC_IDC_STAT_EPISODE_RECENT_COUNT_DTM_START: NORMAL
MDC_IDC_STAT_EPISODE_TYPE: NORMAL
MDC_IDC_STAT_EPISODE_VENDOR_TYPE: NORMAL
MONOCYTES # BLD AUTO: 1.1 10E3/UL (ref 0–1.3)
MONOCYTES NFR BLD AUTO: 10 %
MONOS+MACROS NFR FLD MANUAL: 4 %
NEUTROPHILS # BLD AUTO: 7.7 10E3/UL (ref 1.6–8.3)
NEUTROPHILS NFR BLD AUTO: 72 %
NEUTS BAND NFR FLD MANUAL: 91 %
NRBC # BLD AUTO: 0 10E3/UL
NRBC BLD AUTO-RTO: 0 /100
PLATELET # BLD AUTO: 141 10E3/UL (ref 150–450)
POTASSIUM SERPL-SCNC: 4 MMOL/L (ref 3.4–5.3)
PROT FLD-MCNC: 3.9 G/DL
PROTEIN BODY FLUID SOURCE: NORMAL
RBC # BLD AUTO: 3.6 10E6/UL (ref 4.4–5.9)
SODIUM SERPL-SCNC: 136 MMOL/L (ref 136–145)
URATE SERPL-MCNC: 5.3 MG/DL (ref 3.4–7)
WBC # BLD AUTO: 10.7 10E3/UL (ref 4–11)
WBC # FLD AUTO: ABNORMAL /UL

## 2023-01-01 PROCEDURE — 36416 COLLJ CAPILLARY BLOOD SPEC: CPT

## 2023-01-01 PROCEDURE — 250N000013 HC RX MED GY IP 250 OP 250 PS 637: Performed by: FAMILY MEDICINE

## 2023-01-01 PROCEDURE — 93296 REM INTERROG EVL PM/IDS: CPT | Performed by: INTERNAL MEDICINE

## 2023-01-01 PROCEDURE — 87205 SMEAR GRAM STAIN: CPT | Performed by: FAMILY MEDICINE

## 2023-01-01 PROCEDURE — 85025 COMPLETE CBC W/AUTO DIFF WBC: CPT | Performed by: FAMILY MEDICINE

## 2023-01-01 PROCEDURE — 80048 BASIC METABOLIC PNL TOTAL CA: CPT | Performed by: FAMILY MEDICINE

## 2023-01-01 PROCEDURE — 85610 PROTHROMBIN TIME: CPT

## 2023-01-01 PROCEDURE — 250N000011 HC RX IP 250 OP 636: Mod: JZ | Performed by: INTERNAL MEDICINE

## 2023-01-01 PROCEDURE — 82945 GLUCOSE OTHER FLUID: CPT | Performed by: FAMILY MEDICINE

## 2023-01-01 PROCEDURE — 99283 EMERGENCY DEPT VISIT LOW MDM: CPT | Performed by: EMERGENCY MEDICINE

## 2023-01-01 PROCEDURE — 93281 PM DEVICE PROGR EVAL MULTI: CPT | Performed by: INTERNAL MEDICINE

## 2023-01-01 PROCEDURE — 99284 EMERGENCY DEPT VISIT MOD MDM: CPT | Mod: 25 | Performed by: FAMILY MEDICINE

## 2023-01-01 PROCEDURE — 84157 ASSAY OF PROTEIN OTHER: CPT | Performed by: FAMILY MEDICINE

## 2023-01-01 PROCEDURE — 84550 ASSAY OF BLOOD/URIC ACID: CPT | Performed by: FAMILY MEDICINE

## 2023-01-01 PROCEDURE — 87070 CULTURE OTHR SPECIMN AEROBIC: CPT | Performed by: FAMILY MEDICINE

## 2023-01-01 PROCEDURE — 96402 CHEMO HORMON ANTINEOPL SQ/IM: CPT

## 2023-01-01 PROCEDURE — 93294 REM INTERROG EVL PM/LDLS PM: CPT | Performed by: INTERNAL MEDICINE

## 2023-01-01 PROCEDURE — 89060 EXAM SYNOVIAL FLUID CRYSTALS: CPT | Performed by: FAMILY MEDICINE

## 2023-01-01 PROCEDURE — 36415 COLL VENOUS BLD VENIPUNCTURE: CPT | Performed by: FAMILY MEDICINE

## 2023-01-01 PROCEDURE — 250N000013 HC RX MED GY IP 250 OP 250 PS 637: Performed by: EMERGENCY MEDICINE

## 2023-01-01 PROCEDURE — 20611 DRAIN/INJ JOINT/BURSA W/US: CPT | Mod: RT | Performed by: FAMILY MEDICINE

## 2023-01-01 PROCEDURE — 74019 RADEX ABDOMEN 2 VIEWS: CPT

## 2023-01-01 PROCEDURE — 73562 X-RAY EXAM OF KNEE 3: CPT | Mod: RT

## 2023-01-01 PROCEDURE — 89051 BODY FLUID CELL COUNT: CPT | Performed by: FAMILY MEDICINE

## 2023-01-01 PROCEDURE — 99285 EMERGENCY DEPT VISIT HI MDM: CPT | Mod: 25 | Performed by: FAMILY MEDICINE

## 2023-01-01 PROCEDURE — 99214 OFFICE O/P EST MOD 30 MIN: CPT | Performed by: INTERNAL MEDICINE

## 2023-01-01 PROCEDURE — 99214 OFFICE O/P EST MOD 30 MIN: CPT | Performed by: NURSE PRACTITIONER

## 2023-01-01 PROCEDURE — 86140 C-REACTIVE PROTEIN: CPT | Performed by: FAMILY MEDICINE

## 2023-01-01 RX ORDER — FLUTICASONE PROPIONATE 44 MCG
AEROSOL WITH ADAPTER (GRAM) INHALATION
Qty: 10.6 G | Refills: 1 | Status: SHIPPED | OUTPATIENT
Start: 2023-01-01 | End: 2024-01-01

## 2023-01-01 RX ORDER — SODIUM PHOSPHATE, DIBASIC AND SODIUM PHOSPHATE, MONOBASIC 3.5; 9.5 G/66ML; G/66ML
1 ENEMA RECTAL ONCE
Status: COMPLETED | OUTPATIENT
Start: 2023-01-01 | End: 2023-01-01

## 2023-01-01 RX ORDER — BICALUTAMIDE 50 MG/1
TABLET, FILM COATED ORAL
Qty: 60 TABLET | Refills: 0 | Status: SHIPPED | OUTPATIENT
Start: 2023-01-01 | End: 2024-01-01

## 2023-01-01 RX ORDER — BICALUTAMIDE 50 MG/1
TABLET, FILM COATED ORAL
Qty: 30 TABLET | Refills: 0 | Status: SHIPPED | OUTPATIENT
Start: 2023-01-01 | End: 2023-01-01

## 2023-01-01 RX ORDER — BICALUTAMIDE 50 MG/1
TABLET, FILM COATED ORAL
Qty: 60 TABLET | Refills: 0 | Status: SHIPPED | OUTPATIENT
Start: 2023-01-01 | End: 2023-01-01

## 2023-01-01 RX ORDER — HYDROCODONE BITARTRATE AND ACETAMINOPHEN 5; 325 MG/1; MG/1
1 TABLET ORAL ONCE
Status: COMPLETED | OUTPATIENT
Start: 2023-01-01 | End: 2023-01-01

## 2023-01-01 RX ORDER — HEPARIN SODIUM,PORCINE 10 UNIT/ML
5 VIAL (ML) INTRAVENOUS
Status: CANCELLED | OUTPATIENT
Start: 2023-01-01

## 2023-01-01 RX ORDER — POLYETHYLENE GLYCOL 3350 17 G/17G
POWDER, FOR SOLUTION ORAL
Qty: 510 G | Refills: 0 | Status: SHIPPED | OUTPATIENT
Start: 2023-01-01

## 2023-01-01 RX ORDER — SIMVASTATIN 40 MG
TABLET ORAL
Qty: 60 TABLET | Refills: 3 | Status: SHIPPED | OUTPATIENT
Start: 2023-01-01 | End: 2024-01-01

## 2023-01-01 RX ORDER — LOSARTAN POTASSIUM 100 MG/1
TABLET ORAL
Qty: 90 TABLET | Refills: 0 | Status: SHIPPED | OUTPATIENT
Start: 2023-01-01 | End: 2023-01-01

## 2023-01-01 RX ORDER — RESPIRATORY SYNCYTIAL VIRUS VACCINE 120MCG/0.5
0.5 KIT INTRAMUSCULAR ONCE
Qty: 1 EACH | Refills: 0 | Status: CANCELLED | OUTPATIENT
Start: 2023-01-01 | End: 2023-01-01

## 2023-01-01 RX ORDER — POLYETHYLENE GLYCOL 3350 17 G/17G
17 POWDER, FOR SOLUTION ORAL DAILY PRN
Qty: 510 G | Refills: 0 | Status: SHIPPED | OUTPATIENT
Start: 2023-01-01 | End: 2023-01-01

## 2023-01-01 RX ORDER — LOSARTAN POTASSIUM 100 MG/1
TABLET ORAL
Qty: 60 TABLET | Refills: 0 | Status: SHIPPED | OUTPATIENT
Start: 2023-01-01 | End: 2023-01-01

## 2023-01-01 RX ORDER — LOSARTAN POTASSIUM 100 MG/1
TABLET ORAL
Qty: 60 TABLET | Refills: 0 | Status: SHIPPED | OUTPATIENT
Start: 2023-01-01 | End: 2024-01-01

## 2023-01-01 RX ORDER — WARFARIN SODIUM 2.5 MG/1
TABLET ORAL
Qty: 100 TABLET | Refills: 0 | Status: SHIPPED | OUTPATIENT
Start: 2023-01-01 | End: 2023-01-01

## 2023-01-01 RX ORDER — TRIAMCINOLONE ACETONIDE 40 MG/ML
60 INJECTION, SUSPENSION INTRA-ARTICULAR; INTRAMUSCULAR ONCE
Status: DISCONTINUED | OUTPATIENT
Start: 2023-01-01 | End: 2023-01-01 | Stop reason: HOSPADM

## 2023-01-01 RX ORDER — WARFARIN SODIUM 2.5 MG/1
TABLET ORAL
Qty: 100 TABLET | Refills: 0 | Status: SHIPPED | OUTPATIENT
Start: 2023-01-01 | End: 2024-01-01

## 2023-01-01 RX ORDER — HEPARIN SODIUM (PORCINE) LOCK FLUSH IV SOLN 100 UNIT/ML 100 UNIT/ML
5 SOLUTION INTRAVENOUS
Status: CANCELLED | OUTPATIENT
Start: 2023-01-01

## 2023-01-01 RX ADMIN — SODIUM PHOSPHATE, DIBASIC AND SODIUM PHOSPHATE, MONOBASIC 1 ENEMA: 3.5; 9.5 ENEMA RECTAL at 14:34

## 2023-01-01 RX ADMIN — HYDROCODONE BITARTRATE AND ACETAMINOPHEN 1 TABLET: 5; 325 TABLET ORAL at 03:07

## 2023-01-01 RX ADMIN — LEUPROLIDE ACETATE 22.5 MG: KIT SUBCUTANEOUS at 10:00

## 2023-01-01 ASSESSMENT — ASTHMA QUESTIONNAIRES: ACT_TOTALSCORE: 25

## 2023-01-01 ASSESSMENT — ACTIVITIES OF DAILY LIVING (ADL)
ADLS_ACUITY_SCORE: 37
ADLS_ACUITY_SCORE: 37
ADLS_ACUITY_SCORE: 35
ADLS_ACUITY_SCORE: 37
ADLS_ACUITY_SCORE: 37
ADLS_ACUITY_SCORE: 35
ADLS_ACUITY_SCORE: 35

## 2023-01-01 ASSESSMENT — ENCOUNTER SYMPTOMS
FEVER: 0
WOUND: 0
CHILLS: 0
COLOR CHANGE: 0
ARTHRALGIAS: 1

## 2023-01-01 ASSESSMENT — PAIN SCALES - GENERAL
PAINLEVEL: NO PAIN (0)

## 2023-01-11 ENCOUNTER — ANTICOAGULATION THERAPY VISIT (OUTPATIENT)
Dept: ANTICOAGULATION | Facility: CLINIC | Age: 88
End: 2023-01-11

## 2023-01-11 ENCOUNTER — LAB (OUTPATIENT)
Dept: LAB | Facility: CLINIC | Age: 88
End: 2023-01-11
Payer: MEDICARE

## 2023-01-11 DIAGNOSIS — I48.21 PERMANENT ATRIAL FIBRILLATION (H): ICD-10-CM

## 2023-01-11 DIAGNOSIS — Z79.01 LONG TERM CURRENT USE OF ANTICOAGULANT THERAPY: ICD-10-CM

## 2023-01-11 DIAGNOSIS — I50.40 COMBINED SYSTOLIC AND DIASTOLIC CONGESTIVE HEART FAILURE, UNSPECIFIED HF CHRONICITY (H): Primary | ICD-10-CM

## 2023-01-11 DIAGNOSIS — I48.92 ATRIAL FLUTTER, UNSPECIFIED TYPE (H): ICD-10-CM

## 2023-01-11 DIAGNOSIS — I50.9 CHF (CONGESTIVE HEART FAILURE) (H): ICD-10-CM

## 2023-01-11 LAB — INR BLD: 2.5 (ref 0.9–1.1)

## 2023-01-11 PROCEDURE — 85610 PROTHROMBIN TIME: CPT

## 2023-01-11 PROCEDURE — 36415 COLL VENOUS BLD VENIPUNCTURE: CPT

## 2023-01-11 NOTE — PROGRESS NOTES
ANTICOAGULATION MANAGEMENT     Miguel Patten 89 year old male is on warfarin with therapeutic INR result. (Goal INR 2.0-3.0)    Recent labs: (last 7 days)     01/11/23  0841   INR 2.5*       ASSESSMENT       Source(s): Chart Review and Patient/Caregiver Call       Warfarin doses taken: Warfarin taken as instructed    Diet: No new diet changes identified    New illness, injury, or hospitalization: No    Medication/supplement changes: None noted    Signs or symptoms of bleeding or clotting: No    Previous INR: Therapeutic last 2(+) visits    Additional findings: None       PLAN     Recommended plan for no diet, medication or health factor changes affecting INR     Dosing Instructions: Continue your current warfarin dose with next INR in 6 weeks       Summary  As of 1/11/2023    Full warfarin instructions:  3.75 mg every Mon, Thu; 2.5 mg all other days   Next INR check:  2/22/2023             Telephone call with Miguel who verbalizes understanding and agrees to plan    Lab visit scheduled    Education provided:     Please call back if any changes to your diet, medications or how you've been taking warfarin    Plan made per [facility] anticoagulation protocol    Ashly Jones, RN  Anticoagulation Clinic  1/11/2023    _______________________________________________________________________     Anticoagulation Episode Summary     Current INR goal:  2.0-3.0   TTR:  86.0 % (1 y)   Target end date:  Indefinite   Send INR reminders to:  [facility]    Indications    CHF (congestive heart failure) (H) [I50.9]  Long-term (current) use of anticoagulants [Z79.01] [Z79.01]  Permanent atrial fibrillation (H) [I48.21]  Atrial flutter  unspecified type (H) [I48.92]           Comments:           Anticoagulation Care Providers     Provider Role Specialty Phone number    Virginia Cardona APRN CNP Referring Nurse Practitioner - Gerontology [phone]    Vladimir Gonzales DO [facility] Internal Medicine  [phone]

## 2023-01-11 NOTE — PROGRESS NOTES
ANTICOAGULATION MANAGEMENT     Miguel Patten 89 year old male is on warfarin with therapeutic INR result. (Goal INR 2.0-3.0)    Recent labs: (last 7 days)     01/11/23  0841   INR 2.5*       ASSESSMENT       Source(s): Chart Review    Previous INR was Therapeutic last 2(+) visits    Medication, diet, health changes since last INR chart reviewed; none identified           PLAN     Unable to reach pt today.    VM left to call [facility] back. Will try again later.     Follow up required to confirm warfarin dose taken and assess for changes    Ashyl Jones, RN  Anticoagulation Clinic  1/11/2023

## 2023-01-12 ENCOUNTER — ANCILLARY PROCEDURE (OUTPATIENT)
Dept: CARDIOLOGY | Facility: CLINIC | Age: 88
End: 2023-01-12
Attending: INTERNAL MEDICINE
Payer: MEDICARE

## 2023-01-12 DIAGNOSIS — I44.2 ATRIOVENTRICULAR BLOCK, COMPLETE (H): ICD-10-CM

## 2023-01-12 DIAGNOSIS — Z95.0 CARDIAC PACEMAKER IN SITU: ICD-10-CM

## 2023-01-12 PROCEDURE — 93294 REM INTERROG EVL PM/LDLS PM: CPT | Performed by: INTERNAL MEDICINE

## 2023-01-12 PROCEDURE — 93296 REM INTERROG EVL PM/IDS: CPT | Performed by: INTERNAL MEDICINE

## 2023-01-13 LAB
MDC_IDC_EPISODE_DTM: NORMAL
MDC_IDC_EPISODE_DURATION: 7 S
MDC_IDC_EPISODE_DURATION: 9 S
MDC_IDC_EPISODE_DURATION: 9 S
MDC_IDC_EPISODE_ID: NORMAL
MDC_IDC_EPISODE_TYPE: NORMAL
MDC_IDC_LEAD_IMPLANT_DT: NORMAL
MDC_IDC_LEAD_IMPLANT_DT: NORMAL
MDC_IDC_LEAD_LOCATION: NORMAL
MDC_IDC_LEAD_LOCATION: NORMAL
MDC_IDC_LEAD_LOCATION_DETAIL_1: NORMAL
MDC_IDC_LEAD_LOCATION_DETAIL_1: NORMAL
MDC_IDC_LEAD_MFG: NORMAL
MDC_IDC_LEAD_MFG: NORMAL
MDC_IDC_LEAD_MODEL: NORMAL
MDC_IDC_LEAD_MODEL: NORMAL
MDC_IDC_LEAD_POLARITY_TYPE: NORMAL
MDC_IDC_LEAD_POLARITY_TYPE: NORMAL
MDC_IDC_LEAD_SERIAL: NORMAL
MDC_IDC_LEAD_SERIAL: NORMAL
MDC_IDC_MSMT_BATTERY_DTM: NORMAL
MDC_IDC_MSMT_BATTERY_REMAINING_LONGEVITY: 24 MO
MDC_IDC_MSMT_BATTERY_REMAINING_PERCENTAGE: 32 %
MDC_IDC_MSMT_BATTERY_STATUS: NORMAL
MDC_IDC_MSMT_LEADCHNL_LV_IMPEDANCE_VALUE: 593 OHM
MDC_IDC_MSMT_LEADCHNL_LV_PACING_THRESHOLD_AMPLITUDE: 2.1 V
MDC_IDC_MSMT_LEADCHNL_LV_PACING_THRESHOLD_PULSEWIDTH: 1 MS
MDC_IDC_MSMT_LEADCHNL_RV_IMPEDANCE_VALUE: 463 OHM
MDC_IDC_PG_IMPLANT_DTM: NORMAL
MDC_IDC_PG_MFG: NORMAL
MDC_IDC_PG_MODEL: NORMAL
MDC_IDC_PG_SERIAL: NORMAL
MDC_IDC_PG_TYPE: NORMAL
MDC_IDC_SESS_CLINIC_NAME: NORMAL
MDC_IDC_SESS_DTM: NORMAL
MDC_IDC_SESS_TYPE: NORMAL
MDC_IDC_SET_BRADY_AT_MODE_SWITCH_RATE: 170 {BEATS}/MIN
MDC_IDC_SET_BRADY_LOWRATE: 60 {BEATS}/MIN
MDC_IDC_SET_BRADY_MAX_SENSOR_RATE: 120 {BEATS}/MIN
MDC_IDC_SET_BRADY_MODE: NORMAL
MDC_IDC_SET_CRT_LVRV_DELAY: 0 MS
MDC_IDC_SET_CRT_PACED_CHAMBERS: NORMAL
MDC_IDC_SET_LEADCHNL_LV_PACING_AMPLITUDE: 3 V
MDC_IDC_SET_LEADCHNL_LV_PACING_ANODE_ELECTRODE_1: NORMAL
MDC_IDC_SET_LEADCHNL_LV_PACING_ANODE_LOCATION_1: NORMAL
MDC_IDC_SET_LEADCHNL_LV_PACING_CATHODE_ELECTRODE_1: NORMAL
MDC_IDC_SET_LEADCHNL_LV_PACING_CATHODE_LOCATION_1: NORMAL
MDC_IDC_SET_LEADCHNL_LV_PACING_PULSEWIDTH: 1 MS
MDC_IDC_SET_LEADCHNL_LV_SENSING_ADAPTATION_MODE: NORMAL
MDC_IDC_SET_LEADCHNL_LV_SENSING_ANODE_ELECTRODE_1: NORMAL
MDC_IDC_SET_LEADCHNL_LV_SENSING_ANODE_LOCATION_1: NORMAL
MDC_IDC_SET_LEADCHNL_LV_SENSING_CATHODE_ELECTRODE_1: NORMAL
MDC_IDC_SET_LEADCHNL_LV_SENSING_CATHODE_LOCATION_1: NORMAL
MDC_IDC_SET_LEADCHNL_LV_SENSING_SENSITIVITY: 2.5 MV
MDC_IDC_SET_LEADCHNL_RA_SENSING_ADAPTATION_MODE: NORMAL
MDC_IDC_SET_LEADCHNL_RA_SENSING_SENSITIVITY: 0.5 MV
MDC_IDC_SET_LEADCHNL_RV_PACING_AMPLITUDE: 2 V
MDC_IDC_SET_LEADCHNL_RV_PACING_CAPTURE_MODE: NORMAL
MDC_IDC_SET_LEADCHNL_RV_PACING_POLARITY: NORMAL
MDC_IDC_SET_LEADCHNL_RV_PACING_PULSEWIDTH: 0.5 MS
MDC_IDC_SET_LEADCHNL_RV_SENSING_ADAPTATION_MODE: NORMAL
MDC_IDC_SET_LEADCHNL_RV_SENSING_POLARITY: NORMAL
MDC_IDC_SET_LEADCHNL_RV_SENSING_SENSITIVITY: 2.5 MV
MDC_IDC_SET_ZONE_DETECTION_INTERVAL: 375 MS
MDC_IDC_SET_ZONE_TYPE: NORMAL
MDC_IDC_SET_ZONE_VENDOR_TYPE: NORMAL
MDC_IDC_STAT_BRADY_DTM_END: NORMAL
MDC_IDC_STAT_BRADY_DTM_START: NORMAL
MDC_IDC_STAT_BRADY_RA_PERCENT_PACED: 0 %
MDC_IDC_STAT_BRADY_RV_PERCENT_PACED: 99 %
MDC_IDC_STAT_CRT_DTM_END: NORMAL
MDC_IDC_STAT_CRT_DTM_START: NORMAL
MDC_IDC_STAT_CRT_LV_PERCENT_PACED: 99 %
MDC_IDC_STAT_EPISODE_RECENT_COUNT: 0
MDC_IDC_STAT_EPISODE_RECENT_COUNT: 3
MDC_IDC_STAT_EPISODE_RECENT_COUNT_DTM_END: NORMAL
MDC_IDC_STAT_EPISODE_RECENT_COUNT_DTM_START: NORMAL
MDC_IDC_STAT_EPISODE_TYPE: NORMAL
MDC_IDC_STAT_EPISODE_VENDOR_TYPE: NORMAL

## 2023-01-16 RX ORDER — HEPARIN SODIUM,PORCINE 10 UNIT/ML
5 VIAL (ML) INTRAVENOUS
Status: CANCELLED | OUTPATIENT
Start: 2023-01-16

## 2023-01-16 RX ORDER — HEPARIN SODIUM (PORCINE) LOCK FLUSH IV SOLN 100 UNIT/ML 100 UNIT/ML
5 SOLUTION INTRAVENOUS
Status: CANCELLED | OUTPATIENT
Start: 2023-01-16

## 2023-01-23 DIAGNOSIS — I10 HYPERTENSION GOAL BP (BLOOD PRESSURE) < 140/90: ICD-10-CM

## 2023-01-23 DIAGNOSIS — C61 MALIGNANT NEOPLASM OF PROSTATE (H): ICD-10-CM

## 2023-01-23 DIAGNOSIS — I48.20 CHRONIC ATRIAL FIBRILLATION (H): ICD-10-CM

## 2023-01-25 RX ORDER — LOSARTAN POTASSIUM 100 MG/1
TABLET ORAL
Qty: 90 TABLET | Refills: 0 | Status: SHIPPED | OUTPATIENT
Start: 2023-01-25 | End: 2023-04-25

## 2023-01-25 RX ORDER — WARFARIN SODIUM 2.5 MG/1
TABLET ORAL
Qty: 100 TABLET | Refills: 1 | Status: SHIPPED | OUTPATIENT
Start: 2023-01-25 | End: 2023-01-01

## 2023-01-25 RX ORDER — BICALUTAMIDE 50 MG/1
TABLET, FILM COATED ORAL
Qty: 30 TABLET | Refills: 0 | Status: SHIPPED | OUTPATIENT
Start: 2023-01-25 | End: 2023-03-01

## 2023-01-25 NOTE — TELEPHONE ENCOUNTER
Routing refill request to provider for review/approval because:    Requested Prescriptions   Pending Prescriptions Disp Refills    losartan (COZAAR) 100 MG tablet [Pharmacy Med Name: LOSARTAN POTASSIUM 100MG TABS] 90 tablet 0     Sig: TAKE ONE-HALF TABLET BY MOUTH TWICE A DAY       Angiotensin-II Receptors Failed - 1/23/2023  2:25 PM        Failed - Last blood pressure under 140/90 in past 12 months     BP Readings from Last 3 Encounters:   11/01/22 (!) 148/60   09/14/22 120/78   09/12/22 128/64

## 2023-01-25 NOTE — TELEPHONE ENCOUNTER
Routing refill request to provider for review/approval because:    Requested Prescriptions   Pending Prescriptions Disp Refills    bicalutamide (CASODEX) 50 MG tablet [Pharmacy Med Name: BICALUTAMIDE 50MG TABS] 30 tablet 0     Sig: TAKE ONE TABLET BY MOUTH ONCE DAILY       There is no refill protocol information for this order

## 2023-01-25 NOTE — TELEPHONE ENCOUNTER
ANTICOAGULATION MANAGEMENT:  Medication Refill    Anticoagulation Summary  As of 1/11/2023    Warfarin maintenance plan:  3.75 mg (2.5 mg x 1.5) every Mon, Thu; 2.5 mg (2.5 mg x 1) all other days   Next INR check:  2/22/2023   Target end date:  Indefinite    Indications    CHF (congestive heart failure) (H) [I50.9]  Long-term (current) use of anticoagulants [Z79.01] [Z79.01]  Permanent atrial fibrillation (H) [I48.21]  Atrial flutter  unspecified type (H) [I48.92]             Anticoagulation Care Providers     Provider Role Specialty Phone number    Virginia Cardona APRN CNP Referring Nurse Practitioner - Gerontology [phone]    Vladimir Gonzales DO [facility] Internal Medicine [phone]          Visit with referring provider/group within last year: Yes    [facility] referral signed within last year: Yes    Miguel meets all criteria for refill (current [facility] referral, office visit with referring provider/group in last year, lab monitoring up to date or not exceeding 2 weeks overdue). Rx instructions and quantity supplied updated to match patient's current dosing plan. Warfarin 90 day supply with 1 refill granted per [facility] protocol     Ashly Jones, RN  Anticoagulation Clinic

## 2023-02-01 ENCOUNTER — DOCUMENTATION ONLY (OUTPATIENT)
Dept: ANTICOAGULATION | Facility: CLINIC | Age: 88
End: 2023-02-01
Payer: MEDICARE

## 2023-02-01 DIAGNOSIS — I48.21 PERMANENT ATRIAL FIBRILLATION (H): ICD-10-CM

## 2023-02-01 DIAGNOSIS — Z79.01 LONG TERM CURRENT USE OF ANTICOAGULANT THERAPY: Primary | ICD-10-CM

## 2023-02-01 NOTE — PROGRESS NOTES
ANTICOAGULATION CLINIC REFERRAL RENEWAL REQUEST       An annual renewal order is required for all patients referred to [facility].?  Please review and sign the pended referral order for Miguel Patten.       ANTICOAGULATION SUMMARY      Warfarin indication(s)   Atrial Fibrillation and CHF    Mechanical heart valve present?  NO       Current goal range   INR: 2.0-3.0     Goal appropriate for indication? Goal INR 2-3, standard for indication(s) above     Time in Therapeutic Range (TTR)  (Goal > 60%) 86%       Office visit with referring provider's group within last year yes on 9/12/22       Ashly Jones, RN  [facility] Anticoagulation Clinic

## 2023-02-16 ENCOUNTER — TELEPHONE (OUTPATIENT)
Dept: INTERNAL MEDICINE | Facility: CLINIC | Age: 88
End: 2023-02-16
Payer: MEDICARE

## 2023-02-16 NOTE — TELEPHONE ENCOUNTER
Urology office wanting updates and last office notes faxed from PCP as he has taken over care for patient's prostate cancer.    Jose Ramon Perry,BASSAMN, RN

## 2023-03-01 ENCOUNTER — APPOINTMENT (OUTPATIENT)
Dept: GENERAL RADIOLOGY | Facility: CLINIC | Age: 88
End: 2023-03-01
Attending: EMERGENCY MEDICINE
Payer: MEDICARE

## 2023-03-01 ENCOUNTER — TELEPHONE (OUTPATIENT)
Dept: ANTICOAGULATION | Facility: CLINIC | Age: 88
End: 2023-03-01

## 2023-03-01 ENCOUNTER — HOSPITAL ENCOUNTER (EMERGENCY)
Facility: CLINIC | Age: 88
Discharge: HOME | End: 2023-03-01
Attending: EMERGENCY MEDICINE | Admitting: EMERGENCY MEDICINE
Payer: MEDICARE

## 2023-03-01 VITALS
SYSTOLIC BLOOD PRESSURE: 113 MMHG | BODY MASS INDEX: 25.58 KG/M2 | HEART RATE: 61 BPM | RESPIRATION RATE: 16 BRPM | TEMPERATURE: 99 F | HEIGHT: 73 IN | DIASTOLIC BLOOD PRESSURE: 58 MMHG | WEIGHT: 193 LBS | OXYGEN SATURATION: 95 %

## 2023-03-01 DIAGNOSIS — I48.92 ATRIAL FLUTTER, UNSPECIFIED TYPE (H): ICD-10-CM

## 2023-03-01 DIAGNOSIS — C61 MALIGNANT NEOPLASM OF PROSTATE (H): ICD-10-CM

## 2023-03-01 DIAGNOSIS — R79.1 ELEVATED INR: ICD-10-CM

## 2023-03-01 DIAGNOSIS — J18.9 COMMUNITY ACQUIRED PNEUMONIA, UNSPECIFIED LATERALITY: ICD-10-CM

## 2023-03-01 DIAGNOSIS — Z79.01 LONG TERM CURRENT USE OF ANTICOAGULANT THERAPY: ICD-10-CM

## 2023-03-01 DIAGNOSIS — I50.40 COMBINED SYSTOLIC AND DIASTOLIC CONGESTIVE HEART FAILURE, UNSPECIFIED HF CHRONICITY (H): Primary | ICD-10-CM

## 2023-03-01 DIAGNOSIS — I48.21 PERMANENT ATRIAL FIBRILLATION (H): ICD-10-CM

## 2023-03-01 LAB
FLUAV RNA SPEC QL NAA+PROBE: NEGATIVE
FLUBV RNA RESP QL NAA+PROBE: NEGATIVE
INR PPP: 4.72 (ref 0.85–1.15)
RSV RNA SPEC NAA+PROBE: NEGATIVE
SARS-COV-2 RNA RESP QL NAA+PROBE: NEGATIVE

## 2023-03-01 PROCEDURE — 250N000013 HC RX MED GY IP 250 OP 250 PS 637: Performed by: EMERGENCY MEDICINE

## 2023-03-01 PROCEDURE — 71045 X-RAY EXAM CHEST 1 VIEW: CPT

## 2023-03-01 PROCEDURE — 36415 COLL VENOUS BLD VENIPUNCTURE: CPT | Performed by: EMERGENCY MEDICINE

## 2023-03-01 PROCEDURE — 250N000009 HC RX 250: Performed by: EMERGENCY MEDICINE

## 2023-03-01 PROCEDURE — 99284 EMERGENCY DEPT VISIT MOD MDM: CPT | Mod: CS,25 | Performed by: EMERGENCY MEDICINE

## 2023-03-01 PROCEDURE — 99284 EMERGENCY DEPT VISIT MOD MDM: CPT | Mod: CS | Performed by: EMERGENCY MEDICINE

## 2023-03-01 PROCEDURE — 87637 SARSCOV2&INF A&B&RSV AMP PRB: CPT | Performed by: EMERGENCY MEDICINE

## 2023-03-01 PROCEDURE — 94640 AIRWAY INHALATION TREATMENT: CPT | Performed by: EMERGENCY MEDICINE

## 2023-03-01 PROCEDURE — 85610 PROTHROMBIN TIME: CPT | Performed by: EMERGENCY MEDICINE

## 2023-03-01 PROCEDURE — C9803 HOPD COVID-19 SPEC COLLECT: HCPCS | Performed by: EMERGENCY MEDICINE

## 2023-03-01 PROCEDURE — 250N000012 HC RX MED GY IP 250 OP 636 PS 637: Performed by: EMERGENCY MEDICINE

## 2023-03-01 RX ORDER — PREDNISONE 20 MG/1
60 TABLET ORAL DAILY
Qty: 12 TABLET | Refills: 0 | Status: SHIPPED | OUTPATIENT
Start: 2023-03-01 | End: 2023-03-05

## 2023-03-01 RX ORDER — BICALUTAMIDE 50 MG/1
TABLET, FILM COATED ORAL
Qty: 30 TABLET | Refills: 0 | Status: SHIPPED | OUTPATIENT
Start: 2023-03-01 | End: 2023-03-29

## 2023-03-01 RX ORDER — AZITHROMYCIN 250 MG/1
250 TABLET, FILM COATED ORAL DAILY
Qty: 4 TABLET | Refills: 0 | Status: SHIPPED | OUTPATIENT
Start: 2023-03-01 | End: 2023-03-05

## 2023-03-01 RX ORDER — AZITHROMYCIN 250 MG/1
500 TABLET, FILM COATED ORAL ONCE
Status: COMPLETED | OUTPATIENT
Start: 2023-03-01 | End: 2023-03-01

## 2023-03-01 RX ORDER — IPRATROPIUM BROMIDE AND ALBUTEROL SULFATE 2.5; .5 MG/3ML; MG/3ML
3 SOLUTION RESPIRATORY (INHALATION) ONCE
Status: COMPLETED | OUTPATIENT
Start: 2023-03-01 | End: 2023-03-01

## 2023-03-01 RX ORDER — ALBUTEROL SULFATE 90 UG/1
2 AEROSOL, METERED RESPIRATORY (INHALATION) EVERY 6 HOURS PRN
Qty: 18 G | Refills: 0 | Status: SHIPPED | OUTPATIENT
Start: 2023-03-01

## 2023-03-01 RX ORDER — ALBUTEROL SULFATE 0.83 MG/ML
2.5 SOLUTION RESPIRATORY (INHALATION) ONCE
Status: COMPLETED | OUTPATIENT
Start: 2023-03-01 | End: 2023-03-01

## 2023-03-01 RX ORDER — PREDNISONE 20 MG/1
60 TABLET ORAL ONCE
Status: COMPLETED | OUTPATIENT
Start: 2023-03-01 | End: 2023-03-01

## 2023-03-01 RX ADMIN — AZITHROMYCIN 500 MG: 250 TABLET, FILM COATED ORAL at 11:02

## 2023-03-01 RX ADMIN — IPRATROPIUM BROMIDE AND ALBUTEROL SULFATE 3 ML: 2.5; .5 SOLUTION RESPIRATORY (INHALATION) at 09:46

## 2023-03-01 RX ADMIN — ALBUTEROL SULFATE 2.5 MG: 2.5 SOLUTION RESPIRATORY (INHALATION) at 11:02

## 2023-03-01 RX ADMIN — PREDNISONE 60 MG: 20 TABLET ORAL at 09:26

## 2023-03-01 ASSESSMENT — ACTIVITIES OF DAILY LIVING (ADL)
ADLS_ACUITY_SCORE: 35
ADLS_ACUITY_SCORE: 35

## 2023-03-01 NOTE — TELEPHONE ENCOUNTER
ANTICOAGULATION  MANAGEMENT: Discharge Review    Miguel Patten chart reviewed for anticoagulation continuity of care    Emergency room visit on 3/1 for Community acquired pneumonia .    Discharge disposition: Home    Results:    Recent labs: (last 7 days)     03/01/23  0928   INR 4.72*     Anticoagulation inpatient management:     not applicable     Anticoagulation discharge instructions:     Warfarin dosing: hold warfarin tonight 3/1   Bridging: No   INR goal change: No      Medication changes affecting anticoagulation: Yes: zithromax and prednisone    Additional factors affecting anticoagulation: No     PLAN     Recommend to check INR on 3/6    Left a detailed message for pt to return call to [facility].    Anticoagulation Calendar updated    Leydi Perez RN    ANTICOAGULATION  MANAGEMENT     Interacting Medication Review    Interacting medication(s): Azithromycin (Zithromax, Z-denise) with warfarin.    Duration: 3/1 to 3/5    Indication: Pneumonia    New medication?: Yes, interaction may increase INR and risk of bleeding. Closer INR monitoring recommended.      PLAN     See ED visit

## 2023-03-01 NOTE — TELEPHONE ENCOUNTER
ANTICOAGULATION MANAGEMENT     Miguel Patten 89 year old male is on warfarin with supratherapeutic INR result. (Goal INR )    Recent labs: (last 7 days)     03/01/23  0928   INR 4.72*       ASSESSMENT       Source(s): Chart Review and Patient/Caregiver Call       Warfarin doses taken: Warfarin taken as instructed    Diet: No new diet changes identified    New illness, injury, or hospitalization: Yes: ED 3/1 pneumonia     Medication/supplement changes: Zithromax and prednisone     Signs or symptoms of bleeding or clotting: No    Previous INR: Therapeutic last 2(+) visits    Additional findings: None     PLAN     Recommended plan for temporary change(s) affecting INR     Dosing Instructions: hold warfarin today, take 2.5 mg tomorrow then resume maintenance dose with next INR in 5 weeks       Summary  As of 3/1/2023    Full warfarin instructions:  3/1: Hold; 3/2: 2.5 mg; Otherwise 3.75 mg every Mon, Thu; 2.5 mg all other days   Next INR check:  3/6/2023             Telephone call with Miguel who verbalizes understanding and agrees to plan    Lab visit scheduled    Education provided:     Please call back if any changes to your diet, medications or how you've been taking warfarin    Symptom monitoring: monitoring for bleeding signs and symptoms    Plan made per [facility] anticoagulation protocol    Leydi Perez RN  Anticoagulation Clinic  3/1/2023    _______________________________________________________________________     Anticoagulation Episode Summary     Current INR goal:  2.0-3.0   TTR:  75.6 % (1 y)   Target end date:  Indefinite   Send INR reminders to:  [facility]    Indications    CHF (congestive heart failure) (H) [I50.9]  Long-term (current) use of anticoagulants [Z79.01] [Z79.01]  Permanent atrial fibrillation (H) [I48.21]  Atrial flutter  unspecified type (H) [I48.92]           Comments:           Anticoagulation Care Providers     Provider Role Specialty Phone number    Vladimir Gonzales  DO Referring Internal Medicine [phone]    Virginia Cardona APRN CNP Referring Nurse Practitioner - Gerontology [phone]

## 2023-03-01 NOTE — DISCHARGE INSTRUCTIONS
You were given your first dose of antibiotics and prednisone in the ED.  Start second dose of Zithromax and prednisone tomorrow morning.    Your INR was high.  Please hold Coumadin dose tonight.  I will send a message to the Coumadin clinic and they will adjust your dosing as needed.    Use the albuterol inhaler as needed for wheezing, cough, shortness of breath.    If at anytime you have any significant worsening, changes or concerns return promptly to the ED at any time.    I hope you start to feel much better quickly!!!

## 2023-03-01 NOTE — ED TRIAGE NOTES
Short of breath and has been for about 5 days getting worse.  Weakness and decreased appetite, productive cough. 95% on room air    Is out of meds wants refill as well.

## 2023-03-02 ENCOUNTER — TELEPHONE (OUTPATIENT)
Dept: ANTICOAGULATION | Facility: CLINIC | Age: 88
End: 2023-03-02
Payer: MEDICARE

## 2023-03-02 NOTE — ED PROVIDER NOTES
History     Chief Complaint   Patient presents with     Shortness of Breath     HPI  History per patient, medical records    This is a 89-year-old male, history of asthma, atrial fibrillation/flutter, pacemaker placement, CHF, aortic valve replacement on Coumadin, hyperlipidemia, other history as below presenting with shortness of breath.  Patient is wondering if he has pneumonia.  He states that for the last 5 days he has had cough symptoms.  He has had a hoarse voice.  He has felt tightness in his chest and shortness of breath.  No fever.  He has had a runny nose.  He is on Flovent twice daily and states he has an inhaler but he is not sure where it is.  He has not been eating as much as usual.  No change in bowel or bladder.  He feels a little weak and tired.  Patient does not smoke.    Allergies:  Allergies   Allergen Reactions     No Known Drug Allergies        Problem List:    Patient Active Problem List    Diagnosis Date Noted     Health Care Home 06/10/2011     Priority: High     DX V65.8 REPLACED WITH 59362 HEALTH CARE HOME (04/08/2013)       2019 novel coronavirus disease (COVID-19) 08/24/2021     Priority: Medium     Asthma without status asthmaticus 08/24/2021     Priority: Medium     Traumatic rhabdomyolysis, initial encounter (H) 08/24/2021     Priority: Medium     Atrial flutter, unspecified type (H) 01/25/2021     Priority: Medium     Fall (on) (from) other stairs and steps, initial encounter 11/13/2020     Priority: Medium     Permanent atrial fibrillation (H) 06/09/2020     Priority: Medium     Mixed hyperlipidemia 04/25/2017     Priority: Medium     Chronic systolic congestive heart failure (H) 04/21/2017     Priority: Medium     Appendicitis with perforation 04/19/2017     Priority: Medium     Mild intermittent asthma without complication 09/27/2016     Priority: Medium     Long-term (current) use of anticoagulants [Z79.01] 04/05/2016     Priority: Medium     S/P AVR (aortic valve replacement)  07/25/2014     Priority: Medium     S/P ascending aortic replacement 07/25/2014     Priority: Medium     Atrial fibrillation (H) 07/25/2014     Priority: Medium     Aortic regurgitation 07/17/2014     Priority: Medium     Hypertension goal BP (blood pressure) < 140/90 04/16/2014     Priority: Medium     CHF (congestive heart failure) (H) 03/24/2014     Priority: Medium     Atrial flutter (H) 01/14/2013     Priority: Medium     History of total hip arthroplasty - bilateral 11/19/2012     Priority: Medium     Osteoarthritis of hip - right 11/19/2012     Priority: Medium     Gout 04/30/2012     Priority: Medium     Malignant neoplasm of prostate (H) 03/05/2004     Priority: Medium        Past Medical History:    Past Medical History:   Diagnosis Date     Arthritis      Ascending aortic aneurysm (H)      Asthma      Complete AV block (H)      Congestive heart failure (H)      Coronary artery disease      H/O aortic valve replacement      Hypertension      Malignant neoplasm of prostate (H)      Permanent atrial fibrillation (H)        Past Surgical History:    Past Surgical History:   Procedure Laterality Date     ARTHROPLASTY HIP  1/21/2013    Procedure: ARTHROPLASTY HIP;  right total hip arthroplasty;  Surgeon: Rober Alves MD;  Location: [facility]     GENITOURINARY SURGERY  2001    Prostatectomy       ORTHOPEDIC SURGERY      Left SONALI     PHACOEMULSIFICATION WITH STANDARD INTRAOCULAR LENS IMPLANT Right 10/6/2016    Procedure: PHACOEMULSIFICATION WITH STANDARD INTRAOCULAR LENS IMPLANT;  Surgeon: Elder Rueda MD;  Location: [facility]     PHACOEMULSIFICATION WITH STANDARD INTRAOCULAR LENS IMPLANT Left 10/20/2016    Procedure: PHACOEMULSIFICATION WITH STANDARD INTRAOCULAR LENS IMPLANT;  Surgeon: Elder Rueda MD;  Location: [facility] OR     REPAIR ANEURYSM ASCENDING AORTA  7/17/2014    2. Aortic aneurysm repair with 32 mm Hemashield graft.      REPLACE VALVE AORTIC  7/17/2014    1. Aortic valve replacement  "with 23 mm [employer] Trifecta tissue valve.      s/p aneurysm repair by Dr. Friedman       s/p avr       [facility] NONSPECIFIC PROCEDURE      Hernia repair     [facility] NONSPECIFIC PROCEDURE      Prostatectomy/cancer     [facility] TOTAL HIP ARTHROPLASTY  1/21/13    Right       Family History:    Family History   Problem Relation Age of Onset     Asthma Father      Asthma Paternal Grandmother      Asthma Daughter        Social History:  Marital Status:   [5]  Social History     Tobacco Use     Smoking status: Never     Smokeless tobacco: Never   Vaping Use     Vaping Use: Never used   Substance Use Topics     Alcohol use: Yes     Alcohol/week: 0.0 standard drinks     Comment: rarely, once a week or less     Drug use: No        Medications:    albuterol (PROAIR HFA/PROVENTIL HFA/VENTOLIN HFA) 108 (90 Base) MCG/ACT inhaler  azithromycin (ZITHROMAX Z-MAIK) 250 MG tablet  predniSONE (DELTASONE) 20 MG tablet  acetaminophen (TYLENOL) 325 MG tablet  bicalutamide (CASODEX) 50 MG tablet  cycloSPORINE (RESTASIS) 0.05 % ophthalmic emulsion  FLOVENT HFA 44 MCG/ACT inhaler  leuprolide (LUPRON DEPOT, 3-MONTH,) 22.5 MG kit  losartan (COZAAR) 100 MG tablet  simvastatin (ZOCOR) 40 MG tablet  sodium chloride (OCEAN) 0.65 % nasal spray  warfarin ANTICOAGULANT (JANTOVEN ANTICOAGULANT) 2.5 MG tablet          Review of Systems   All other ROS reviewed and are negative or non-contributory except as stated in HPI.     Physical Exam   BP: 121/59  Pulse: 60  Temp: 99  F (37.2  C)  Resp: 19  Height: 185.4 cm (6' 1\")  Weight: 87.5 kg (193 lb)  SpO2: 95 %      Physical Exam  Vitals and nursing note reviewed.   Constitutional:       Appearance: He is well-developed and normal weight.      Comments: Very pleasant elderly man sitting in the bed.  Hoarse voice.  Able to speak in full sentences.  Intermittent cough.   HENT:      Head: Normocephalic.      Nose: Congestion and rhinorrhea (Clear) present.      Mouth/Throat:      Mouth: Mucous membranes are moist.      " Pharynx: Oropharynx is clear.   Eyes:      Extraocular Movements: Extraocular movements intact.      Conjunctiva/sclera: Conjunctivae normal.   Cardiovascular:      Rate and Rhythm: Normal rate.      Pulses: Normal pulses.      Comments: Murmur with valve click.  Paced.  Pulmonary:      Comments: Very tight breath sounds with wheeze throughout  Abdominal:      Palpations: Abdomen is soft.      Tenderness: There is no abdominal tenderness.   Musculoskeletal:         General: No tenderness. Normal range of motion.      Cervical back: Normal range of motion and neck supple.      Right lower leg: No edema.      Left lower leg: No edema.   Skin:     General: Skin is warm and dry.      Findings: No rash.   Neurological:      General: No focal deficit present.      Mental Status: He is alert and oriented to person, place, and time.   Psychiatric:         Mood and Affect: Mood normal.         Behavior: Behavior normal.         ED Course (with Medical Decision Making)    Pt seen and examined by me.  RN and EPIC notes reviewed.       Elderly male with symptoms of shortness of breath, cough for about 5 days.  He has a runny nose, hoarse voice.  His lung sounds are extremely tight with wheezing.  O2 sats 95% on room air.  Concern for pneumonia, bronchitis, viral versus bacterial cause.    Patient was given prednisone.  Nebulizer treatment.  Chest x-ray was done.  COVID swab.    COVID is negative.  Chest x-ray shows mild bibasilar pulmonary opacities.  INR was checked at patient's request.  It is elevated at 4.72.    I discussed options with the patient.  He feels that he would be safe to try to manage at his care facility/assisted living.  I think at this point with his normal vital signs and his adequate oxygen saturations that this would be okay.  He declines a nebulizer.  I am going to give him a refill on his inhaler.  I am also going to continue him on prednisone.  He was given a first dose of Zithromax in the ED and will  continue him on Zithromax at home.  I would like him to drink lots of fluids and get plenty of rest.  I am going to send a message to his care provider so they can call to check on him for follow-up if needed.  In the meantime, would like him to continue to monitor symptoms.  If it anytime he notes any significant worsening, changes or concerns return promptly to this ED or any ED at any time.    I also will send a message to his Coumadin clinic provider.  I told him to hold his Coumadin tonight.      Procedures      Results for orders placed or performed during the hospital encounter of 03/01/23 (from the past 24 hour(s))   [place] Draw *Canceled*    Narrative    The following orders were created for panel order [place] Draw.  Procedure                               Abnormality         Status                     ---------                               -----------         ------                       Please view results for these tests on the individual orders.   Symptomatic Influenza A/B, RSV, & SARS-CoV2 PCR (COVID-19) Nasopharyngeal    Specimen: Nasopharyngeal; Swab   Result Value Ref Range    Influenza A PCR Negative Negative    Influenza B PCR Negative Negative    RSV PCR Negative Negative    SARS CoV2 PCR Negative Negative    Narrative    Testing was performed using the Xpert Xpress CoV2/Flu/RSV Assay on the [employer] GeneXpert Instrument. This test should be ordered for the detection of SARS-CoV-2, influenza, and RSV viruses in individuals who meet clinical and/or epidemiological criteria. Test performance is unknown in asymptomatic patients. This test is for in vitro diagnostic use under the FDA EUA for laboratories certified under CLIA to perform high or moderate complexity testing. This test has not been FDA cleared or approved. A negative result does not rule out the presence of PCR inhibitors in the specimen or target RNA in concentration below the limit of detection for the assay. If only one viral target  is positive but coinfection with multiple targets is suspected, the sample should be re-tested with another FDA cleared, approved, or authorized test, if coinfection would change clinical management. This test was validated by the [facility] [employer]. These laboratories are certified under the Clinical Laboratory Improvement Amendments of 1988 (CLIA-88) as qualified to perform high complexity laboratory testing.   INR   Result Value Ref Range    INR 4.72 (H) 0.85 - 1.15   XR Chest Port 1 View    Narrative    XR CHEST PORT 1 VIEW 3/1/2023 9:40 AM    HISTORY: cough    COMPARISON: CT 11/13/2020      Impression    IMPRESSION: Sternotomy. Stable multi lead left-sided cardiac  conduction device. Low lung volumes. Trace bilateral pleural fluid.  Mild bibasilar pulmonary opacities may reflect atelectasis or mild  pneumonic infiltrates. Stable heart size.    ARNULFO HAYDEN MD         SYSTEM ID:  [id number]       Medications   ipratropium - albuterol 0.5 mg/2.5 mg/3 mL (DUONEB) neb solution 3 mL (3 mLs Nebulization $Given 3/1/23 0946)   predniSONE (DELTASONE) tablet 60 mg (60 mg Oral $Given 3/1/23 0926)   azithromycin (ZITHROMAX) tablet 500 mg (500 mg Oral $Given 3/1/23 1102)   albuterol (PROVENTIL) neb solution 2.5 mg (2.5 mg Nebulization $Given 3/1/23 1102)       Assessments & Plan     I have reviewed the findings, diagnosis, plan and need for follow up with the patient.    Discharge Medication List as of 3/1/2023 11:19 AM      START taking these medications    Details   albuterol (PROAIR HFA/PROVENTIL HFA/VENTOLIN HFA) 108 (90 Base) MCG/ACT inhaler Inhale 2 puffs into the lungs every 6 hours as needed for shortness of breath, wheezing or cough, Disp-18 g, R-0, E-PrescribePharmacy may dispense brand covered by insurance (Proair, or proventil or ventolin or generic albuterol inhaler)      azithromycin (ZITHROMAX Z-MAIK) 250 MG tablet Take 1 tablet (250 mg) by mouth daily for 4 doses, Disp-4 tablet, R-0,  E-Prescribe      predniSONE (DELTASONE) 20 MG tablet Take 3 tablets (60 mg) by mouth daily for 4 days, Disp-12 tablet, R-0, E-Prescribe             Final diagnoses:   Community acquired pneumonia, unspecified laterality   Elevated INR - 4.72     Disposition: Patient discharged home in stable condition.  Plan as above.  Return for concerns.     Note: Chart documentation done in part with Dragon Voice Recognition software. Although reviewed after completion, some word and grammatical errors may remain.     3/1/2023   [facility] EMERGENCY DEPT     Marleni Grullon MD  03/02/23 0424

## 2023-03-02 NOTE — TELEPHONE ENCOUNTER
Had message from ER doctor stating anna was on abx. Looks like INR was done yesterday and will be checked again Monday the 7th.  No further action needed.  Cristiana Leonardo Rn  [facility]

## 2023-03-06 ENCOUNTER — LAB (OUTPATIENT)
Dept: LAB | Facility: CLINIC | Age: 88
End: 2023-03-06
Payer: MEDICARE

## 2023-03-06 ENCOUNTER — ANTICOAGULATION THERAPY VISIT (OUTPATIENT)
Dept: ANTICOAGULATION | Facility: CLINIC | Age: 88
End: 2023-03-06

## 2023-03-06 DIAGNOSIS — I48.21 PERMANENT ATRIAL FIBRILLATION (H): ICD-10-CM

## 2023-03-06 DIAGNOSIS — Z79.01 LONG TERM CURRENT USE OF ANTICOAGULANT THERAPY: ICD-10-CM

## 2023-03-06 DIAGNOSIS — I48.92 ATRIAL FLUTTER, UNSPECIFIED TYPE (H): ICD-10-CM

## 2023-03-06 DIAGNOSIS — I50.40 COMBINED SYSTOLIC AND DIASTOLIC CONGESTIVE HEART FAILURE, UNSPECIFIED HF CHRONICITY (H): Primary | ICD-10-CM

## 2023-03-06 LAB — INR BLD: 5.9 (ref 0.9–1.1)

## 2023-03-06 PROCEDURE — 85610 PROTHROMBIN TIME: CPT

## 2023-03-06 PROCEDURE — 36416 COLLJ CAPILLARY BLOOD SPEC: CPT

## 2023-03-06 NOTE — PROGRESS NOTES
ANTICOAGULATION MANAGEMENT     Miguel Patten 89 year old male is on warfarin with supratherapeutic INR result. (Goal INR 2.0-3.0)    Recent labs: (last 7 days)     03/06/23  1003   INR 5.9*       ASSESSMENT       Source(s): Chart Review and Patient/Caregiver Call       Warfarin doses taken: Warfarin taken as instructed    Diet: Not eating well due to illness    New illness, injury, or hospitalization: Yes: dx with pneumonia 3/1    Medication/supplement changes: Zpack & prednisone 3/1- 3/5 likely contributing to supratherapeutic INR    Signs or symptoms of bleeding or clotting: No    Previous INR: Supratherapeutic    Additional findings: None         PLAN     Recommended plan for temporary change(s) affecting INR     Dosing Instructions: Hold today, 1/2 dose (1.25 mg) tomorrow then continue your current warfarin dose with next INR in 3 days       Summary  As of 3/6/2023    Full warfarin instructions:  3/6: Hold; 3/7: 1.25 mg; Otherwise 3.75 mg every Mon, Thu; 2.5 mg all other days   Next INR check:  3/9/2023             Telephone call with Miguel who verbalizes understanding and agrees to plan and who agrees to plan and repeated back plan correctly    Lab visit scheduled    Education provided:     Goal range and lab monitoring: goal range and significance of current result    Symptom monitoring: monitoring for bleeding signs and symptoms, when to seek medical attention/emergency care and if you hit your head or have a bad fall seek emergency care    Plan made per [facility] anticoagulation protocol    Sameera Musa, RN  Anticoagulation Clinic  3/6/2023    _______________________________________________________________________     Anticoagulation Episode Summary     Current INR goal:  2.0-3.0   TTR:  74.2 % (1 y)   Target end date:  Indefinite   Send INR reminders to:  BUBBA REID    Indications    CHF (congestive heart failure) (H) [I50.9]  Long-term (current) use of anticoagulants [Z79.01] [Z79.01]  Permanent  atrial fibrillation (H) [I48.21]  Atrial flutter  unspecified type (H) [I48.92]           Comments:           Anticoagulation Care Providers     Provider Role Specialty Phone number    Vladimir Gonzales DO Referring Internal Medicine [phone]    Virginia Cardona APRN CNP Referring Nurse Practitioner - Gerontology [phone]

## 2023-03-09 ENCOUNTER — LAB (OUTPATIENT)
Dept: LAB | Facility: CLINIC | Age: 88
End: 2023-03-09
Payer: MEDICARE

## 2023-03-09 ENCOUNTER — ANTICOAGULATION THERAPY VISIT (OUTPATIENT)
Dept: ANTICOAGULATION | Facility: CLINIC | Age: 88
End: 2023-03-09

## 2023-03-09 DIAGNOSIS — Z79.01 LONG TERM CURRENT USE OF ANTICOAGULANT THERAPY: ICD-10-CM

## 2023-03-09 DIAGNOSIS — I50.40 COMBINED SYSTOLIC AND DIASTOLIC CONGESTIVE HEART FAILURE, UNSPECIFIED HF CHRONICITY (H): Primary | ICD-10-CM

## 2023-03-09 DIAGNOSIS — I48.21 PERMANENT ATRIAL FIBRILLATION (H): ICD-10-CM

## 2023-03-09 DIAGNOSIS — I48.92 ATRIAL FLUTTER, UNSPECIFIED TYPE (H): ICD-10-CM

## 2023-03-09 LAB — INR BLD: 4.1 (ref 0.9–1.1)

## 2023-03-09 PROCEDURE — 36416 COLLJ CAPILLARY BLOOD SPEC: CPT

## 2023-03-09 PROCEDURE — 85610 PROTHROMBIN TIME: CPT

## 2023-03-09 NOTE — PROGRESS NOTES
ANTICOAGULATION MANAGEMENT     Miguel Patten 89 year old male is on warfarin with supratherapeutic INR result. (Goal INR 2.0-3.0)    Recent labs: (last 7 days)     03/09/23  1008   INR 4.1*       ASSESSMENT       Source(s): Chart Review       Warfarin doses taken: Warfarin taken as instructed    Diet: No new diet changes identified    New illness, injury, or hospitalization: Yes: Recent pneumonia 3/1/23    Medication/supplement changes: Z-denise and prednisone - finished on 3/5/23    Signs or symptoms of bleeding or clotting: No    Previous INR: Supratherapeutic    Additional findings: None         PLAN     Recommended plan for temporary change(s) affecting INR     Dosing Instructions: hold dose then continue your current warfarin dose with next INR in 10 days       Summary  As of 3/9/2023    Full warfarin instructions:  3/9: Hold; Otherwise 3.75 mg every Mon, Thu; 2.5 mg all other days   Next INR check:  3/20/2023             Telephone call with Miguel who verbalizes understanding and agrees to plan and who agrees to plan and repeated back plan correctly    Lab visit scheduled    Education provided:     Please call back if any changes to your diet, medications or how you've been taking warfarin    Goal range and lab monitoring: goal range and significance of current result and Importance of therapeutic range    Plan made per [facility] anticoagulation protocol    Kerwin Faustin RN  Anticoagulation Clinic  3/9/2023    _______________________________________________________________________     Anticoagulation Episode Summary     Current INR goal:  2.0-3.0   TTR:  73.4 % (1 y)   Target end date:  Indefinite   Send INR reminders to:  BUBBA REID    Indications    CHF (congestive heart failure) (H) [I50.9]  Long-term (current) use of anticoagulants [Z79.01] [Z79.01]  Permanent atrial fibrillation (H) [I48.21]  Atrial flutter  unspecified type (H) [I48.92]           Comments:           Anticoagulation Care Providers      Provider Role Specialty Phone number    Vladimir Gonzales DO Referring Internal Medicine [phone]    Virginia Cardona APRN CNP Referring Nurse Practitioner - Gerontology [phone]

## 2023-03-20 ENCOUNTER — ANTICOAGULATION THERAPY VISIT (OUTPATIENT)
Dept: ANTICOAGULATION | Facility: CLINIC | Age: 88
End: 2023-03-20

## 2023-03-20 ENCOUNTER — LAB (OUTPATIENT)
Dept: LAB | Facility: CLINIC | Age: 88
End: 2023-03-20
Payer: MEDICARE

## 2023-03-20 DIAGNOSIS — I48.92 ATRIAL FLUTTER, UNSPECIFIED TYPE (H): ICD-10-CM

## 2023-03-20 DIAGNOSIS — I48.21 PERMANENT ATRIAL FIBRILLATION (H): ICD-10-CM

## 2023-03-20 DIAGNOSIS — Z79.01 LONG TERM CURRENT USE OF ANTICOAGULANT THERAPY: ICD-10-CM

## 2023-03-20 DIAGNOSIS — I50.40 COMBINED SYSTOLIC AND DIASTOLIC CONGESTIVE HEART FAILURE, UNSPECIFIED HF CHRONICITY (H): Primary | ICD-10-CM

## 2023-03-20 LAB — INR BLD: 2.3 (ref 0.9–1.1)

## 2023-03-20 PROCEDURE — 36416 COLLJ CAPILLARY BLOOD SPEC: CPT

## 2023-03-20 PROCEDURE — 85610 PROTHROMBIN TIME: CPT

## 2023-03-20 NOTE — PROGRESS NOTES
ANTICOAGULATION MANAGEMENT     Miguel Patten 89 year old male is on warfarin with therapeutic INR result. (Goal INR 2.0-3.0)    Recent labs: (last 7 days)     03/20/23  1001   INR 2.3*       ASSESSMENT       Source(s): Chart Review and Patient/Caregiver Call       Warfarin doses taken: Warfarin taken as instructed    Diet: No new diet changes identified    New illness, injury, or hospitalization: No    Medication/supplement changes: None noted    Signs or symptoms of bleeding or clotting: No    Previous INR: Supratherapeutic    Additional findings: is feeling much better now s/p pneumonia.  he is still a little fatigued but he states he is much better.          PLAN     Recommended plan for no diet, medication or health factor changes affecting INR     Dosing Instructions: Continue your current warfarin dose with next INR in 3 weeks       Summary  As of 3/20/2023    Full warfarin instructions:  3.75 mg every Mon, Thu; 2.5 mg all other days   Next INR check:  4/10/2023             Telephone call with Miguel who verbalizes understanding and agrees to plan    Lab visit scheduled    Education provided:     Contact [phone]  with any changes, questions or concerns.     Plan made per [facility] anticoagulation protocol    Laurence Richter, RN  Anticoagulation Clinic  3/20/2023    _______________________________________________________________________     Anticoagulation Episode Summary     Current INR goal:  2.0-3.0   TTR:  71.6 % (1 y)   Target end date:  Indefinite   [facility]    Indications    CHF (congestive heart failure) (H) [I50.9]  Long-term (current) use of anticoagulants [Z79.01] [Z79.01]  Permanent atrial fibrillation (H) [I48.21]  Atrial flutter  unspecified type (H) [I48.92]           Comments:           Anticoagulation Care Providers     Provider Role Specialty Phone number    Vladimir Gonzales DO [facility] Internal Medicine [phone]    Virginia Cardona,  APRN CNP Referring Nurse Practitioner - Gerontology [phone]

## 2023-03-27 NOTE — PROGRESS NOTES
Called mom to make appointment in 3 weeks for a follow up medication and weight check mom was on another call in the background then everything was very quiet wasn't sure if patients mom hung up so writer disconnected the call    Infusion Nursing Note:  Miguel Patten presents today for Eligard.    Patient seen by provider today: No   present during visit today: Not Applicable.    Note: Patient doing well. Has no complaints. Ambulated w/stick cane to IVO. .      Intravenous Access:  No Intravenous access/labs at this visit.    Treatment Conditions:  Not Applicable.      Post Infusion Assessment:  Patient tolerated injection without incident.  Patient observed for 10 minutes post injection per protocol.       Discharge Plan:   Discharge instructions reviewed with: Patient.  Patient and/or family verbalized understanding of discharge instructions and all questions answered.  Patient discharged in stable condition accompanied by: self.  Departure Mode: Ambulatory.  Returning in 6 months.       Catrina Cuevas RN

## 2023-03-29 DIAGNOSIS — C61 MALIGNANT NEOPLASM OF PROSTATE (H): ICD-10-CM

## 2023-03-29 RX ORDER — BICALUTAMIDE 50 MG/1
TABLET, FILM COATED ORAL
Qty: 30 TABLET | Refills: 0 | Status: SHIPPED | OUTPATIENT
Start: 2023-03-29 | End: 2023-04-25

## 2023-04-10 ENCOUNTER — TELEPHONE (OUTPATIENT)
Dept: INTERNAL MEDICINE | Facility: CLINIC | Age: 88
End: 2023-04-10

## 2023-04-10 ENCOUNTER — ANTICOAGULATION THERAPY VISIT (OUTPATIENT)
Dept: ANTICOAGULATION | Facility: CLINIC | Age: 88
End: 2023-04-10

## 2023-04-10 ENCOUNTER — LAB (OUTPATIENT)
Dept: LAB | Facility: CLINIC | Age: 88
End: 2023-04-10
Payer: MEDICARE

## 2023-04-10 DIAGNOSIS — I50.40 COMBINED SYSTOLIC AND DIASTOLIC CONGESTIVE HEART FAILURE, UNSPECIFIED HF CHRONICITY (H): Primary | ICD-10-CM

## 2023-04-10 DIAGNOSIS — I48.21 PERMANENT ATRIAL FIBRILLATION (H): ICD-10-CM

## 2023-04-10 DIAGNOSIS — Z79.01 LONG TERM CURRENT USE OF ANTICOAGULANT THERAPY: ICD-10-CM

## 2023-04-10 DIAGNOSIS — I48.92 ATRIAL FLUTTER, UNSPECIFIED TYPE (H): ICD-10-CM

## 2023-04-10 LAB — INR BLD: 3.2 (ref 0.9–1.1)

## 2023-04-10 PROCEDURE — 36416 COLLJ CAPILLARY BLOOD SPEC: CPT

## 2023-04-10 PROCEDURE — 85610 PROTHROMBIN TIME: CPT

## 2023-04-10 NOTE — PROGRESS NOTES
ANTICOAGULATION MANAGEMENT     Miguel Patten 89 year old male is on warfarin with supratherapeutic INR result. (Goal INR 2.0-3.0)    Recent labs: (last 7 days)     04/10/23  0949   INR 3.2*       ASSESSMENT       Source(s): Chart Review and Patient/Caregiver Call       Warfarin doses taken: Warfarin taken as instructed    Diet: No new diet changes identified    New illness, injury, or hospitalization: No    Medication/supplement changes: None noted    Signs or symptoms of bleeding or clotting: No    Previous INR: Therapeutic last visit; previously outside of goal range    Additional findings: None  INR was supratherapeutic recently due to pneumonia and antibiotic use. Will give partial dose today and then resume, if elevated with next INR did discuss possible maintenance dose reduction.      PLAN     Recommended plan for no diet, medication or health factor changes affecting INR     Dosing Instructions: partial hold then continue your current warfarin dose with next INR in 2 weeks       Summary  As of 4/10/2023    Full warfarin instructions:  4/10: 2.5 mg; Otherwise 3.75 mg every Mon, Thu; 2.5 mg all other days   Next INR check:  4/24/2023             Telephone call with Miguel who verbalizes understanding and agrees to plan    Lab visit scheduled    Education provided:     Goal range and lab monitoring: goal range and significance of current result    Contact [phone]  with any changes, questions or concerns.     Plan made per [facility] anticoagulation protocol    Laurence Richter RN  Anticoagulation Clinic  4/10/2023    _______________________________________________________________________     Anticoagulation Episode Summary     Current INR goal:  2.0-3.0   TTR:  70.3 % (1 y)   Target end date:  Indefinite   Send INR reminders to:  BUBBA REID    Indications    CHF (congestive heart failure) (H) [I50.9]  Long-term (current) use of anticoagulants [Z79.01] [Z79.01]  Permanent atrial fibrillation (H)  [I48.21]  Atrial flutter  unspecified type (H) [I48.92]           Comments:           Anticoagulation Care Providers     Provider Role Specialty Phone number    Vladimir Gonzales DO Referring Internal Medicine [phone]    Virginia Cardona APRN CNP Referring Nurse Practitioner - Gerontology [phone]

## 2023-04-10 NOTE — TELEPHONE ENCOUNTER
See [facility] encounter.    Laurence Richter RN  [facility] Anticoagulation [facility]

## 2023-04-10 NOTE — TELEPHONE ENCOUNTER
Patient Returning Call    Reason for call:  Returning ACN call    Information relayed to patient:  none    Patient has additional questions:  Yes    What are your questions/concerns:      Who does the patient want to speak with:  ACN    Is an  needed?:  No      Okay to leave a detailed message?: No at Home number on file [phone] (home)

## 2023-04-24 ENCOUNTER — ANTICOAGULATION THERAPY VISIT (OUTPATIENT)
Dept: ANTICOAGULATION | Facility: CLINIC | Age: 88
End: 2023-04-24

## 2023-04-24 ENCOUNTER — LAB (OUTPATIENT)
Dept: LAB | Facility: CLINIC | Age: 88
End: 2023-04-24
Payer: MEDICARE

## 2023-04-24 DIAGNOSIS — Z79.01 LONG TERM CURRENT USE OF ANTICOAGULANT THERAPY: ICD-10-CM

## 2023-04-24 DIAGNOSIS — I48.21 PERMANENT ATRIAL FIBRILLATION (H): ICD-10-CM

## 2023-04-24 DIAGNOSIS — I48.92 ATRIAL FLUTTER, UNSPECIFIED TYPE (H): ICD-10-CM

## 2023-04-24 DIAGNOSIS — I50.40 COMBINED SYSTOLIC AND DIASTOLIC CONGESTIVE HEART FAILURE, UNSPECIFIED HF CHRONICITY (H): Primary | ICD-10-CM

## 2023-04-24 LAB — INR BLD: 2.5 (ref 0.9–1.1)

## 2023-04-24 PROCEDURE — 85610 PROTHROMBIN TIME: CPT

## 2023-04-24 PROCEDURE — 36416 COLLJ CAPILLARY BLOOD SPEC: CPT

## 2023-04-24 NOTE — PROGRESS NOTES
ANTICOAGULATION MANAGEMENT     Miguel Patten 89 year old male is on warfarin with therapeutic INR result. (Goal INR 2.0-3.0)    Recent labs: (last 7 days)     04/24/23  0842   INR 2.5*       ASSESSMENT       Source(s): Chart Review and Patient/Caregiver Call       Warfarin doses taken: Warfarin taken as instructed    Diet: No new diet changes identified    Medication/supplement changes: None noted    New illness, injury, or hospitalization: No    Signs or symptoms of bleeding or clotting: No    Previous result: Supratherapeutic    Additional findings: None         PLAN     Recommended plan for no diet, medication or health factor changes affecting INR     Dosing Instructions: Continue your current warfarin dose with next INR in 3 weeks       Summary  As of 4/24/2023    Full warfarin instructions:  3.75 mg every Mon, Thu; 2.5 mg all other days   Next INR check:  5/15/2023             Telephone call with Miguel who verbalizes understanding and agrees to plan and who agrees to plan and repeated back plan correctly    Lab visit scheduled    Education provided:     Please call back if any changes to your diet, medications or how you've been taking warfarin    Plan made per [facility] anticoagulation protocol    Kerwin Faustin, RN  Anticoagulation Clinic  4/24/2023    _______________________________________________________________________     Anticoagulation Episode Summary     Current INR goal:  2.0-3.0   TTR:  69.2 % (1 y)   Target end date:  Indefinite   [facility]    Indications    CHF (congestive heart failure) (H) [I50.9]  Long-term (current) use of anticoagulants [Z79.01] [Z79.01]  Permanent atrial fibrillation (H) [I48.21]  Atrial flutter  unspecified type (H) [I48.92]           Comments:           Anticoagulation Care Providers     Provider Role Specialty Phone number    Vladimir Gonzales DO Referring Internal Medicine [phone]    Virginia Cardona APRN CNP  Referring Nurse Practitioner - Gerontology [phone]

## 2023-04-25 DIAGNOSIS — C61 MALIGNANT NEOPLASM OF PROSTATE (H): ICD-10-CM

## 2023-04-25 DIAGNOSIS — I10 HYPERTENSION GOAL BP (BLOOD PRESSURE) < 140/90: ICD-10-CM

## 2023-04-25 RX ORDER — LOSARTAN POTASSIUM 100 MG/1
TABLET ORAL
Qty: 90 TABLET | Refills: 0 | Status: SHIPPED | OUTPATIENT
Start: 2023-04-25 | End: 2023-01-01

## 2023-04-25 RX ORDER — BICALUTAMIDE 50 MG/1
TABLET, FILM COATED ORAL
Qty: 30 TABLET | Refills: 0 | Status: SHIPPED | OUTPATIENT
Start: 2023-04-25 | End: 2023-05-31

## 2023-05-01 ENCOUNTER — INFUSION THERAPY VISIT (OUTPATIENT)
Dept: INFUSION THERAPY | Facility: CLINIC | Age: 88
End: 2023-05-01
Attending: INTERNAL MEDICINE
Payer: MEDICARE

## 2023-05-01 VITALS
BODY MASS INDEX: 24.84 KG/M2 | DIASTOLIC BLOOD PRESSURE: 60 MMHG | HEART RATE: 60 BPM | TEMPERATURE: 97.8 F | RESPIRATION RATE: 20 BRPM | OXYGEN SATURATION: 97 % | WEIGHT: 188.3 LBS | SYSTOLIC BLOOD PRESSURE: 137 MMHG

## 2023-05-01 DIAGNOSIS — C61 MALIGNANT NEOPLASM OF PROSTATE (H): Primary | ICD-10-CM

## 2023-05-01 PROCEDURE — 96402 CHEMO HORMON ANTINEOPL SQ/IM: CPT

## 2023-05-01 PROCEDURE — 250N000011 HC RX IP 250 OP 636: Performed by: INTERNAL MEDICINE

## 2023-05-01 RX ORDER — HEPARIN SODIUM (PORCINE) LOCK FLUSH IV SOLN 100 UNIT/ML 100 UNIT/ML
5 SOLUTION INTRAVENOUS
Status: CANCELLED | OUTPATIENT
Start: 2023-05-01

## 2023-05-01 RX ORDER — HEPARIN SODIUM,PORCINE 10 UNIT/ML
5 VIAL (ML) INTRAVENOUS
Status: CANCELLED | OUTPATIENT
Start: 2023-05-01

## 2023-05-01 RX ADMIN — LEUPROLIDE ACETATE 22.5 MG: KIT SUBCUTANEOUS at 09:19

## 2023-05-01 ASSESSMENT — PAIN SCALES - GENERAL: PAINLEVEL: NO PAIN (0)

## 2023-05-01 NOTE — PROGRESS NOTES
Infusion Nursing Note:  Miguel Patten presents today for Eligard.    Patient seen by provider today: No   present during visit today: Not Applicable.    Note: Patient has no complaints. Denies pain. Recently lost his daughter last week. Grieving over this. Listened and reassurance given to patient. Living at [facility], but plans to get a place for him and son in law to live together as he has MS.      Intravenous Access:  No Intravenous access/labs at this visit.    Treatment Conditions:  Not Applicable.      Post Infusion Assessment:  Patient tolerated injection without incident. Administered to left upper abdomen.   Patient observed for 10 minutes post injection per protocol.       Discharge Plan:   Discharge instructions reviewed with: Patient.  Patient and/or family verbalized understanding of discharge instructions and all questions answered.  Copy of AVS reviewed with patient and/or family.  Patient will return 6 months for next appointment.  Patient discharged in stable condition accompanied by: self.  Departure Mode: Wheelchair. Assisted to speciality clinic door. Ambulated to car with cane.       Catrina Cuevas RN

## 2023-05-15 ENCOUNTER — ANTICOAGULATION THERAPY VISIT (OUTPATIENT)
Dept: ANTICOAGULATION | Facility: CLINIC | Age: 88
End: 2023-05-15

## 2023-05-15 ENCOUNTER — LAB (OUTPATIENT)
Dept: LAB | Facility: CLINIC | Age: 88
End: 2023-05-15
Payer: MEDICARE

## 2023-05-15 DIAGNOSIS — Z79.01 LONG TERM CURRENT USE OF ANTICOAGULANT THERAPY: ICD-10-CM

## 2023-05-15 DIAGNOSIS — I50.40 COMBINED SYSTOLIC AND DIASTOLIC CONGESTIVE HEART FAILURE, UNSPECIFIED HF CHRONICITY (H): Primary | ICD-10-CM

## 2023-05-15 DIAGNOSIS — I48.92 ATRIAL FLUTTER, UNSPECIFIED TYPE (H): ICD-10-CM

## 2023-05-15 DIAGNOSIS — I48.21 PERMANENT ATRIAL FIBRILLATION (H): ICD-10-CM

## 2023-05-15 LAB — INR BLD: 2.7 (ref 0.9–1.1)

## 2023-05-15 PROCEDURE — 85610 PROTHROMBIN TIME: CPT

## 2023-05-15 PROCEDURE — 36416 COLLJ CAPILLARY BLOOD SPEC: CPT

## 2023-05-30 DIAGNOSIS — C61 MALIGNANT NEOPLASM OF PROSTATE (H): ICD-10-CM

## 2023-05-31 RX ORDER — BICALUTAMIDE 50 MG/1
TABLET, FILM COATED ORAL
Qty: 30 TABLET | Refills: 0 | Status: SHIPPED | OUTPATIENT
Start: 2023-05-31 | End: 2023-01-01

## 2023-06-12 NOTE — PROGRESS NOTES
ANTICOAGULATION MANAGEMENT     Miguel Patten 89 year old male is on warfarin with therapeutic INR result. (Goal INR 2.0-3.0)    Recent labs: (last 7 days)     06/12/23  0853   INR 2.5*       ASSESSMENT       Source(s): Chart Review and Patient/Caregiver Call       Warfarin doses taken: Warfarin taken as instructed    Diet: No new diet changes identified    Medication/supplement changes: None noted    New illness, injury, or hospitalization: No    Signs or symptoms of bleeding or clotting: No    Previous result: Therapeutic last 2(+) visits    Additional findings: None         PLAN     Recommended plan for no diet, medication or health factor changes affecting INR     Dosing Instructions: Continue your current warfarin dose with next INR in 4 weeks       Summary  As of 6/12/2023    Full warfarin instructions:  3.75 mg every Mon, Thu; 2.5 mg all other days   Next INR check:  7/10/2023             Telephone call with Miguel who verbalizes understanding and agrees to plan and who agrees to plan and repeated back plan correctly    Lab visit scheduled    Education provided:     Please call back if any changes to your diet, medications or how you've been taking warfarin    Plan made per [facility] anticoagulation protocol    Kerwin Faustin, RN  Anticoagulation Clinic  6/12/2023    _______________________________________________________________________     Anticoagulation Episode Summary     Current INR goal:  2.0-3.0   TTR:  78.7 % (1 y)   Target end date:  Indefinite   Send INR reminders to:  [facility]    Indications    CHF (congestive heart failure) (H) [I50.9]  Long-term (current) use of anticoagulants [Z79.01] [Z79.01]  Permanent atrial fibrillation (H) [I48.21]  Atrial flutter  unspecified type (H) [I48.92]           Comments:           Anticoagulation Care Providers     Provider Role Specialty Phone number    Vladimir Gonzales DO [facility] Internal Medicine [phone]    Virginia Cardona,  APRN CNP Referring Nurse Practitioner - Gerontology [phone]

## 2023-06-17 NOTE — ED PROVIDER NOTES
History     Chief Complaint   Patient presents with     Knee Pain     HPI  Miguel Patten is a 89 year old male who presents to the ER via ambulance from his assisted living home secondary to concerns of increasingly severe right knee pain to the point where he is having difficulty walking and toileting himself because of the pain.  He denies any specific injury.  He states that the knee is swollen and has been for some time but the pain has become more severe over the last 3 days.  He has difficulty bending the knee due to increasingly severe pain with any attempted bending.  He is more comfortable with the knee extended.  He denies fever or chills.  He denies any open skin wound to the knee area.  He denies any similar issues in the past.  Patient states that he has been a runner and ran half marathons and so has had a lot of miles on his knees.  He has had no prior procedure to the knee.  He denies any significant right hip pain or ankle pain.  He does have a history of gout on his problem list but the patient does not recall prior diagnosis of gout.  Patient is on chronic warfarin therapy.      I reviewed his chart for past history as well as most recent blood test results and copied results below:                Allergies:  Allergies   Allergen Reactions     No Known Drug Allergy        Problem List:    Patient Active Problem List    Diagnosis Date Noted     Health Care Home 06/10/2011     Priority: High     DX V65.8 REPLACED WITH 03217 HEALTH CARE HOME (04/08/2013)       2019 novel coronavirus disease (COVID-19) 08/24/2021     Priority: Medium     Asthma without status asthmaticus 08/24/2021     Priority: Medium     Traumatic rhabdomyolysis, initial encounter (H) 08/24/2021     Priority: Medium     Atrial flutter, unspecified type (H) 01/25/2021     Priority: Medium     Fall (on) (from) other stairs and steps, initial encounter 11/13/2020     Priority: Medium     Permanent atrial fibrillation (H) 06/09/2020      Priority: Medium     Mixed hyperlipidemia 04/25/2017     Priority: Medium     Chronic systolic congestive heart failure (H) 04/21/2017     Priority: Medium     Appendicitis with perforation 04/19/2017     Priority: Medium     Mild intermittent asthma without complication 09/27/2016     Priority: Medium     Long-term (current) use of anticoagulants [Z79.01] 04/05/2016     Priority: Medium     S/P AVR (aortic valve replacement) 07/25/2014     Priority: Medium     S/P ascending aortic replacement 07/25/2014     Priority: Medium     Atrial fibrillation (H) 07/25/2014     Priority: Medium     Aortic regurgitation 07/17/2014     Priority: Medium     Hypertension goal BP (blood pressure) < 140/90 04/16/2014     Priority: Medium     CHF (congestive heart failure) (H) 03/24/2014     Priority: Medium     Atrial flutter (H) 01/14/2013     Priority: Medium     History of total hip arthroplasty - bilateral 11/19/2012     Priority: Medium     Osteoarthritis of hip - right 11/19/2012     Priority: Medium     Gout 04/30/2012     Priority: Medium     Malignant neoplasm of prostate (H) 03/05/2004     Priority: Medium        Past Medical History:    Past Medical History:   Diagnosis Date     Arthritis      Ascending aortic aneurysm (H)      Asthma      Complete AV block (H)      Congestive heart failure (H)      Coronary artery disease      H/O aortic valve replacement      Hypertension      Malignant neoplasm of prostate (H)      Permanent atrial fibrillation (H)        Past Surgical History:    Past Surgical History:   Procedure Laterality Date     ARTHROPLASTY HIP  1/21/2013    Procedure: ARTHROPLASTY HIP;  right total hip arthroplasty;  Surgeon: Rober Alves MD;  Location: [facility] OR     GENITOURINARY SURGERY  2001    Prostatectomy       ORTHOPEDIC SURGERY      Left SONALI     PHACOEMULSIFICATION WITH STANDARD INTRAOCULAR LENS IMPLANT Right 10/6/2016    Procedure: PHACOEMULSIFICATION WITH STANDARD INTRAOCULAR LENS IMPLANT;   Surgeon: Elder Rueda MD;  Location: [facility] OR     PHACOEMULSIFICATION WITH STANDARD INTRAOCULAR LENS IMPLANT Left 10/20/2016    Procedure: PHACOEMULSIFICATION WITH STANDARD INTRAOCULAR LENS IMPLANT;  Surgeon: Elder Rueda MD;  Location: [facility] OR     REPAIR ANEURYSM ASCENDING AORTA  7/17/2014    2. Aortic aneurysm repair with 32 mm Hemashield graft.      REPLACE VALVE AORTIC  7/17/2014    1. Aortic valve replacement with 23 mm [employer] Trifecta tissue valve.      s/p aneurysm repair by Dr. Friedman       s/p avr       [facility] NONSPECIFIC PROCEDURE      Hernia repair     [facility] NONSPECIFIC PROCEDURE      Prostatectomy/cancer     [facility] TOTAL HIP ARTHROPLASTY  1/21/13    Right       Family History:    Family History   Problem Relation Age of Onset     Asthma Father      Asthma Paternal Grandmother      Asthma Daughter        Social History:  Marital Status:   [5]  Social History     Tobacco Use     Smoking status: Never     Smokeless tobacco: Never   Vaping Use     Vaping status: Never Used     Passive vaping exposure: Yes   Substance Use Topics     Alcohol use: Yes     Alcohol/week: 0.0 standard drinks of alcohol     Comment: rarely, once a week or less     Drug use: No        Medications:    albuterol (PROAIR HFA/PROVENTIL HFA/VENTOLIN HFA) 108 (90 Base) MCG/ACT inhaler  bicalutamide (CASODEX) 50 MG tablet  cycloSPORINE (RESTASIS) 0.05 % ophthalmic emulsion  FLOVENT HFA 44 MCG/ACT inhaler  losartan (COZAAR) 100 MG tablet  simvastatin (ZOCOR) 40 MG tablet  warfarin ANTICOAGULANT (JANTOVEN ANTICOAGULANT) 2.5 MG tablet  acetaminophen (TYLENOL) 325 MG tablet  leuprolide (LUPRON DEPOT, 3-MONTH,) 22.5 MG kit  sodium chloride (OCEAN) 0.65 % nasal spray        Review of Systems   Constitutional: Negative for chills and fever.   Musculoskeletal: Positive for arthralgias (right knee pain and swelling.) and gait problem (Patient with concerns of difficulty walking and toileting himself because of severe pain in the right  knee with movement and bending.).   Skin: Negative for color change, rash and wound.   All other systems reviewed and are negative.      Physical Exam   BP: 129/69  Pulse: 61  Temp: 98.1  F (36.7  C)  Resp: 19  Height: 182.9 cm (6')  Weight: 85.7 kg (189 lb)  SpO2: 97 %      Physical Exam  Vitals and nursing note reviewed.   Constitutional:       General: He is in acute distress (right knee pain).      Appearance: He is not ill-appearing or toxic-appearing.   Musculoskeletal:      Right knee: Swelling, effusion and crepitus present. No ecchymosis, lacerations or bony tenderness. Decreased range of motion. Tenderness present. No medial joint line tenderness. No LCL laxity, MCL laxity, ACL laxity or PCL laxity. Normal pulse.      Instability Tests: Anterior drawer test negative. Posterior drawer test negative. Anterior Lachman test negative. Medial Renato test negative and lateral Renato test negative.        Legs:    Skin:     Findings: No bruising, erythema or rash.   Neurological:      General: No focal deficit present.      Mental Status: He is alert and oriented to person, place, and time.             ED Course                 Arthrocentesis    Date/Time: 6/17/2023 4:24 AM    Performed by: Rober Lopez DO  Authorized by: Rober Lopez DO    Risks, benefits and alternatives discussed.      UNIVERSAL PROTOCOL   Site Marked: Yes  Prior Images Obtained and Reviewed:  Yes  Required items: Required blood products, implants, devices and special equipment available    Patient identity confirmed:  Verbally with patient  NA - No sedation, light sedation, or local anesthesia  Confirmation Checklist:  Patient's identity using two indicators, procedure was appropriate and matched the consent or emergent situation and correct equipment/implants were available  Time out: Immediately prior to the procedure a time out was called    Universal Protocol: the Joint Commission Universal Protocol was followed     Preparation: Patient was prepped and draped in usual sterile fashion    ESBL (mL):  0    LOCATION:   Location:  Knee  Knee:  R knee  ANESTHESIA (see MAR for exact dosages):   Anesthesia method:  Local infiltration  Local anesthetic:  Bupivacaine 0.5% WITH epi      PROCEDURE DETAILS:     Needle gauge:  18 G    Ultrasound guidance: yes      Approach:  Lateral    Aspirate amount:  50ml    Aspirate characteristics:  Yellow    Steroid injected: no      Specimen collected: yes      Dressing: adhesive bandage      PROCEDURE  Describe Procedure: Aspiration of the right prepatellar bursal sac for both diagnostic and therapeutic reasons.  She tolerated procedure well.  Procedure done sterilely.  Betadine used to cleanse the area prior to procedure.  Sterile procedure performed.  Patient Tolerance:  Patient tolerated the procedure well with no immediate complications      Results for orders placed during the hospital encounter of 06/17/23    POC US GUIDANCE NEEDLE PLACEMENT    Impression  Limited Soft Tissue Ultrasound, performed and interpreted by me.    Indication: Severe pain right knee inability to tolerate ambulation. Clinical exam suggestive of prepatellar bursitis with elevated C-reactive protein level.    Body location: right knee/lower extremity    Findings:  There is no cobblestoning suggestive of cellulitis in the evaluated area. There is a fluid collection measuring 2cm x 5cm. No foreign body identified.  Ultrasound used to guide aspiration of the prepatellar bursal sac right knee.  50 cc of turbid yellowish colored fluid aspirated from the area. See picture.    IMPRESSION: Right knee prepatellar bursitis                 Critical Care time:  none               Results for orders placed or performed during the hospital encounter of 06/17/23 (from the past 24 hour(s))   XR Knee Right 3 Views    Narrative    EXAM: XR KNEE RIGHT 3 VIEWS  LOCATION: [facility]  DATE:  6/17/2023    INDICATION: knee pain swelling  COMPARISON: None.      Impression    IMPRESSION: Severe tricompartmental degenerative changes of the knee most prominently involving the lateral compartment. No fracture. Probable knee effusion present. Atherosclerotic vascular calcifications.   CBC with platelets differential    Narrative    The following orders were created for panel order CBC with platelets differential.  Procedure                               Abnormality         Status                     ---------                               -----------         ------                     CBC with platelets and d...[[id number]]  Abnormal            Final result                 Please view results for these tests on the individual orders.   CRP inflammation   Result Value Ref Range    CRP Inflammation 47.47 (H) <5.00 mg/L   Basic metabolic panel   Result Value Ref Range    Sodium 136 136 - 145 mmol/L    Potassium 4.0 3.4 - 5.3 mmol/L    Chloride 103 98 - 107 mmol/L    Carbon Dioxide (CO2) 21 (L) 22 - 29 mmol/L    Anion Gap 12 7 - 15 mmol/L    Urea Nitrogen 27.1 (H) 8.0 - 23.0 mg/dL    Creatinine 0.74 0.67 - 1.17 mg/dL    Calcium 8.8 8.2 - 9.6 mg/dL    Glucose 106 (H) 70 - 99 mg/dL    GFR Estimate 86 >60 mL/min/1.73m2   Uric acid   Result Value Ref Range    Uric Acid 5.3 3.4 - 7.0 mg/dL   CBC with platelets and differential   Result Value Ref Range    WBC Count 10.7 4.0 - 11.0 10e3/uL    RBC Count 3.60 (L) 4.40 - 5.90 10e6/uL    Hemoglobin 11.5 (L) 13.3 - 17.7 g/dL    Hematocrit 34.8 (L) 40.0 - 53.0 %    MCV 97 78 - 100 fL    MCH 31.9 26.5 - 33.0 pg    MCHC 33.0 31.5 - 36.5 g/dL    RDW 16.0 (H) 10.0 - 15.0 %    Platelet Count 141 (L) 150 - 450 10e3/uL    % Neutrophils 72 %    % Lymphocytes 16 %    % Monocytes 10 %    % Eosinophils 0 %    % Basophils 1 %    % Immature Granulocytes 1 %    NRBCs per 100 WBC 0 <1 /100    Absolute Neutrophils 7.7 1.6 - 8.3 10e3/uL    Absolute Lymphocytes 1.7 0.8 - 5.3 10e3/uL    Absolute  Monocytes 1.1 0.0 - 1.3 10e3/uL    Absolute Eosinophils 0.0 0.0 - 0.7 10e3/uL    Absolute Basophils 0.1 0.0 - 0.2 10e3/uL    Absolute Immature Granulocytes 0.1 <=0.4 10e3/uL    Absolute NRBCs 0.0 10e3/uL   POC US GUIDANCE NEEDLE PLACEMENT    Impression    Limited Soft Tissue Ultrasound, performed and interpreted by me.    Indication: Severe pain right knee inability to tolerate ambulation. Clinical exam suggestive of prepatellar bursitis with elevated C-reactive protein level.    Body location: right knee/lower extremity    Findings:  There is no cobblestoning suggestive of cellulitis in the evaluated area. There is a fluid collection measuring 2cm x 5cm. No foreign body identified.  Ultrasound used to guide aspiration of the prepatellar bursal sac right knee.  50 cc of turbid yellowish colored fluid aspirated from the area. See picture.    IMPRESSION: Right knee prepatellar bursitis       Cell count with differential fluid    Narrative    The following orders were created for panel order Cell count with differential fluid.  Procedure                               Abnormality         Status                     ---------                               -----------         ------                     Cell Count Body Fluid[[id number]]        Abnormal            Final result               Differential Body Fluid[[id number]]                          Final result                 Please view results for these tests on the individual orders.   Gram stain    Specimen: Knee, Right; Synovial fluid   Result Value Ref Range    Gram Stain Result No organisms seen    Cell Count Body Fluid   Result Value Ref Range    Color Yellow Colorless, Yellow    Clarity Hazy (A) Clear    Cell Count Fluid Source Knee, Right     Total Nucleated Cells 17,119 /uL    Narrative    No reference ranges have been established.  This result  should be interpreted in the context of the patient's clinical condition and   compared to simultaneous measurement in  the patient's blood.         Differential Body Fluid   Result Value Ref Range    % Neutrophils 91 %    % Lymphocytes 5 %    % Monocyte/Macrophages 4 %    Narrative    No reference ranges have been established. This result should be interpreted in the context of the patient's clinical condition and compared to simultaneous measurement in the patient's blood.       Medications   HYDROcodone-acetaminophen (NORCO) 5-325 MG per tablet 1 tablet (1 tablet Oral $Given 6/17/23 0303)       Assessments & Plan (with Medical Decision Making)  9-year-old male to the ER secondary concerns of severe right knee pain making it difficult for him to get up to the toilet and back in his assisted living home.  Patient noted increased swelling to the knee that has been present for some time but gotten worse over the last 3 days.  Exam findings reveal nonerythematous swelling to the right knee consistent with prepatellar bursal swelling.  Exam findings after aspiration with an elevated white count but negative Gram stain.  Likely inflammatory bursitis to the prepatellar area of the knee.  Patient signed out to Dr. Cuevas at shift change.  Consideration for possible steroid injection of the knee per my discussion with Dr. Cuevas.     I have reviewed the nursing notes.    I have reviewed the findings, diagnosis, plan and need for follow up with the patient.           Medical Decision Making  The patient's presentation was of moderate complexity (a chronic illness mild to moderate exacerbation, progression, or side effect of treatment).    The patient's evaluation involved:  ordering and/or review of 3+ test(s) in this encounter (see separate area of note for details)    The patient's management necessitated further care after sign-out to Dr. Cuevas (see their note for further management).        Final diagnoses:   Prepatellar bursitis of right knee   Acute pain of right knee   Osteoarthritis of right knee, unspecified osteoarthritis type        6/17/2023   [facility] EMERGENCY DEPT     John, Rober Warren,   06/17/23 0729

## 2023-06-17 NOTE — ED NOTES
Daughter, Savi, updated on pt status and plan for discharge. She states she is unable to  pt and he has no other family near the area. Pt unable to bear weight on right leg at this time.

## 2023-06-17 NOTE — DISCHARGE INSTRUCTIONS
Medical work-up identified tricompartment osteoarthritis.  Significant inflammation causing fluid to build in the joint.  Most likely related to underlying arthritis.  Low probability for infection.  Fluid culture is pending.  Fluid crystal analysis for gout is also pending.  Emergency department care included triamcinolone 60 mg intra-articular injection(cortisone injection)     Recommend icing for 30 minutes 3 times daily for 3 days

## 2023-06-17 NOTE — ED TRIAGE NOTES
Brought in via EMS from [facility].  Having 3 days of Rt knee pain, now with increased swelling, tenderness and trouble with weight bearing on leg.  Trouble with getting on/off toilet tonight due to change in mobility. No known injury.      Triage Assessment     Row Name 06/17/23 0244       Triage Assessment (Adult)    Airway WDL WDL       Respiratory WDL    Respiratory WDL WDL       Skin Circulation/Temperature WDL    Skin Circulation/Temperature WDL WDL       Cardiac WDL    Cardiac WDL WDL       Peripheral/Neurovascular WDL    Peripheral Neurovascular WDL X;neurovascular assessment lower       LLE Neurovascular Assessment    Temperature LLE warm    Color LLE no discoloration    Sensation LLE no tenderness       RLE Neurovascular Assessment    Temperature RLE warm    Color RLE no discoloration    Sensation RLE tenderness present;no numbness;no tingling       Cognitive/Neuro/Behavioral WDL    Cognitive/Neuro/Behavioral WDL WDL

## 2023-06-17 NOTE — ED PROVIDER NOTES
Emergency Department Patient Sign-out       Brief HPI:  This is a 89 year old male signed out to me by Dr. Lopez.  See initial ED Provider note for details of the presentation.     Signed out at shift change.  Appears to have an inflammatory synovitis with large amount of fluid removed from the joint space.  Gram stain showed no organisms appears to be inflammatory not infectious.  Crystal analysis is pending and is sent out to the diversity.    Proceed with Kenalog 60 mg intra-articular injection before discharge.      Exam:   Patient Vitals for the past 24 hrs:   BP Temp Temp src Pulse Resp SpO2 Height Weight   06/17/23 0630 128/70 -- -- 62 -- 97 % -- --   06/17/23 0600 120/62 -- -- 60 -- 95 % -- --   06/17/23 0530 112/57 -- -- 60 -- 93 % -- --   06/17/23 0515 115/61 -- -- 60 -- 92 % -- --   06/17/23 0500 104/57 -- -- 60 -- 93 % -- --   06/17/23 0445 111/59 -- -- 60 -- 94 % -- --   06/17/23 0430 104/55 -- -- 60 -- 94 % -- --   06/17/23 0415 102/52 -- -- 60 -- 94 % -- --   06/17/23 0406 110/55 -- -- -- -- 94 % -- --   06/17/23 0351 110/56 -- -- -- -- 94 % -- --   06/17/23 0336 113/56 -- -- -- -- 94 % -- --   06/17/23 0321 111/64 -- -- -- -- 95 % -- --   06/17/23 0306 121/63 -- -- -- -- 93 % -- --   06/17/23 0251 130/61 -- -- -- -- 96 % -- --   06/17/23 0240 129/69 98.1  F (36.7  C) Oral 61 19 97 % 1.829 m (6') 85.7 kg (189 lb)           ED RESULTS:   Results for orders placed or performed during the hospital encounter of 06/17/23 (from the past 24 hour(s))   XR Knee Right 3 Views     Status: None    Collection Time: 06/17/23  3:09 AM    Narrative    EXAM: XR KNEE RIGHT 3 VIEWS  LOCATION: [facility]  DATE: 6/17/2023    INDICATION: knee pain swelling  COMPARISON: None.      Impression    IMPRESSION: Severe tricompartmental degenerative changes of the knee most prominently involving the lateral compartment. No fracture. Probable knee effusion present. Atherosclerotic vascular  calcifications.   CBC with platelets differential     Status: Abnormal    Collection Time: 06/17/23  3:24 AM    Narrative    The following orders were created for panel order CBC with platelets differential.  Procedure                               Abnormality         Status                     ---------                               -----------         ------                     CBC with platelets and d...[[id number]]  Abnormal            Final result                 Please view results for these tests on the individual orders.   CRP inflammation     Status: Abnormal    Collection Time: 06/17/23  3:24 AM   Result Value Ref Range    CRP Inflammation 47.47 (H) <5.00 mg/L   Basic metabolic panel     Status: Abnormal    Collection Time: 06/17/23  3:24 AM   Result Value Ref Range    Sodium 136 136 - 145 mmol/L    Potassium 4.0 3.4 - 5.3 mmol/L    Chloride 103 98 - 107 mmol/L    Carbon Dioxide (CO2) 21 (L) 22 - 29 mmol/L    Anion Gap 12 7 - 15 mmol/L    Urea Nitrogen 27.1 (H) 8.0 - 23.0 mg/dL    Creatinine 0.74 0.67 - 1.17 mg/dL    Calcium 8.8 8.2 - 9.6 mg/dL    Glucose 106 (H) 70 - 99 mg/dL    GFR Estimate 86 >60 mL/min/1.73m2   Uric acid     Status: Normal    Collection Time: 06/17/23  3:24 AM   Result Value Ref Range    Uric Acid 5.3 3.4 - 7.0 mg/dL   CBC with platelets and differential     Status: Abnormal    Collection Time: 06/17/23  3:24 AM   Result Value Ref Range    WBC Count 10.7 4.0 - 11.0 10e3/uL    RBC Count 3.60 (L) 4.40 - 5.90 10e6/uL    Hemoglobin 11.5 (L) 13.3 - 17.7 g/dL    Hematocrit 34.8 (L) 40.0 - 53.0 %    MCV 97 78 - 100 fL    MCH 31.9 26.5 - 33.0 pg    MCHC 33.0 31.5 - 36.5 g/dL    RDW 16.0 (H) 10.0 - 15.0 %    Platelet Count 141 (L) 150 - 450 10e3/uL    % Neutrophils 72 %    % Lymphocytes 16 %    % Monocytes 10 %    % Eosinophils 0 %    % Basophils 1 %    % Immature Granulocytes 1 %    NRBCs per 100 WBC 0 <1 /100    Absolute Neutrophils 7.7 1.6 - 8.3 10e3/uL    Absolute Lymphocytes 1.7 0.8 - 5.3  10e3/uL    Absolute Monocytes 1.1 0.0 - 1.3 10e3/uL    Absolute Eosinophils 0.0 0.0 - 0.7 10e3/uL    Absolute Basophils 0.1 0.0 - 0.2 10e3/uL    Absolute Immature Granulocytes 0.1 <=0.4 10e3/uL    Absolute NRBCs 0.0 10e3/uL   POC US GUIDANCE NEEDLE PLACEMENT     Status: None    Collection Time: 06/17/23  3:50 AM    Impression    Limited Soft Tissue Ultrasound, performed and interpreted by me.    Indication: Severe pain right knee inability to tolerate ambulation. Clinical exam suggestive of prepatellar bursitis with elevated C-reactive protein level.    Body location: right knee/lower extremity    Findings:  There is no cobblestoning suggestive of cellulitis in the evaluated area. There is a fluid collection measuring 2cm x 5cm. No foreign body identified.  Ultrasound used to guide aspiration of the prepatellar bursal sac right knee.  50 cc of turbid yellowish colored fluid aspirated from the area. See picture.    IMPRESSION: Right knee prepatellar bursitis       Cell count with differential fluid     Status: Abnormal    Collection Time: 06/17/23  4:20 AM    Narrative    The following orders were created for panel order Cell count with differential fluid.  Procedure                               Abnormality         Status                     ---------                               -----------         ------                     Cell Count Body Fluid[[id number]]        Abnormal            Final result               Differential Body Fluid[[id number]]                          Final result                 Please view results for these tests on the individual orders.   Gram stain     Status: None    Collection Time: 06/17/23  4:20 AM    Specimen: Knee, Right; Synovial fluid   Result Value Ref Range    Gram Stain Result No organisms seen    Cell Count Body Fluid     Status: Abnormal    Collection Time: 06/17/23  4:20 AM   Result Value Ref Range    Color Yellow Colorless, Yellow    Clarity Hazy (A) Clear    Cell Count Fluid  Source Knee, Right     Total Nucleated Cells 17,119 /uL    Narrative    No reference ranges have been established.  This result  should be interpreted in the context of the patient's clinical condition and   compared to simultaneous measurement in the patient's blood.         Differential Body Fluid     Status: None    Collection Time: 06/17/23  4:20 AM   Result Value Ref Range    % Neutrophils 91 %    % Lymphocytes 5 %    % Monocyte/Macrophages 4 %    Narrative    No reference ranges have been established. This result should be interpreted in the context of the patient's clinical condition and compared to simultaneous measurement in the patient's blood.   Arthrocentesis     Status: None    Collection Time: 06/17/23  4:24 AM    Narrative    Rober Lopez DO     6/17/2023  7:29 AM  Arthrocentesis    Date/Time: 6/17/2023 4:24 AM    Performed by: Rober Lopez DO  Authorized by: Rober Lopez DO    Risks, benefits and alternatives discussed.      UNIVERSAL PROTOCOL   Site Marked: Yes  Prior Images Obtained and Reviewed:  Yes  Required items: Required blood products, implants, devices and special   equipment available    Patient identity confirmed:  Verbally with patient  NA - No sedation, light sedation, or local anesthesia  Confirmation Checklist:  Patient's identity using two indicators,   procedure was appropriate and matched the consent or emergent situation   and correct equipment/implants were available  Time out: Immediately prior to the procedure a time out was called    Universal Protocol: the Joint Commission Universal Protocol was followed    Preparation: Patient was prepped and draped in usual sterile fashion    ESBL (mL):  0    LOCATION:   Location:  Knee  Knee:  R knee  ANESTHESIA (see MAR for exact dosages):   Anesthesia method:  Local infiltration  Local anesthetic:  Bupivacaine 0.5% WITH epi      PROCEDURE DETAILS:     Needle gauge:  18 G    Ultrasound guidance: yes       Approach:  Lateral    Aspirate amount:  50ml    Aspirate characteristics:  Yellow    Steroid injected: no      Specimen collected: yes      Dressing: adhesive bandage      PROCEDURE  Describe Procedure: Aspiration of the right prepatellar bursal sac for   both diagnostic and therapeutic reasons.  She tolerated procedure well.    Procedure done sterilely.  Betadine used to cleanse the area prior to   procedure.  Sterile procedure performed.  Patient Tolerance:  Patient tolerated the procedure well with no immediate   complications       ED MEDICATIONS:   Medications   triamcinolone (KENALOG-40) injection 60 mg (has no administration in time range)   HYDROcodone-acetaminophen (NORCO) 5-325 MG per tablet 1 tablet (1 tablet Oral $Given 6/17/23 0306)         Impression:    ICD-10-CM    1. Prepatellar bursitis of right knee  M70.41       2. Acute pain of right knee  M25.561       3. Osteoarthritis of right knee, unspecified osteoarthritis type  M17.11             Romie Cuevas DO Johnson, Daniel Eugene, DO  06/17/23 0752

## 2023-06-18 NOTE — RESULT ENCOUNTER NOTE
[facility] Emergency [facility] discharge antibiotic prescribed: None  Incision and Drainage performed in [facility] [Yes or No]: No, arthrocentesis only  Recommendations in treatment per [facility] ED Lab Result culture protocol

## 2023-06-19 NOTE — PROGRESS NOTES
ANTICOAGULATION  MANAGEMENT: Discharge Review    Miguel Patten chart reviewed for anticoagulation continuity of care    Emergency room visit on 6/17 for bursitis.    Discharge disposition: [facility]    Results:    No results for input(s): INR, [id number], F2, ALMWH, AAUFH in the last 168 hours.  Anticoagulation inpatient management:     home regimen continued    Anticoagulation discharge instructions:     Warfarin dosing: home regimen continued   Bridging: No   INR goal change: No      Medication changes affecting anticoagulation: Yes: steroid injection to knee     Additional factors affecting anticoagulation: No     PLAN     No adjustment to anticoagulation plan needed    Patient not contacted Has upcoming INR apt.    No adjustment to Anticoagulation Calendar was required    Emily Damon RN

## 2023-06-19 NOTE — TELEPHONE ENCOUNTER
Reason for Call:  Appointment Request    Patient requesting this type of appt:  Hospital/ED Follow-Up     Requested provider: Vladimir Gonzales    Reason patient unable to be scheduled: Not within requested timeframe    When does patient want to be seen/preferred time: 3-7 days    Comments: Patient was seen at [facility] ED for bursitis of right knee. Patient is asking if provider can work him in before next available on 7/11.     Okay to leave a detailed message?: Yes at Home number on file [phone] (home)

## 2023-06-23 NOTE — TELEPHONE ENCOUNTER
"Please place the patient on my schedule in any available 20 minute time slot that is NOT listed as:   \"U N A V A I L A B L E\".    If a timely appointment is not available, please refer the patient to one of the many excellent [place] providers who might have an opening.    Thank you.    Christian"

## 2023-07-10 NOTE — PROGRESS NOTES
ANTICOAGULATION MANAGEMENT     Miguel Patten 89 year old male is on warfarin with therapeutic INR result. (Goal INR 2.0-3.0)    Recent labs: (last 7 days)     07/10/23  0847   INR 2.4*       ASSESSMENT       Source(s): Chart Review and Patient/Caregiver Call       Warfarin doses taken: Warfarin taken as instructed    Diet: No new diet changes identified    Medication/supplement changes: None noted    New illness, injury, or hospitalization: No    Signs or symptoms of bleeding or clotting: No    Previous result: Therapeutic last 2(+) visits    Additional findings: None         PLAN     Recommended plan for no diet, medication or health factor changes affecting INR     Dosing Instructions: Continue your current warfarin dose with next INR in 5 weeks       Summary  As of 7/10/2023    Full warfarin instructions:  3.75 mg every Mon, Thu; 2.5 mg all other days   Next INR check:  8/14/2023             Telephone call with Miguel who verbalizes understanding and agrees to plan and who agrees to plan and repeated back plan correctly    Lab visit scheduled    Education provided:     Please call back if any changes to your diet, medications or how you've been taking warfarin    Plan made per [facility] anticoagulation protocol    Kerwin Faustin, RN  Anticoagulation Clinic  7/10/2023    _______________________________________________________________________     Anticoagulation Episode Summary     Current INR goal:  2.0-3.0   TTR:  80.5 % (1 y)   Target end date:  Indefinite   Send INR reminders to:  [facility]    Indications    CHF (congestive heart failure) (H) [I50.9]  Long-term (current) use of anticoagulants [Z79.01] [Z79.01]  Permanent atrial fibrillation (H) [I48.21]  Atrial flutter  unspecified type (H) [I48.92]           Comments:           Anticoagulation Care Providers     Provider Role Specialty Phone number    Vladimir Gonzales DO [facility] Internal Medicine [phone]    Virginia Cardona,  APRN CNP Referring Nurse Practitioner - Gerontology [phone]

## 2023-08-14 NOTE — PROGRESS NOTES
ANTICOAGULATION MANAGEMENT     Miguel Patten 89 year old male is on warfarin with therapeutic INR result. (Goal INR 2.0-3.0)    Recent labs: (last 7 days)     08/14/23  0853   INR 2.6*       ASSESSMENT     Source(s): Chart Review  Previous INR was Therapeutic last 2(+) visits  Medication, diet, health changes since last INR chart reviewed; none identified         PLAN     Recommended plan for no diet, medication or health factor changes affecting INR     Dosing Instructions: Continue your current warfarin dose with next INR in 5 weeks       Summary  As of 8/14/2023      Full warfarin instructions:  3.75 mg every Mon, Thu; 2.5 mg all other days   Next INR check:  9/18/2023               Detailed voice message left for Miguel with dosing instructions and follow up date.     Contact [phone]  to schedule and with any changes, questions or concerns.     Education provided:   Please call back if any changes to your diet, medications or how you've been taking warfarin    Plan made per [facility] anticoagulation protocol    Kerwin Faustin RN  Anticoagulation Clinic  8/14/2023    _______________________________________________________________________     Anticoagulation Episode Summary       Current INR goal:  2.0-3.0   TTR:  80.5 % (1 y)   Target end date:  Indefinite   Send INR reminders to:  [facility]    Indications    CHF (congestive heart failure) (H) [I50.9]  Long-term (current) use of anticoagulants [Z79.01] [Z79.01]  Permanent atrial fibrillation (H) [I48.21]  Atrial flutter  unspecified type (H) [I48.92]             Comments:               Anticoagulation Care Providers       Provider Role Specialty Phone number    Vladimir Gonzales DO Referring Internal Medicine [phone]    Virginia Cardona APRN CNP Referring Nurse Practitioner - Gerontology [phone]

## 2023-08-14 NOTE — TELEPHONE ENCOUNTER
ANTICOAGULATION MANAGEMENT:  Medication Refill    Anticoagulation Summary  As of 8/14/2023      Warfarin maintenance plan:  3.75 mg (2.5 mg x 1.5) every Mon, Thu; 2.5 mg (2.5 mg x 1) all other days   Next INR check:  9/18/2023   Target end date:  Indefinite    Indications    CHF (congestive heart failure) (H) [I50.9]  Long-term (current) use of anticoagulants [Z79.01] [Z79.01]  Permanent atrial fibrillation (H) [I48.21]  Atrial flutter  unspecified type (H) [I48.92]                 Anticoagulation Care Providers       Provider Role Specialty Phone number    Vladimir Gonzales DO Referring Internal Medicine [phone]    Virginia Cardona APRN CNP Referring Nurse Practitioner - Gerontology [phone]            Refill Criteria    Visit with referring provider/group: Meets criteria: office visit within referring provider group in the last 1 year on 8/25/22    [facility] referral signed last signed: 02/01/2023; within last year: Yes    Lab monitoring not exceeding 2 weeks overdue: Yes    Miguel meets all criteria for refill. Rx instructions and quantity match patient's current dosing plan.  90 day supply with 0 refills granted per [facility] protocol     Elio ORLANDO RN  Anticoagulation Clinic

## 2023-08-31 NOTE — PROGRESS NOTES
HPI:   Mr. Patten is a delightful 89-year-old gentleman well known to me here at our [place] location.  He is an established patient of Dr. Caputo, now retired.      His past medical history includes:  1.  Aortic valve disease with bioprosthetic aortic valve replacement and graft repair of the ascending aorta.  2.  Atrial fibrillation with complete AV block.  The patient has a biventricular pacemaker that was implanted in 03/2015.  3.  Chronic AFib, on warfarin therapy.  4.  Hypertension with moderate/severe concentric left ventricular hypertrophy noted on previous echo.  5.  Recent COVID infection 2022.  The patient continues to have a cough, but feels I has improved. He still uses an inhaler daily.    At today's visit Miguel is doing well.  Denies chest pain, shortness of breath, lightheadedness, dizziness.  At his assisted care facility he ambulates with a walker.  He brought a cane today and is slightly unsteady with his gait.  I reviewed his device check with him.  He does have a history of nonsustained VT.  He has listed a DNI DNR.  We briefly discussed it today.  He states that he has not thought of whether or not he wants to be resuscitated in a long time.  He has longevity in his family.  His mother lived to 103 years of age.      Echocardiogram 2022 showed that his 23 mm [employer] Trifecta tissue valve that was implanted in 2014 is well seated with mild periprosthetic valvular regurgitation. Mean systolic gradient is 19 mmHg, which is up from 13 mmHg from 2019. His ejection fraction is 60%-65%. RV function is normal. He has just a trace of tricuspid insufficiency.     Last device check 8/17/2023:  [employer] Intua CRT-P Remote PPM Device Check  BiVP: 99%  Mode: VVIR 60/120  Presenting Rhythm: Bi   Historical underlying rhythm: AF, taking warfarin, with CHB and Vrates in 40's as of 10/2022  Heart Rate: rates mostly in the 60s   Sensing: stable   Pacing Threshold: not recorded   Impedance: stable    Battery Status: 1 years remaining   Atrial Arrhythmia: n/a   Ventricular Arrhythmia: 6 VHRs logged. 2 EGMs show BiVS events suggestive of NSVT lasting 2-15 beats, rates 140-185bpm. EF 60-65% (7/2022). This is not a new finding. Patient is DNI/DNR per [facility].    Component      Latest Ref Rng 6/17/2023  3:24 AM 8/14/2023  8:53 AM   Sodium      136 - 145 mmol/L 136     Potassium      3.4 - 5.3 mmol/L 4.0     Chloride      98 - 107 mmol/L 103     Carbon Dioxide (CO2)      22 - 29 mmol/L 21 (L)     Anion Gap      7 - 15 mmol/L 12     Urea Nitrogen      8.0 - 23.0 mg/dL 27.1 (H)     Creatinine      0.67 - 1.17 mg/dL 0.74     Calcium      8.2 - 9.6 mg/dL 8.8     Glucose      70 - 99 mg/dL 106 (H)     GFR Estimate      >60 mL/min/1.73m2 86     INR Point of Care      0.9 - 1.1   2.6 (H)       Plan:   1.  Aortic valve disease with bioprosthetic aortic valve replacement and graft repair to the ascending aorta back in 2014.    Bioprosthetic valve is functioning normally.  Gradients are up slightly but still acceptable.  EF is normal.  Patient declined a repeat echo next year.     2.  Chronic AFib with complete AV block.    The patient is 98% BiV paced with a CRT-P device.  Device is nearing JOSELITO.  He has 1 year of battery longevity.  I will see him next year or sooner should there be any concerns.  I explained to the patient that a generator change is a same-day procedure and is fairly straightforward.  On warfarin therapy     3.  Hypertension with left ventricular hypertrophy.    Blood pressure is stable today.  Patient doing well with losartan, 50 mg twice daily     4.  Hypercholesterolemia, on simvastatin 40 mg daily.  Last LDL was noted at 34.     5. hx Right carotid bruit.    His last carotid ultrasound was in 2014.  At that time, he had less than 50% stenosis of bilateral internal carotid arteries.    Bruit was not appreciated on exam today.  Patient has declined doing any type of diagnostic studies.    6.  Nonsustained VT  noted on device interrogation  Patient is a DNR/DNI.  After discussion he decided to continue with his CODE STATUS.    7.  History of cardiomyopathy last echocardiogram showed normalization of his EF.  Initial ejection fraction at time of implantation was 40%.  It was decided to place a BiV device due to the results of the BLOCK- HF trial.  Patient's EF is normalized and is doing very well.    Thank you for including me in his care.  Feel free to contact us with questions or concerns.    Lillie Milan, NP, APRN CNP          Today's clinic visit entailed:  Review of the result(s) of each unique test - device check  20 minutes spent by me on the date of the encounter doing chart review, patient visit, and documentation   Provider  Link to [facility] Help Grid     The level of medical decision making during this visit was of moderate complexity.      Orders Placed This Encounter   Procedures    Follow-Up with Cardiology SERINA       No orders of the defined types were placed in this encounter.      There are no discontinued medications.      Encounter Diagnoses   Name Primary?    Permanent atrial fibrillation (H) Yes    Sick sinus syndrome (H)     S/P AVR (aortic valve replacement)        CURRENT MEDICATIONS:  Current Outpatient Medications   Medication Sig Dispense Refill    acetaminophen (TYLENOL) 325 MG tablet Take 650 mg by mouth every 4 hours as needed for mild pain or fever 100 tablet     albuterol (PROAIR HFA/PROVENTIL HFA/VENTOLIN HFA) 108 (90 Base) MCG/ACT inhaler Inhale 2 puffs into the lungs every 6 hours as needed for shortness of breath, wheezing or cough 18 g 0    bicalutamide (CASODEX) 50 MG tablet TAKE ONE TABLET BY MOUTH ONCE DAILY 30 tablet 0    cycloSPORINE (RESTASIS) 0.05 % ophthalmic emulsion Place 1 drop into both eyes 2 times daily as needed for dry eyes 1.5 mL 3    FLOVENT HFA 44 MCG/ACT inhaler INHALE ONE PUFF BY MOUTH TWICE A DAY 10.6 g 5    leuprolide (LUPRON DEPOT, 3-MONTH,) 22.5 MG kit Inject 22.5  MG, IM q 6 months per Dr. Zeyad Guevara, pt's Urologist in [place]. 10/17/2018 1 each 3    losartan (COZAAR) 100 MG tablet TAKE ONE-HALF TABLET BY MOUTH TWICE A DAY 90 tablet 0    simvastatin (ZOCOR) 40 MG tablet TAKE ONE TABLET BY MOUTH EVERY NIGHT AT BEDTIME 90 tablet 3    sodium chloride (OCEAN) 0.65 % nasal spray Spray 1 spray into both nostrils 3 times daily as needed for congestion      warfarin ANTICOAGULANT (JANTOVEN ANTICOAGULANT) 2.5 MG tablet TAKE ONE AND ONE-HALF TABLETS BY MOUTH EVERY MONDAY AND THURSDAY. THEN TAKE ONE TABLET BY MOUTH ALL OTHER DAYS. OR AS DIRECTED BY THE COUMADIN CLINIC 100 tablet 0       ALLERGIES     Allergies   Allergen Reactions    No Known Drug Allergy        PAST MEDICAL HISTORY:  Past Medical History:   Diagnosis Date    Arthritis     Ascending aortic aneurysm (H)     repaired with Hemashield graft 7/2014    Asthma     Complete AV block (H)     sp CRTP implant    Congestive heart failure (H)     Coronary artery disease     mild-moderate stenosis by angiography    H/O aortic valve replacement     bioprosthetic, 7/2014    Hypertension     Malignant neoplasm of prostate (H)     Prostate cancer    Permanent atrial fibrillation (H)     chronic       PAST SURGICAL HISTORY:  Past Surgical History:   Procedure Laterality Date    ARTHROPLASTY HIP  1/21/2013    Procedure: ARTHROPLASTY HIP;  right total hip arthroplasty;  Surgeon: Rober Alves MD;  Location:  OR    GENITOURINARY SURGERY  2001    Prostatectomy      ORTHOPEDIC SURGERY      Left SONALI    PHACOEMULSIFICATION WITH STANDARD INTRAOCULAR LENS IMPLANT Right 10/6/2016    Procedure: PHACOEMULSIFICATION WITH STANDARD INTRAOCULAR LENS IMPLANT;  Surgeon: Elder Rueda MD;  [facility]    PHACOEMULSIFICATION WITH STANDARD INTRAOCULAR LENS IMPLANT Left 10/20/2016    Procedure: PHACOEMULSIFICATION WITH STANDARD INTRAOCULAR LENS IMPLANT;  Surgeon: Elder Rueda MD;  Location: [facility] OR    REPAIR ANEURYSM  ASCENDING AORTA  7/17/2014    2. Aortic aneurysm repair with 32 mm Hemashield graft.     REPLACE VALVE AORTIC  7/17/2014    1. Aortic valve replacement with 23 mm [employer] Trifecta tissue valve.     s/p aneurysm repair by Dr. Friedman      s/p avr      [facility] NONSPECIFIC PROCEDURE      Hernia repair    [facility] NONSPECIFIC PROCEDURE      Prostatectomy/cancer    [facility] TOTAL HIP ARTHROPLASTY  1/21/13    Right       FAMILY HISTORY:  Family History   Problem Relation Age of Onset    Asthma Father     Asthma Paternal Grandmother     Asthma Daughter        SOCIAL HISTORY:  Social History     Socioeconomic History    Marital status:      Spouse name: None    Number of children: None    Years of education: None    Highest education level: None   Tobacco Use    Smoking status: Never    Smokeless tobacco: Never   Vaping Use    Vaping Use: Never used   Substance and Sexual Activity    Alcohol use: Yes     Alcohol/week: 0.0 standard drinks of alcohol     Comment: rarely, once a week or less    Drug use: No    Sexual activity: Not Currently     Partners: Female     Comment:  - live with friend - 3 children.   Other Topics Concern    Parent/sibling w/ CABG, MI or angioplasty before 65F 55M? No     Service Yes    Blood Transfusions No    Caffeine Concern No    Occupational Exposure No    Hobby Hazards No    Sleep Concern No    Stress Concern No    Weight Concern No    Special Diet No    Back Care No    Exercise Yes    Bike Helmet No     Comment: n/a    Seat Belt Yes    Self-Exams Yes       Review of Systems:  Skin:          Eyes:         ENT:         Respiratory:          Cardiovascular:         Gastroenterology:        Genitourinary:         Musculoskeletal:         Neurologic:         Psychiatric:         Heme/Lymph/Imm:         Endocrine:           Physical Exam:  Vitals: /82 (BP Location: Right arm, Patient Position: Sitting, Cuff Size: Adult Regular)   Pulse 54   Resp 22   Ht 1.829 m (6')   Wt 84.6 kg (186  lb 8 oz)   SpO2 99%   BMI 25.29 kg/m      Constitutional:  cooperative;in no acute distress;well developed        Skin:  warm and dry to the touch, no apparent skin lesions or masses noted   pacemaker incision in the left infraclavicular area was well-healed      Head:  normocephalic, no masses or lesions        Eyes:  pupils equal and round;conjunctivae and lids unremarkable        ENT:  no pallor or cyanosis, dentition good hearing aide(s) present      Neck:  JVP normal;no carotid bruit        Respiratory:  clear to auscultation;normal symmetry        Cardiac: regular rhythm;normal S1 and S2       grade 1;systolic murmur        not assessed this visit                                        GI:  abdomen soft;BS normoactive        Extremities and Muscular Skeletal:  no deformities, clubbing, cyanosis, erythema observed;no edema         uses a cane. Gait unsteady    Neurological:  no gross motor deficits;affect appropriate        Psych:  Alert and Oriented x 3        CC  PHANI Lord CNP  [address]  [place] [address]

## 2023-08-31 NOTE — LETTER
8/31/2023    Vladimir Gonzales, DO  [address] [place] [address]    RE: Miguel Patten       Dear Colleague,     I had the pleasure of seeing Miguel Patten in the [facility].  HPI:   Mr. Patten is a delightful 89-year-old gentleman well known to me here at our [place] location.  He is an established patient of Dr. Caputo, now retired.      His past medical history includes:  1.  Aortic valve disease with bioprosthetic aortic valve replacement and graft repair of the ascending aorta.  2.  Atrial fibrillation with complete AV block.  The patient has a biventricular pacemaker that was implanted in 03/2015.  3.  Chronic AFib, on warfarin therapy.  4.  Hypertension with moderate/severe concentric left ventricular hypertrophy noted on previous echo.  5.  Recent COVID infection 2022.  The patient continues to have a cough, but feels I has improved. He still uses an inhaler daily.    At today's visit Miguel is doing well.  Denies chest pain, shortness of breath, lightheadedness, dizziness.  At his assisted care facility he ambulates with a walker.  He brought a cane today and is slightly unsteady with his gait.  I reviewed his device check with him.  He does have a history of nonsustained VT.  He has listed a DNI DNR.  We briefly discussed it today.  He states that he has not thought of whether or not he wants to be resuscitated in a long time.  He has longevity in his family.  His mother lived to 103 years of age.      Echocardiogram 2022 showed that his 23 mm [employer] Trifecta tissue valve that was implanted in 2014 is well seated with mild periprosthetic valvular regurgitation. Mean systolic gradient is 19 mmHg, which is up from 13 mmHg from 2019. His ejection fraction is 60%-65%. RV function is normal. He has just a trace of tricuspid insufficiency.     Last device check 8/17/2023:  [employer] Int[employer] CRT-P Remote PPM Device Check  BiVP: 99%  Mode: VVIR 60/120  Presenting Rhythm:  Bi   Historical underlying rhythm: AF, taking warfarin, with CHB and Vrates in 40's as of 10/2022  Heart Rate: rates mostly in the 60s   Sensing: stable   Pacing Threshold: not recorded   Impedance: stable   Battery Status: 1 years remaining   Atrial Arrhythmia: n/a   Ventricular Arrhythmia: 6 VHRs logged. 2 EGMs show BiVS events suggestive of NSVT lasting 2-15 beats, rates 140-185bpm. EF 60-65% (7/2022). This is not a new finding. Patient is DNI/DNR per [facility].    Component      Latest Ref Rng 6/17/2023  3:24 AM 8/14/2023  8:53 AM   Sodium      136 - 145 mmol/L 136     Potassium      3.4 - 5.3 mmol/L 4.0     Chloride      98 - 107 mmol/L 103     Carbon Dioxide (CO2)      22 - 29 mmol/L 21 (L)     Anion Gap      7 - 15 mmol/L 12     Urea Nitrogen      8.0 - 23.0 mg/dL 27.1 (H)     Creatinine      0.67 - 1.17 mg/dL 0.74     Calcium      8.2 - 9.6 mg/dL 8.8     Glucose      70 - 99 mg/dL 106 (H)     GFR Estimate      >60 mL/min/1.73m2 86     INR Point of Care      0.9 - 1.1   2.6 (H)       Plan:   1.  Aortic valve disease with bioprosthetic aortic valve replacement and graft repair to the ascending aorta back in 2014.    Bioprosthetic valve is functioning normally.  Gradients are up slightly but still acceptable.  EF is normal.  Patient declined a repeat echo next year.     2.  Chronic AFib with complete AV block.    The patient is 98% BiV paced with a CRT-P device.  Device is nearing JOSELITO.  He has 1 year of battery longevity.  I will see him next year or sooner should there be any concerns.  I explained to the patient that a generator change is a same-day procedure and is fairly straightforward.  On warfarin therapy     3.  Hypertension with left ventricular hypertrophy.    Blood pressure is stable today.  Patient doing well with losartan, 50 mg twice daily     4.  Hypercholesterolemia, on simvastatin 40 mg daily.  Last LDL was noted at 34.     5. hx Right carotid bruit.    His last carotid ultrasound was in 2014.  At  that time, he had less than 50% stenosis of bilateral internal carotid arteries.    Bruit was not appreciated on exam today.  Patient has declined doing any type of diagnostic studies.    6.  Nonsustained VT noted on device interrogation  Patient is a DNR/DNI.  After discussion he decided to continue with his CODE STATUS.    7.  History of cardiomyopathy last echocardiogram showed normalization of his EF.  Initial ejection fraction at time of implantation was 40%.  It was decided to place a BiV device due to the results of the BLOCK- HF trial.  Patient's EF is normalized and is doing very well.    Thank you for including me in his care.  Feel free to contact us with questions or concerns.    Lillie Milan, NP, APRN CNP          Today's clinic visit entailed:  Review of the result(s) of each unique test - device check  20 minutes spent by me on the date of the encounter doing chart review, patient visit, and documentation   Provider  Link to [facility] Help Grid     The level of medical decision making during this visit was of moderate complexity.      Orders Placed This Encounter   Procedures    Follow-Up with Cardiology SERINA       No orders of the defined types were placed in this encounter.      There are no discontinued medications.      Encounter Diagnoses   Name Primary?    Permanent atrial fibrillation (H) Yes    Sick sinus syndrome (H)     S/P AVR (aortic valve replacement)        CURRENT MEDICATIONS:  Current Outpatient Medications   Medication Sig Dispense Refill    acetaminophen (TYLENOL) 325 MG tablet Take 650 mg by mouth every 4 hours as needed for mild pain or fever 100 tablet     albuterol (PROAIR HFA/PROVENTIL HFA/VENTOLIN HFA) 108 (90 Base) MCG/ACT inhaler Inhale 2 puffs into the lungs every 6 hours as needed for shortness of breath, wheezing or cough 18 g 0    bicalutamide (CASODEX) 50 MG tablet TAKE ONE TABLET BY MOUTH ONCE DAILY 30 tablet 0    cycloSPORINE (RESTASIS) 0.05 % ophthalmic emulsion Place 1  drop into both eyes 2 times daily as needed for dry eyes 1.5 mL 3    FLOVENT HFA 44 MCG/ACT inhaler INHALE ONE PUFF BY MOUTH TWICE A DAY 10.6 g 5    leuprolide (LUPRON DEPOT, 3-MONTH,) 22.5 MG kit Inject 22.5 MG, IM q 6 months per Dr. Zeyad Guevara, pt's Urologist in [place]. 10/17/2018 1 each 3    losartan (COZAAR) 100 MG tablet TAKE ONE-HALF TABLET BY MOUTH TWICE A DAY 90 tablet 0    simvastatin (ZOCOR) 40 MG tablet TAKE ONE TABLET BY MOUTH EVERY NIGHT AT BEDTIME 90 tablet 3    sodium chloride (OCEAN) 0.65 % nasal spray Spray 1 spray into both nostrils 3 times daily as needed for congestion      warfarin ANTICOAGULANT (JANTOVEN ANTICOAGULANT) 2.5 MG tablet TAKE ONE AND ONE-HALF TABLETS BY MOUTH EVERY MONDAY AND THURSDAY. THEN TAKE ONE TABLET BY MOUTH ALL OTHER DAYS. OR AS DIRECTED BY THE COUMADIN CLINIC 100 tablet 0       ALLERGIES     Allergies   Allergen Reactions    No Known Drug Allergy        PAST MEDICAL HISTORY:  Past Medical History:   Diagnosis Date    Arthritis     Ascending aortic aneurysm (H)     repaired with Hemashield graft 7/2014    Asthma     Complete AV block (H)     sp CRTP implant    Congestive heart failure (H)     Coronary artery disease     mild-moderate stenosis by angiography    H/O aortic valve replacement     bioprosthetic, 7/2014    Hypertension     Malignant neoplasm of prostate (H)     Prostate cancer    Permanent atrial fibrillation (H)     chronic       PAST SURGICAL HISTORY:  Past Surgical History:   Procedure Laterality Date    ARTHROPLASTY HIP  1/21/2013    Procedure: ARTHROPLASTY HIP;  right total hip arthroplasty;  Surgeon: Rober Alves MD;  Location:  OR    GENITOURINARY SURGERY  2001    Prostatectomy      ORTHOPEDIC SURGERY      Left SONALI    PHACOEMULSIFICATION WITH STANDARD INTRAOCULAR LENS IMPLANT Right 10/6/2016    Procedure: PHACOEMULSIFICATION WITH STANDARD INTRAOCULAR LENS IMPLANT;  Surgeon: Elder Rueda MD;  Location:  OR     PHACOEMULSIFICATION WITH STANDARD INTRAOCULAR LENS IMPLANT Left 10/20/2016    Procedure: PHACOEMULSIFICATION WITH STANDARD INTRAOCULAR LENS IMPLANT;  Surgeon: Elder Rueda MD;  Location: [facility] OR    REPAIR ANEURYSM ASCENDING AORTA  7/17/2014    2. Aortic aneurysm repair with 32 mm Hemashield graft.     REPLACE VALVE AORTIC  7/17/2014    1. Aortic valve replacement with 23 mm [employer] Trifecta tissue valve.     s/p aneurysm repair by Dr. Friedman      s/p avr      [facility] NONSPECIFIC PROCEDURE      Hernia repair    [facility] NONSPECIFIC PROCEDURE      Prostatectomy/cancer    [facility] TOTAL HIP ARTHROPLASTY  1/21/13    Right       FAMILY HISTORY:  Family History   Problem Relation Age of Onset    Asthma Father     Asthma Paternal Grandmother     Asthma Daughter        SOCIAL HISTORY:  Social History     Socioeconomic History    Marital status:      Spouse name: None    Number of children: None    Years of education: None    Highest education level: None   Tobacco Use    Smoking status: Never    Smokeless tobacco: Never   Vaping Use    Vaping Use: Never used   Substance and Sexual Activity    Alcohol use: Yes     Alcohol/week: 0.0 standard drinks of alcohol     Comment: rarely, once a week or less    Drug use: No    Sexual activity: Not Currently     Partners: Female     Comment:  - live with friend - 3 children.   Other Topics Concern    Parent/sibling w/ CABG, MI or angioplasty before 65F 55M? No     Service Yes    Blood Transfusions No    Caffeine Concern No    Occupational Exposure No    Hobby Hazards No    Sleep Concern No    Stress Concern No    Weight Concern No    Special Diet No    Back Care No    Exercise Yes    Bike Helmet No     Comment: n/a    Seat Belt Yes    Self-Exams Yes       Review of Systems:  Skin:          Eyes:         ENT:         Respiratory:          Cardiovascular:         Gastroenterology:        Genitourinary:         Musculoskeletal:         Neurologic:         Psychiatric:          Heme/Lymph/Imm:         Endocrine:           Physical Exam:  Vitals: /82 (BP Location: Right arm, Patient Position: Sitting, Cuff Size: Adult Regular)   Pulse 54   Resp 22   Ht 1.829 m (6')   Wt 84.6 kg (186 lb 8 oz)   SpO2 99%   BMI 25.29 kg/m      Constitutional:  cooperative;in no acute distress;well developed        Skin:  warm and dry to the touch, no apparent skin lesions or masses noted   pacemaker incision in the left infraclavicular area was well-healed      Head:  normocephalic, no masses or lesions        Eyes:  pupils equal and round;conjunctivae and lids unremarkable        ENT:  no pallor or cyanosis, dentition good hearing aide(s) present      Neck:  JVP normal;no carotid bruit        Respiratory:  clear to auscultation;normal symmetry        Cardiac: regular rhythm;normal S1 and S2       grade 1;systolic murmur        not assessed this visit                                        GI:  abdomen soft;BS normoactive        Extremities and Muscular Skeletal:  no deformities, clubbing, cyanosis, erythema observed;no edema         uses a cane. Gait unsteady    Neurological:  no gross motor deficits;affect appropriate        Psych:  Alert and Oriented x 3        CC  PHANI Lord CNP  [address]  [place] [address]                Thank you for allowing me to participate in the care of your patient.      Sincerely,     Lillie Milan, NP, APRN CNP     [facility]

## 2023-08-31 NOTE — PATIENT INSTRUCTIONS
Call my nurse with any questions or concerns:  [phone]  *If you have concerns after hours, please call [phone], option 2 to speak with on call Cardiologist.      See me in 1 year. Call with any questions or concerns  You will most likely need a new battery next year

## 2023-09-18 NOTE — PROGRESS NOTES
ANTICOAGULATION MANAGEMENT     Miguel Patten 89 year old male is on warfarin with therapeutic INR result. (Goal INR 2.0-3.0)    Recent labs: (last 7 days)     09/18/23  0857   INR 2.0*       ASSESSMENT     Source(s): Chart Review and Patient/Caregiver Call     Warfarin doses taken: Warfarin taken as instructed  Diet: No new diet changes identified  Medication/supplement changes: None noted  New illness, injury, or hospitalization: No  Signs or symptoms of bleeding or clotting: No  Previous result: Therapeutic last 2(+) visits  Additional findings: None       PLAN     Recommended plan for no diet, medication or health factor changes affecting INR     Dosing Instructions: Continue your current warfarin dose with next INR in 5 weeks       Summary  As of 9/18/2023      Full warfarin instructions:  3.75 mg every Mon, Thu; 2.5 mg all other days   Next INR check:  10/23/2023               Telephone call with Miguel who verbalizes understanding and agrees to plan and who agrees to plan and repeated back plan correctly    Lab visit scheduled    Education provided:   None required    Plan made per [facility] anticoagulation protocol    Sameera Musa RN  Anticoagulation Clinic  9/18/2023    _______________________________________________________________________     Anticoagulation Episode Summary       Current INR goal:  2.0-3.0   TTR:  83.2 % (1 y)   Target end date:  Indefinite   Send INR reminders to:  BUBBA REID    Indications    CHF (congestive heart failure) (H) [I50.9]  Long-term (current) use of anticoagulants [Z79.01] [Z79.01]  Permanent atrial fibrillation (H) [I48.21]  Atrial flutter  unspecified type (H) [I48.92]             Comments:               Anticoagulation Care Providers       Provider Role Specialty Phone number    Vladimir Gonzales DO Referring Internal Medicine [phone]    Virginia Cardona APRN CNP Referring Nurse Practitioner - Gerontology [phone]

## 2023-10-18 NOTE — PROGRESS NOTES
"ANTICOAGULATION  MANAGEMENT: Discharge Review    Miguel Patten chart reviewed for anticoagulation continuity of care    Emergency room visit on 10/10/23 for Constipation.    Discharge disposition: Home    Results:    No results for input(s): \"INR\", \"ETJLBQ86BBAS\", \"F2\", \"ALMWH\", \"AAUFH\" in the last 168 hours.  Anticoagulation inpatient management:     not applicable     Anticoagulation discharge instructions:     Warfarin dosing: home regimen continued   Bridging: No   INR goal change: No      Medication changes affecting anticoagulation: No    Additional factors affecting anticoagulation: No     PLAN     No adjustment to anticoagulation plan needed    Patient not contacted    No adjustment to Anticoagulation Calendar was required    Yennifer Cooper RN    "

## 2023-10-18 NOTE — PROGRESS NOTES
[facility] Transitions Program  Community Health Worker Outreach      Community Health Worker Initial Outreach    CHW Initial Information Gathering:  Referral Source: ED Follow-Up  Preferred Hospital: [facility], [place]  [phone]  Preferred Urgent Care: [facility] - [place], [phone]  Current living arrangement:: I live in assisted living  Type of residence:: Assisted living  Community Resources: Home Care, Skilled Nursing Facility, Financial/Insurance, County Programs, County Worker  Supplies Currently Used at Home: Wipes, Hearing Aid Batteries  Equipment Currently Used at Home: walker, rolling, grab bar, tub/shower, grab bar, toilet, shower chair, raised toilet seat  Informal Support system:: Family, Friends, Neighbors  No PCP office visit in Past Year: No  Transportation means:: Other (From [place])  CHW Additional Questions  If ED/Hospital discharge, follow-up appointment scheduled as recommended?: No  Patient agreeable to assistance with scheduling?: No  Patient declined (specify): Patient will have daughter do  Medication changes made following ED/Hospital discharge?: No  MyChart active?: No  Patient agreeable to assistance with activating MyChart?: No    Patient accepts CC:  No, Patient lives in [facility] and stated everything I described our program does, [place] provides from him.       Social Determinants of Health     Food Insecurity: Low Risk  (10/18/2023)    Food Insecurity     Within the past 12 months, did you worry that your food would run out before you got money to buy more?: No     Within the past 12 months, did the food you bought just not last and you didn t have money to get more?: No   Depression: Not at risk (8/25/2022)    PHQ-2     PHQ-2 Score: 0   Housing Stability: Low Risk  (10/18/2023)    Housing Stability     Do you have housing? : Yes     Are you worried about losing your housing?: No   Tobacco Use: Low Risk  (8/31/2023)    Patient  History     Smoking Tobacco Use: Never     Smokeless Tobacco Use: Never     Passive Exposure: Not on file   Financial Resource Strain: Low Risk  (10/18/2023)    Financial Resource Strain     Within the past 12 months, have you or your family members you live with been unable to get utilities (heat, electricity) when it was really needed?: No   Alcohol Use: Not on file   Transportation Needs: Low Risk  (10/18/2023)    Transportation Needs     Within the past 12 months, has lack of transportation kept you from medical appointments, getting your medicines, non-medical meetings or appointments, work, or from getting things that you need?: No   Physical Activity: Not on file   Interpersonal Safety: Not on file   Stress: Not on file   Social Connections: Not on file     Confirmed red Flags identified and reviewed by Clinical Staff? Yes  Any resources you need Discharge/home successful? No    Met with patient at bedside in [facility] ED.  Offered Clinic Care Coordination Program. Patient declined. No further outreach or follow up will be conducted by [facility] Transitions [facility] Community Health Worker     Adeline Mack  Community Health Worker  [facility] Transitions Program  [facility]  [email or web address]  Office: [phone]

## 2023-10-18 NOTE — ED TRIAGE NOTES
Lower abdominal pain and constipation for the last 5 days.  States yesterday he had to use his fingers to help get any relief bowel wise

## 2023-10-18 NOTE — DISCHARGE INSTRUCTIONS
You are given an enema today to help with constipation.  Hopefully your bowel movements will normalize    You may take 1 packet of MiraLAX daily if needed to help with constipation    Make sure you are staying well-hydrated    Follow-up with your primary doctor in clinic within 1 week    If you have any significant new or worsening symptoms do not hesitate to return to the emergency room for evaluation

## 2023-10-18 NOTE — ED PROVIDER NOTES
History     Chief Complaint   Patient presents with    Constipation     HPI  Miguel Patten is a 89 year old male who presents for concern of constipation.  His been feeling bloated and constipated for the last 5 days.  Has had difficulty passing stool.  He said he had to manually disimpact himself last night to get some relief.  Has not taken any medication to help with his symptoms.  Has not had any changes in his baseline medication and states he does not take any opioid medication.  Has not been vomiting.    Allergies:  Allergies   Allergen Reactions    No Known Drug Allergy        Problem List:    Patient Active Problem List    Diagnosis Date Noted    Health Care Home 06/10/2011     Priority: High     DX V65.8 REPLACED WITH 89[facility] HEALTH CARE HOME (04/08/2013)      2019 novel coronavirus disease (COVID-19) 08/24/2021     Priority: Medium    Asthma without status asthmaticus 08/24/2021     Priority: Medium    Traumatic rhabdomyolysis, initial encounter (H24) 08/24/2021     Priority: Medium    Atrial flutter, unspecified type (H) 01/25/2021     Priority: Medium    Fall (on) (from) other stairs and steps, initial encounter 11/13/2020     Priority: Medium    Permanent atrial fibrillation (H) 06/09/2020     Priority: Medium    Mixed hyperlipidemia 04/25/2017     Priority: Medium    Chronic systolic congestive heart failure (H) 04/21/2017     Priority: Medium    Appendicitis with perforation 04/19/2017     Priority: Medium    Mild intermittent asthma without complication 09/27/2016     Priority: Medium    Long-term (current) use of anticoagulants [Z79.01] 04/05/2016     Priority: Medium    S/P AVR (aortic valve replacement) 07/25/2014     Priority: Medium    S/P ascending aortic replacement 07/25/2014     Priority: Medium    Atrial fibrillation (H) 07/25/2014     Priority: Medium    Aortic regurgitation 07/17/2014     Priority: Medium    Hypertension goal BP (blood pressure) < 140/90 04/16/2014     Priority: Medium     CHF (congestive heart failure) (H) 03/24/2014     Priority: Medium    Atrial flutter (H) 01/14/2013     Priority: Medium    History of total hip arthroplasty - bilateral 11/19/2012     Priority: Medium    Osteoarthritis of hip - right 11/19/2012     Priority: Medium    Gout 04/30/2012     Priority: Medium    Malignant neoplasm of prostate (H) 03/05/2004     Priority: Medium        Past Medical History:    Past Medical History:   Diagnosis Date    Arthritis     Ascending aortic aneurysm (H)     Asthma     Complete AV block (H)     Congestive heart failure (H)     Coronary artery disease     H/O aortic valve replacement     Hypertension     Malignant neoplasm of prostate (H)     Permanent atrial fibrillation (H)        Past Surgical History:    Past Surgical History:   Procedure Laterality Date    ARTHROPLASTY HIP  1/21/2013    Procedure: ARTHROPLASTY HIP;  right total hip arthroplasty;  Surgeon: Rober Alves MD;  Location: [facility] OR    GENITOURINARY SURGERY  2001    Prostatectomy      ORTHOPEDIC SURGERY      Left SONALI    PHACOEMULSIFICATION WITH STANDARD INTRAOCULAR LENS IMPLANT Right 10/6/2016    Procedure: PHACOEMULSIFICATION WITH STANDARD INTRAOCULAR LENS IMPLANT;  Surgeon: Elder Rueda MD;  Location: [facility] OR    PHACOEMULSIFICATION WITH STANDARD INTRAOCULAR LENS IMPLANT Left 10/20/2016    Procedure: PHACOEMULSIFICATION WITH STANDARD INTRAOCULAR LENS IMPLANT;  Surgeon: Elder Rueda MD;  Location: [facility] OR    REPAIR ANEURYSM ASCENDING AORTA  7/17/2014    2. Aortic aneurysm repair with 32 mm Hemashield graft.     REPLACE VALVE AORTIC  7/17/2014    1. Aortic valve replacement with 23 mm [employer] Trifecta tissue valve.     s/p aneurysm repair by Dr. Friedman      s/p avr      [facility] NONSPECIFIC PROCEDURE      Hernia repair    [facility] NONSPECIFIC PROCEDURE      Prostatectomy/cancer    [facility] TOTAL HIP ARTHROPLASTY  1/21/13    Right       Family History:    Family History   Problem Relation Age of Onset    Asthma  "Father     Asthma Paternal Grandmother     Asthma Daughter        Social History:  Marital Status:   [5]  Social History     Tobacco Use    Smoking status: Never    Smokeless tobacco: Never   Vaping Use    Vaping Use: Never used   Substance Use Topics    Alcohol use: Yes     Alcohol/week: 0.0 standard drinks of alcohol     Comment: rarely, once a week or less    Drug use: No        Medications:    polyethylene glycol (MIRALAX) 17 GM/Dose powder  acetaminophen (TYLENOL) 325 MG tablet  albuterol (PROAIR HFA/PROVENTIL HFA/VENTOLIN HFA) 108 (90 Base) MCG/ACT inhaler  bicalutamide (CASODEX) 50 MG tablet  cycloSPORINE (RESTASIS) 0.05 % ophthalmic emulsion  FLOVENT HFA 44 MCG/ACT inhaler  leuprolide (LUPRON DEPOT, 3-MONTH,) 22.5 MG kit  losartan (COZAAR) 100 MG tablet  simvastatin (ZOCOR) 40 MG tablet  sodium chloride (OCEAN) 0.65 % nasal spray  warfarin ANTICOAGULANT (JANTOVEN ANTICOAGULANT) 2.5 MG tablet          Review of Systems   All other systems reviewed and are negative.      Physical Exam   BP: (!) 142/75  Pulse: 63  Temp: 99  F (37.2  C)  Resp: 15  Height: 185.4 cm (6' 1\")  Weight: 83.9 kg (185 lb)  SpO2: 98 %      Physical Exam  Vitals and nursing note reviewed.   Constitutional:       General: He is not in acute distress.  Cardiovascular:      Rate and Rhythm: Normal rate.   Pulmonary:      Effort: Pulmonary effort is normal.      Breath sounds: Normal breath sounds.   Abdominal:      General: Bowel sounds are normal.      Palpations: Abdomen is soft.      Comments: Says he is comfortable with palpation of his abdomen, but does not admit pain   Neurological:      Mental Status: He is alert.         ED Course                 Procedures              Critical Care time:  none               Results for orders placed or performed during the hospital encounter of 10/18/23 (from the past 24 hour(s))   XR Abdomen 2 Views    Narrative    XR ABDOMEN 2 VIEWS   10/18/2023 2:06 PM     HISTORY: Abdominal pain, " constipation    COMPARISON: 6/2/2017      Impression    IMPRESSION: No free air. Multiple mildly dilated loops of small bowel  in the midabdomen, could indicate low-grade small bowel obstruction.  Consider a follow-up CT. Moderate amount of formed stool in the colon.      Bilateral total hip arthroplasties. Pelvic surgical clips. Median  sternotomy, valvular prosthesis and pacemaker. Cardiomegaly.    DEEPALI MARTINS MD         SYSTEM ID:  [id number]       Medications   sodium phosphate (FLEET PEDS) enema 1 enema (1 enema Rectal $Given 10/18/23 9755)       Assessments & Plan (with Medical Decision Making)  Miguel is a 89-year-old male presenting to the emergency room for concern of constipation.  See history and focused physical exam as above  Pleasant 89-year-old male in no acute distress, is mildly hypertensive but otherwise vitally stable and afebrile.  He does have some abdominal discomfort but does not endorse significant pain.  No rigidity, no palpable mass.  We will start with KUB to evaluate and may need to consider enema versus manual disimpaction  X-ray results as above.  Patient was given an enema with much relief and passage of large amount of brown stool.  He says that he does feel improved.  Recommended that he start taking MiraLAX daily as needed to help with constipation.  [facility] back [facility]     I have reviewed the nursing notes.    I have reviewed the findings, diagnosis, plan and need for follow up with the patient.           Medical Decision Making  The patient's presentation was of low complexity (an acute and uncomplicated illness or injury).    The patient's evaluation involved:  ordering and/or review of 1 test(s) in this encounter (see separate area of note for details)    The patient's management necessitated only low risk treatment.        New Prescriptions    POLYETHYLENE GLYCOL (MIRALAX) 17 GM/DOSE POWDER    Take 17 g by mouth daily as needed for constipation       Final  diagnoses:   Other constipation       10/18/2023   [facility] EMERGENCY DEPT       Grace Quintainlla DO  10/18/23 3882

## 2023-10-19 NOTE — TELEPHONE ENCOUNTER
Reason for Call:  Appointment Request    Patient requesting this type of appt:  Hospital/ED Follow-Up     Requested provider: Vladimir Gonzales    Reason patient unable to be scheduled: Not within requested timeframe    When does patient want to be seen/preferred time: 3-7 days    Comments: Patient looking for a hospital follow up appt with pcp within a week.    Okay to leave a detailed message?: Yes at Home number on file [phone] (home)    Call taken on 10/19/2023 at 1:47 PM by Genesis Clayton

## 2023-10-23 NOTE — PROGRESS NOTES
ANTICOAGULATION MANAGEMENT     Miguel Patten 89 year old male is on warfarin with therapeutic INR result. (Goal INR 2.0-3.0)    Recent labs: (last 7 days)     10/23/23  0850   INR 3.0*       ASSESSMENT     Source(s): Chart Review and Patient/Caregiver Call     Warfarin doses taken: Warfarin taken as instructed  Diet: No new diet changes identified  Medication/supplement changes: None noted  New illness, injury, or hospitalization: No  Signs or symptoms of bleeding or clotting: No  Previous result: Therapeutic last 2(+) visits  Additional findings: None       PLAN     Recommended plan for no diet, medication or health factor changes affecting INR     Dosing Instructions: Continue your current warfarin dose with next INR in 6 weeks       Summary  As of 10/23/2023      Full warfarin instructions:  3.75 mg every Mon, Thu; 2.5 mg all other days   Next INR check:  12/4/2023               Telephone call with Miguel who verbalizes understanding and agrees to plan    Lab visit scheduled    Education provided:   Contact [phone]  with any changes, questions or concerns.     Plan made per [facility] anticoagulation protocol    Laurence Richter RN  Anticoagulation Clinic  10/23/2023    _______________________________________________________________________     Anticoagulation Episode Summary       Current INR goal:  2.0-3.0   TTR:  83.2% (1 y)   Target end date:  Indefinite   Send INR reminders to:  BUBBA REID    Indications    CHF (congestive heart failure) (H) [I50.9]  Long-term (current) use of anticoagulants [Z79.01] [Z79.01]  Permanent atrial fibrillation (H) [I48.21]  Atrial flutter  unspecified type (H) [I48.92]             Comments:               Anticoagulation Care Providers       Provider Role Specialty Phone number    Vladimir Gonzales DO Referring Internal Medicine [phone]    Virginia Cardona APRN CNP Referring Nurse Practitioner - Gerontology [phone]

## 2023-10-24 NOTE — TELEPHONE ENCOUNTER
The patient will need to set up a face-to-face appointment with me prior to any subsequent refills.    Please help the patient set up an appointment.    Thank you.  Christian

## 2023-10-25 NOTE — PROGRESS NOTES
"      Nannette Rivera is a 89 year old, presenting for the following health issues:  Hospital F/U (ED follow up/Constipation)      10/25/2023     3:17 PM   Additional Questions   Roomed by Shriley OROPEZA       ED/UC Followup:    Facility:  [facility] Emergency Dept  Date of visit: 10/18/23  Reason for visit: Constipation  Current Status: Pt reports doing better, \"better as it should be.\"          EMR reviewed including:             Complaint, History of Chief Complaint, Corresponding Review of Systems, and Complaint Specific Physical Examination.    #1   Follow-up on constipation  Was seen in the emergency department and started on MiraLAX  Has had normalization of bowel function.  Denies any melena or hematochezia.        Exam:   GI: Abdomen is soft, without rebound, guarding or tenderness. Bowel sounds are appropriate. No renal bruits are heard.      #2   Follow-up on chronic anticoagulation.  Patient has history of bioprosthetic aortic valve replacement as well as chronic atrial fibrillation.  Patient denies bruising or bleeding.  Denies any syncope or near syncope.  Does note that he is due for pacemaker replacement.  He will be setting up that in the near future.  Denies signs of heart failure or significant edema.  Denies any orthopnea.        Exam:   HEART:  regular without rubs, clicks, gallops.  4/6 systolic ejection murmur heard across the sternum is noted.. PMI is nondisplaced. Upstrokes are brisk. S1,S2 are heard.      #3   Follow-up on hypertension  Continues losartan without complication or side effect.  Blood pressure is 118/72.  Denies any lightheadedness or near syncope.        Exam:  As above   NEURO: Pt is alert and appropriate. No neurologic lateralization is noted. Cranial nerves 2-12 are intact. Peripheral sensory and motor function are grossly normal.         Patient has been interviewed, applicable history and applied review of systems have been performed.    Vital " Signs:   /72   Pulse 60   Temp 98.5  F (36.9  C) (Temporal)   Resp 18   Wt 84.1 kg (185 lb 5 oz)   SpO2 99%   BMI 24.45 kg/m        Decision Making    Problem and Complexity     1. Slow transit constipation  Recommend that he continues the MiraLAX as current.  Discussed fiber supplementation.    2. Permanent atrial fibrillation (H)  Continue anticoagulation.  Recommend pacemaker replacement as suggested    3. Chronic systolic congestive heart failure (H)  Stable at this time.    4. S/P AVR (aortic valve replacement)  Regular echocardiography as directed by his cardiologist    5. Long-term (current) use of anticoagulants [Z79.01]  Follow-up with the anticoagulation clinic.    6. Hypertension goal BP (blood pressure) < 140/90  Well-controlled on current medication    7. Malignant neoplasm of prostate (H)  Continue Casodex and Eligard.  Follow-up with urologist    8. Mixed hyperlipidemia  Currently stable.  Continue simvastatin.                                FOLLOW UP   I have asked the patient to make an appointment for followup with either:  1.  Me,  2.  The patient's preferred provider,  3.  Any available provider  In 6 months or as needed        Regarding routine vaccinations:  I have reviewed the patient's vaccination schedule and discussed the benefits of prophylactic vaccination in detail.  I recommend the patient contact their pharmacist for vaccinations.  Discussed that most insurance companies now favor reimbursement to the pharmacies and it will financially behoove the patient to have vaccinations performed at their pharmacy.        I have carefully explained the diagnosis and treatment options to the patient.  The patient has displayed an understanding of the above, and all subsequent questions were answered.      DO THAIS Correa    Portions of this note were produced using [employer]  Although every attempt at real-time proof reading has been made, occasional grammar/syntax  errors may have been missed.

## 2023-11-01 NOTE — PROGRESS NOTES
Infusion Nursing Note:  Miguel Patten presents today for Eligard injection in RIGHT LOWER ABDOMEN.    Patient seen by provider today: No   present during visit today: Not Applicable.    Note: Pt arrives via walker. Lives at [facility] in [place]. Was brought here by medical transport. Denies pain.       Intravenous Access:  No Intravenous access/labs at this visit.    Treatment Conditions:  Not Applicable.      Post Infusion Assessment:  Patient tolerated injection without incident.       Discharge Plan:   Patient discharged in stable condition accompanied by: self.  Departure Mode: Ambulatory with walker.      Tanya Rosa RN

## 2023-12-04 NOTE — PROGRESS NOTES
ANTICOAGULATION MANAGEMENT     Miguel Patten 89 year old male is on warfarin with therapeutic INR result. (Goal INR 2.0-3.0)    Recent labs: (last 7 days)     12/04/23  0827   INR 2.5*       ASSESSMENT     Source(s): Chart Review and Patient/Caregiver Call     Warfarin doses taken: Warfarin taken as instructed  Diet: No new diet changes identified  Medication/supplement changes: None noted  New illness, injury, or hospitalization: No  Signs or symptoms of bleeding or clotting: No  Previous result: Therapeutic last 2(+) visits  Additional findings: None       PLAN     Recommended plan for no diet, medication or health factor changes affecting INR     Dosing Instructions: Continue your current warfarin dose with next INR in 6 weeks       Summary  As of 12/4/2023      Full warfarin instructions:  3.75 mg every Mon, Thu; 2.5 mg all other days   Next INR check:  1/15/2024               Telephone call with Miguel who verbalizes understanding and agrees to plan    Lab visit scheduled    Education provided:   Please call back if any changes to your diet, medications or how you've been taking warfarin    Plan made per [facility] anticoagulation protocol    Ashly Jones, RN  Anticoagulation Clinic  12/4/2023    _______________________________________________________________________     Anticoagulation Episode Summary       Current INR goal:  2.0-3.0   TTR:  83.2% (1 y)   Target end date:  Indefinite   [facility]    Indications    CHF (congestive heart failure) (H) [I50.9]  Long-term (current) use of anticoagulants [Z79.01] [Z79.01]  Permanent atrial fibrillation (H) [I48.21]  Atrial flutter  unspecified type (H) [I48.92]             Comments:               Anticoagulation Care Providers       Provider Role Specialty Phone number    Vladimir Gonzales DO Referring Internal Medicine [phone]    Virginia Cardona APRN CNP Referring Nurse Practitioner - Gerontology [phone]

## 2024-01-01 ENCOUNTER — DOCUMENTATION ONLY (OUTPATIENT)
Dept: ANTICOAGULATION | Facility: CLINIC | Age: 89
End: 2024-01-01
Payer: MEDICARE

## 2024-01-01 ENCOUNTER — NURSING HOME VISIT (OUTPATIENT)
Dept: GERIATRICS | Facility: CLINIC | Age: 89
End: 2024-01-01
Payer: OTHER MISCELLANEOUS

## 2024-01-01 ENCOUNTER — LAB (OUTPATIENT)
Dept: LAB | Facility: CLINIC | Age: 89
End: 2024-01-01
Payer: MEDICARE

## 2024-01-01 ENCOUNTER — APPOINTMENT (OUTPATIENT)
Dept: GENERAL RADIOLOGY | Facility: CLINIC | Age: 89
End: 2024-01-01
Attending: EMERGENCY MEDICINE
Payer: MEDICARE

## 2024-01-01 ENCOUNTER — TELEPHONE (OUTPATIENT)
Dept: ANTICOAGULATION | Facility: CLINIC | Age: 89
End: 2024-01-01

## 2024-01-01 ENCOUNTER — NURSING HOME VISIT (OUTPATIENT)
Dept: GERIATRICS | Facility: CLINIC | Age: 89
End: 2024-01-01
Payer: MEDICARE

## 2024-01-01 ENCOUNTER — OFFICE VISIT (OUTPATIENT)
Dept: INTERNAL MEDICINE | Facility: CLINIC | Age: 89
End: 2024-01-01
Payer: MEDICARE

## 2024-01-01 ENCOUNTER — ANTICOAGULATION THERAPY VISIT (OUTPATIENT)
Dept: ANTICOAGULATION | Facility: CLINIC | Age: 89
End: 2024-01-01

## 2024-01-01 ENCOUNTER — TELEPHONE (OUTPATIENT)
Dept: INTERNAL MEDICINE | Facility: CLINIC | Age: 89
End: 2024-01-01
Payer: MEDICARE

## 2024-01-01 ENCOUNTER — DOCUMENTATION ONLY (OUTPATIENT)
Dept: GERIATRICS | Facility: CLINIC | Age: 89
End: 2024-01-01
Payer: MEDICARE

## 2024-01-01 ENCOUNTER — PATIENT OUTREACH (OUTPATIENT)
Dept: CARE COORDINATION | Facility: CLINIC | Age: 89
End: 2024-01-01
Payer: MEDICARE

## 2024-01-01 ENCOUNTER — DOCUMENTATION ONLY (OUTPATIENT)
Dept: OTHER | Facility: CLINIC | Age: 89
End: 2024-01-01

## 2024-01-01 ENCOUNTER — TELEPHONE (OUTPATIENT)
Dept: INTERNAL MEDICINE | Facility: CLINIC | Age: 89
End: 2024-01-01

## 2024-01-01 ENCOUNTER — PATIENT OUTREACH (OUTPATIENT)
Dept: CARE COORDINATION | Facility: CLINIC | Age: 89
End: 2024-01-01

## 2024-01-01 ENCOUNTER — LAB REQUISITION (OUTPATIENT)
Dept: LAB | Facility: CLINIC | Age: 89
End: 2024-01-01
Payer: OTHER MISCELLANEOUS

## 2024-01-01 ENCOUNTER — HOSPITAL ENCOUNTER (EMERGENCY)
Facility: CLINIC | Age: 89
Discharge: HOME | End: 2024-02-26
Attending: EMERGENCY MEDICINE | Admitting: EMERGENCY MEDICINE
Payer: MEDICARE

## 2024-01-01 ENCOUNTER — TELEPHONE (OUTPATIENT)
Dept: GERIATRICS | Facility: CLINIC | Age: 89
End: 2024-01-01
Payer: MEDICARE

## 2024-01-01 ENCOUNTER — ANCILLARY PROCEDURE (OUTPATIENT)
Dept: CARDIOLOGY | Facility: CLINIC | Age: 89
End: 2024-01-01
Attending: INTERNAL MEDICINE
Payer: MEDICARE

## 2024-01-01 ENCOUNTER — HOSPITAL ENCOUNTER (EMERGENCY)
Facility: CLINIC | Age: 89
Discharge: HOME | End: 2024-03-11
Attending: EMERGENCY MEDICINE | Admitting: EMERGENCY MEDICINE
Payer: MEDICARE

## 2024-01-01 VITALS
HEART RATE: 58 BPM | RESPIRATION RATE: 24 BRPM | SYSTOLIC BLOOD PRESSURE: 137 MMHG | WEIGHT: 185 LBS | OXYGEN SATURATION: 95 % | DIASTOLIC BLOOD PRESSURE: 79 MMHG | BODY MASS INDEX: 24.41 KG/M2 | TEMPERATURE: 97.5 F

## 2024-01-01 VITALS
HEIGHT: 73 IN | SYSTOLIC BLOOD PRESSURE: 86 MMHG | DIASTOLIC BLOOD PRESSURE: 54 MMHG | HEART RATE: 62 BPM | BODY MASS INDEX: 24.68 KG/M2 | OXYGEN SATURATION: 94 % | TEMPERATURE: 97.8 F | WEIGHT: 186.2 LBS | RESPIRATION RATE: 18 BRPM

## 2024-01-01 VITALS
HEART RATE: 75 BPM | WEIGHT: 199.5 LBS | RESPIRATION RATE: 20 BRPM | BODY MASS INDEX: 26.32 KG/M2 | TEMPERATURE: 98 F | OXYGEN SATURATION: 96 % | DIASTOLIC BLOOD PRESSURE: 80 MMHG | SYSTOLIC BLOOD PRESSURE: 125 MMHG

## 2024-01-01 VITALS
DIASTOLIC BLOOD PRESSURE: 68 MMHG | OXYGEN SATURATION: 95 % | HEART RATE: 68 BPM | SYSTOLIC BLOOD PRESSURE: 116 MMHG | RESPIRATION RATE: 20 BRPM | TEMPERATURE: 97.4 F

## 2024-01-01 VITALS
OXYGEN SATURATION: 95 % | BODY MASS INDEX: 25.19 KG/M2 | DIASTOLIC BLOOD PRESSURE: 56 MMHG | HEART RATE: 68 BPM | HEIGHT: 72 IN | RESPIRATION RATE: 16 BRPM | TEMPERATURE: 97.8 F | WEIGHT: 186 LBS | SYSTOLIC BLOOD PRESSURE: 96 MMHG

## 2024-01-01 DIAGNOSIS — I50.40 COMBINED SYSTOLIC AND DIASTOLIC CONGESTIVE HEART FAILURE, UNSPECIFIED HF CHRONICITY (H): Primary | ICD-10-CM

## 2024-01-01 DIAGNOSIS — I10 HYPERTENSION GOAL BP (BLOOD PRESSURE) < 140/90: ICD-10-CM

## 2024-01-01 DIAGNOSIS — I50.32 CHRONIC HEART FAILURE WITH PRESERVED EJECTION FRACTION (H): ICD-10-CM

## 2024-01-01 DIAGNOSIS — I48.21 PERMANENT ATRIAL FIBRILLATION (H): ICD-10-CM

## 2024-01-01 DIAGNOSIS — Z79.01 LONG TERM CURRENT USE OF ANTICOAGULANT THERAPY: ICD-10-CM

## 2024-01-01 DIAGNOSIS — I48.11 LONGSTANDING PERSISTENT ATRIAL FIBRILLATION (H): Primary | ICD-10-CM

## 2024-01-01 DIAGNOSIS — I50.42 CHRONIC COMBINED SYSTOLIC AND DIASTOLIC CONGESTIVE HEART FAILURE (H): ICD-10-CM

## 2024-01-01 DIAGNOSIS — J96.10 CHRONIC RESPIRATORY FAILURE, UNSPECIFIED WHETHER WITH HYPOXIA OR HYPERCAPNIA (H): ICD-10-CM

## 2024-01-01 DIAGNOSIS — I48.92 ATRIAL FLUTTER, UNSPECIFIED TYPE (H): ICD-10-CM

## 2024-01-01 DIAGNOSIS — E78.5 HYPERLIPIDEMIA LDL GOAL <100: ICD-10-CM

## 2024-01-01 DIAGNOSIS — Z95.2 S/P AVR (AORTIC VALVE REPLACEMENT): ICD-10-CM

## 2024-01-01 DIAGNOSIS — I50.9 CONGESTIVE HEART FAILURE, UNSPECIFIED HF CHRONICITY, UNSPECIFIED HEART FAILURE TYPE (H): ICD-10-CM

## 2024-01-01 DIAGNOSIS — I44.2 ATRIOVENTRICULAR BLOCK, COMPLETE (H): ICD-10-CM

## 2024-01-01 DIAGNOSIS — E87.70 HYPERVOLEMIA, UNSPECIFIED HYPERVOLEMIA TYPE: ICD-10-CM

## 2024-01-01 DIAGNOSIS — C61 MALIGNANT NEOPLASM OF PROSTATE (H): ICD-10-CM

## 2024-01-01 DIAGNOSIS — I50.22 CHRONIC SYSTOLIC CONGESTIVE HEART FAILURE (H): ICD-10-CM

## 2024-01-01 DIAGNOSIS — I95.1 ORTHOSTATIC HYPOTENSION: ICD-10-CM

## 2024-01-01 DIAGNOSIS — Z79.01 LONG TERM CURRENT USE OF ANTICOAGULANT THERAPY: Primary | ICD-10-CM

## 2024-01-01 DIAGNOSIS — R79.1 ELEVATED INR: ICD-10-CM

## 2024-01-01 DIAGNOSIS — I50.22 CHRONIC SYSTOLIC CONGESTIVE HEART FAILURE (H): Primary | ICD-10-CM

## 2024-01-01 DIAGNOSIS — Z86.16 HISTORY OF COVID-19: ICD-10-CM

## 2024-01-01 DIAGNOSIS — Z11.1 ENCOUNTER FOR SCREENING FOR RESPIRATORY TUBERCULOSIS: ICD-10-CM

## 2024-01-01 DIAGNOSIS — Z95.0 CARDIAC PACEMAKER IN SITU: ICD-10-CM

## 2024-01-01 DIAGNOSIS — Z95.828 S/P ASCENDING AORTIC REPLACEMENT: ICD-10-CM

## 2024-01-01 DIAGNOSIS — I50.9 CHF (CONGESTIVE HEART FAILURE) (H): Primary | ICD-10-CM

## 2024-01-01 DIAGNOSIS — J45.20 MILD INTERMITTENT ASTHMA WITHOUT STATUS ASTHMATICUS WITHOUT COMPLICATION: ICD-10-CM

## 2024-01-01 DIAGNOSIS — Z51.5 HOSPICE CARE PATIENT: Primary | ICD-10-CM

## 2024-01-01 DIAGNOSIS — I48.20 CHRONIC ATRIAL FIBRILLATION (H): ICD-10-CM

## 2024-01-01 DIAGNOSIS — J45.20 MILD INTERMITTENT ASTHMA WITHOUT COMPLICATION: ICD-10-CM

## 2024-01-01 DIAGNOSIS — Z51.5 HOSPICE CARE PATIENT: ICD-10-CM

## 2024-01-01 LAB
ALBUMIN SERPL BCG-MCNC: 4.2 G/DL (ref 3.5–5.2)
ALP SERPL-CCNC: 57 U/L (ref 40–150)
ALT SERPL W P-5'-P-CCNC: 25 U/L (ref 0–70)
ANION GAP SERPL CALCULATED.3IONS-SCNC: 10 MMOL/L (ref 7–15)
ANION GAP SERPL CALCULATED.3IONS-SCNC: 11 MMOL/L (ref 7–15)
AST SERPL W P-5'-P-CCNC: 47 U/L (ref 0–45)
BASOPHILS # BLD AUTO: 0.1 10E3/UL (ref 0–0.2)
BASOPHILS # BLD AUTO: 0.1 10E3/UL (ref 0–0.2)
BASOPHILS NFR BLD AUTO: 1 %
BASOPHILS NFR BLD AUTO: 1 %
BILIRUB SERPL-MCNC: 1.9 MG/DL
BUN SERPL-MCNC: 28.3 MG/DL (ref 8–23)
BUN SERPL-MCNC: 29.6 MG/DL (ref 8–23)
CALCIUM SERPL-MCNC: 8.7 MG/DL (ref 8.2–9.6)
CALCIUM SERPL-MCNC: 9.3 MG/DL (ref 8.2–9.6)
CHLORIDE SERPL-SCNC: 102 MMOL/L (ref 98–107)
CHLORIDE SERPL-SCNC: 107 MMOL/L (ref 98–107)
CHOLEST SERPL-MCNC: 117 MG/DL
CREAT SERPL-MCNC: 0.92 MG/DL (ref 0.67–1.17)
CREAT SERPL-MCNC: 1 MG/DL (ref 0.67–1.17)
DEPRECATED HCO3 PLAS-SCNC: 26 MMOL/L (ref 22–29)
DEPRECATED HCO3 PLAS-SCNC: 28 MMOL/L (ref 22–29)
EGFRCR SERPLBLD CKD-EPI 2021: 71 ML/MIN/1.73M2
EGFRCR SERPLBLD CKD-EPI 2021: 79 ML/MIN/1.73M2
EOSINOPHIL # BLD AUTO: 0.1 10E3/UL (ref 0–0.7)
EOSINOPHIL # BLD AUTO: 0.1 10E3/UL (ref 0–0.7)
EOSINOPHIL NFR BLD AUTO: 2 %
EOSINOPHIL NFR BLD AUTO: 2 %
ERYTHROCYTE [DISTWIDTH] IN BLOOD BY AUTOMATED COUNT: 18.6 % (ref 10–15)
ERYTHROCYTE [DISTWIDTH] IN BLOOD BY AUTOMATED COUNT: 18.8 % (ref 10–15)
FLUAV RNA SPEC QL NAA+PROBE: NEGATIVE
FLUAV RNA SPEC QL NAA+PROBE: NEGATIVE
FLUBV RNA RESP QL NAA+PROBE: NEGATIVE
FLUBV RNA RESP QL NAA+PROBE: NEGATIVE
GAMMA INTERFERON BACKGROUND BLD IA-ACNC: 0.06 IU/ML
GLUCOSE SERPL-MCNC: 96 MG/DL (ref 70–99)
GLUCOSE SERPL-MCNC: 97 MG/DL (ref 70–99)
HCT VFR BLD AUTO: 39.2 % (ref 40–53)
HCT VFR BLD AUTO: 42.4 % (ref 40–53)
HDLC SERPL-MCNC: 59 MG/DL
HGB BLD-MCNC: 12.3 G/DL (ref 13.3–17.7)
HGB BLD-MCNC: 13.5 G/DL (ref 13.3–17.7)
IMM GRANULOCYTES # BLD: 0 10E3/UL
IMM GRANULOCYTES # BLD: 0 10E3/UL
IMM GRANULOCYTES NFR BLD: 0 %
IMM GRANULOCYTES NFR BLD: 0 %
INR BLD: 2.9 (ref 0.9–1.1)
INR BLD: 3.3 (ref 0.9–1.1)
INR BLD: 3.3 (ref 0.9–1.1)
INR BLD: 3.5 (ref 0.9–1.1)
INR BLD: 3.6 (ref 0.9–1.1)
INR BLD: 4.2 (ref 0.9–1.1)
INR BLD: 5.7 (ref 0.9–1.1)
INR PPP: 3.27 (ref 0.85–1.15)
INR PPP: 6.6 (ref 0.85–1.15)
LDLC SERPL CALC-MCNC: 45 MG/DL
LYMPHOCYTES # BLD AUTO: 1.7 10E3/UL (ref 0.8–5.3)
LYMPHOCYTES # BLD AUTO: 2.2 10E3/UL (ref 0–5.3)
LYMPHOCYTES NFR BLD AUTO: 28 %
LYMPHOCYTES NFR BLD AUTO: 30 %
M TB IFN-G BLD-IMP: NEGATIVE
M TB IFN-G CD4+ BCKGRND COR BLD-ACNC: 9.94 IU/ML
MCH RBC QN AUTO: 31.8 PG (ref 26.5–33)
MCH RBC QN AUTO: 31.9 PG (ref 26.5–33)
MCHC RBC AUTO-ENTMCNC: 31.4 G/DL (ref 31.5–36.5)
MCHC RBC AUTO-ENTMCNC: 31.8 G/DL (ref 31.5–36.5)
MCV RBC AUTO: 100 FL (ref 78–100)
MCV RBC AUTO: 101 FL (ref 78–100)
MDC_IDC_EPISODE_DTM: NORMAL
MDC_IDC_EPISODE_DURATION: 5 S
MDC_IDC_EPISODE_DURATION: 6 S
MDC_IDC_EPISODE_DURATION: 6 S
MDC_IDC_EPISODE_DURATION: 7 S
MDC_IDC_EPISODE_DURATION: 9 S
MDC_IDC_EPISODE_ID: NORMAL
MDC_IDC_EPISODE_TYPE: NORMAL
MDC_IDC_EPISODE_TYPE_INDUCED: NO
MDC_IDC_LEAD_CONNECTION_STATUS: NORMAL
MDC_IDC_LEAD_CONNECTION_STATUS: NORMAL
MDC_IDC_LEAD_IMPLANT_DT: NORMAL
MDC_IDC_LEAD_IMPLANT_DT: NORMAL
MDC_IDC_LEAD_LOCATION: NORMAL
MDC_IDC_LEAD_LOCATION: NORMAL
MDC_IDC_LEAD_LOCATION_DETAIL_1: NORMAL
MDC_IDC_LEAD_LOCATION_DETAIL_1: NORMAL
MDC_IDC_LEAD_MFG: NORMAL
MDC_IDC_LEAD_MFG: NORMAL
MDC_IDC_LEAD_MODEL: NORMAL
MDC_IDC_LEAD_MODEL: NORMAL
MDC_IDC_LEAD_POLARITY_TYPE: NORMAL
MDC_IDC_LEAD_POLARITY_TYPE: NORMAL
MDC_IDC_LEAD_SERIAL: NORMAL
MDC_IDC_LEAD_SERIAL: NORMAL
MDC_IDC_MSMT_BATTERY_DTM: NORMAL
MDC_IDC_MSMT_BATTERY_REMAINING_LONGEVITY: 8 MO
MDC_IDC_MSMT_BATTERY_REMAINING_PERCENTAGE: 11 %
MDC_IDC_MSMT_BATTERY_STATUS: NORMAL
MDC_IDC_MSMT_LEADCHNL_LV_IMPEDANCE_VALUE: 443 OHM
MDC_IDC_MSMT_LEADCHNL_LV_PACING_THRESHOLD_AMPLITUDE: 2 V
MDC_IDC_MSMT_LEADCHNL_LV_PACING_THRESHOLD_PULSEWIDTH: 1 MS
MDC_IDC_MSMT_LEADCHNL_RV_IMPEDANCE_VALUE: 448 OHM
MDC_IDC_PG_IMPLANT_DTM: NORMAL
MDC_IDC_PG_MFG: NORMAL
MDC_IDC_PG_MODEL: NORMAL
MDC_IDC_PG_SERIAL: NORMAL
MDC_IDC_PG_TYPE: NORMAL
MDC_IDC_SESS_CLINIC_NAME: NORMAL
MDC_IDC_SESS_DTM: NORMAL
MDC_IDC_SESS_TYPE: NORMAL
MDC_IDC_SET_BRADY_AT_MODE_SWITCH_RATE: 170 {BEATS}/MIN
MDC_IDC_SET_BRADY_LOWRATE: 60 {BEATS}/MIN
MDC_IDC_SET_BRADY_MAX_SENSOR_RATE: 120 {BEATS}/MIN
MDC_IDC_SET_BRADY_MODE: NORMAL
MDC_IDC_SET_CRT_LVRV_DELAY: 0 MS
MDC_IDC_SET_CRT_PACED_CHAMBERS: NORMAL
MDC_IDC_SET_LEADCHNL_LV_PACING_AMPLITUDE: 3 V
MDC_IDC_SET_LEADCHNL_LV_PACING_ANODE_ELECTRODE_1: NORMAL
MDC_IDC_SET_LEADCHNL_LV_PACING_ANODE_LOCATION_1: NORMAL
MDC_IDC_SET_LEADCHNL_LV_PACING_CATHODE_ELECTRODE_1: NORMAL
MDC_IDC_SET_LEADCHNL_LV_PACING_CATHODE_LOCATION_1: NORMAL
MDC_IDC_SET_LEADCHNL_LV_PACING_PULSEWIDTH: 1 MS
MDC_IDC_SET_LEADCHNL_LV_SENSING_ADAPTATION_MODE: NORMAL
MDC_IDC_SET_LEADCHNL_LV_SENSING_ANODE_ELECTRODE_1: NORMAL
MDC_IDC_SET_LEADCHNL_LV_SENSING_ANODE_LOCATION_1: NORMAL
MDC_IDC_SET_LEADCHNL_LV_SENSING_CATHODE_ELECTRODE_1: NORMAL
MDC_IDC_SET_LEADCHNL_LV_SENSING_CATHODE_LOCATION_1: NORMAL
MDC_IDC_SET_LEADCHNL_LV_SENSING_SENSITIVITY: 2.5 MV
MDC_IDC_SET_LEADCHNL_RA_SENSING_ADAPTATION_MODE: NORMAL
MDC_IDC_SET_LEADCHNL_RA_SENSING_SENSITIVITY: 0.5 MV
MDC_IDC_SET_LEADCHNL_RV_PACING_AMPLITUDE: 2 V
MDC_IDC_SET_LEADCHNL_RV_PACING_CAPTURE_MODE: NORMAL
MDC_IDC_SET_LEADCHNL_RV_PACING_POLARITY: NORMAL
MDC_IDC_SET_LEADCHNL_RV_PACING_PULSEWIDTH: 0.5 MS
MDC_IDC_SET_LEADCHNL_RV_SENSING_ADAPTATION_MODE: NORMAL
MDC_IDC_SET_LEADCHNL_RV_SENSING_POLARITY: NORMAL
MDC_IDC_SET_LEADCHNL_RV_SENSING_SENSITIVITY: 2.5 MV
MDC_IDC_SET_ZONE_DETECTION_INTERVAL: 375 MS
MDC_IDC_SET_ZONE_STATUS: NORMAL
MDC_IDC_SET_ZONE_TYPE: NORMAL
MDC_IDC_SET_ZONE_VENDOR_TYPE: NORMAL
MDC_IDC_STAT_BRADY_DTM_END: NORMAL
MDC_IDC_STAT_BRADY_DTM_START: NORMAL
MDC_IDC_STAT_BRADY_RA_PERCENT_PACED: 0 %
MDC_IDC_STAT_BRADY_RV_PERCENT_PACED: 95 %
MDC_IDC_STAT_CRT_DTM_END: NORMAL
MDC_IDC_STAT_CRT_DTM_START: NORMAL
MDC_IDC_STAT_CRT_LV_PERCENT_PACED: 95 %
MDC_IDC_STAT_EPISODE_RECENT_COUNT: 0
MDC_IDC_STAT_EPISODE_RECENT_COUNT: 7
MDC_IDC_STAT_EPISODE_RECENT_COUNT_DTM_END: NORMAL
MDC_IDC_STAT_EPISODE_RECENT_COUNT_DTM_END: NORMAL
MDC_IDC_STAT_EPISODE_RECENT_COUNT_DTM_START: NORMAL
MDC_IDC_STAT_EPISODE_RECENT_COUNT_DTM_START: NORMAL
MDC_IDC_STAT_EPISODE_TYPE: NORMAL
MDC_IDC_STAT_EPISODE_TYPE: NORMAL
MDC_IDC_STAT_EPISODE_VENDOR_TYPE: NORMAL
MDC_IDC_STAT_EPISODE_VENDOR_TYPE: NORMAL
MITOGEN IGNF BCKGRD COR BLD-ACNC: -0.01 IU/ML
MITOGEN IGNF BCKGRD COR BLD-ACNC: -0.01 IU/ML
MONOCYTES # BLD AUTO: 0.7 10E3/UL (ref 0–1.3)
MONOCYTES # BLD AUTO: 0.8 10E3/UL (ref 0–1.3)
MONOCYTES NFR BLD AUTO: 11 %
MONOCYTES NFR BLD AUTO: 11 %
NEUTROPHILS # BLD AUTO: 3.6 10E3/UL (ref 1.6–8.3)
NEUTROPHILS # BLD AUTO: 4.1 10E3/UL (ref 1.6–8.3)
NEUTROPHILS NFR BLD AUTO: 56 %
NEUTROPHILS NFR BLD AUTO: 58 %
NONHDLC SERPL-MCNC: 58 MG/DL
NRBC # BLD AUTO: 0 10E3/UL
NRBC # BLD AUTO: 0.1 10E3/UL
NRBC BLD AUTO-RTO: 0 /100
NRBC BLD AUTO-RTO: 1 /100
NT-PROBNP SERPL-MCNC: 9869 PG/ML (ref 0–1800)
NT-PROBNP SERPL-MCNC: ABNORMAL PG/ML (ref 0–1800)
PLATELET # BLD AUTO: 129 10E3/UL (ref 150–450)
PLATELET # BLD AUTO: 149 10E3/UL (ref 150–450)
POTASSIUM SERPL-SCNC: 4.7 MMOL/L (ref 3.4–5.3)
POTASSIUM SERPL-SCNC: 5.1 MMOL/L (ref 3.4–5.3)
PROT SERPL-MCNC: 7.1 G/DL (ref 6.4–8.3)
QUANTIFERON MITOGEN: 10 IU/ML
QUANTIFERON NIL TUBE: 0.06 IU/ML
QUANTIFERON TB1 TUBE: 0.05 IU/ML
QUANTIFERON TB2 TUBE: 0.05
RBC # BLD AUTO: 3.87 10E6/UL (ref 4.4–5.9)
RBC # BLD AUTO: 4.23 10E6/UL (ref 4.4–5.9)
RSV RNA SPEC NAA+PROBE: NEGATIVE
RSV RNA SPEC NAA+PROBE: NEGATIVE
SARS-COV-2 RNA RESP QL NAA+PROBE: NEGATIVE
SARS-COV-2 RNA RESP QL NAA+PROBE: NEGATIVE
SODIUM SERPL-SCNC: 141 MMOL/L (ref 135–145)
SODIUM SERPL-SCNC: 143 MMOL/L (ref 135–145)
TRIGL SERPL-MCNC: 64 MG/DL
WBC # BLD AUTO: 6.2 10E3/UL (ref 4–11)
WBC # BLD AUTO: 7.3 10E3/UL (ref 4–11)

## 2024-01-01 PROCEDURE — 36415 COLL VENOUS BLD VENIPUNCTURE: CPT | Performed by: EMERGENCY MEDICINE

## 2024-01-01 PROCEDURE — 36416 COLLJ CAPILLARY BLOOD SPEC: CPT

## 2024-01-01 PROCEDURE — 86481 TB AG RESPONSE T-CELL SUSP: CPT | Mod: ORL | Performed by: FAMILY MEDICINE

## 2024-01-01 PROCEDURE — 99284 EMERGENCY DEPT VISIT MOD MDM: CPT | Mod: 25 | Performed by: EMERGENCY MEDICINE

## 2024-01-01 PROCEDURE — 85025 COMPLETE CBC W/AUTO DIFF WBC: CPT | Performed by: EMERGENCY MEDICINE

## 2024-01-01 PROCEDURE — 85610 PROTHROMBIN TIME: CPT | Performed by: INTERNAL MEDICINE

## 2024-01-01 PROCEDURE — 71045 X-RAY EXAM CHEST 1 VIEW: CPT

## 2024-01-01 PROCEDURE — 83880 ASSAY OF NATRIURETIC PEPTIDE: CPT | Performed by: EMERGENCY MEDICINE

## 2024-01-01 PROCEDURE — 85610 PROTHROMBIN TIME: CPT | Performed by: EMERGENCY MEDICINE

## 2024-01-01 PROCEDURE — 80061 LIPID PANEL: CPT

## 2024-01-01 PROCEDURE — 36416 COLLJ CAPILLARY BLOOD SPEC: CPT | Performed by: INTERNAL MEDICINE

## 2024-01-01 PROCEDURE — 80053 COMPREHEN METABOLIC PANEL: CPT | Performed by: EMERGENCY MEDICINE

## 2024-01-01 PROCEDURE — 80048 BASIC METABOLIC PNL TOTAL CA: CPT | Performed by: EMERGENCY MEDICINE

## 2024-01-01 PROCEDURE — 87637 SARSCOV2&INF A&B&RSV AMP PRB: CPT | Performed by: EMERGENCY MEDICINE

## 2024-01-01 PROCEDURE — 36415 COLL VENOUS BLD VENIPUNCTURE: CPT | Mod: ORL | Performed by: FAMILY MEDICINE

## 2024-01-01 PROCEDURE — 99284 EMERGENCY DEPT VISIT MOD MDM: CPT | Performed by: EMERGENCY MEDICINE

## 2024-01-01 PROCEDURE — 85610 PROTHROMBIN TIME: CPT

## 2024-01-01 PROCEDURE — 93294 REM INTERROG EVL PM/LDLS PM: CPT | Performed by: INTERNAL MEDICINE

## 2024-01-01 PROCEDURE — 99305 1ST NF CARE MODERATE MDM 35: CPT | Mod: GV | Performed by: FAMILY MEDICINE

## 2024-01-01 PROCEDURE — P9604 ONE-WAY ALLOW PRORATED TRIP: HCPCS | Performed by: FAMILY MEDICINE

## 2024-01-01 PROCEDURE — 99214 OFFICE O/P EST MOD 30 MIN: CPT | Performed by: INTERNAL MEDICINE

## 2024-01-01 PROCEDURE — 93296 REM INTERROG EVL PM/IDS: CPT | Performed by: INTERNAL MEDICINE

## 2024-01-01 PROCEDURE — 99309 SBSQ NF CARE MODERATE MDM 30: CPT | Performed by: NURSE PRACTITIONER

## 2024-01-01 RX ORDER — RESPIRATORY SYNCYTIAL VIRUS VACCINE 120MCG/0.5
0.5 KIT INTRAMUSCULAR ONCE
Qty: 1 EACH | Refills: 0 | Status: CANCELLED | OUTPATIENT
Start: 2024-01-01 | End: 2024-01-01

## 2024-01-01 RX ORDER — LOSARTAN POTASSIUM 100 MG/1
TABLET ORAL
Qty: 180 TABLET | Refills: 2 | Status: SHIPPED | OUTPATIENT
Start: 2024-01-01 | End: 2024-01-01

## 2024-01-01 RX ORDER — CYCLOSPORINE 0.5 MG/ML
1 EMULSION OPHTHALMIC 2 TIMES DAILY PRN
COMMUNITY

## 2024-01-01 RX ORDER — WARFARIN SODIUM 2.5 MG/1
TABLET ORAL
Qty: 100 TABLET | Refills: 1 | Status: SHIPPED | OUTPATIENT
Start: 2024-01-01 | End: 2024-01-01

## 2024-01-01 RX ORDER — LOSARTAN POTASSIUM 50 MG/1
50 TABLET ORAL DAILY
COMMUNITY
End: 2024-01-01

## 2024-01-01 RX ORDER — FUROSEMIDE 20 MG
20 TABLET ORAL DAILY
Qty: 60 TABLET | Refills: 2 | Status: SHIPPED | OUTPATIENT
Start: 2024-01-01 | End: 2024-01-01

## 2024-01-01 RX ORDER — FUROSEMIDE 20 MG
40 TABLET ORAL DAILY
Qty: 10 TABLET | Refills: 0 | Status: SHIPPED | OUTPATIENT
Start: 2024-01-01 | End: 2024-01-01

## 2024-01-01 RX ORDER — FLUTICASONE PROPIONATE 44 UG/1
1 AEROSOL, METERED RESPIRATORY (INHALATION) 2 TIMES DAILY
Qty: 10.6 G | Refills: 1 | Status: SHIPPED | OUTPATIENT
Start: 2024-01-01

## 2024-01-01 RX ORDER — BICALUTAMIDE 50 MG/1
TABLET, FILM COATED ORAL
Qty: 60 TABLET | Refills: 0 | Status: SHIPPED | OUTPATIENT
Start: 2024-01-01 | End: 2024-01-01

## 2024-01-01 RX ORDER — HEPARIN SODIUM,PORCINE 10 UNIT/ML
5 VIAL (ML) INTRAVENOUS
Status: CANCELLED | OUTPATIENT
Start: 2024-01-01

## 2024-01-01 RX ORDER — FLUTICASONE PROPIONATE 44 UG/1
2 AEROSOL, METERED RESPIRATORY (INHALATION) EVERY 6 HOURS PRN
COMMUNITY

## 2024-01-01 RX ORDER — TOBRAMYCIN AND DEXAMETHASONE 3; 1 MG/ML; MG/ML
2 SUSPENSION/ DROPS OPHTHALMIC 4 TIMES DAILY
COMMUNITY
Start: 2024-01-01 | End: 2024-06-04

## 2024-01-01 RX ORDER — MORPHINE SULFATE 20 MG/ML
5 SOLUTION ORAL EVERY 4 HOURS PRN
COMMUNITY

## 2024-01-01 RX ORDER — HEPARIN SODIUM (PORCINE) LOCK FLUSH IV SOLN 100 UNIT/ML 100 UNIT/ML
5 SOLUTION INTRAVENOUS
Status: CANCELLED | OUTPATIENT
Start: 2024-01-01

## 2024-01-01 RX ORDER — FUROSEMIDE 40 MG
40 TABLET ORAL DAILY
COMMUNITY

## 2024-01-01 ASSESSMENT — ACTIVITIES OF DAILY LIVING (ADL)
ADLS_ACUITY_SCORE: 35

## 2024-01-01 ASSESSMENT — COLUMBIA-SUICIDE SEVERITY RATING SCALE - C-SSRS
2. HAVE YOU ACTUALLY HAD ANY THOUGHTS OF KILLING YOURSELF IN THE PAST MONTH?: NO
1. IN THE PAST MONTH, HAVE YOU WISHED YOU WERE DEAD OR WISHED YOU COULD GO TO SLEEP AND NOT WAKE UP?: NO
6. HAVE YOU EVER DONE ANYTHING, STARTED TO DO ANYTHING, OR PREPARED TO DO ANYTHING TO END YOUR LIFE?: NO
6. HAVE YOU EVER DONE ANYTHING, STARTED TO DO ANYTHING, OR PREPARED TO DO ANYTHING TO END YOUR LIFE?: NO
1. IN THE PAST MONTH, HAVE YOU WISHED YOU WERE DEAD OR WISHED YOU COULD GO TO SLEEP AND NOT WAKE UP?: NO
2. HAVE YOU ACTUALLY HAD ANY THOUGHTS OF KILLING YOURSELF IN THE PAST MONTH?: NO

## 2024-01-01 ASSESSMENT — PAIN SCALES - GENERAL: PAINLEVEL: NO PAIN (0)

## 2024-01-23 NOTE — TELEPHONE ENCOUNTER
ANTICOAGULATION CLINIC REFERRAL RENEWAL REQUEST       An annual renewal order is required for all patients referred to [facility].?  Please review and sign the pended referral order for Miguel Patten.       ANTICOAGULATION SUMMARY      Warfarin indication(s)   Atrial Fibrillation    Mechanical heart valve present?  NO       Current goal range   INR: 2.0-3.0     Goal appropriate for indication? Goal INR 2-3, standard for indication(s) above     Time in Therapeutic Range (TTR)  (Goal > 60%) 78.2%       Office visit with referring provider's group within last year yes on 10/25/2023       Emily Damon, RN  [facility]   Department of Neurosurgery  Progress Note    CHIEF COMPLAINT: s/p bethany hole craniotomy 11/18    SUBJECTIVE:  More alert this AM. States she feels \"okay. \"    REVIEW OF SYSTEMS :  Unable to obtain.      OBJECTIVE:   VITALS:  BP (!) 168/92   Pulse 68   Temp 97.6 °F (36.4 °C) (Core)   Resp 14   Ht 5' 4\" (1.626 m)   Wt 137 lb (62.1 kg)   SpO2 100%   BMI 23.52 kg/m²     PHYSICAL:  Neurologic:  Awake and alert  Motor Exam:  Moving all extremities well  Sensory:  Sensory intact  Incision c/d/i      DATA:  CBC:   Lab Results   Component Value Date    WBC 3.7 11/21/2020    RBC 3.10 11/21/2020    HGB 9.5 11/21/2020    HCT 28.4 11/21/2020    MCV 91.6 11/21/2020    MCH 30.6 11/21/2020    MCHC 33.5 11/21/2020    RDW 12.9 11/21/2020    PLT 94 11/21/2020    MPV 11.0 11/21/2020     BMP:    Lab Results   Component Value Date     11/21/2020    K 2.7 11/21/2020    K 2.7 11/21/2020     11/21/2020    CO2 21 11/21/2020    BUN 14 11/21/2020    LABALBU 2.0 11/21/2020    LABALBU 4.4 05/04/2012    CREATININE 0.4 11/21/2020    CALCIUM 6.5 11/21/2020    GFRAA >60 11/21/2020    LABGLOM >60 11/21/2020    GLUCOSE 107 11/21/2020    GLUCOSE 105 05/04/2012     PT/INR:    Lab Results   Component Value Date    PROTIME 10.8 11/18/2020    INR 1.0 11/18/2020     PTT:    Lab Results   Component Value Date    APTT 34.8 11/18/2020   [APTT}    Current Inpatient Medications  Current Facility-Administered Medications: sodium phosphate 15 mmol in dextrose 5 % 250 mL IVPB, 15 mmol, Intravenous, Once  potassium chloride 10 mEq/100 mL IVPB (Peripheral Line), 10 mEq, Intravenous, Q1H  magnesium sulfate 2 g in 50 mL IVPB premix, 2 g, Intravenous, Once  LORazepam (ATIVAN) injection 0.5 mg, 0.5 mg, Intravenous, Q8H PRN  melatonin disintegrating tablet 5 mg, 5 mg, Oral, Nightly PRN  sodium chloride flush 0.9 % injection 10 mL, 10 mL, Intravenous, 2 times per day  acetaminophen (TYLENOL) tablet 650 mg, 650 mg, Oral, Q4H  polyethylene glycol (GLYCOLAX) packet 17 g, 17 g, Oral, Daily PRN  donepezil (ARICEPT) tablet 10 mg, 10 mg, Oral, Nightly  LORazepam (ATIVAN) tablet 1 mg, 1 mg, Oral, BID PRN  memantine (NAMENDA) tablet 10 mg, 10 mg, Oral, BID  acetaminophen (TYLENOL) tablet 650 mg, 650 mg, Oral, Q4H PRN  atorvastatin (LIPITOR) tablet 80 mg, 80 mg, Oral, Nightly  levetiracetam (KEPPRA) 750 mg/50 mL IVPB, 750 mg, Intravenous, Q12H  sodium chloride flush 0.9 % injection 10 mL, 10 mL, Intravenous, PRN  promethazine (PHENERGAN) tablet 12.5 mg, 12.5 mg, Oral, Q6H PRN **OR** ondansetron (ZOFRAN) injection 4 mg, 4 mg, Intravenous, Q6H PRN  0.9 % sodium chloride infusion, , Intravenous, Continuous    ASSESSMENT:   s/p bethany hole craniotomy 11/18    PLAN:  -Continue ICU care  -Serial neurological exams  -Pain control  -No anticoagulation/antiplatelet agents  -Neurology following      Electronically signed by Olivia OfficerMARLIN on 11/21/2020 at 9:07 AM

## 2024-01-23 NOTE — PROGRESS NOTES
ANTICOAGULATION MANAGEMENT     Miguel Patten 89 year old male is on warfarin with supratherapeutic INR result. (Goal INR 2.0-3.0)    Recent labs: (last 7 days)     01/23/24  0939   INR 3.3*       ASSESSMENT     Source(s): Chart Review and Patient/Caregiver Call     Warfarin doses taken: Warfarin taken as instructed  Diet: Decreased greens/vitamin K in diet; plans to resume previous intake  Medication/supplement changes: None noted  New illness, injury, or hospitalization: No  Signs or symptoms of bleeding or clotting: No  Previous result: Therapeutic last 2(+) visits  Additional findings: None       PLAN     Recommended plan for temporary change(s) affecting INR     Dosing Instructions: partial hold then continue your current warfarin dose with next INR in 2 weeks       Summary  As of 1/23/2024      Full warfarin instructions:  1/23: 1.25 mg; Otherwise 3.75 mg every Mon, Thu; 2.5 mg all other days   Next INR check:  2/6/2024               Telephone call with Miguel who verbalizes understanding and agrees to plan    Lab visit scheduled    Education provided:   Please call back if any changes to your diet, medications or how you've been taking warfarin    Plan made per [facility] anticoagulation protocol    Emily Damon, RN  Anticoagulation Clinic  1/23/2024    _______________________________________________________________________     Anticoagulation Episode Summary       Current INR goal:  2.0-3.0   TTR:  78.2% (1 y)   Target end date:  Indefinite   Send INR reminders to:  [facility]    Indications    CHF (congestive heart failure) (H) [I50.9]  Long-term (current) use of anticoagulants [Z79.01] [Z79.01]  Permanent atrial fibrillation (H) [I48.21]  Atrial flutter  unspecified type (H) [I48.92]             Comments:               Anticoagulation Care Providers       Provider Role Specialty Phone number    Vladimir Gonzales DO [facility] Internal Medicine [phone]    Virginia Cardona APRN  CNP Referring Nurse Practitioner - Gerontology [phone]

## 2024-02-06 NOTE — PROGRESS NOTES
ANTICOAGULATION MANAGEMENT     Miguel Patten 89 year old male is on warfarin with supratherapeutic INR result. (Goal INR 2.0-3.0)    Recent labs: (last 7 days)     02/06/24  0835   INR 3.3*       ASSESSMENT     Source(s): Chart Review and Patient/Caregiver Call     Warfarin doses taken: Warfarin taken as instructed  Diet: No new diet changes identified  Medication/supplement changes: None noted  New illness, injury, or hospitalization: No  Signs or symptoms of bleeding or clotting: No  Previous result: Supratherapeutic  Additional findings: None     PLAN     Recommended plan for no diet, medication or health factor changes affecting INR     Dosing Instructions: decrease your warfarin dose (6.2% change) with next INR in 2 weeks       Summary  As of 2/6/2024      Full warfarin instructions:  3.75 mg every Mon; 2.5 mg all other days   Next INR check:  2/20/2024               Telephone call with Miguel who verbalizes understanding and agrees to plan    Lab visit scheduled    Education provided:   Please call back if any changes to your diet, medications or how you've been taking warfarin  Symptom monitoring: monitoring for bleeding signs and symptoms    Plan made per [facility] anticoagulation protocol    Leydi Perez RN  Anticoagulation Clinic  2/6/2024    _______________________________________________________________________     Anticoagulation Episode Summary       Current INR goal:  2.0-3.0   TTR:  78.2% (1 y)   Target end date:  Indefinite   Send INR reminders to:  [facility]    Indications    CHF (congestive heart failure) (H) [I50.9]  Long-term (current) use of anticoagulants [Z79.01] [Z79.01]  Permanent atrial fibrillation (H) [I48.21]  Atrial flutter  unspecified type (H) [I48.92]             Comments:               Anticoagulation Care Providers       Provider Role Specialty Phone number    Vladimir Gonzales DO Referring Internal Medicine [phone]    Virginia Cardona APRN CNP  Referring Nurse Practitioner - Gerontology [phone]

## 2024-02-15 NOTE — TELEPHONE ENCOUNTER
ANTICOAGULATION MANAGEMENT:  Medication Refill    Anticoagulation Summary  As of 2/6/2024      Warfarin maintenance plan:  3.75 mg (2.5 mg x 1.5) every Mon; 2.5 mg (2.5 mg x 1) all other days   Next INR check:  2/20/2024   Target end date:  Indefinite    Indications    CHF (congestive heart failure) (H) [I50.9]  Long-term (current) use of anticoagulants [Z79.01] [Z79.01]  Permanent atrial fibrillation (H) [I48.21]  Atrial flutter  unspecified type (H) [I48.92]                 Anticoagulation Care Providers       Provider Role Specialty Phone number    Vladimir Gonzales DO Referring Internal Medicine [phone]    Virginia Cardona APRN CNP Referring Nurse Practitioner - Gerontology [phone]            Refill Criteria    Visit with referring provider/group: Meets criteria: office visit within referring provider group in the last 1 year on 10/25/23    [facility] referral signed last signed: 01/23/2024; within last year: Yes    Lab monitoring not exceeding 2 weeks overdue: Yes    Miguel meets all criteria for refill. Rx instructions and quantity supplied updated to match patient's current dosing plan. Warfarin 90 day supply with 1 refill granted per [facility] protocol     Laurence Richter RN  Anticoagulation Clinic

## 2024-02-20 NOTE — PROGRESS NOTES
ANTICOAGULATION MANAGEMENT     Miguel Patten 89 year old male is on warfarin with supratherapeutic INR result. (Goal INR 2.0-3.0)    Recent labs: (last 7 days)     02/20/24  0919   INR 4.2*       ASSESSMENT     Source(s): Chart Review and Patient/Caregiver Call     Warfarin doses taken: Warfarin taken as instructed  Diet: No new diet changes identified  Medication/supplement changes: None noted  New illness, injury, or hospitalization: No  Signs or symptoms of bleeding or clotting: No  Previous result: Supratherapeutic  Additional findings: None       PLAN     Recommended plan for no diet, medication or health factor changes affecting INR     Dosing Instructions: hold dose then decrease your warfarin dose (6.7% change) with next INR in 10 days       Summary  As of 2/20/2024      Full warfarin instructions:  2/20: Hold; Otherwise 2.5 mg every day   Next INR check:                 Telephone call with Miguel who verbalizes understanding and agrees to plan    Lab visit scheduled    Education provided:   Symptom monitoring: monitoring for bleeding signs and symptoms    Plan made per [facility] anticoagulation protocol    Emily Damon RN  Anticoagulation Clinic  2/20/2024    _______________________________________________________________________     Anticoagulation Episode Summary       Current INR goal:  2.0-3.0   TTR:  78.2% (1 y)   Target end date:  Indefinite   Send INR reminders to:  [facility]    Indications    CHF (congestive heart failure) (H) [I50.9]  Long-term (current) use of anticoagulants [Z79.01] [Z79.01]  Permanent atrial fibrillation (H) [I48.21]  Atrial flutter  unspecified type (H) [I48.92]             Comments:               Anticoagulation Care Providers       Provider Role Specialty Phone number    Vladimir Gonzales DO Referring Internal Medicine [phone]    Virginia Cardona APRN CNP Referring Nurse Practitioner - Gerontology [phone]

## 2024-02-27 NOTE — PROGRESS NOTES
ANTICOAGULATION  MANAGEMENT: Discharge Review    Miguel Patten chart reviewed for anticoagulation continuity of care    Emergency room visit on 2/27/2024 for shortness of breath.    Discharge disposition: Home    Results:    Recent labs: (last 7 days)     02/20/24  0919 02/26/24  1931   INR 4.2* 6.60*     Anticoagulation inpatient management:     held warfarin due to 6.60 result     Anticoagulation discharge instructions:     Warfarin dosing: home regimen continued and hold 2/27/2024   Bridging: No   INR goal change: No      Medication changes affecting anticoagulation: Yes: lasix with diuresis may effect INR    Additional factors affecting anticoagulation: Yes: fluid overload     PLAN     Agree with dosing adjustment on discharge    Spoke with Miguel he will hold today and tomorrow and already has inr apt 2/29 took his dose 2/26/2024    Anticoagulation Calendar updated    Emily Damon RN

## 2024-02-27 NOTE — PROGRESS NOTES
Clinic Care Coordination Contact  Community Health Worker Initial Outreach    Background: Primary Care - Care Coordination program identified per system criteria based on ED discharge report and reviewed for possible outreach.    CHW will not proceed with patient outreach due to the following reason:    Patient has discharged to an Assisted Living Facility where patient is receiving on-site support, including support for ED follow up plan.    Plan: Primary Care - Care Coordination episode addressed appropriately per reason noted above.        Alexia Shi  Community Health Worker  [facility], [facility]    *Connected Care Resource Team does NOT follow patient ongoing. Referrals are identified based on internal discharge reports and the outreach is to ensure patient has an understanding of their discharge instructions.

## 2024-02-27 NOTE — ED PROVIDER NOTES
History     Chief Complaint   Patient presents with    Shortness of Breath     HPI  Miguel Patten is a 89 year old male who has a history of atrial flutter, asthma, aortic valve replacement on anticoagulation who presents to the emergency department via ems secondary to coughing and shortness of breath.  Staff at the facility were monitoring him and felt to be a good idea to get checked out.  He has not been wheezing.  He feels better since he arrived in the emergency department.  He has been tested for COVID and it was negative.  He has not run a fever.  He has not had significant production from his cough.    Allergies:  Allergies   Allergen Reactions    No Known Drug Allergy        Problem List:    Patient Active Problem List    Diagnosis Date Noted    Health Care Home 06/10/2011     Priority: High     DX V65.8 REPLACED WITH [facility] (04/08/2013)      2019 novel coronavirus disease (COVID-19) 08/24/2021     Priority: Medium    Asthma without status asthmaticus 08/24/2021     Priority: Medium    Traumatic rhabdomyolysis, initial encounter (H24) 08/24/2021     Priority: Medium    Atrial flutter, unspecified type (H) 01/25/2021     Priority: Medium    Fall (on) (from) other stairs and steps, initial encounter 11/13/2020     Priority: Medium    Permanent atrial fibrillation (H) 06/09/2020     Priority: Medium    Mixed hyperlipidemia 04/25/2017     Priority: Medium    Chronic systolic congestive heart failure (H) 04/21/2017     Priority: Medium    Appendicitis with perforation 04/19/2017     Priority: Medium    Mild intermittent asthma without complication 09/27/2016     Priority: Medium    Long-term (current) use of anticoagulants [Z79.01] 04/05/2016     Priority: Medium    S/P AVR (aortic valve replacement) 07/25/2014     Priority: Medium    S/P ascending aortic replacement 07/25/2014     Priority: Medium    Atrial fibrillation (H) 07/25/2014     Priority: Medium    Aortic regurgitation 07/17/2014      Priority: Medium    Hypertension goal BP (blood pressure) < 140/90 04/16/2014     Priority: Medium    CHF (congestive heart failure) (H) 03/24/2014     Priority: Medium    Atrial flutter (H) 01/14/2013     Priority: Medium    History of total hip arthroplasty - bilateral 11/19/2012     Priority: Medium    Osteoarthritis of hip - right 11/19/2012     Priority: Medium    Gout 04/30/2012     Priority: Medium    Malignant neoplasm of prostate (H) 03/05/2004     Priority: Medium        Past Medical History:    Past Medical History:   Diagnosis Date    Arthritis     Ascending aortic aneurysm (H24)     Asthma     Complete AV block (H)     Congestive heart failure (H)     Coronary artery disease     H/O aortic valve replacement     Hypertension     Malignant neoplasm of prostate (H)     Permanent atrial fibrillation (H)        Past Surgical History:    Past Surgical History:   Procedure Laterality Date    ARTHROPLASTY HIP  1/21/2013    Procedure: ARTHROPLASTY HIP;  right total hip arthroplasty;  Surgeon: Rober Alves MD;  Location: [facility]    GENITOURINARY SURGERY  2001    Prostatectomy      ORTHOPEDIC SURGERY      Left SONALI    PHACOEMULSIFICATION WITH STANDARD INTRAOCULAR LENS IMPLANT Right 10/6/2016    Procedure: PHACOEMULSIFICATION WITH STANDARD INTRAOCULAR LENS IMPLANT;  Surgeon: Elder Rueda MD;  Location: [facility] OR    PHACOEMULSIFICATION WITH STANDARD INTRAOCULAR LENS IMPLANT Left 10/20/2016    Procedure: PHACOEMULSIFICATION WITH STANDARD INTRAOCULAR LENS IMPLANT;  Surgeon: Elder Rueda MD;  Location: [facility] OR    REPAIR ANEURYSM ASCENDING AORTA  7/17/2014    2. Aortic aneurysm repair with 32 mm Hemashield graft.     REPLACE VALVE AORTIC  7/17/2014    1. Aortic valve replacement with 23 mm [employer] Trifecta tissue valve.     s/p aneurysm repair by Dr. Friedman      s/p avr      [facility] NONSPECIFIC PROCEDURE      Hernia repair    [facility] NONSPECIFIC PROCEDURE      Prostatectomy/cancer    [facility] TOTAL HIP  ARTHROPLASTY  1/21/13    Right       Family History:    Family History   Problem Relation Age of Onset    Asthma Father     Asthma Paternal Grandmother     Asthma Daughter        Social History:  Marital Status:   [5]  Social History     Tobacco Use    Smoking status: Never    Smokeless tobacco: Never   Vaping Use    Vaping Use: Never used   Substance Use Topics    Alcohol use: Yes     Alcohol/week: 0.0 standard drinks of alcohol     Comment: rarely, once a week or less    Drug use: No        Medications:    furosemide (LASIX) 20 MG tablet  albuterol (PROAIR HFA/PROVENTIL HFA/VENTOLIN HFA) 108 (90 Base) MCG/ACT inhaler  bicalutamide (CASODEX) 50 MG tablet  fluticasone (FLOVENT HFA) 44 MCG/ACT inhaler  losartan (COZAAR) 100 MG tablet  simvastatin (ZOCOR) 40 MG tablet  SM CLEARLAX 17 GM/SCOOP powder  sodium chloride (OCEAN) 0.65 % nasal spray  warfarin ANTICOAGULANT (JANTOVEN ANTICOAGULANT) 2.5 MG tablet          Review of Systems   All other systems reviewed and are negative.      Physical Exam   BP: 132/64  Pulse: 61  Temp: 97.5  F (36.4  C)  Resp: 24  Weight: 83.9 kg (185 lb)  SpO2: 95 %      Physical Exam  Vitals and nursing note reviewed.   Constitutional:       General: He is not in acute distress.     Appearance: He is well-developed. He is not diaphoretic.   HENT:      Head: Normocephalic and atraumatic.      Nose: No congestion.      Mouth/Throat:      Mouth: Mucous membranes are moist.   Eyes:      General: No scleral icterus.     Conjunctiva/sclera: Conjunctivae normal.   Cardiovascular:      Rate and Rhythm: Normal rate.   Pulmonary:      Effort: Pulmonary effort is normal.      Breath sounds: Examination of the right-upper field reveals rhonchi. Examination of the left-upper field reveals rhonchi. Examination of the right-middle field reveals rhonchi. Examination of the left-middle field reveals rhonchi. Examination of the right-lower field reveals rhonchi. Examination of the left-lower field  reveals rhonchi. Rhonchi present. No decreased breath sounds or rales.   Abdominal:      General: Abdomen is flat.   Musculoskeletal:      Cervical back: Normal range of motion and neck supple.      Right lower leg: No tenderness.      Left lower leg: No tenderness.      Comments: Trace bilateral pretibial edema    Skin:     General: Skin is warm and dry.      Capillary Refill: Capillary refill takes less than 2 seconds.      Findings: No rash.   Neurological:      General: No focal deficit present.      Mental Status: He is alert and oriented to person, place, and time.   Psychiatric:         Mood and Affect: Mood normal.         Behavior: Behavior normal.         ED Course        Procedures                  Results for orders placed or performed during the hospital encounter of 02/26/24 (from the past 24 hour(s))   INR   Result Value Ref Range    INR 6.60 (HH) 0.85 - 1.15   Symptomatic Influenza A/B, RSV, & SARS-CoV2 PCR (COVID-19) Nose    Specimen: Nose; Swab   Result Value Ref Range    Influenza A PCR Negative Negative    Influenza B PCR Negative Negative    RSV PCR Negative Negative    SARS CoV2 PCR Negative Negative    Narrative    Testing was performed using the Xpert Xpress CoV2/Flu/RSV Assay on the [employer] GeneXpert Instrument. This test should be ordered for the detection of SARS-CoV-2, influenza, and RSV viruses in individuals who meet clinical and/or epidemiological criteria. Test performance is unknown in asymptomatic patients. This test is for in vitro diagnostic use under the FDA EUA for laboratories certified under CLIA to perform high or moderate complexity testing. This test has not been FDA cleared or approved. A negative result does not rule out the presence of PCR inhibitors in the specimen or target RNA in concentration below the limit of detection for the assay. If only one viral target is positive but coinfection with multiple targets is suspected, the sample should be re-tested with another  FDA cleared, approved, or authorized test, if coinfection would change clinical management. This test was validated by the [facility]. These laboratories are certified under the Clinical Laboratory Improvement Amendments of 1988 (CLIA-88) as qualified to perform high complexity laboratory testing.   CBC with platelets differential    Narrative    The following orders were created for panel order CBC with platelets differential.  Procedure                               Abnormality         Status                     ---------                               -----------         ------                     CBC with platelets and d...[[id number]]  Abnormal            Final result                 Please view results for these tests on the individual orders.   Basic metabolic panel   Result Value Ref Range    Sodium 143 135 - 145 mmol/L    Potassium 4.7 3.4 - 5.3 mmol/L    Chloride 107 98 - 107 mmol/L    Carbon Dioxide (CO2) 26 22 - 29 mmol/L    Anion Gap 10 7 - 15 mmol/L    Urea Nitrogen 28.3 (H) 8.0 - 23.0 mg/dL    Creatinine 0.92 0.67 - 1.17 mg/dL    GFR Estimate 79 >60 mL/min/1.73m2    Calcium 8.7 8.2 - 9.6 mg/dL    Glucose 96 70 - 99 mg/dL   Nt probnp inpatient (BNP)   Result Value Ref Range    N terminal Pro BNP Inpatient 9,869 (H) 0 - 1,800 pg/mL   CBC with platelets and differential   Result Value Ref Range    WBC Count 6.2 4.0 - 11.0 10e3/uL    RBC Count 3.87 (L) 4.40 - 5.90 10e6/uL    Hemoglobin 12.3 (L) 13.3 - 17.7 g/dL    Hematocrit 39.2 (L) 40.0 - 53.0 %     (H) 78 - 100 fL    MCH 31.8 26.5 - 33.0 pg    MCHC 31.4 (L) 31.5 - 36.5 g/dL    RDW 18.8 (H) 10.0 - 15.0 %    Platelet Count 129 (L) 150 - 450 10e3/uL    % Neutrophils 58 %    % Lymphocytes 28 %    % Monocytes 11 %    % Eosinophils 2 %    % Basophils 1 %    % Immature Granulocytes 0 %    NRBCs per 100 WBC 1 (H) <1 /100    Absolute Neutrophils 3.6 1.6 - 8.3 10e3/uL    Absolute Lymphocytes 1.7 0.8 - 5.3 10e3/uL    Absolute Monocytes  0.7 0.0 - 1.3 10e3/uL    Absolute Eosinophils 0.1 0.0 - 0.7 10e3/uL    Absolute Basophils 0.1 0.0 - 0.2 10e3/uL    Absolute Immature Granulocytes 0.0 <=0.4 10e3/uL    Absolute NRBCs 0.1 10e3/uL   XR Chest Port 1 View    Narrative    EXAM: XR CHEST PORT 1 VIEW  LOCATION: [facility]  DATE: 2/26/2024    INDICATION: cough  COMPARISON: 3/1/2023      Impression    IMPRESSION: Small bilateral pleural effusions, right greater than left, with likely compressive atelectasis. No pneumothorax. Stable enlarged cardiomediastinal silhouette. Median sternotomy wires and left chest pacemaker.       Medications - No data to display    Assessments & Plan (with Medical Decision Making)  89-year-old male who has a history of asthma, atrial flutter is anticoagulated on warfarin who presents to the emergency department with a coughing illness for the last 10 days.  He has been tested for COVID at his home and it has been negative reportedly.  He has been short of breath with activity but mostly has been coughing.  No new lower extremity edema.   IV established, BMP, BNP, CBC, chest x-ray, COVID/RSV/influenza swab performed.  Patient is in no distress.  Vital signs are unremarkable.  He does have a wet sounding cough.  Oxygen saturations are 95% and is afebrile.  Blood pressure is normal.  INR is over 6.  BNP is elevated over 8000.  Chest x-ray shows some bilateral small pleural effusions.  Patient is in no distress.  Actually his symptoms improved during his stay in the emergency department.  His cough went down to a minimal and his shortness of breath also improved.  I will give him a few days of Lasix and have him follow-up with his cardiology team regarding whether or not to continue that.  He will skip tomorrow's dose of Coumadin and follow direction from the Coumadin clinic after that.  Return to ER precautions and follow-up precautions discussed.  All questions answered prior to discharge.     I have  reviewed the nursing notes.    I have reviewed the findings, diagnosis, plan and need for follow up with the patient.          New Prescriptions    FUROSEMIDE (LASIX) 20 MG TABLET    Take 2 tablets (40 mg) by mouth daily for 5 days       Final diagnoses:   Congestive heart failure, unspecified HF chronicity, unspecified heart failure type (H)   Elevated INR       2/26/2024   [facility] EMERGENCY DEPT       Romie Orourke MD  02/26/24 2035

## 2024-02-27 NOTE — ED TRIAGE NOTES
Pt having increased shortness of breath over last 10 days.  Has not been walking the halls now for 3 days.         Triage Assessment (Adult)       Row Name 02/26/24 9153          Triage Assessment    Airway WDL WDL

## 2024-02-27 NOTE — DISCHARGE INSTRUCTIONS
Do not take tomorrow's dose of Coumadin.  Follow-up with the Coumadin clinic regarding ongoing management of your INR as it was elevated today.  Take the diuretic in the morning for the next several days until you are in touch with your cardiology team to see if they would like to continue that.  Return to the ER if increasing shortness of breath, chest discomfort or other new symptoms.  It was a pleasure to meet you.

## 2024-03-05 NOTE — PROGRESS NOTES
ANTICOAGULATION MANAGEMENT     Miguel Patten 89 year old male is on warfarin with supratherapeutic INR result. (Goal INR 2.0-3.0)    Recent labs: (last 7 days)     03/05/24  0940   INR 5.7*       ASSESSMENT     Source(s): Chart Review and Patient/Caregiver Call     Warfarin doses taken: Held for supra therapeutic INR  recently which may be affecting INR  Diet: Decreased greens/vitamin K in diet; plans to resume previous intake  Medication/supplement changes:  finished 5 days of lasix recently   New illness, injury, or hospitalization: Yes: CHF when in ER on 2/26/24 but he states that his SOB has resolved, denies any swelling in his extremities   Signs or symptoms of bleeding or clotting: No  Previous result: Supratherapeutic  Additional findings:  states that his appetite is poor and that he is slowly trying to eat more.         PLAN     Recommended plan for ongoing change(s) affecting INR     Dosing Instructions: hold 2 doses then decrease your warfarin dose (14% change) with next INR in 2 days       Summary  As of 3/5/2024      Full warfarin instructions:  3/5: Hold; 3/6: Hold; Otherwise 1.25 mg every Mon, Fri; 2.5 mg all other days   Next INR check:  3/7/2024               Telephone call with Miguel who verbalizes understanding and agrees to plan    Lab visit scheduled    Education provided:   Goal range and lab monitoring: goal range and significance of current result  Symptom monitoring: monitoring for bleeding signs and symptoms and when to seek medical attention/emergency care  Contact [phone]  with any changes, questions or concerns.     Plan made per [facility] anticoagulation protocol    Laurence Richter, RN  Anticoagulation Clinic  3/5/2024    _______________________________________________________________________     Anticoagulation Episode Summary       Current INR goal:  2.0-3.0   TTR:  78.2% (1 y)   Target end date:  Indefinite   Send INR reminders to:  BUBBA REID    Indications    CHF  (congestive heart failure) (H) [I50.9]  Long-term (current) use of anticoagulants [Z79.01] [Z79.01]  Permanent atrial fibrillation (H) [I48.21]  Atrial flutter  unspecified type (H) [I48.92]             Comments:               Anticoagulation Care Providers       Provider Role Specialty Phone number    Vladimir Gonzales DO Referring Internal Medicine [phone]    Virginia Cardona APRN CNP Referring Nurse Practitioner - Gerontology [phone]

## 2024-03-07 NOTE — PROGRESS NOTES
ANTICOAGULATION MANAGEMENT     Miguel Patten 89 year old male is on warfarin with supratherapeutic INR result. (Goal INR 2.0-3.0)    Recent labs: (last 7 days)     03/07/24  1006   INR 3.6*       ASSESSMENT     Source(s): Chart Review and Patient/Caregiver Call     Warfarin doses taken: Held for supratherapeutic INR   recently which may be affecting INR  Diet: No new diet changes identified  Medication/supplement changes: None noted  New illness, injury, or hospitalization: No  Signs or symptoms of bleeding or clotting: No  Previous result: Supratherapeutic  Additional findings: None       PLAN     Recommended plan for no diet, medication or health factor changes affecting INR     Dosing Instructions: partial hold then continue your current warfarin dose with next INR in 1 week       Summary  As of 3/7/2024      Full warfarin instructions:  3/7: 1.25 mg; Otherwise 1.25 mg every Mon, Fri; 2.5 mg all other days   Next INR check:  3/14/2024               Telephone call with Miguel who verbalizes understanding and agrees to plan    Lab visit scheduled    Education provided:   Goal range and lab monitoring: goal range and significance of current result  Contact [phone]  with any changes, questions or concerns.     Plan made per [facility] anticoagulation protocol    Laurence Richter RN  Anticoagulation Clinic  3/7/2024    _______________________________________________________________________     Anticoagulation Episode Summary       Current INR goal:  2.0-3.0   TTR:  78.2% (1 y)   Target end date:  Indefinite   [facility]    Indications    CHF (congestive heart failure) (H) [I50.9]  Long-term (current) use of anticoagulants [Z79.01] [Z79.01]  Permanent atrial fibrillation (H) [I48.21]  Atrial flutter  unspecified type (H) [I48.92]             Comments:               Anticoagulation Care Providers       Provider Role Specialty Phone number    Vladimir Gonzales DO Referring  Internal Medicine [phone]    Virginia Cardona APRN CNP Referring Nurse Practitioner - Gerontology [phone]

## 2024-03-07 NOTE — TELEPHONE ENCOUNTER
Please see [facility] encounter from today.    Laurence Richter RN  [facility]

## 2024-03-07 NOTE — TELEPHONE ENCOUNTER
Reason for Call:  Other call back    Detailed comments: Pt calling to speak with inr nurse    Phone Number Patient can be reached at: Home number on file [phone] (home)    Best Time: any    Can we leave a detailed message on this number? Not Applicable    Call taken on 3/7/2024 at 1:06 PM by Stephanie Mack

## 2024-03-11 NOTE — ED PROVIDER NOTES
"  History     Chief Complaint   Patient presents with    Generalized Weakness     HPI  Miguel Patten is a 89 year old male with a history of 6 asthma, atrial flutter, congestive heart failure, aortic valve replacement with tissue valve, prostate cancer, anticoagulation with Coumadin, pacemaker who presents to the ER from assisted secondary to SOB.  He states he really has not been weaker than usual.  The shortness of breath is not necessarily worse with lying down flat.  It is made worse when he carries on a conversation with somebody.  He has not been febrile or coughing significantly or having any wheezing.    He was last seen here on 26 February and was given IV Lasix and doubled his dose of Lasix from 20 mg a day to 40 mg a day.  That helped his shortness of breath but he states it was a \"pain in the between the patch \"to be taking diuretics and going to the bathroom all the time.  He would rather try another approach if possible.    He has not had any vomiting or diarrhea or chest pain or palpitations.  He has had trouble with controlling his INR lately.  It was over 6 when he was here on the 26.    Interpretation Summary     1. Status post bioprosthetic aortic valve replacement with a 23-mm [employer] Miguel  Trifecta tissue valve, 7/18/2014.  2. The valve is well-seated. Mild periprosthetic valve regurgitation (9  o'clock position). Mean systolic gradient 19 mmHg.  3. Normal left ventricular systolic function. Estimated LVEF 60-65%.  4. Normal right ventricular size and systolic function.  5. Pacemaker lead visualized in the right ventricle.  6. Trace tricuspid valve regurgitation.     Compared to the previous study dated 6/27/2019, aortic bioprosthesis mean  gradient has increased from 13 mmHg to 19 mmHg.    Allergies:  Allergies   Allergen Reactions    No Known Drug Allergy        Problem List:    Patient Active Problem List    Diagnosis Date Noted    Health Care Home 06/10/2011     Priority: High     DX V65.8 " REPLACED WITH 73682 HEALTH CARE HOME (04/08/2013)      2019 novel coronavirus disease (COVID-19) 08/24/2021     Priority: Medium    Asthma without status asthmaticus 08/24/2021     Priority: Medium    Traumatic rhabdomyolysis, initial encounter (H24) 08/24/2021     Priority: Medium    Atrial flutter, unspecified type (H) 01/25/2021     Priority: Medium    Fall (on) (from) other stairs and steps, initial encounter 11/13/2020     Priority: Medium    Permanent atrial fibrillation (H) 06/09/2020     Priority: Medium    Mixed hyperlipidemia 04/25/2017     Priority: Medium    Chronic systolic congestive heart failure (H) 04/21/2017     Priority: Medium    Appendicitis with perforation 04/19/2017     Priority: Medium    Mild intermittent asthma without complication 09/27/2016     Priority: Medium    Long-term (current) use of anticoagulants [Z79.01] 04/05/2016     Priority: Medium    S/P AVR (aortic valve replacement) 07/25/2014     Priority: Medium    S/P ascending aortic replacement 07/25/2014     Priority: Medium    Atrial fibrillation (H) 07/25/2014     Priority: Medium    Aortic regurgitation 07/17/2014     Priority: Medium    Hypertension goal BP (blood pressure) < 140/90 04/16/2014     Priority: Medium    CHF (congestive heart failure) (H) 03/24/2014     Priority: Medium    Atrial flutter (H) 01/14/2013     Priority: Medium    History of total hip arthroplasty - bilateral 11/19/2012     Priority: Medium    Osteoarthritis of hip - right 11/19/2012     Priority: Medium    Gout 04/30/2012     Priority: Medium    Malignant neoplasm of prostate (H) 03/05/2004     Priority: Medium        Past Medical History:    Past Medical History:   Diagnosis Date    Arthritis     Ascending aortic aneurysm (H24)     Asthma     Complete AV block (H)     Congestive heart failure (H)     Coronary artery disease     H/O aortic valve replacement     Hypertension     Malignant neoplasm of prostate (H)     Permanent atrial fibrillation (H)         Past Surgical History:    Past Surgical History:   Procedure Laterality Date    ARTHROPLASTY HIP  1/21/2013    Procedure: ARTHROPLASTY HIP;  right total hip arthroplasty;  Surgeon: Rober Alves MD;  Location: [facility]    GENITOURINARY SURGERY  2001    Prostatectomy      ORTHOPEDIC SURGERY      Left SONALI    PHACOEMULSIFICATION WITH STANDARD INTRAOCULAR LENS IMPLANT Right 10/6/2016    Procedure: PHACOEMULSIFICATION WITH STANDARD INTRAOCULAR LENS IMPLANT;  Surgeon: Elder Rueda MD;  Location: [facility] OR    PHACOEMULSIFICATION WITH STANDARD INTRAOCULAR LENS IMPLANT Left 10/20/2016    Procedure: PHACOEMULSIFICATION WITH STANDARD INTRAOCULAR LENS IMPLANT;  Surgeon: Elder Rueda MD;  Location: [facility] OR    REPAIR ANEURYSM ASCENDING AORTA  7/17/2014    2. Aortic aneurysm repair with 32 mm Hemashield graft.     REPLACE VALVE AORTIC  7/17/2014    1. Aortic valve replacement with 23 mm [employer] Miguel Trifecta tissue valve.     s/p aneurysm repair by Dr. Friedman      s/p avr      [facility] NONSPECIFIC PROCEDURE      Hernia repair    [facility] NONSPECIFIC PROCEDURE      Prostatectomy/cancer    [facility] TOTAL HIP ARTHROPLASTY  1/21/13    Right       Family History:    Family History   Problem Relation Age of Onset    Asthma Father     Asthma Paternal Grandmother     Asthma Daughter        Social History:  Marital Status:   [5]  Social History     Tobacco Use    Smoking status: Never    Smokeless tobacco: Never   Vaping Use    Vaping Use: Never used   Substance Use Topics    Alcohol use: Yes     Alcohol/week: 0.0 standard drinks of alcohol     Comment: rarely, once a week or less    Drug use: No        Medications:    albuterol (PROAIR HFA/PROVENTIL HFA/VENTOLIN HFA) 108 (90 Base) MCG/ACT inhaler  bicalutamide (CASODEX) 50 MG tablet  fluticasone (FLOVENT HFA) 44 MCG/ACT inhaler  furosemide (LASIX) 20 MG tablet  losartan (COZAAR) 100 MG tablet  simvastatin (ZOCOR) 40 MG tablet  SM CLEARLAX 17 GM/SCOOP powder  sodium chloride  (OCEAN) 0.65 % nasal spray  warfarin ANTICOAGULANT (JANTOVEN ANTICOAGULANT) 2.5 MG tablet          Review of Systems   All other systems reviewed and are negative.      Physical Exam   BP: 131/79  Pulse: 65  Temp: 98  F (36.7  C)  Resp: 20  Weight: 90.5 kg (199 lb 8 oz)  SpO2: 98 %      Physical Exam  Vitals and nursing note reviewed.   Constitutional:       General: He is not in acute distress.     Appearance: He is well-developed. He is not diaphoretic.   HENT:      Head: Normocephalic and atraumatic.      Right Ear: External ear normal.      Left Ear: External ear normal.      Nose: No congestion.      Mouth/Throat:      Mouth: Mucous membranes are moist.   Eyes:      General: No scleral icterus.     Conjunctiva/sclera: Conjunctivae normal.   Cardiovascular:      Rate and Rhythm: Normal rate.      Pulses: Normal pulses.   Pulmonary:      Effort: Pulmonary effort is normal. No respiratory distress.      Breath sounds: No stridor. No wheezing, rhonchi or rales.      Comments: Decreased breath sounds at the bases bilaterally.  Chest:      Chest wall: No tenderness.   Abdominal:      General: Abdomen is flat.   Musculoskeletal:         General: Normal range of motion.      Cervical back: Normal range of motion and neck supple.      Right lower leg: No edema.      Left lower leg: No edema.   Skin:     General: Skin is warm and dry.      Capillary Refill: Capillary refill takes less than 2 seconds.      Findings: No rash.   Neurological:      Mental Status: He is alert and oriented to person, place, and time.   Psychiatric:         Mood and Affect: Mood normal.         Behavior: Behavior normal.         ED Course        Procedures                  Results for orders placed or performed during the hospital encounter of 03/11/24 (from the past 24 hour(s))   Symptomatic Influenza A/B, RSV, & SARS-CoV2 PCR (COVID-19) Nose    Specimen: Nose; Swab   Result Value Ref Range    Influenza A PCR Negative Negative    Influenza B  PCR Negative Negative    RSV PCR Negative Negative    SARS CoV2 PCR Negative Negative    Narrative    Testing was performed using the Xpert Xpress CoV2/Flu/RSV Assay on the [employer] GeneXpert Instrument. This test should be ordered for the detection of SARS-CoV-2, influenza, and RSV viruses in individuals who meet clinical and/or epidemiological criteria. Test performance is unknown in asymptomatic patients. This test is for in vitro diagnostic use under the FDA EUA for laboratories certified under CLIA to perform high or moderate complexity testing. This test has not been FDA cleared or approved. A negative result does not rule out the presence of PCR inhibitors in the specimen or target RNA in concentration below the limit of detection for the assay. If only one viral target is positive but coinfection with multiple targets is suspected, the sample should be re-tested with another FDA cleared, approved, or authorized test, if coinfection would change clinical management. This test was validated by the [facility]. These laboratories are certified under the Clinical Laboratory Improvement Amendments of 1988 (CLIA-88) as qualified to perform high complexity laboratory testing.   CBC with platelets differential    Narrative    The following orders were created for panel order CBC with platelets differential.  Procedure                               Abnormality         Status                     ---------                               -----------         ------                     CBC with platelets and d...[[id number]]  Abnormal            Final result                 Please view results for these tests on the individual orders.   Comprehensive metabolic panel   Result Value Ref Range    Sodium 141 135 - 145 mmol/L    Potassium 5.1 3.4 - 5.3 mmol/L    Carbon Dioxide (CO2) 28 22 - 29 mmol/L    Anion Gap 11 7 - 15 mmol/L    Urea Nitrogen 29.6 (H) 8.0 - 23.0 mg/dL    Creatinine 1.00 0.67 - 1.17 mg/dL     GFR Estimate 71 >60 mL/min/1.73m2    Calcium 9.3 8.2 - 9.6 mg/dL    Chloride 102 98 - 107 mmol/L    Glucose 97 70 - 99 mg/dL    Alkaline Phosphatase 57 40 - 150 U/L    AST 47 (H) 0 - 45 U/L    ALT 25 0 - 70 U/L    Protein Total 7.1 6.4 - 8.3 g/dL    Albumin 4.2 3.5 - 5.2 g/dL    Bilirubin Total 1.9 (H) <=1.2 mg/dL   NT pro BNP   Result Value Ref Range    N terminal Pro BNP Inpatient 13,307 (H) 0 - 1,800 pg/mL   INR   Result Value Ref Range    INR 3.27 (H) 0.85 - 1.15   CBC with platelets and differential   Result Value Ref Range    WBC Count 7.3 4.0 - 11.0 10e3/uL    RBC Count 4.23 (L) 4.40 - 5.90 10e6/uL    Hemoglobin 13.5 13.3 - 17.7 g/dL    Hematocrit 42.4 40.0 - 53.0 %     78 - 100 fL    MCH 31.9 26.5 - 33.0 pg    MCHC 31.8 31.5 - 36.5 g/dL    RDW 18.6 (H) 10.0 - 15.0 %    Platelet Count 149 (L) 150 - 450 10e3/uL    % Neutrophils 56 %    % Lymphocytes 30 %    % Monocytes 11 %    % Eosinophils 2 %    % Basophils 1 %    % Immature Granulocytes 0 %    NRBCs per 100 WBC 0 <1 /100    Absolute Neutrophils 4.1 1.6 - 8.3 10e3/uL    Absolute Lymphocytes 2.2 0.0 - 5.3 10e3/uL    Absolute Monocytes 0.8 0.0 - 1.3 10e3/uL    Absolute Eosinophils 0.1 0.0 - 0.7 10e3/uL    Absolute Basophils 0.1 0.0 - 0.2 10e3/uL    Absolute Immature Granulocytes 0.0 <=0.4 10e3/uL    Absolute NRBCs 0.0 10e3/uL   XR Chest Port 1 View    Narrative    EXAM: XR CHEST PORT 1 VIEW  LOCATION: [facility]  DATE: 3/11/2024    INDICATION: sob  COMPARISON: 02/26/2024      Impression    IMPRESSION: Moderate right and small left effusions and bibasilar atelectasis, similar to 02/26/2024. Pulmonary edema. No pneumothorax. Stable mild cardiomegaly. Median sternotomy, mediastinal surgical clips and cardiac valvular prosthesis. Pacemaker..       Medications - No data to display    Assessments & Plan (with Medical Decision Making)  89-year-old male who lives in assisted living who has a past medical history significant for  asthma, atrial flutter, congestive heart failure, aortic valve replacement on Coumadin who presented to the emergency department with some shortness of breath.  He is not needing oxygen here in the emergency department.  Oxygen saturations are 95% on room air.  Physical exam is unremarkable.  No significant lower extremity edema.  No crackles or wheezes.  IV was established, BNP, CBC, basic metabolic panel, INR, chest x-ray ordered.  Patient would like to try to avoid diuretics or increasing his diuretics.  Patient has had weight gain and further elevation of his BNP.  He still is saturating well on room air.  He does take 20 mg of Lasix daily.  I again recommended IV Lasix followed by increased dose of home Lasix but the patient declined.  He would like to discuss it with his doctor and has an appointment in 2 days.  He understands the risk of worsening heart failure and fluid overload and would still like to proceed with this plan.  His wishes were respected.  Patient was discharged home in stable condition.  Return to ER precautions and follow-up precautions discussed.  All questions answered prior to discharge.     I have reviewed the nursing notes.    I have reviewed the findings, diagnosis, plan and need for follow up with the patient.        New Prescriptions    No medications on file       Final diagnoses:   Congestive heart failure, unspecified HF chronicity, unspecified heart failure type (H)   Hypervolemia, unspecified hypervolemia type       3/11/2024   [facility] EMERGENCY DEPT       Romie Orourke MD  03/11/24 2026

## 2024-03-12 NOTE — DISCHARGE INSTRUCTIONS
As discussed I recommended that we give you IV Lasix and increase your home oral Lasix dose.  You have chosen to wait until you talk to your doctor on Wednesday.  If you get worse and get more short of breath in the meantime please return to the ER for reevaluation.  It was a pleasure to see you.  I hope you get better quickly.

## 2024-03-12 NOTE — PROGRESS NOTES
"ANTICOAGULATION  MANAGEMENT: Discharge Review    Miguel Patten chart reviewed for anticoagulation continuity of care    Emergency room visit on 3/11/24 for CHF and weakness.    Discharge disposition: Home    Results:    Recent labs: (last 7 days)     03/05/24  0940 03/07/24  1006 03/11/24  1754   INR 5.7* 3.6* 3.27*     Anticoagulation inpatient management:     not applicable     Anticoagulation discharge instructions:     Warfarin dosing: home regimen continued   Bridging: No   INR goal change: No      Medication changes affecting anticoagulation: No    Additional factors affecting anticoagulation: Yes: CHF symptoms and fluid overload      PLAN     No adjustment to anticoagulation plan needed  Recommend to check INR on 3/13/24 when in clinic to see PCP  his maintenance dose has been decreased recently due to supratherapeutic INR\"s     Recommended follow up is scheduled  Patient not contacted    Anticoagulation Calendar updated    Laurence Richter RN  "

## 2024-03-13 NOTE — PROGRESS NOTES
ANTICOAGULATION MANAGEMENT     Miguel Patten 89 year old male is on warfarin with supratherapeutic INR result. (Goal INR 2.0-3.0)    Recent labs: (last 7 days)     03/13/24  1433   INR 3.5*       ASSESSMENT     Source(s): Chart Review  Previous INR was Supratherapeutic  Medication, diet, health changes since last INR chart reviewed; Recent hospital admission see ADT from 3/12/24. Attempted to call Miguel and daughter x 3 today. No answer. Dosing left for tonight.          PLAN     Unable to reach Miguel today.    Left message to take reduced dose of warfarin, 1.25 mg tonight. Request call back for assessment.    Follow up required to discuss out of range result     Kerwin Faustin RN  Anticoagulation Clinic  3/13/2024

## 2024-03-13 NOTE — PROGRESS NOTES
Assessment & Plan     Chronic systolic congestive heart failure (H)  Continue Lasix 20 mg daily.    - Lipid panel reflex to direct LDL Non-fasting; Future  - Primary Care - Care Coordination Referral; Future  - furosemide (LASIX) 20 MG tablet; Take 1 tablet (20 mg) by mouth daily    S/P AVR (aortic valve replacement)  Continue anticoagulation.  As the patient does have mechanical heart valve, discontinuation of anticoagulation is not an option at this time.    S/P ascending aortic replacement  Echocardiography as needed.    Permanent atrial fibrillation (H)  Continue anticoagulation.    - INR; Future    Long term current use of anticoagulant therapy  Adjust INR accordingly  - INR point of care    Hypertension goal BP (blood pressure) < 140/90  Currently controlled    Malignant neoplasm of prostate (H)  Observe as needed (patient 89 years old and asymptomatic)          MED REC REQUIRED  Post Medication Reconciliation Status: discharge medications reconciled, continue medications without change    MEDICATIONS:        - Continue other medications without change    Nannette Rivera is a 89 year old, presenting for the following health issues:  ER F/U      3/13/2024     1:49 PM   Additional Questions   Roomed by Lucita MARTINES   Accompanied by veda Weaver     [facility]       ED/UC Followup:    Facility:  [facility]  Date of visit: 2- and 3-  Reason for visit: CHF  Current Status: no change        Review of Systems  CONSTITUTIONAL: Patient's had recent weight loss associated with diuresis.  He is also now able to sleep in a reclined position.  His respiratory status is improved significantly.  INTEGUMENTARY/SKIN: NEGATIVE for worrisome rashes, moles or lesions  EYES: NEGATIVE for vision changes or irritation  ENT/MOUTH: NEGATIVE for ear, mouth and throat problems  RESP: Patient denies cough or hemoptysis.  Notes that his dyspnea is persistent but markedly improved  BREAST:  NEGATIVE for masses, tenderness or discharge  CV: Patient has history of aortic valve replacement as well as chronic atrial fibrillation.  Pacemaker is in place.  Denies any chest pain at this time.  GI: NEGATIVE for nausea, abdominal pain, heartburn, or change in bowel habits  : NEGATIVE for frequency, dysuria, or hematuria  MUSCULOSKELETAL: NEGATIVE for significant arthralgias or myalgia  NEURO: NEGATIVE for weakness, dizziness or paresthesias  ENDOCRINE: NEGATIVE for temperature intolerance, skin/hair changes  HEME: NEGATIVE for bleeding problems  PSYCHIATRIC: NEGATIVE for changes in mood or affect      Objective    /68 (BP Location: Right arm, Patient Position: Chair)   Pulse 68   Temp 97.4  F (36.3  C) (Temporal)   Resp 20   SpO2 95%   There is no height or weight on file to calculate BMI.  Physical Exam   GENERAL: alert, no distress, pale, frail, and elderly  EYES: Eyes grossly normal to inspection, PERRL and conjunctivae and sclerae normal  HENT: ear canals and TM's normal, nose and mouth without ulcers or lesions  NECK: no adenopathy, no asymmetry, masses, or scars  RESP: Minimal wheeze is heard.  Breath sounds are decreased in all fields.  No areas of consolidation noted.  No dependent rales at this time.  CV: regular rates and rhythm, normal S1 S2, no S3 or S4, peripheral pulses strong, and no peripheral edema  ABDOMEN: soft, nontender, no hepatosplenomegaly, no masses and bowel sounds normal  MS: no gross musculoskeletal defects noted, no edema  SKIN: no suspicious lesions or rashes  NEURO: Normal strength and tone, mentation intact and speech normal  PSYCH: mentation appears normal, affect normal/bright    Lab work and radiographs performed during the emergency department stay are reviewed with the patient and his caregiver.        Signed Electronically by: Vladimir Gonzales DO

## 2024-03-14 NOTE — LETTER
[facility] COORDINATION  [address]   [place] [address]    March 14, 2024    Miguel Patten  [facility]  [address]  [facility] [address]      Dear Miguel,    I am a clinic community health worker who works with Vladimir Gonzales DO with the [facility]. I wanted to thank you for spending the time to talk with me.  Below is a description of clinic care coordination and how we can further assist you.       The clinic care coordination team is made up of a registered nurse, , financial resource worker and community health worker who understand the health care system. The goal of clinic care coordination is to help you manage your health and improve access to the health care system. Our team works alongside your provider to assist you in determining your health and social needs. We can help you obtain health care and community resources, providing you with necessary information and education. We can work with you through any barriers and develop a care plan that helps coordinate and strengthen the communication between you and your care team.  Our services are voluntary and are offered without charge to you personally.    Please feel free to contact me with any questions or concerns regarding care coordination and what we can offer.      We are focused on providing you with the highest-quality healthcare experience possible.    Sincerely,     JAROD Iqbal  [phone]  [facility]  [facility]

## 2024-03-14 NOTE — PROGRESS NOTES
ANTICOAGULATION MANAGEMENT     Miguel Patten 89 year old male is on warfarin with supratherapeutic INR result. (Goal INR 2.0-3.0)    Recent labs: (last 7 days)     03/13/24  1433   INR 3.5*       ASSESSMENT     Source(s): Chart Review and Patient/Caregiver Call     Warfarin doses taken: Held for supratheraputic INR  recently which may be affecting INR  Diet: No new diet changes identified  Medication/supplement changes: None noted  New illness, injury, or hospitalization: Yes: CHF symptoms, in ED on 3/12/24  Signs or symptoms of bleeding or clotting: No  Previous result: Supratherapeutic  Additional findings:  Ongoing CHF sx, fluid shifting etc - trying to get under control per pt       PLAN     Recommended plan for temporary change(s) and ongoing change(s) affecting INR     Dosing Instructions: decrease your warfarin dose (8.3% change) with next INR in 1 week       Summary  As of 3/13/2024      Full warfarin instructions:  1.25 mg every Mon, Wed, Fri; 2.5 mg all other days   Next INR check:  3/20/2024               Telephone call with Miguel who verbalizes understanding and agrees to plan and who agrees to plan and repeated back plan correctly    Lab visit scheduled    Education provided:   Goal range and lab monitoring: goal range and significance of current result, Importance of therapeutic range, and Importance of following up at instructed interval  Symptom monitoring: monitoring for bleeding signs and symptoms, when to seek medical attention/emergency care, and if you hit your head or have a bad fall seek emergency care  Contact [phone]  with any changes, questions or concerns.     Plan made per [facility] anticoagulation protocol    Kerwin Faustin RN  Anticoagulation Clinic  3/14/2024    _______________________________________________________________________     Anticoagulation Episode Summary       Current INR goal:  2.0-3.0   TTR:  78.4% (1 y)   Target end date:  Indefinite   Send INR reminders to:   [facility]    Indications    CHF (congestive heart failure) (H) [I50.9]  Long-term (current) use of anticoagulants [Z79.01] [Z79.01]  Permanent atrial fibrillation (H) [I48.21]  Atrial flutter  unspecified type (H) [I48.92]             Comments:               Anticoagulation Care Providers       Provider Role Specialty Phone number    Vladimir Gonzales DO Referring Internal Medicine [phone]    Virginia Cardona APRN CNP Referring Nurse Practitioner - Gerontology [phone]

## 2024-03-14 NOTE — PROGRESS NOTES
Clinic Care Coordination Contact  Community Health Worker Initial Outreach    CHW Initial Information Gathering:  Referral Source: PCP  Preferred Hospital: [facility], [place]  [phone]  Preferred Urgent Care: [facility], [phone]  No PCP office visit in Past Year: No       Patient accepts CC: No, due to the patient stating that at this time he is not needing any assistance at this time. The CHW did inform him about CCC and asked about finances. Patient will be sent Care Coordination introduction letter for future reference.     JAROD Iqbal  [phone]  Connected Care Resource [facility]

## 2024-03-20 NOTE — TELEPHONE ENCOUNTER
12:25 PM    Patient talked with KARI Arana. Verified dosing patient is taking. She states he does have a pill set up in his container. Scheduled the INR for tomorrow.     Elio Cuevas RN, BSN, PHN  Anticoagulation Clinic   [phone]

## 2024-03-20 NOTE — TELEPHONE ENCOUNTER
Yasmeen, nurse, went to check on patient as he noted he did not feel well. Patient did not know that he had an appointment for INR today so it was not done.    She is looking for dosing orders and to have the INR rescheduled. Patient needs transportation scheduled also so INR can not be done same day.     Please call to review dosing and to reschedule.    Meg Tapia RN    [facility] Anticoagulation Clinic

## 2024-03-21 NOTE — PROGRESS NOTES
ANTICOAGULATION MANAGEMENT     Miguel Patten 89 year old male is on warfarin with therapeutic INR result. (Goal INR 2.0-3.0)    Recent labs: (last 7 days)     03/21/24  1010   INR 2.9*       ASSESSMENT     Source(s): Chart Review and Patient/Caregiver Call     Warfarin doses taken: Warfarin taken as instructed  Diet: No new diet changes identified  Medication/supplement changes: None noted  New illness, injury, or hospitalization: No  Signs or symptoms of bleeding or clotting: No  Previous result: Supratherapeutic  Additional findings:  continues with ongoing CHF  sx       PLAN     Recommended plan for ongoing change(s) affecting INR     Dosing Instructions: Continue your current warfarin dose with next INR in 1 week       Summary  As of 3/21/2024      Full warfarin instructions:  1.25 mg every Mon, Wed, Fri; 2.5 mg all other days   Next INR check:  3/28/2024               Telephone call with Miguel who verbalizes understanding and agrees to plan    Lab visit scheduled    Education provided:   Contact [phone]  with any changes, questions or concerns.     Plan made per [facility] anticoagulation protocol    Laurence Richter RN  Anticoagulation Clinic  3/21/2024    _______________________________________________________________________     Anticoagulation Episode Summary       Current INR goal:  2.0-3.0   TTR:  77.0% (1 y)   Target end date:  Indefinite   Send INR reminders to:  BUBBA REID    Indications    CHF (congestive heart failure) (H) [I50.9]  Long-term (current) use of anticoagulants [Z79.01] [Z79.01]  Permanent atrial fibrillation (H) [I48.21]  Atrial flutter  unspecified type (H) [I48.92]             Comments:               Anticoagulation Care Providers       Provider Role Specialty Phone number    Vladimir Gonzales DO Referring Internal Medicine [phone]    Virginia Cardona APRN CNP Referring Nurse Practitioner - Gerontology [phone]

## 2024-03-22 NOTE — TELEPHONE ENCOUNTER
Received call from [facility] that patient would like a Hospice Eval due to O2 sats being 90% on RA, increased confusion, another fall this morning and refusing hospital evaluation. Please advise if verbal order can be given for this, PCP is out of office today.    Jose Ramon Perry,BASSAMN, RN

## 2024-03-22 NOTE — TELEPHONE ENCOUNTER
Keily for order to have hospice evaluation.  Diagnosis would be heart failure    Order should be confirmed by his primary Dr Gonzales next week

## 2024-03-22 NOTE — TELEPHONE ENCOUNTER
Called and notified KARI Christianson of the below message from covering provider.     Sending to PCP for review when he returns.    BASSAM WongN, RN

## 2024-03-25 NOTE — TELEPHONE ENCOUNTER
I completely agree with the hospice evaluation.  I would like to thank Dr. Hodges for initiating this in my absence.    Christian

## 2024-03-27 NOTE — TELEPHONE ENCOUNTER
Hospice is wondering if Dr. Gonzales is willing to continue to follow patient.    Patient has had a decline in condition and needs to be transferred to the care center.    If Dr. Gonzales will follow then they will need an order to transfer.    Courtney Sterling   Fax# [phone]    Dominique Muller RN on 3/27/2024 at 9:34 AM

## 2024-03-28 NOTE — TELEPHONE ENCOUNTER
Okay to transfer patient  to [facility].   Need actual MD signature on paper and faxed to care center.    Had Dr. Gonzales sign and faxed over to Meghan at [phone].    Jose Ramon Perry,BSN, RN

## 2024-03-30 NOTE — PROGRESS NOTES
Nurse is calling to report patient with low blood pressure of 96/57, then 76 over 40s.  Patient is asymptomatic, no other symptoms, afebrile.  He is a hospice patient, he gets losartan once a day, gave orders to hold losartan for systolic blood pressure less than 100, update hospice if patient continues to decline and encourage fluids.  Odalys Ch, CNP

## 2024-04-01 NOTE — PROGRESS NOTES
[facility] GERIATRICS    PRIMARY CARE PROVIDER AND CLINIC:  Celestine Martinez NP, [address] [facility] [address]  Chief Complaint   Patient presents with    [facility] Medical Record Number:  [id number]  Place of Service where encounter took place:  [facility] ([place], [place]) () [[id number]]    Miguel Patten  is a 89 year old  (12/16/1932), admitted to the above facility from [facility] on 3/28/24..   HPI:    Patient with PMH pertinent for HFpEF, status post AVR, status post ascending aortic replacement, permanent A-fib, long-term current use of anticoagulation therapy, essential hypertension, malignant neoplasm of prostate.    -Patient was seen on the ED on 2/26 with shortness of breath after recent COVID infection he was found to have a BNP over 8K and INR over 6 and checks x-ray showed bilateral small pleural effusion.  Patient's symptoms improved in the ER, dismissed on a few days of Lasix with plan to follow-up with cardiology Coumadin dose adjusted.    - Patient was seen at the ED on 3/11 with increased shortness of breath.  Was found to have CHF exacerbation.  IV diuretics and increasing Lasix dose were recommended but patient declined and discharged back to assisted living facility.    -Patient referred to hospice and transition to this facility.      Today:  - Cardiac: denies chest pain. Reports now on O2 all the time, at home used to prn as needed.   - Hospice: reports appetite was good, but not does not taste the food much. Denies pain.   -  ===============================================  CODE STATUS/ADVANCE DIRECTIVES DISCUSSION:  Prior   ALLERGIES:   Allergies   Allergen Reactions    No Known Drug Allergy       PAST MEDICAL HISTORY:   Past Medical History:   Diagnosis Date    Arthritis     Ascending aortic aneurysm (H24)     repaired with Hemashield graft 7/2014    Asthma     Complete AV block (H)     sp CRTP implant    Congestive heart failure (H)      Coronary artery disease     mild-moderate stenosis by angiography    H/O aortic valve replacement     bioprosthetic, 7/2014    Hypertension     Malignant neoplasm of prostate (H)     Prostate cancer    Permanent atrial fibrillation (H)     chronic      PAST SURGICAL HISTORY:   has a past surgical history that includes NONSPECIFIC PROCEDURE; NONSPECIFIC PROCEDURE; orthopedic surgery; Abdomen surgery (2001); TOTAL HIP ARTHROPLASTY (1/21/13); Arthroplasty hip (1/21/2013); s/p avr; s/p aneurysm repair by Dr. Friedman; Replace valve aortic (7/17/2014); Repair aneurysm ascending aorta (7/17/2014); Phacoemulsification with standard intraocular lens implant (Right, 10/6/2016); and Phacoemulsification with standard intraocular lens implant (Left, 10/20/2016).  FAMILY HISTORY: family history includes Asthma in his daughter, father, and paternal grandmother.  SOCIAL HISTORY:   reports that he has never smoked. He has never used smokeless tobacco. He reports current alcohol use. He reports that he does not use drugs.  Patient's living condition: lives in an assisted living facility    Post Discharge Medication Reconciliation Status:   MED REC REQUIRED  Post Medication Reconciliation Status: discharge medications reconciled and changed, per note/orders       Current Outpatient Medications   Medication Sig    bicalutamide (CASODEX) 50 MG tablet TAKE ONE TABLET BY MOUTH ONCE DAILY    cycloSPORINE (RESTASIS) 0.05 % ophthalmic emulsion Place 1 drop into both eyes 2 times daily as needed for dry eyes    fluticasone (FLOVENT HFA) 44 MCG/ACT inhaler Inhale 2 puffs into the lungs every 6 hours as needed    furosemide (LASIX) 40 MG tablet Take 40 mg by mouth daily    losartan (COZAAR) 50 MG tablet Take 50 mg by mouth daily    simvastatin (ZOCOR) 40 MG tablet TAKE ONE TABLET BY MOUTH EVERY NIGHT AT BEDTIME    SM CLEARLAX 17 GM/SCOOP powder STIR 17 GM OF POWDER (SEE ROBERTO INSIDE CAP) IN 8-OZ OF LIQUID UNTIL COMPLETELY DISSOLVED. DRINK THE  "SOLUTION DAILY AS NEEDED FOR CONSTIPATION    albuterol (PROAIR HFA/PROVENTIL HFA/VENTOLIN HFA) 108 (90 Base) MCG/ACT inhaler Inhale 2 puffs into the lungs every 6 hours as needed for shortness of breath, wheezing or cough    BETA BLOCKER NOT PRESCRIBED (INTENTIONAL) Please choose reason not prescribed from choices below.    fluticasone (FLOVENT HFA) 44 MCG/ACT inhaler INHALE ONE PUFF BY MOUTH TWICE A DAY    furosemide (LASIX) 20 MG tablet Take 1 tablet (20 mg) by mouth daily    losartan (COZAAR) 100 MG tablet TAKE ONE-HALF TABLET BY MOUTH TWICE A DAY    sodium chloride (OCEAN) 0.65 % nasal spray Spray 1 spray into both nostrils 3 times daily as needed for congestion    warfarin ANTICOAGULANT (JANTOVEN ANTICOAGULANT) 2.5 MG tablet TAKE ONE AND ONE-HALF TABLETS BY MOUTH EVERY MONDAY . THEN TAKE ONE TABLET BY MOUTH ALL OTHER DAYS. OR AS DIRECTED BY THE COUMADIN CLINIC     Current Facility-Administered Medications   Medication    leuprolide (4 Month) (ELIGARD) injection 30 mg       ROS:    Pubic couple minutes need to finish this note before  I have personally  10 point ROS of systems including Constitutional, Eyes, Respiratory, Cardiovascular, Gastroenterology, Genitourinary, Integumentary, Musculoskeletal, Psychiatric were all negative except for pertinent positives noted in my HPI.    Vitals:  BP (!) 86/54   Pulse 62   Temp 97.8  F (36.6  C)   Resp 18   Ht 1.854 m (6' 1\")   Wt 84.5 kg (186 lb 3.2 oz)   SpO2 94%   BMI 24.57 kg/m    Exam:  GENERAL APPEARANCE:  in no distress, lying down in the recliner.   RESP:  Unlabored breathing. Diminished BS, expiratory course sounds.   CV:  S1S2 audible, regular HR, no murmur appreciated.   ABDOMEN:  soft, NT/ND, BS audible.   M/S: 1+ pitting pedal edema b/l, traces in the legs.   SKIN:  No rash noted on observation  NEURO:   No NFD appreciated on observation.   PSYCH:  affect and mood normal    Lab/Diagnostic data: no new lab "       ASSESSMENT/PLAN:  ---------------------------  Longstanding persistent atrial fibrillation (H)  Chronic heart failure with preserved ejection fraction (H)  Orthostatic hypotension  Hyperlipidemia LDL goal <100  Chronic respiratory failure, unspecified whether with hypoxia or hypercapnia.   - comfort ,care .   BP Readings from Last 3 Encounters:   04/02/24 (!) 86/54   03/13/24 116/68   03/11/24 125/80   - will discontinue  losartan and statin    Malignant neoplasm of prostate (H)      Mild intermittent asthma without status asthmaticus without complication  History of COVID-19    Hospice care patient  Appreciate collaboration with  hospice team for symptom management in collaboration with cares for maximum comfort at end-of-life    Electronically signed by:  Meenu Brooks MD

## 2024-04-02 NOTE — LETTER
4/2/2024        RE: Miguel Patten  [facility]  [address]  [facility] [address]        [facility] GERIATRICS    PRIMARY CARE PROVIDER AND CLINIC:  Celestine Martinez NP, [address] [facility] [address]  Chief Complaint   Patient presents with     [facility] Medical Record Number:  [id number]  Place of Service where encounter took place:  [facility] ([place], [place]) () [[id number]]    Miguel Patten  is a 89 year old  (12/16/1932), admitted to the above facility from [facility] on 3/28/24..   HPI:    Patient with PMH pertinent for HFpEF, status post AVR, status post ascending aortic replacement, permanent A-fib, long-term current use of anticoagulation therapy, essential hypertension, malignant neoplasm of prostate.    -Patient was seen on the ED on 2/26 with shortness of breath after recent COVID infection he was found to have a BNP over 8K and INR over 6 and checks x-ray showed bilateral small pleural effusion.  Patient's symptoms improved in the ER, dismissed on a few days of Lasix with plan to follow-up with cardiology Coumadin dose adjusted.    - Patient was seen at the ED on 3/11 with increased shortness of breath.  Was found to have CHF exacerbation.  IV diuretics and increasing Lasix dose were recommended but patient declined and discharged back to assisted living facility.    -Patient referred to hospice and transition to this facility.      Today:  - Cardiac: denies chest pain. Reports now on O2 all the time, at home used to prn as needed.   - Hospice: reports appetite was good, but not does not taste the food much. Denies pain.   -  ===============================================  CODE STATUS/ADVANCE DIRECTIVES DISCUSSION:  Prior   ALLERGIES:   Allergies   Allergen Reactions     No Known Drug Allergy       PAST MEDICAL HISTORY:   Past Medical History:   Diagnosis Date     Arthritis      Ascending aortic aneurysm (H24)     repaired with  Hemashield graft 7/2014     Asthma      Complete AV block (H)     sp CRTP implant     Congestive heart failure (H)      Coronary artery disease     mild-moderate stenosis by angiography     H/O aortic valve replacement     bioprosthetic, 7/2014     Hypertension      Malignant neoplasm of prostate (H)     Prostate cancer     Permanent atrial fibrillation (H)     chronic      PAST SURGICAL HISTORY:   has a past surgical history that includes NONSPECIFIC PROCEDURE; NONSPECIFIC PROCEDURE; orthopedic surgery; Abdomen surgery (2001); TOTAL HIP ARTHROPLASTY (1/21/13); Arthroplasty hip (1/21/2013); s/p avr; s/p aneurysm repair by Dr. Friedman; Replace valve aortic (7/17/2014); Repair aneurysm ascending aorta (7/17/2014); Phacoemulsification with standard intraocular lens implant (Right, 10/6/2016); and Phacoemulsification with standard intraocular lens implant (Left, 10/20/2016).  FAMILY HISTORY: family history includes Asthma in his daughter, father, and paternal grandmother.  SOCIAL HISTORY:   reports that he has never smoked. He has never used smokeless tobacco. He reports current alcohol use. He reports that he does not use drugs.  Patient's living condition: lives in an assisted living facility    Post Discharge Medication Reconciliation Status:   MED REC REQUIRED  Post Medication Reconciliation Status: discharge medications reconciled and changed, per note/orders       Current Outpatient Medications   Medication Sig     bicalutamide (CASODEX) 50 MG tablet TAKE ONE TABLET BY MOUTH ONCE DAILY     cycloSPORINE (RESTASIS) 0.05 % ophthalmic emulsion Place 1 drop into both eyes 2 times daily as needed for dry eyes     fluticasone (FLOVENT HFA) 44 MCG/ACT inhaler Inhale 2 puffs into the lungs every 6 hours as needed     furosemide (LASIX) 40 MG tablet Take 40 mg by mouth daily     losartan (COZAAR) 50 MG tablet Take 50 mg by mouth daily     simvastatin (ZOCOR) 40 MG tablet TAKE ONE TABLET BY MOUTH EVERY NIGHT AT BEDTIME     SM  "CLEARLAX 17 GM/SCOOP powder STIR 17 GM OF POWDER (SEE ROBERTO INSIDE CAP) IN 8-OZ OF LIQUID UNTIL COMPLETELY DISSOLVED. DRINK THE SOLUTION DAILY AS NEEDED FOR CONSTIPATION     albuterol (PROAIR HFA/PROVENTIL HFA/VENTOLIN HFA) 108 (90 Base) MCG/ACT inhaler Inhale 2 puffs into the lungs every 6 hours as needed for shortness of breath, wheezing or cough     BETA BLOCKER NOT PRESCRIBED (INTENTIONAL) Please choose reason not prescribed from choices below.     fluticasone (FLOVENT HFA) 44 MCG/ACT inhaler INHALE ONE PUFF BY MOUTH TWICE A DAY     furosemide (LASIX) 20 MG tablet Take 1 tablet (20 mg) by mouth daily     losartan (COZAAR) 100 MG tablet TAKE ONE-HALF TABLET BY MOUTH TWICE A DAY     sodium chloride (OCEAN) 0.65 % nasal spray Spray 1 spray into both nostrils 3 times daily as needed for congestion     warfarin ANTICOAGULANT (JANTOVEN ANTICOAGULANT) 2.5 MG tablet TAKE ONE AND ONE-HALF TABLETS BY MOUTH EVERY MONDAY . THEN TAKE ONE TABLET BY MOUTH ALL OTHER DAYS. OR AS DIRECTED BY THE COUMADIN CLINIC     Current Facility-Administered Medications   Medication     leuprolide (4 Month) (ELIGARD) injection 30 mg       ROS:    Pubic couple minutes need to finish this note before  I have personally  10 point ROS of systems including Constitutional, Eyes, Respiratory, Cardiovascular, Gastroenterology, Genitourinary, Integumentary, Musculoskeletal, Psychiatric were all negative except for pertinent positives noted in my HPI.    Vitals:  BP (!) 86/54   Pulse 62   Temp 97.8  F (36.6  C)   Resp 18   Ht 1.854 m (6' 1\")   Wt 84.5 kg (186 lb 3.2 oz)   SpO2 94%   BMI 24.57 kg/m    Exam:  GENERAL APPEARANCE:  in no distress, lying down in the recliner.   RESP:  Unlabored breathing. Diminished BS, expiratory course sounds.   CV:  S1S2 audible, regular HR, no murmur appreciated.   ABDOMEN:  soft, NT/ND, BS audible.   M/S: 1+ pitting pedal edema b/l, traces in the legs.   SKIN:  No rash noted on observation  NEURO:   No NFD " appreciated on observation.   PSYCH:  affect and mood normal    Lab/Diagnostic data: no new lab       ASSESSMENT/PLAN:  ---------------------------  Longstanding persistent atrial fibrillation (H)  Chronic heart failure with preserved ejection fraction (H)  Orthostatic hypotension  Hyperlipidemia LDL goal <100  Chronic respiratory failure, unspecified whether with hypoxia or hypercapnia.   - comfort ,care .   BP Readings from Last 3 Encounters:   04/02/24 (!) 86/54   03/13/24 116/68   03/11/24 125/80   - will discontinue  losartan and statin    Malignant neoplasm of prostate (H)      Mild intermittent asthma without status asthmaticus without complication  History of COVID-19    Hospice care patient  Appreciate collaboration with  hospice team for symptom management in collaboration with cares for maximum comfort at end-of-life    Electronically signed by:  Meenu Brooks MD                     Sincerely,        Meenu Brooks MD

## 2024-04-05 NOTE — PROGRESS NOTES
ANTICOAGULATION  MANAGEMENT    Miguel Patten is being discharged from the [facility] Anticoagulation [facility] (ACC).    Reason for discharge: care has been transferred to LTC and hospice.    Anticoagulation episode resolved, ACC referral closed, and Standing order discontinued        Elio Cuevas RN, BSN, N  Anticoagulation Clinic   [phone]

## 2024-05-14 PROBLEM — Z51.5 HOSPICE CARE PATIENT: Status: ACTIVE | Noted: 2024-01-01

## 2024-05-14 NOTE — PROGRESS NOTES
[facility] GERIATRICS    Chief Complaint   Patient presents with    RECHECK     HPI:  Miguel Patten is a 89 year old  (12/16/1932), who is being seen today for an episodic care visit at: [facility] () [[id number]].     This is a 89-year-old male with past medical history of AVR, status post ascending aortic replacement, permanent A-fib, essential hypertension, CHF, malignant neoplasm of prostate now on hospice and long-term care.    Today's concern is:   Hospice, hypotension    Allergies, and PMH/PSH reviewed in [facility] today.  REVIEW OF SYSTEMS:  4 point ROS including Respiratory, CV, GI and , other than that noted in the HPI,  is negative    Objective:   BP 96/56   Pulse 68   Temp 97.8  F (36.6  C)   Resp 16   Ht 1.829 m (6')   Wt 84.4 kg (186 lb)   SpO2 95%   BMI 25.23 kg/m    GENERAL APPEARANCE:  in no distress, cooperative, denies pain  RESP:  diminished breath sounds bilaterally, 2 L nasal cannula  CV:  irregular rhythm per A-fib with rate controlled  PSYCH:  insight and judgement impaired    Patient is on hospice/palliative care and labs are not recommended    Assessment/Plan:    (Z51.5) Hospice care patient    Initiated 3/2024    Pain  Continue morphine 5 mg every 4 hours as needed, denies pain    (Z95.2) S/P AVR (aortic valve replacement)  (Z95.828) S/P ascending aortic replacement  (I50.22) Chronic systolic congestive heart failure (H)  Per hypotension we will discontinue losartan, continue Lasix 40 mg daily.  SBP 90-120s, HR <70s    Mild intermittent asthma, without complication  History of COVID-19  Currently on 2 L nasal cannula    (I48.21) Permanent atrial fibrillation (H)  Not on AC    Constipation  Continue MiraLAX daily as needed    Orders:  Discontinue losartan    Electronically signed by: Celestine Martinez NP

## 2024-05-14 NOTE — LETTER
5/14/2024        RE: Miguel Patten  [facility]  [address]  [facility] [address]        [facility] GERIATRICS    Chief Complaint   Patient presents with     RECHECK     HPI:  Miguel Patten is a 89 year old  (12/16/1932), who is being seen today for an episodic care visit at: [facility] () [[id number]].     This is a 89-year-old male with past medical history of AVR, status post ascending aortic replacement, permanent A-fib, essential hypertension, CHF, malignant neoplasm of prostate now on hospice and long-term care.    Today's concern is:   Hospice, hypotension    Allergies, and PMH/PSH reviewed in [facility] today.  REVIEW OF SYSTEMS:  4 point ROS including Respiratory, CV, GI and , other than that noted in the HPI,  is negative    Objective:   BP 96/56   Pulse 68   Temp 97.8  F (36.6  C)   Resp 16   Ht 1.829 m (6')   Wt 84.4 kg (186 lb)   SpO2 95%   BMI 25.23 kg/m    GENERAL APPEARANCE:  in no distress, cooperative, denies pain  RESP:  diminished breath sounds bilaterally, 2 L nasal cannula  CV:  irregular rhythm per A-fib with rate controlled  PSYCH:  insight and judgement impaired    Patient is on hospice/palliative care and labs are not recommended    Assessment/Plan:    (Z51.5) Hospice care patient    Initiated 3/2024    Pain  Continue morphine 5 mg every 4 hours as needed, denies pain    (Z95.2) S/P AVR (aortic valve replacement)  (Z95.828) S/P ascending aortic replacement  (I50.22) Chronic systolic congestive heart failure (H)  Per hypotension we will discontinue losartan, continue Lasix 40 mg daily.  SBP 90-120s, HR <70s    Mild intermittent asthma, without complication  History of COVID-19  Currently on 2 L nasal cannula    (I48.21) Permanent atrial fibrillation (H)  Not on AC    Constipation  Continue MiraLAX daily as needed    Orders:  Discontinue losartan    Electronically signed by: Celestine Martinez NP         Sincerely,        Celestine Maritnez,  NP

## 2024-05-28 NOTE — TELEPHONE ENCOUNTER
[facility]iatric[facility] Triage Nurse Telephone Encounter    Provider: Celestine Martinez NP   Facility: [facility] Facility Type:  LTC    Caller: Dorene  Call Back Number: [phone]    Allergies:    Allergies   Allergen Reactions    No Known Drug Allergy         Reason for call: Nurse is reporting that patient's right eye appears infected again.  The sclera is red with yellow drainage.  Patient appears to be itching his eye.  Nurse is also reporting that patient has swelling in the left hand.  He does have a ring on his 4th finger on the left hand and staff is able to twirl it, but they're not able to remove it.  Staff is elevating the left hand and applying ice to decrease swelling.  Of note, patient is on hospice.      Verbal Order/Direction given by Provider: Tobradex 0.3%-0.1% solution---apply 2 drops to the right eye QID x 7 days for conjunctivitis.  Contact hospice about the left hand swelling and to discuss about possibly removing the ring.      Provider giving Order:  Celestine Martinez NP     Verbal Order given to: Dorene Harrington RN
